# Patient Record
Sex: FEMALE | Race: WHITE | NOT HISPANIC OR LATINO | URBAN - METROPOLITAN AREA
[De-identification: names, ages, dates, MRNs, and addresses within clinical notes are randomized per-mention and may not be internally consistent; named-entity substitution may affect disease eponyms.]

---

## 2017-07-08 ENCOUNTER — INPATIENT (INPATIENT)
Facility: HOSPITAL | Age: 77
LOS: 3 days | Discharge: NURSING, REHAB OR LONG-TERM CARE | End: 2017-07-12
Attending: INTERNAL MEDICINE
Payer: MEDICARE

## 2017-07-08 VITALS
DIASTOLIC BLOOD PRESSURE: 92 MMHG | SYSTOLIC BLOOD PRESSURE: 158 MMHG | WEIGHT: 128.97 LBS | RESPIRATION RATE: 18 BRPM | HEART RATE: 75 BPM | HEIGHT: 66 IN | OXYGEN SATURATION: 100 % | TEMPERATURE: 98 F

## 2017-07-08 DIAGNOSIS — Z96.653 PRESENCE OF ARTIFICIAL KNEE JOINT, BILATERAL: Chronic | ICD-10-CM

## 2017-07-08 DIAGNOSIS — Z98.1 ARTHRODESIS STATUS: Chronic | ICD-10-CM

## 2017-07-08 DIAGNOSIS — Z98.89 OTHER SPECIFIED POSTPROCEDURAL STATES: Chronic | ICD-10-CM

## 2017-07-08 LAB
ABO RH CONFIRMATION: SIGNIFICANT CHANGE UP
ALBUMIN SERPL ELPH-MCNC: 3.5 G/DL — SIGNIFICANT CHANGE UP (ref 3.3–5)
ALP SERPL-CCNC: 51 U/L — SIGNIFICANT CHANGE UP (ref 40–120)
ALT FLD-CCNC: 31 U/L — SIGNIFICANT CHANGE UP (ref 12–78)
ANION GAP SERPL CALC-SCNC: 8 MMOL/L — SIGNIFICANT CHANGE UP (ref 5–17)
APPEARANCE UR: CLEAR — SIGNIFICANT CHANGE UP
APTT BLD: 29.4 SEC — SIGNIFICANT CHANGE UP (ref 27.5–37.4)
AST SERPL-CCNC: 37 U/L — SIGNIFICANT CHANGE UP (ref 15–37)
BACTERIA # UR AUTO: (no result)
BASOPHILS # BLD AUTO: 0.1 K/UL — SIGNIFICANT CHANGE UP (ref 0–0.2)
BASOPHILS NFR BLD AUTO: 2.1 % — HIGH (ref 0–2)
BILIRUB SERPL-MCNC: 0.4 MG/DL — SIGNIFICANT CHANGE UP (ref 0.2–1.2)
BILIRUB UR-MCNC: NEGATIVE — SIGNIFICANT CHANGE UP
BLD GP AB SCN SERPL QL: SIGNIFICANT CHANGE UP
BUN SERPL-MCNC: 17 MG/DL — SIGNIFICANT CHANGE UP (ref 7–23)
CALCIUM SERPL-MCNC: 8.7 MG/DL — SIGNIFICANT CHANGE UP (ref 8.5–10.1)
CHLORIDE SERPL-SCNC: 99 MMOL/L — SIGNIFICANT CHANGE UP (ref 96–108)
CO2 SERPL-SCNC: 26 MMOL/L — SIGNIFICANT CHANGE UP (ref 22–31)
COLOR SPEC: YELLOW — SIGNIFICANT CHANGE UP
CREAT SERPL-MCNC: 0.92 MG/DL — SIGNIFICANT CHANGE UP (ref 0.5–1.3)
DIFF PNL FLD: NEGATIVE — SIGNIFICANT CHANGE UP
EOSINOPHIL # BLD AUTO: 0.2 K/UL — SIGNIFICANT CHANGE UP (ref 0–0.5)
EOSINOPHIL NFR BLD AUTO: 4.1 % — SIGNIFICANT CHANGE UP (ref 0–6)
EPI CELLS # UR: SIGNIFICANT CHANGE UP
GLUCOSE SERPL-MCNC: 89 MG/DL — SIGNIFICANT CHANGE UP (ref 70–99)
GLUCOSE UR QL: NEGATIVE MG/DL — SIGNIFICANT CHANGE UP
HCT VFR BLD CALC: 39.6 % — SIGNIFICANT CHANGE UP (ref 34.5–45)
HGB BLD-MCNC: 13.1 G/DL — SIGNIFICANT CHANGE UP (ref 11.5–15.5)
INR BLD: 0.95 RATIO — SIGNIFICANT CHANGE UP (ref 0.88–1.16)
KETONES UR-MCNC: NEGATIVE — SIGNIFICANT CHANGE UP
LEUKOCYTE ESTERASE UR-ACNC: (no result)
LYMPHOCYTES # BLD AUTO: 1.6 K/UL — SIGNIFICANT CHANGE UP (ref 1–3.3)
LYMPHOCYTES # BLD AUTO: 27.6 % — SIGNIFICANT CHANGE UP (ref 13–44)
MCHC RBC-ENTMCNC: 32.6 PG — SIGNIFICANT CHANGE UP (ref 27–34)
MCHC RBC-ENTMCNC: 33.2 GM/DL — SIGNIFICANT CHANGE UP (ref 32–36)
MCV RBC AUTO: 98.2 FL — SIGNIFICANT CHANGE UP (ref 80–100)
MONOCYTES # BLD AUTO: 0.7 K/UL — SIGNIFICANT CHANGE UP (ref 0–0.9)
MONOCYTES NFR BLD AUTO: 12 % — SIGNIFICANT CHANGE UP (ref 2–14)
NEUTROPHILS # BLD AUTO: 3.2 K/UL — SIGNIFICANT CHANGE UP (ref 1.8–7.4)
NEUTROPHILS NFR BLD AUTO: 54.2 % — SIGNIFICANT CHANGE UP (ref 43–77)
NITRITE UR-MCNC: NEGATIVE — SIGNIFICANT CHANGE UP
PH UR: 7 — SIGNIFICANT CHANGE UP (ref 5–8)
PLATELET # BLD AUTO: 177 K/UL — SIGNIFICANT CHANGE UP (ref 150–400)
POTASSIUM SERPL-MCNC: 4.3 MMOL/L — SIGNIFICANT CHANGE UP (ref 3.5–5.3)
POTASSIUM SERPL-SCNC: 4.3 MMOL/L — SIGNIFICANT CHANGE UP (ref 3.5–5.3)
PROT SERPL-MCNC: 6.9 GM/DL — SIGNIFICANT CHANGE UP (ref 6–8.3)
PROT UR-MCNC: NEGATIVE MG/DL — SIGNIFICANT CHANGE UP
PROTHROM AB SERPL-ACNC: 10.2 SEC — SIGNIFICANT CHANGE UP (ref 9.8–12.7)
RBC # BLD: 4.03 M/UL — SIGNIFICANT CHANGE UP (ref 3.8–5.2)
RBC # FLD: 11.6 % — SIGNIFICANT CHANGE UP (ref 10.3–14.5)
RBC CASTS # UR COMP ASSIST: SIGNIFICANT CHANGE UP /HPF (ref 0–4)
SODIUM SERPL-SCNC: 133 MMOL/L — LOW (ref 135–145)
SP GR SPEC: 1 — LOW (ref 1.01–1.02)
TROPONIN I SERPL-MCNC: <0.015 NG/ML — SIGNIFICANT CHANGE UP (ref 0.01–0.04)
TYPE + AB SCN PNL BLD: SIGNIFICANT CHANGE UP
UROBILINOGEN FLD QL: NEGATIVE MG/DL — SIGNIFICANT CHANGE UP
WBC # BLD: 5.8 K/UL — SIGNIFICANT CHANGE UP (ref 3.8–10.5)
WBC # FLD AUTO: 5.8 K/UL — SIGNIFICANT CHANGE UP (ref 3.8–10.5)
WBC UR QL: SIGNIFICANT CHANGE UP

## 2017-07-08 PROCEDURE — 70450 CT HEAD/BRAIN W/O DYE: CPT | Mod: 26

## 2017-07-08 PROCEDURE — 73552 X-RAY EXAM OF FEMUR 2/>: CPT | Mod: 26

## 2017-07-08 PROCEDURE — 71010: CPT | Mod: 26

## 2017-07-08 PROCEDURE — 99285 EMERGENCY DEPT VISIT HI MDM: CPT

## 2017-07-08 PROCEDURE — 73560 X-RAY EXAM OF KNEE 1 OR 2: CPT | Mod: 26,LT

## 2017-07-08 PROCEDURE — 73610 X-RAY EXAM OF ANKLE: CPT | Mod: 26,RT

## 2017-07-08 PROCEDURE — 72125 CT NECK SPINE W/O DYE: CPT | Mod: 26

## 2017-07-08 PROCEDURE — 93010 ELECTROCARDIOGRAM REPORT: CPT

## 2017-07-08 PROCEDURE — 73501 X-RAY EXAM HIP UNI 1 VIEW: CPT | Mod: 26

## 2017-07-08 RX ORDER — HYDROMORPHONE HYDROCHLORIDE 2 MG/ML
1 INJECTION INTRAMUSCULAR; INTRAVENOUS; SUBCUTANEOUS ONCE
Qty: 0 | Refills: 0 | Status: DISCONTINUED | OUTPATIENT
Start: 2017-07-08 | End: 2017-07-08

## 2017-07-08 RX ORDER — CARVEDILOL PHOSPHATE 80 MG/1
3.12 CAPSULE, EXTENDED RELEASE ORAL EVERY 12 HOURS
Qty: 0 | Refills: 0 | Status: DISCONTINUED | OUTPATIENT
Start: 2017-07-08 | End: 2017-07-09

## 2017-07-08 RX ORDER — MORPHINE SULFATE 50 MG/1
4 CAPSULE, EXTENDED RELEASE ORAL EVERY 4 HOURS
Qty: 0 | Refills: 0 | Status: DISCONTINUED | OUTPATIENT
Start: 2017-07-08 | End: 2017-07-09

## 2017-07-08 RX ORDER — HEPARIN SODIUM 5000 [USP'U]/ML
5000 INJECTION INTRAVENOUS; SUBCUTANEOUS EVERY 8 HOURS
Qty: 0 | Refills: 0 | Status: DISCONTINUED | OUTPATIENT
Start: 2017-07-08 | End: 2017-07-08

## 2017-07-08 RX ORDER — HEPARIN SODIUM 5000 [USP'U]/ML
5000 INJECTION INTRAVENOUS; SUBCUTANEOUS EVERY 8 HOURS
Qty: 0 | Refills: 0 | Status: COMPLETED | OUTPATIENT
Start: 2017-07-08 | End: 2017-07-08

## 2017-07-08 RX ORDER — SODIUM CHLORIDE 9 MG/ML
1000 INJECTION INTRAMUSCULAR; INTRAVENOUS; SUBCUTANEOUS
Qty: 0 | Refills: 0 | Status: DISCONTINUED | OUTPATIENT
Start: 2017-07-08 | End: 2017-07-09

## 2017-07-08 RX ORDER — DOCUSATE SODIUM 100 MG
100 CAPSULE ORAL THREE TIMES A DAY
Qty: 0 | Refills: 0 | Status: DISCONTINUED | OUTPATIENT
Start: 2017-07-08 | End: 2017-07-09

## 2017-07-08 RX ORDER — DULOXETINE HYDROCHLORIDE 30 MG/1
30 CAPSULE, DELAYED RELEASE ORAL DAILY
Qty: 0 | Refills: 0 | Status: DISCONTINUED | OUTPATIENT
Start: 2017-07-08 | End: 2017-07-09

## 2017-07-08 RX ORDER — OXYCODONE HYDROCHLORIDE 5 MG/1
5 TABLET ORAL EVERY 4 HOURS
Qty: 0 | Refills: 0 | Status: DISCONTINUED | OUTPATIENT
Start: 2017-07-08 | End: 2017-07-09

## 2017-07-08 RX ORDER — HYDROXYCHLOROQUINE SULFATE 200 MG
200 TABLET ORAL DAILY
Qty: 0 | Refills: 0 | Status: DISCONTINUED | OUTPATIENT
Start: 2017-07-08 | End: 2017-07-09

## 2017-07-08 RX ORDER — SODIUM CHLORIDE 9 MG/ML
500 INJECTION INTRAMUSCULAR; INTRAVENOUS; SUBCUTANEOUS ONCE
Qty: 0 | Refills: 0 | Status: COMPLETED | OUTPATIENT
Start: 2017-07-08 | End: 2017-07-08

## 2017-07-08 RX ORDER — METOCLOPRAMIDE HCL 10 MG
10 TABLET ORAL EVERY 6 HOURS
Qty: 0 | Refills: 0 | Status: DISCONTINUED | OUTPATIENT
Start: 2017-07-08 | End: 2017-07-09

## 2017-07-08 RX ORDER — ONDANSETRON 8 MG/1
4 TABLET, FILM COATED ORAL ONCE
Qty: 0 | Refills: 0 | Status: COMPLETED | OUTPATIENT
Start: 2017-07-08 | End: 2017-07-08

## 2017-07-08 RX ORDER — OXYCODONE HYDROCHLORIDE 5 MG/1
10 TABLET ORAL EVERY 4 HOURS
Qty: 0 | Refills: 0 | Status: DISCONTINUED | OUTPATIENT
Start: 2017-07-08 | End: 2017-07-09

## 2017-07-08 RX ORDER — SODIUM CHLORIDE 9 MG/ML
1000 INJECTION INTRAMUSCULAR; INTRAVENOUS; SUBCUTANEOUS
Qty: 0 | Refills: 0 | Status: DISCONTINUED | OUTPATIENT
Start: 2017-07-08 | End: 2017-07-08

## 2017-07-08 RX ADMIN — SODIUM CHLORIDE 500 MILLILITER(S): 9 INJECTION INTRAMUSCULAR; INTRAVENOUS; SUBCUTANEOUS at 17:16

## 2017-07-08 RX ADMIN — HYDROMORPHONE HYDROCHLORIDE 1 MILLIGRAM(S): 2 INJECTION INTRAMUSCULAR; INTRAVENOUS; SUBCUTANEOUS at 16:10

## 2017-07-08 RX ADMIN — MORPHINE SULFATE 4 MILLIGRAM(S): 50 CAPSULE, EXTENDED RELEASE ORAL at 22:20

## 2017-07-08 RX ADMIN — HEPARIN SODIUM 5000 UNIT(S): 5000 INJECTION INTRAVENOUS; SUBCUTANEOUS at 22:52

## 2017-07-08 RX ADMIN — HYDROMORPHONE HYDROCHLORIDE 1 MILLIGRAM(S): 2 INJECTION INTRAMUSCULAR; INTRAVENOUS; SUBCUTANEOUS at 18:40

## 2017-07-08 RX ADMIN — MORPHINE SULFATE 4 MILLIGRAM(S): 50 CAPSULE, EXTENDED RELEASE ORAL at 22:06

## 2017-07-08 RX ADMIN — ONDANSETRON 4 MILLIGRAM(S): 8 TABLET, FILM COATED ORAL at 16:10

## 2017-07-08 RX ADMIN — HYDROMORPHONE HYDROCHLORIDE 1 MILLIGRAM(S): 2 INJECTION INTRAMUSCULAR; INTRAVENOUS; SUBCUTANEOUS at 16:40

## 2017-07-08 RX ADMIN — SODIUM CHLORIDE 75 MILLILITER(S): 9 INJECTION INTRAMUSCULAR; INTRAVENOUS; SUBCUTANEOUS at 19:14

## 2017-07-08 RX ADMIN — HYDROMORPHONE HYDROCHLORIDE 1 MILLIGRAM(S): 2 INJECTION INTRAMUSCULAR; INTRAVENOUS; SUBCUTANEOUS at 18:34

## 2017-07-08 RX ADMIN — HYDROMORPHONE HYDROCHLORIDE 1 MILLIGRAM(S): 2 INJECTION INTRAMUSCULAR; INTRAVENOUS; SUBCUTANEOUS at 18:48

## 2017-07-08 NOTE — ED PROVIDER NOTE - OBJECTIVE STATEMENT
70 y/o F w/ mitral valve replacement, OA, RA and bilat knee surgeries 2010 and 2012 presents to ED s/p fall c/o left hip pain. Pt states she was walking on carpet with floppy shoes and fell down hitting her head on a basket.  Pt has no other complaints and denies SOB, CP, fever/chills, n/v/d and HA. Pt takes plavix. 70 y/o F w/ mitral valve replacement, OA, RA and bilat knee surgeries 2010 and 2012 presents to ED s/p fall c/o left hip pain. Pt states she was walking on carpet with floppy shoes and fell down hitting her head on a basket.  Pt has no other complaints and denies SOB, CP, fever/chills, n/v/d and HA. Pt takes Plavix.

## 2017-07-08 NOTE — ED ADULT NURSE REASSESSMENT NOTE - NS ED NURSE REASSESS COMMENT FT1
Pt care received at 1900 from ELIA Abel. Pt resting comfortably in bed. No c/o pain. No acute distress noted at this time. VSS.  at bedside. Awaiting admitted orders.

## 2017-07-08 NOTE — ED ADULT NURSE NOTE - CHPI ED SYMPTOMS NEG
Walk in no loss of consciousness/no fever/no tingling/no numbness/no bleeding/no weakness/no abrasion/no vomiting/no confusion

## 2017-07-08 NOTE — CONSULT NOTE ADULT - SUBJECTIVE AND OBJECTIVE BOX
77y Female community ambulatory presents c/o L hip pain and inability to ambulate sp mechanical fall. Admits to hitting her head but no LOC. Denies numbness/tingling. Denies fever/chills. Denies pain/injury elsewhere.     HEALTH ISSUES - PROBLEM Dx:  Breast Ca (surgery in 1990)  HLD  MV replacement  RA  PSF (2005)  B/l TKA       MEDICATIONS  (STANDING):  sodium chloride 0.9%. 1000 milliLiter(s) IV Continuous <Continuous>  heparin  Injectable 5000 Unit(s) SubCutaneous every 8 hours    Allergies:     No Known Allergies                          13.1   5.8   )-----------( 177      ( 08 Jul 2017 17:14 )             39.6     08 Jul 2017 17:14    133    |  99     |  17     ----------------------------<  89     4.3     |  26     |  0.92     Ca    8.7        08 Jul 2017 17:14    TPro  6.9    /  Alb  3.5    /  TBili  0.4    /  DBili  x      /  AST  37     /  ALT  31     /  AlkPhos  51     08 Jul 2017 17:14    PT/INR - ( 08 Jul 2017 17:14 )   PT: 10.2 sec;   INR: 0.95 ratio         PTT - ( 08 Jul 2017 17:14 )  PTT:29.4 sec  Vital Signs Last 24 Hrs  T(C): 36.8 (07-08-17 @ 15:42), Max: 36.8 (07-08-17 @ 15:42)  T(F): 98.2 (07-08-17 @ 15:42), Max: 98.2 (07-08-17 @ 15:42)  HR: 75 (07-08-17 @ 15:42) (75 - 75)  BP: 158/92 (07-08-17 @ 15:42) (158/92 - 158/92)  BP(mean): --  RR: 18 (07-08-17 @ 15:42) (18 - 18)  SpO2: 100% (07-08-17 @ 15:42) (100% - 100%)    Imaging: XR demonstates displaced L femoral neck fracture  Physical Exam  Gen: NAD, A&Ox3  HEENT: NC/AT, No csp ttp  LLE: skin intact, short/ER leg, unable to SLR R/L LE, + log roll R/L LE, +ttp hip/groin, no ttp elsewhere, +ehl/fhl/ta/gs function, SILT L3-S1, no calf ttp, dp/pt pulse intact, compartments soft  Secondary survey: benign, nv intact, able to SLR contralateral leg, negative log roll contralateral leg, no bony ttp elsewhere    A/P: 77y Female with L femoral neck fx  Pain control  NWB LLE, bedrest  FU labs/preop testing  NPO and hold sqh after midnight  Check vitamin D levels  Ca/Vit D supplementation  Outpt osteoporosis workup  Admit to medical team  Medical clearance/optimization for OR  Plan for OR tomorrow  Discussed with Dr Mcleod, agrees with above plan

## 2017-07-08 NOTE — H&P ADULT - NSHPREVIEWOFSYSTEMS_GEN_ALL_CORE
REVIEW OF SYSTEMS:    CONSTITUTIONAL: No weakness, fevers or chills  EYES/ENT: No visual changes;  No vertigo or throat pain   NECK: No pain or stiffness  RESPIRATORY: No cough, wheezing, hemoptysis; No shortness of breath  CARDIOVASCULAR: No chest pain or palpitations  GASTROINTESTINAL: No abdominal or epigastric pain. No nausea, vomiting, or hematemesis; No diarrhea or constipation. No melena or hematochezia.  GENITOURINARY: No dysuria, frequency or hematuria  NEUROLOGICAL: No numbness  MUSCULOSKELETAL: left hip pain  SKIN: No itching, burning, rashes, or lesions   All other review of systems is negative unless indicated above

## 2017-07-08 NOTE — H&P ADULT - ASSESSMENT
77F with left hip fx s/p [facility] fall.      *RA:  -c/w Plaquenil  -must r/o atlantoaxial disease BEFORE pt can be medically cleared.   -f/u Xrays of cervical A/P, lateral, open mouth and Flex/Ex. If xrays (-) for subluxations pt can be optimized for repair of left hip fx with the following recommendations stated below.    *Fall with Left hip fx  -admit to medicine  -ortho cx  -prn pain meds  -anti-emetics and stool softners prn  -bed rest until post op per Ortho  -npo after midnight  -IVF hydration while NPO  -CT C-spine/head CT (-) for acute findings      *MVR:  -non-metallic valve  -cardiology cx (pt does not require cardiac clearance at this time as pt is low risk for yessenia-operative cardiac complications)  -EKG: NSR      *HTN:  -c/w home meds, coreg and enalapril      *DVT Px:  -heparin sq until midnight, IPCs

## 2017-07-08 NOTE — ED PROVIDER NOTE - MUSCULOSKELETAL, MLM
Spine appears normal, range of motion is not limited, no muscle or joint tenderness +TTP left hip w/ shortness and external rotation, +2DP/PT, +EHL

## 2017-07-08 NOTE — ED PROVIDER NOTE - CARDIAC, MLM
Normal rate, regular rhythm.  Heart sounds S1, S2.  No murmurs, rubs or gallops. +click from valve. Normal rate, regular rhythm.  Heart sounds S1, S2.  No murmurs, rubs or gallops.

## 2017-07-08 NOTE — ED PROVIDER NOTE - PSH
H/O spinal fusion  lumbar 2005  History of bilateral knee replacement  right 2009, left 2011  History of lumpectomy of left breast  with sentinal node biopsy 1990  History of tonsillectomy    S/P bunionectomy  left x 2 2007

## 2017-07-08 NOTE — H&P ADULT - NSHPLABSRESULTS_GEN_ALL_CORE
T(C): 36.8 (17 @ 15:42), Max: 36.8 (17 @ 15:42)  HR: 79 (17 @ 19:15) (75 - 79)  BP: 150/79 (17 @ 19:15) (150/79 - 158/92)  RR: 16 (17 @ 19:15) (16 - 18)  SpO2: 98% (17 @ 19:15) (98% - 100%)  Wt(kg): --    CARDIAC MARKERS ( 2017 17:14 )  <0.015 ng/mL / x     / x     / x     / x                                13.1   5.8   )-----------( 177      ( 2017 17:14 )             39.6     2017 17:14    133    |  99     |  17     ----------------------------<  89     4.3     |  26     |  0.92     Ca    8.7        2017 17:14    TPro  6.9    /  Alb  3.5    /  TBili  0.4    /  DBili  x      /  AST  37     /  ALT  31     /  AlkPhos  51     2017 17:14    PT/INR - ( 2017 17:14 )   PT: 10.2 sec;   INR: 0.95 ratio         PTT - ( 2017 17:14 )  PTT:29.4 sec  CAPILLARY BLOOD GLUCOSE        LIVER FUNCTIONS - ( 2017 17:14 )  Alb: 3.5 g/dL / Pro: 6.9 gm/dL / ALK PHOS: 51 U/L / ALT: 31 U/L / AST: 37 U/L / GGT: x           Urinalysis Basic - ( 2017 18:36 )    Color: Yellow / Appearance: Clear / S.005 / pH: x  Gluc: x / Ketone: Negative  / Bili: Negative / Urobili: Negative mg/dL   Blood: x / Protein: Negative mg/dL / Nitrite: Negative   Leuk Esterase: Small / RBC: 0-2 /HPF / WBC 0-2   Sq Epi: x / Non Sq Epi: Few / Bacteria: x

## 2017-07-08 NOTE — CONSULT NOTE ADULT - ATTENDING COMMENTS
Read agree with above.   No interval change.  Medical clearance appreciated.    A: Left Femoral neck Fx  P:Left Hip Hemiarthroplasty  Bedrest  NPO

## 2017-07-08 NOTE — ED PROVIDER NOTE - PROGRESS NOTE DETAILS
Julieta NICHOLS: SORAYA Garsia attest that this document has been prepared under the direction and in the presence of Dr. Gregory Julieta NICHOLS: Orthopedics paged for intertrochanteric fx on XR, will see pt in ED. spoke to orthopedics pt needs surgery but ate at 2pm today,  will keep npo and iv hydrate. called hospitalist Dr. King.

## 2017-07-08 NOTE — H&P ADULT - HISTORY OF PRESENT ILLNESS
77 F with PMHx of RA, Osteoarthritis, MVR (non-metallic), HTN, Left Br CA s/p lumpectomy and RTX presents after fall and subsequent left hip fx. pt was walking and tripped voer a laundry basket. pt hit her head on the wall and floor, landing on her left side. Denies LOC. Pt was unable to ambulate afterwards,  called EMS. Pt found to have a left hip fx.

## 2017-07-08 NOTE — ED PROVIDER NOTE - PMH
Breast cancer  left 1990 treated with RT and surgery  Chronic pain  secondary to OA and RA  HLD (hyperlipidemia)    Migraine    Mitral valve disease    OA (osteoarthritis)    RA (rheumatoid arthritis)

## 2017-07-09 ENCOUNTER — TRANSCRIPTION ENCOUNTER (OUTPATIENT)
Age: 77
End: 2017-07-09

## 2017-07-09 ENCOUNTER — RESULT REVIEW (OUTPATIENT)
Age: 77
End: 2017-07-09

## 2017-07-09 DIAGNOSIS — M15.9 POLYOSTEOARTHRITIS, UNSPECIFIED: ICD-10-CM

## 2017-07-09 DIAGNOSIS — G43.909 MIGRAINE, UNSPECIFIED, NOT INTRACTABLE, WITHOUT STATUS MIGRAINOSUS: ICD-10-CM

## 2017-07-09 DIAGNOSIS — E78.00 PURE HYPERCHOLESTEROLEMIA, UNSPECIFIED: ICD-10-CM

## 2017-07-09 DIAGNOSIS — M06.9 RHEUMATOID ARTHRITIS, UNSPECIFIED: ICD-10-CM

## 2017-07-09 DIAGNOSIS — Z95.2 PRESENCE OF PROSTHETIC HEART VALVE: ICD-10-CM

## 2017-07-09 DIAGNOSIS — I05.9 RHEUMATIC MITRAL VALVE DISEASE, UNSPECIFIED: ICD-10-CM

## 2017-07-09 DIAGNOSIS — S72.002A FRACTURE OF UNSPECIFIED PART OF NECK OF LEFT FEMUR, INITIAL ENCOUNTER FOR CLOSED FRACTURE: ICD-10-CM

## 2017-07-09 LAB
ANION GAP SERPL CALC-SCNC: 7 MMOL/L — SIGNIFICANT CHANGE UP (ref 5–17)
BUN SERPL-MCNC: 12 MG/DL — SIGNIFICANT CHANGE UP (ref 7–23)
CALCIUM SERPL-MCNC: 8.1 MG/DL — LOW (ref 8.5–10.1)
CHLORIDE SERPL-SCNC: 102 MMOL/L — SIGNIFICANT CHANGE UP (ref 96–108)
CO2 SERPL-SCNC: 27 MMOL/L — SIGNIFICANT CHANGE UP (ref 22–31)
CREAT SERPL-MCNC: 0.55 MG/DL — SIGNIFICANT CHANGE UP (ref 0.5–1.3)
GLUCOSE SERPL-MCNC: 93 MG/DL — SIGNIFICANT CHANGE UP (ref 70–99)
HCT VFR BLD CALC: 37.5 % — SIGNIFICANT CHANGE UP (ref 34.5–45)
HGB BLD-MCNC: 12.5 G/DL — SIGNIFICANT CHANGE UP (ref 11.5–15.5)
INR BLD: 1.03 RATIO — SIGNIFICANT CHANGE UP (ref 0.88–1.16)
MCHC RBC-ENTMCNC: 33.1 PG — SIGNIFICANT CHANGE UP (ref 27–34)
MCHC RBC-ENTMCNC: 33.4 GM/DL — SIGNIFICANT CHANGE UP (ref 32–36)
MCV RBC AUTO: 99.2 FL — SIGNIFICANT CHANGE UP (ref 80–100)
PLATELET # BLD AUTO: 160 K/UL — SIGNIFICANT CHANGE UP (ref 150–400)
POTASSIUM SERPL-MCNC: 4.1 MMOL/L — SIGNIFICANT CHANGE UP (ref 3.5–5.3)
POTASSIUM SERPL-SCNC: 4.1 MMOL/L — SIGNIFICANT CHANGE UP (ref 3.5–5.3)
PROTHROM AB SERPL-ACNC: 11.1 SEC — SIGNIFICANT CHANGE UP (ref 9.8–12.7)
RBC # BLD: 3.78 M/UL — LOW (ref 3.8–5.2)
RBC # FLD: 11.6 % — SIGNIFICANT CHANGE UP (ref 10.3–14.5)
SODIUM SERPL-SCNC: 136 MMOL/L — SIGNIFICANT CHANGE UP (ref 135–145)
WBC # BLD: 6.6 K/UL — SIGNIFICANT CHANGE UP (ref 3.8–10.5)
WBC # FLD AUTO: 6.6 K/UL — SIGNIFICANT CHANGE UP (ref 3.8–10.5)

## 2017-07-09 PROCEDURE — 73522 X-RAY EXAM HIPS BI 3-4 VIEWS: CPT | Mod: 26

## 2017-07-09 PROCEDURE — 88311 DECALCIFY TISSUE: CPT | Mod: 26

## 2017-07-09 PROCEDURE — 88305 TISSUE EXAM BY PATHOLOGIST: CPT | Mod: 26

## 2017-07-09 RX ORDER — CARVEDILOL PHOSPHATE 80 MG/1
3.12 CAPSULE, EXTENDED RELEASE ORAL EVERY 12 HOURS
Qty: 0 | Refills: 0 | Status: DISCONTINUED | OUTPATIENT
Start: 2017-07-09 | End: 2017-07-12

## 2017-07-09 RX ORDER — OXYCODONE HYDROCHLORIDE 5 MG/1
10 TABLET ORAL EVERY 4 HOURS
Qty: 0 | Refills: 0 | Status: DISCONTINUED | OUTPATIENT
Start: 2017-07-09 | End: 2017-07-12

## 2017-07-09 RX ORDER — OXYCODONE HYDROCHLORIDE 5 MG/1
5 TABLET ORAL EVERY 4 HOURS
Qty: 0 | Refills: 0 | Status: DISCONTINUED | OUTPATIENT
Start: 2017-07-09 | End: 2017-07-12

## 2017-07-09 RX ORDER — ONDANSETRON 8 MG/1
4 TABLET, FILM COATED ORAL ONCE
Qty: 0 | Refills: 0 | Status: DISCONTINUED | OUTPATIENT
Start: 2017-07-09 | End: 2017-07-09

## 2017-07-09 RX ORDER — MAGNESIUM HYDROXIDE 400 MG/1
30 TABLET, CHEWABLE ORAL DAILY
Qty: 0 | Refills: 0 | Status: DISCONTINUED | OUTPATIENT
Start: 2017-07-09 | End: 2017-07-12

## 2017-07-09 RX ORDER — HYDROXYCHLOROQUINE SULFATE 200 MG
200 TABLET ORAL DAILY
Qty: 0 | Refills: 0 | Status: DISCONTINUED | OUTPATIENT
Start: 2017-07-09 | End: 2017-07-12

## 2017-07-09 RX ORDER — SODIUM CHLORIDE 9 MG/ML
1000 INJECTION, SOLUTION INTRAVENOUS
Qty: 0 | Refills: 0 | Status: DISCONTINUED | OUTPATIENT
Start: 2017-07-09 | End: 2017-07-12

## 2017-07-09 RX ORDER — MORPHINE SULFATE 50 MG/1
4 CAPSULE, EXTENDED RELEASE ORAL EVERY 4 HOURS
Qty: 0 | Refills: 0 | Status: DISCONTINUED | OUTPATIENT
Start: 2017-07-09 | End: 2017-07-12

## 2017-07-09 RX ORDER — ACETAMINOPHEN 500 MG
650 TABLET ORAL EVERY 6 HOURS
Qty: 0 | Refills: 0 | Status: DISCONTINUED | OUTPATIENT
Start: 2017-07-09 | End: 2017-07-11

## 2017-07-09 RX ORDER — SUMATRIPTAN SUCCINATE 4 MG/.5ML
50 INJECTION, SOLUTION SUBCUTANEOUS EVERY 6 HOURS
Qty: 0 | Refills: 0 | Status: DISCONTINUED | OUTPATIENT
Start: 2017-07-09 | End: 2017-07-12

## 2017-07-09 RX ORDER — DULOXETINE HYDROCHLORIDE 30 MG/1
30 CAPSULE, DELAYED RELEASE ORAL DAILY
Qty: 0 | Refills: 0 | Status: DISCONTINUED | OUTPATIENT
Start: 2017-07-09 | End: 2017-07-12

## 2017-07-09 RX ORDER — HEPARIN SODIUM 5000 [USP'U]/ML
5000 INJECTION INTRAVENOUS; SUBCUTANEOUS EVERY 8 HOURS
Qty: 0 | Refills: 0 | Status: DISCONTINUED | OUTPATIENT
Start: 2017-07-10 | End: 2017-07-10

## 2017-07-09 RX ORDER — OXYCODONE HYDROCHLORIDE 5 MG/1
10 TABLET ORAL EVERY 6 HOURS
Qty: 0 | Refills: 0 | Status: DISCONTINUED | OUTPATIENT
Start: 2017-07-09 | End: 2017-07-09

## 2017-07-09 RX ORDER — SUMATRIPTAN SUCCINATE 4 MG/.5ML
50 INJECTION, SOLUTION SUBCUTANEOUS EVERY 6 HOURS
Qty: 0 | Refills: 0 | Status: DISCONTINUED | OUTPATIENT
Start: 2017-07-09 | End: 2017-07-09

## 2017-07-09 RX ORDER — CEFAZOLIN SODIUM 1 G
2000 VIAL (EA) INJECTION EVERY 8 HOURS
Qty: 0 | Refills: 0 | Status: COMPLETED | OUTPATIENT
Start: 2017-07-09 | End: 2017-07-10

## 2017-07-09 RX ORDER — FENTANYL CITRATE 50 UG/ML
50 INJECTION INTRAVENOUS
Qty: 0 | Refills: 0 | Status: DISCONTINUED | OUTPATIENT
Start: 2017-07-09 | End: 2017-07-09

## 2017-07-09 RX ADMIN — CARVEDILOL PHOSPHATE 3.12 MILLIGRAM(S): 80 CAPSULE, EXTENDED RELEASE ORAL at 17:24

## 2017-07-09 RX ADMIN — Medication 2.5 MILLIGRAM(S): at 05:19

## 2017-07-09 RX ADMIN — SUMATRIPTAN SUCCINATE 50 MILLIGRAM(S): 4 INJECTION, SOLUTION SUBCUTANEOUS at 06:14

## 2017-07-09 RX ADMIN — DULOXETINE HYDROCHLORIDE 30 MILLIGRAM(S): 30 CAPSULE, DELAYED RELEASE ORAL at 17:26

## 2017-07-09 RX ADMIN — MORPHINE SULFATE 4 MILLIGRAM(S): 50 CAPSULE, EXTENDED RELEASE ORAL at 05:30

## 2017-07-09 RX ADMIN — Medication 2.5 MILLIGRAM(S): at 17:25

## 2017-07-09 RX ADMIN — CARVEDILOL PHOSPHATE 3.12 MILLIGRAM(S): 80 CAPSULE, EXTENDED RELEASE ORAL at 05:18

## 2017-07-09 RX ADMIN — SODIUM CHLORIDE 75 MILLILITER(S): 9 INJECTION, SOLUTION INTRAVENOUS at 14:11

## 2017-07-09 RX ADMIN — Medication 200 MILLIGRAM(S): at 17:27

## 2017-07-09 RX ADMIN — SUMATRIPTAN SUCCINATE 50 MILLIGRAM(S): 4 INJECTION, SOLUTION SUBCUTANEOUS at 05:18

## 2017-07-09 RX ADMIN — MORPHINE SULFATE 4 MILLIGRAM(S): 50 CAPSULE, EXTENDED RELEASE ORAL at 05:17

## 2017-07-09 RX ADMIN — Medication 100 MILLIGRAM(S): at 22:07

## 2017-07-09 NOTE — PROGRESS NOTE ADULT - SUBJECTIVE AND OBJECTIVE BOX
Pt S&E. Pain controlled. No acute events overnight    AVSS    Gen: NAD    LLE:  skin intact, no erythema  SILT L2-S1  +EHL/FHL/TA/Gastroc  DP+  Soft compartments, - calf ttp

## 2017-07-09 NOTE — DISCHARGE NOTE ADULT - CARE PROVIDER_API CALL
Rizwan Mcleod (DO), Orthopaedic Surgery  [address] [place] [address]  Phone: [phone]  Fax: [phone]

## 2017-07-09 NOTE — PROGRESS NOTE ADULT - SUBJECTIVE AND OBJECTIVE BOX
Post Op Check    Patient seen and examined. Pain controlled.     Vital Signs Last 24 Hrs  T(C): 36.4 (07-09-17 @ 15:24), Max: 37.1 (07-09-17 @ 10:45)  T(F): 97.6 (07-09-17 @ 15:24), Max: 98.7 (07-09-17 @ 10:45)  HR: 82 (07-09-17 @ 15:24) (72 - 93)  BP: 108/52 (07-09-17 @ 15:24) (108/52 - 160/82)  BP(mean): --  RR: 16 (07-09-17 @ 15:24) (16 - 22)  SpO2: 100% (07-09-17 @ 15:24) (95% - 100%)    Physical Exam  Gen: NAD  LLE:   Dressing c/d/i  +EHL/FHL/Gsc/TA  SILT L3-S1  DP +  Compartments soft  No calf ttp    A/P: 77y Female sp Left Hip Hemiarthroplasty POD 0  Pain control  DVT ppx  PT/OT, WBAT  Posterior hip precautions, use abduction pillow while in bed  FU labs  Ortho stable for DC  D/w attending Post Op Check    Patient seen and examined. Pain controlled.     Vital Signs Last 24 Hrs  T(C): 36.4 (07-09-17 @ 15:24), Max: 37.1 (07-09-17 @ 10:45)  T(F): 97.6 (07-09-17 @ 15:24), Max: 98.7 (07-09-17 @ 10:45)  HR: 82 (07-09-17 @ 15:24) (72 - 93)  BP: 108/52 (07-09-17 @ 15:24) (108/52 - 160/82)  BP(mean): --  RR: 16 (07-09-17 @ 15:24) (16 - 22)  SpO2: 100% (07-09-17 @ 15:24) (95% - 100%)    Physical Exam  Gen: NAD  LLE:   Dressing c/d/i  +EHL/FHL/Gsc/TA  SILT L3-S1  DP +  Compartments soft  No calf ttp    A/P: 77y Female sp Left Hip Hemiarthroplasty POD 0  Pain control  DVT ppx  PT/OT, WBAT  Posterior hip precautions, use abduction pillow while in bed  FU labs  D/w attending

## 2017-07-09 NOTE — DISCHARGE NOTE ADULT - CARE PLAN
Principal Discharge DX:	Closed fracture of left hip, initial encounter  Goal:	Return to ADLs  Instructions for follow-up, activity and diet:	1.	Pain Control  2.	Weight Bearing as Tolerated Left Lower Extremity, with assistive devices (walker/Cane as Needed)  3.	DVT Prophylaxis  4.	PT as needed  5.	Follow up with Dr. Mcleod as Outpatient in 10-14 Days after Discharge from the Hospital or Rehab. Call Office For Appointment.  6.	Staples/Sutures to be removed  Post-Op Day 14, and repeat x-rays  7.	Ice/Elevate affected area as Needed  8.	Keep Dressing Clean and dry.  9. Posterior Hip Precautions - Abduction pillow while in bed Principal Discharge DX:	Closed fracture of left hip, initial encounter  Goal:	Return to ADLs  Instructions for follow-up, activity and diet:	Discharge Instructions  Hip Hemiarthroplasty    1. Diet: Resume previous diet    2. Activity: WBAT. Rolling walker. Posterior Hip Dislocation Precautions. Abduction Pillow while in bed and Chair. Daily Physical Therapy.    3. Call with: fever over 101, wound redness, drainage or open area, calf pain/calf swelling.    4. Wound Care: Remove old and Place new Aquacel bandage to hip wound every 7days. Remove Sutures/Staples Post Op Day #14 (7/23/17) so long as wound is healed, no drainage or open area. OK to Shower with Aquacel.  Avoid direct water beating on bandage.     5. DVT PE Prophylaxis:   Lovenox for 4 weeks or otherwise specified -See Med Rec.    6. Labs: Check H&H weekly while on Anticoagulation    7. Follow Up: Orthopedics, 10-14 days in office. Call to schedule.

## 2017-07-09 NOTE — DISCHARGE NOTE ADULT - PLAN OF CARE
Return to ADLs 1.	Pain Control  2.	Weight Bearing as Tolerated Left Lower Extremity, with assistive devices (walker/Cane as Needed)  3.	DVT Prophylaxis  4.	PT as needed  5.	Follow up with Dr. Mcleod as Outpatient in 10-14 Days after Discharge from the Hospital or Rehab. Call Office For Appointment.  6.	Staples/Sutures to be removed  Post-Op Day 14, and repeat x-rays  7.	Ice/Elevate affected area as Needed  8.	Keep Dressing Clean and dry.  9. Posterior Hip Precautions - Abduction pillow while in bed Discharge Instructions  Hip Hemiarthroplasty    1. Diet: Resume previous diet    2. Activity: WBAT. Rolling walker. Posterior Hip Dislocation Precautions. Abduction Pillow while in bed and Chair. Daily Physical Therapy.    3. Call with: fever over 101, wound redness, drainage or open area, calf pain/calf swelling.    4. Wound Care: Remove old and Place new Aquacel bandage to hip wound every 7days. Remove Sutures/Staples Post Op Day #14 (7/23/17) so long as wound is healed, no drainage or open area. OK to Shower with Aquacel.  Avoid direct water beating on bandage.     5. DVT PE Prophylaxis:   Lovenox for 4 weeks or otherwise specified -See Med Rec.    6. Labs: Check H&H weekly while on Anticoagulation    7. Follow Up: Orthopedics, 10-14 days in office. Call to schedule.

## 2017-07-09 NOTE — DISCHARGE NOTE ADULT - HOSPITAL COURSE
77 F with PMHx of RA, Osteoarthritis, MVR (non-metallic), HTN, Left Br CA s/p lumpectomy and RTX presented to the ER after fall and subsequent left hip fx. Pt was walking and tripped over a laundry basket. She hit her head on the wall and floor, landing on her left side. Denied LOC. Pt was unable to ambulate afterwards,  called EMS. Pt found to have a left hip fx. She was admitted and underwent Left ORIF on 7/9/17. she tolerated the procedure well. remained hemodynamically stable while here. She will be discharged to rehab today.     For physical exam please see progress note from 7/12/17    LABS:                        9.1    6.6   )-----------( 136      ( 12 Jul 2017 05:56 )             27.4     07-12    137  |  102  |  11  ----------------------------<  96  4.0   |  29  |  0.54    Ca    8.1<L>      12 Jul 2017 05:56    CT Head No Cont (07.08.17 @ 16:43) >  IMPRESSION:     No evidence of acute intracranial abnormality.  No evidence of   hemorrhage.    Age-related involutional changes as above.       CT Head No Cont (07.08.17 @ 16:43) >  IMPRESSION:   No vertebral fracture is recognized.   Multilevel   degenerative changes of the cervical spine are present.     Xray Hip w/ Pelvis 1 View, Left (07.09.17 @ 13:38) >  IMPRESSION:    Status post hip replacements in adequate alignment.          Final diagnosis:    1.Mechanical fall with LT hip fracture s/p ORIF  -POD#3  2. Acute blood loss anemia  3. HTN  4. H/o RA    time taken in preparation for dc 65 min  plan d/w patient in detail.   PCP-Dr. Campbell-->Dr. Goncalves following while inpatient.

## 2017-07-09 NOTE — DISCHARGE NOTE ADULT - MEDICATION SUMMARY - MEDICATIONS TO TAKE
I will START or STAY ON the medications listed below when I get home from the hospital:    oxyCODONE 5 mg oral tablet  -- 1 tab(s) by mouth every 4 hours, As needed, moderate pain  -- Indication: For for mild to mod pain    oxyCODONE 10 mg oral tablet  -- 1 tab(s) by mouth every 4 hours, As needed, severe Pain  -- Indication: For for mod to severe pain    SUMAtriptan 50 mg oral tablet  -- 1 tab(s) by mouth every 6 hours, As needed, migraine  -- Indication: For for migraine    CeleBREX 200 mg oral capsule  -- 1 cap(s) by mouth once a day, As Needed for arthritis pain  -- Indication: For Home med    Ecotrin Adult Low Strength 81 mg oral delayed release tablet  -- 1 tab(s) by mouth once a day  -- Indication: For Home med    enalapril 2.5 mg oral tablet  -- 1 tab(s) by mouth 2 times a day  -- Indication: For Home med    enoxaparin  -- 40 milligram(s) subcutaneous once a day  -- Indication: For dvt px for 4 week    Cymbalta 30 mg oral delayed release capsule  -- 1 cap(s) by mouth once a day  -- Indication: For Home med    Plaquenil 200 mg oral tablet  -- 1 tab(s) by mouth once a day  -- Indication: For Home med    Coreg 3.125 mg oral tablet  -- 1 tab(s) by mouth 2 times a day  -- Indication: For Home med    bisacodyl 10 mg rectal suppository  -- 1 suppository(ies) rectally once a day, As needed, Constipation  -- Indication: For Constipation    docusate sodium 100 mg oral capsule  -- 1 cap(s) by mouth 3 times a day  -- Indication: For Constipation    senna 8.6 mg oral tablet  -- 2 tab(s) by mouth once a day (at bedtime)  -- Indication: For Constipation

## 2017-07-09 NOTE — CONSULT NOTE ADULT - SUBJECTIVE AND OBJECTIVE BOX
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  She was walking and tripped over a laundry basket.  She hit her head on the wall and floor, landing on her left side. Denies LOC. Pt was unable to ambulate afterwards,  called EMS.  She was found to have a left hip fx. Prior cardiac cath on 6/10/15 showed normal LV function, severe (4+) MR and normal coronary arteries.        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  S/P bunionectomy: left x 2   History of tonsillectomy  H/O spinal fusion: lumbar   History of bilateral knee replacement: right , left   History of lumpectomy of left breast: with sentinal node biopsy       FAMILY HISTORY:   FAMILY HISTORY:  No pertinent family history in first degree relatives      ALLERGIES:  Allergies  No Known Allergies      REVIEW OF SYSTEMS:    CONSTITUTIONAL: No weakness, fevers or chills  EYES/ENT: No visual changes;  No vertigo or throat pain   NECK: No pain or stiffness  RESPIRATORY: No cough, wheezing, hemoptysis; No shortness of breath  CARDIOVASCULAR: No chest pain or palpitations  GASTROINTESTINAL: No abdominal or epigastric pain. No nausea, vomiting, or hematemesis; No diarrhea or constipation. No melena or hematochezia.  GENITOURINARY: No dysuria, frequency or hematuria  NEUROLOGICAL: No numbness or weakness  SKIN: No itching, burning, rashes, or lesions   All other review of systems is negative unless indicated above    Vital Signs Last 24 Hrs  T(C): 36.8 (2017 05:15), Max: 36.9 (2017 22:02)  T(F): 98.2 (2017 05:15), Max: 98.4 (2017 22:02)  HR: 93 (2017 05:15) (75 - 93)  BP: 155/78 (2017 05:15) (133/77 - 160/82)  BP(mean): --  RR: 18 (2017 05:15) (16 - 18)  SpO2: 95% (2017 05:15) (95% - 100%)    I&O's Summary    2017 07:01  -  2017 07:00  --------------------------------------------------------  IN: 486 mL / OUT: 0 mL / NET: 486 mL          PHYSICAL EXAM:   Constitutional: NAD, awake and alert, well-developed  HEENT: PERR, EOMI, Normal Hearing, MMM  Neck: Soft and supple, No LAD, No JVD  Respiratory: Breath sounds are clear bilaterally, No wheezing, rales or rhonchi  Cardiovascular: S1 and S2, regular rate and rhythm, Crisp bioprosthetic MVR sounds, no gallops or rubs  Gastrointestinal: Bowel Sounds present, soft, nontender, nondistended, no guarding, no rebound  Extremities: No peripheral edema  Vascular: 2+ peripheral pulses  Neurological: A/O x 3, no focal deficits  Skin: No rashes      MEDICATIONS  (STANDING):  sodium chloride 0.9%. 1000 milliLiter(s) (75 mL/Hr) IV Continuous <Continuous>  docusate sodium 100 milliGRAM(s) Oral three times a day  enalapril 2.5 milliGRAM(s) Oral two times a day  DULoxetine 30 milliGRAM(s) Oral daily  hydroxychloroquine 200 milliGRAM(s) Oral daily  carvedilol 3.125 milliGRAM(s) Oral every 12 hours      LABS: All Labs Reviewed:                        12.5   6.6   )-----------( 160      ( 2017 06:35 )             37.5     07-09    136  |  102  |  12  ----------------------------<  93  4.1   |  27  |  0.55    Ca    8.1<L>      2017 06:35    TPro  6.9  /  Alb  3.5  /  TBili  0.4  /  DBili  x   /  AST  37  /  ALT  31  /  AlkPhos  51  07-08    PT/INR - ( 2017 17:14 )   PT: 10.2 sec;   INR: 0.95 ratio         PTT - ( 2017 17:14 )  PTT:29.4 sec  CARDIAC MARKERS ( 2017 17:14 )  <0.015 ng/mL / x     / x     / x     / x          RADIOLOGY:    CT of Cervical Spine: 17:   No vertebral fracture is recognized.   Multilevel degenerative changes of the cervical spine are present.    CT of Head:  17:  No evidence of acute intracranial abnormality.  No evidence of hemorrhage.    Age-related involutional changes as above.      EK17:  NSR, WNL

## 2017-07-09 NOTE — DISCHARGE NOTE ADULT - PATIENT PORTAL LINK FT
“You can access the [facility] Patient Portal, offered by [facility], by registering with the following website: [email or web address]”

## 2017-07-09 NOTE — PROGRESS NOTE ADULT - SUBJECTIVE AND OBJECTIVE BOX
Patient is a 77y old  Female who presents with a chief complaint of fall (2017 20:33)    HPI:  77 F with PMHx of RA, Osteoarthritis, MVR (non-metallic), HTN, Left Br CA s/p lumpectomy and RTX presents after fall and subsequent left hip fx. pt was walking and tripped voer a laundry basket. pt hit her head on the wall and floor, landing on her left side. Denies LOC. Pt was unable to ambulate afterwards,  called EMS. Pt found to have a left hip fx. (2017 20:33)  17- s/p LT hip ORIF    Review of system- All 10 systems reviewed and is as per HPI otherwise negative.     T(C): 36.7 (17 @ 15:11), Max: 37.1 (17 @ 10:45)  HR: 72 (17 @ 15:11) (72 - 93)  BP: 134/61 (17 @ 15:11) (123/63 - 160/82)  RR: 16 (17 @ 15:11) (16 - 22)  SpO2: 100% (17 @ 15:11) (95% - 100%)  Wt(kg): --    LABS:                        12.5   6.6   )-----------( 160      ( 2017 06:35 )             37.5         136  |  102  |  12  ----------------------------<  93  4.1   |  27  |  0.55    Ca    8.1<L>      2017 06:35  TPro  6.9  /  Alb  3.5  /  TBili  0.4  /  DBili  x   /  AST  37  /  ALT  31  /  AlkPhos  51  -08  PT/INR - ( 2017 06:35 )   PT: 11.1 sec;   INR: 1.03 ratio    PTT - ( 2017 17:14 )  PTT:29.4 sec  Urinalysis Basic - ( 2017 18:36 )  Color: Yellow / Appearance: Clear / S.005 / pH: x  Gluc: x / Ketone: Negative  / Bili: Negative / Urobili: Negative mg/dL   Blood: x / Protein: Negative mg/dL / Nitrite: Negative   Leuk Esterase: Small / RBC: 0-2 /HPF / WBC 0-2   Sq Epi: x / Non Sq Epi: Few / Bacteria: x    CARDIAC MARKERS ( 2017 17:14 )  <0.015 ng/mL / x     / x     / x     / x        RADIOLOGY & ADDITIONAL TESTS:    PHYSICAL EXAM:  GENERAL: NAD, well-groomed, well-developed  HEAD:  Atraumatic, Normocephalic  EYES: EOMI, PERRLA, conjunctiva and sclera clear  HEENT: Moist mucous membranes  NECK: Supple, No JVD  NERVOUS SYSTEM:  Alert & Oriented X3, Motor Strength 5/5 B/L upper and lower extremities; DTRs 2+ intact and symmetric  CHEST/LUNG: Clear to auscultation bilaterally; No rales, rhonchi, wheezing, or rubs  HEART: Regular rate and rhythm; No murmurs, rubs, or gallops  ABDOMEN: Soft, Nontender, Nondistended; Bowel sounds present  GENITOURINARY- Voiding, no palpable bladder  EXTREMITIES:  2+ Peripheral Pulses, No clubbing, cyanosis, or edema  MUSCULOSKELTAL- LT hip dressing dry  SKIN-no rash, no lesion  CNS- alert, oriented X3, non focal      morphine  - Injectable 4 milliGRAM(s) IV Push every 4 hours PRN  oxyCODONE    IR 10 milliGRAM(s) Oral every 4 hours PRN  oxyCODONE    IR 5 milliGRAM(s) Oral every 4 hours PRN  enalapril 2.5 milliGRAM(s) Oral two times a day  DULoxetine 30 milliGRAM(s) Oral daily  hydroxychloroquine 200 milliGRAM(s) Oral daily  carvedilol 3.125 milliGRAM(s) Oral every 12 hours  SUMAtriptan 50 milliGRAM(s) Oral every 6 hours PRN  lactated ringers. 1000 milliLiter(s) IV Continuous <Continuous>  acetaminophen   Tablet 650 milliGRAM(s) Oral every 6 hours PRN  ceFAZolin   IVPB 2000 milliGRAM(s) IV Intermittent every 8 hours  aluminum hydroxide/magnesium hydroxide/simethicone Suspension 30 milliLiter(s) Oral four times a day PRN  magnesium hydroxide Suspension 30 milliLiter(s) Oral daily PRN    Assessment/Plan  #LT hip fracture s/p ORIF  Ortho f/u appreciated  PT as tolerated  Pain meds prn  Monitor HH  Bowel regimen  Incentive spirometry  AC consult    #HTN, stable    #Dispo- PT eval

## 2017-07-10 LAB
ANION GAP SERPL CALC-SCNC: 7 MMOL/L — SIGNIFICANT CHANGE UP (ref 5–17)
BASOPHILS # BLD AUTO: 0 K/UL — SIGNIFICANT CHANGE UP (ref 0–0.2)
BASOPHILS NFR BLD AUTO: 0.5 % — SIGNIFICANT CHANGE UP (ref 0–2)
BUN SERPL-MCNC: 18 MG/DL — SIGNIFICANT CHANGE UP (ref 7–23)
CALCIUM SERPL-MCNC: 8.3 MG/DL — LOW (ref 8.5–10.1)
CHLORIDE SERPL-SCNC: 102 MMOL/L — SIGNIFICANT CHANGE UP (ref 96–108)
CO2 SERPL-SCNC: 28 MMOL/L — SIGNIFICANT CHANGE UP (ref 22–31)
CREAT SERPL-MCNC: 0.71 MG/DL — SIGNIFICANT CHANGE UP (ref 0.5–1.3)
EOSINOPHIL # BLD AUTO: 0 K/UL — SIGNIFICANT CHANGE UP (ref 0–0.5)
EOSINOPHIL NFR BLD AUTO: 0.1 % — SIGNIFICANT CHANGE UP (ref 0–6)
GLUCOSE SERPL-MCNC: 127 MG/DL — HIGH (ref 70–99)
HCT VFR BLD CALC: 31.2 % — LOW (ref 34.5–45)
HGB BLD-MCNC: 10.6 G/DL — LOW (ref 11.5–15.5)
LYMPHOCYTES # BLD AUTO: 0.8 K/UL — LOW (ref 1–3.3)
LYMPHOCYTES # BLD AUTO: 7.6 % — LOW (ref 13–44)
MCHC RBC-ENTMCNC: 33.2 PG — SIGNIFICANT CHANGE UP (ref 27–34)
MCHC RBC-ENTMCNC: 34 GM/DL — SIGNIFICANT CHANGE UP (ref 32–36)
MCV RBC AUTO: 97.6 FL — SIGNIFICANT CHANGE UP (ref 80–100)
MONOCYTES # BLD AUTO: 0.8 K/UL — SIGNIFICANT CHANGE UP (ref 0–0.9)
MONOCYTES NFR BLD AUTO: 8.3 % — SIGNIFICANT CHANGE UP (ref 2–14)
NEUTROPHILS # BLD AUTO: 8.3 K/UL — HIGH (ref 1.8–7.4)
NEUTROPHILS NFR BLD AUTO: 83.5 % — HIGH (ref 43–77)
PLATELET # BLD AUTO: 135 K/UL — LOW (ref 150–400)
POTASSIUM SERPL-MCNC: 4.7 MMOL/L — SIGNIFICANT CHANGE UP (ref 3.5–5.3)
POTASSIUM SERPL-SCNC: 4.7 MMOL/L — SIGNIFICANT CHANGE UP (ref 3.5–5.3)
RBC # BLD: 3.19 M/UL — LOW (ref 3.8–5.2)
RBC # FLD: 11.7 % — SIGNIFICANT CHANGE UP (ref 10.3–14.5)
SODIUM SERPL-SCNC: 137 MMOL/L — SIGNIFICANT CHANGE UP (ref 135–145)
WBC # BLD: 9.9 K/UL — SIGNIFICANT CHANGE UP (ref 3.8–10.5)
WBC # FLD AUTO: 9.9 K/UL — SIGNIFICANT CHANGE UP (ref 3.8–10.5)

## 2017-07-10 PROCEDURE — 99223 1ST HOSP IP/OBS HIGH 75: CPT

## 2017-07-10 PROCEDURE — 72052 X-RAY EXAM NECK SPINE 6/>VWS: CPT | Mod: 26

## 2017-07-10 RX ORDER — DOCUSATE SODIUM 100 MG
100 CAPSULE ORAL THREE TIMES A DAY
Qty: 0 | Refills: 0 | Status: DISCONTINUED | OUTPATIENT
Start: 2017-07-10 | End: 2017-07-12

## 2017-07-10 RX ORDER — ONDANSETRON 8 MG/1
4 TABLET, FILM COATED ORAL EVERY 6 HOURS
Qty: 0 | Refills: 0 | Status: DISCONTINUED | OUTPATIENT
Start: 2017-07-10 | End: 2017-07-12

## 2017-07-10 RX ORDER — ENOXAPARIN SODIUM 100 MG/ML
40 INJECTION SUBCUTANEOUS DAILY
Qty: 0 | Refills: 0 | Status: DISCONTINUED | OUTPATIENT
Start: 2017-07-10 | End: 2017-07-12

## 2017-07-10 RX ADMIN — Medication 2.5 MILLIGRAM(S): at 05:49

## 2017-07-10 RX ADMIN — HEPARIN SODIUM 5000 UNIT(S): 5000 INJECTION INTRAVENOUS; SUBCUTANEOUS at 12:37

## 2017-07-10 RX ADMIN — Medication 100 MILLIGRAM(S): at 05:48

## 2017-07-10 RX ADMIN — ENOXAPARIN SODIUM 40 MILLIGRAM(S): 100 INJECTION SUBCUTANEOUS at 21:10

## 2017-07-10 RX ADMIN — HEPARIN SODIUM 5000 UNIT(S): 5000 INJECTION INTRAVENOUS; SUBCUTANEOUS at 05:48

## 2017-07-10 RX ADMIN — OXYCODONE HYDROCHLORIDE 5 MILLIGRAM(S): 5 TABLET ORAL at 18:22

## 2017-07-10 RX ADMIN — CARVEDILOL PHOSPHATE 3.12 MILLIGRAM(S): 80 CAPSULE, EXTENDED RELEASE ORAL at 17:40

## 2017-07-10 RX ADMIN — OXYCODONE HYDROCHLORIDE 10 MILLIGRAM(S): 5 TABLET ORAL at 07:20

## 2017-07-10 RX ADMIN — Medication 2.5 MILLIGRAM(S): at 17:40

## 2017-07-10 RX ADMIN — Medication 200 MILLIGRAM(S): at 12:37

## 2017-07-10 RX ADMIN — ONDANSETRON 4 MILLIGRAM(S): 8 TABLET, FILM COATED ORAL at 18:22

## 2017-07-10 RX ADMIN — Medication 100 MILLIGRAM(S): at 21:10

## 2017-07-10 RX ADMIN — DULOXETINE HYDROCHLORIDE 30 MILLIGRAM(S): 30 CAPSULE, DELAYED RELEASE ORAL at 12:37

## 2017-07-10 RX ADMIN — CARVEDILOL PHOSPHATE 3.12 MILLIGRAM(S): 80 CAPSULE, EXTENDED RELEASE ORAL at 05:50

## 2017-07-10 RX ADMIN — OXYCODONE HYDROCHLORIDE 10 MILLIGRAM(S): 5 TABLET ORAL at 14:21

## 2017-07-10 RX ADMIN — OXYCODONE HYDROCHLORIDE 10 MILLIGRAM(S): 5 TABLET ORAL at 06:35

## 2017-07-10 RX ADMIN — MAGNESIUM HYDROXIDE 30 MILLILITER(S): 400 TABLET, CHEWABLE ORAL at 17:42

## 2017-07-10 NOTE — PROGRESS NOTE ADULT - ATTENDING COMMENTS
Patient doing well  Read and agree with above    A: S/P Left Hip Hemiarthroplasty  P: DC planning  PT - WBAT  Posterior hip precaution  F/U as outpatient in 2 weeks

## 2017-07-10 NOTE — CONSULT NOTE ADULT - ASSESSMENT
7/9/17:  Pt with above history and s/p porcine MVR on 8/31/15 with mechanical fall and resultant left hip fx.  No new cardiac symptoms and otherwise hemodynamically stable and optimized for needed urgent surgery as planned.  Continue as outlined by ortho and medicine etal.  Dr Campbell will follow post-op as needed and as an outpt.
This is a 76 y/o female with PMHx of RA, osteoarthritis, MVR (porcine biosynthetic valve), HTN, Left breast CA s/p lumpectomy and radiation, migraine presents after trip/fall and subsequent left hip fx. Pt is s/p left hip hemiarthroplasty POD #1 yesterday 7/9/17 with abduction pillow in place, pt tolerating post operative pain, left dressing c/d/i.     PLAN:  :: d/c heparin and start lovenox 40 mg DVT ppx tonight x 4 weeks  :: maintain SCDs on right leg  :: encourage ambulation with pain control  :: obtain CBC/BMP for tomorrow    Thank you for the consult. Will continue to follow.

## 2017-07-10 NOTE — PROGRESS NOTE ADULT - SUBJECTIVE AND OBJECTIVE BOX
c/c: Fall    HPI:  77 F with PMHx of RA, Osteoarthritis, MVR (non-metallic), HTN, Left Br CA s/p lumpectomy and RTX presented to the ER after fall and subsequent left hip fx. pt was walking and tripped over a laundry basket. pt hit her head on the wall and floor, landing on her left side. Denied LOC. Pt was unable to ambulate afterwards,  called EMS. Pt found to have a left hip fx. She was admitted and underwent Left ORIF on 17.    7/10: pt seen and examined earlier today. Was feeling well. Was awaiting working with physical therapy. No sob/chest pain/dizziness/lightheadedness. Left hip pain controlled.     Review of system- All 10 systems reviewed and is as per HPI otherwise negative.      Vital Signs Last 24 Hrs  T(C): 36.6 (10 Jul 2017 05:47), Max: 36.7 (2017 14:00)  T(F): 97.9 (10 Jul 2017 05:47), Max: 98 (2017 14:00)  HR: 87 (10 Jul 2017 05:47) (72 - 87)  BP: 142/70 (10 Jul 2017 05:47) (108/52 - 142/70)-  RR: 16 (10 Jul 2017 05:47) (16 - 22)  SpO2: 97% (10 Jul 2017 05:47) (97% - 100%)      PHYSICAL EXAM:  GENERAL: NAD, well-groomed, well-developed  HEAD:  Atraumatic, Normocephalic  EYES: EOMI, PERRLA, conjunctiva and sclera clear  HEENT: Moist mucous membranes  NECK: Supple, No JVD  NERVOUS SYSTEM:  Alert & Oriented X3, non focal.   CHEST/LUNG: Clear to auscultation bilaterally;   HEART: Regular rate and rhythm;  ABDOMEN: Soft, Nontender, Nondistended; Bowel sounds present  GENITOURINARY- Voiding, no palpable bladder  EXTREMITIES:  2+ Peripheral Pulses, No clubbing, cyanosis, or edema  MUSCULOSKELTAL- LT hip dressing dry, appropriate post op left hip tenderness  SKIN-no rash, no lesion    LABS:                        10.6   9.9   )-----------( 135      ( 10 Jul 2017 06:23 )             31.2     07-10    137  |  102  |  18  ----------------------------<  127<H>  4.7   |  28  |  0.71    Ca    8.3<L>      10 Jul 2017 06:23    TPro  6.9  /  Alb  3.5  /  TBili  0.4  /  DBili  x   /  AST  37  /  ALT  31  /  AlkPhos  51  07-08    PT/INR - ( 2017 06:35 )   PT: 11.1 sec;   INR: 1.03 ratio         PTT - ( 2017 17:14 )  PTT:29.4 sec  Urinalysis Basic - ( 2017 18:36 )    Color: Yellow / Appearance: Clear / S.005 / pH: x  Gluc: x / Ketone: Negative  / Bili: Negative / Urobili: Negative mg/dL   Blood: x / Protein: Negative mg/dL / Nitrite: Negative   Leuk Esterase: Small / RBC: 0-2 /HPF / WBC 0-2   Sq Epi: x / Non Sq Epi: Few / Bacteria: x    CARDIAC MARKERS ( 2017 17:14 )  <0.015 ng/mL / x     / x     / x     / x        LABS:                        10.6   9.9   )-----------( 135      ( 10 Jul 2017 06:23 )             31.2     07-10    137  |  102  |  18  ----------------------------<  127<H>  4.7   |  28  |  0.71    Ca    8.3<L>      10 Jul 2017 06:23    TPro  6.9  /  Alb  3.5  /  TBili  0.4  /  DBili  x   /  AST  37  /  ALT  31  /  AlkPhos  51  07-08    PT/INR - ( 2017 06:35 )   PT: 11.1 sec;   INR: 1.03 ratio         PTT - ( 2017 17:14 )  PTT:29.4 sec  Urinalysis Basic - ( 2017 18:36 )    Color: Yellow / Appearance: Clear / S.005 / pH: x  Gluc: x / Ketone: Negative  / Bili: Negative / Urobili: Negative mg/dL   Blood: x / Protein: Negative mg/dL / Nitrite: Negative   Leuk Esterase: Small / RBC: 0-2 /HPF / WBC 0-2   Sq Epi: x / Non Sq Epi: Few / Bacteria: x      CARDIAC MARKERS ( 2017 17:14 )  <0.015 ng/mL / x     / x     / x     / x        MEDICATIONS  (STANDING):  enalapril 2.5 milliGRAM(s) Oral two times a day  DULoxetine 30 milliGRAM(s) Oral daily  hydroxychloroquine 200 milliGRAM(s) Oral daily  carvedilol 3.125 milliGRAM(s) Oral every 12 hours  heparin  Injectable 5000 Unit(s) SubCutaneous every 8 hours  lactated ringers. 1000 milliLiter(s) (75 mL/Hr) IV Continuous <Continuous>    MEDICATIONS  (PRN):  morphine  - Injectable 4 milliGRAM(s) IV Push every 4 hours PRN Severe Pain  oxyCODONE    IR 10 milliGRAM(s) Oral every 4 hours PRN Moderate Pain  oxyCODONE    IR 5 milliGRAM(s) Oral every 4 hours PRN Mild Pain  SUMAtriptan 50 milliGRAM(s) Oral every 6 hours PRN migraine  acetaminophen   Tablet 650 milliGRAM(s) Oral every 6 hours PRN For Temp greater than 38 C (100.4 F)  aluminum hydroxide/magnesium hydroxide/simethicone Suspension 30 milliLiter(s) Oral four times a day PRN Indigestion  magnesium hydroxide Suspension 30 milliLiter(s) Oral daily PRN Constipation        Assessment/Plan    77M, pmh as above a/w:    #Mechanical fall with LT hip fracture s/p ORIF  -pain control  -physical therapy  -incentive spirometry    #HTN  -stable  -continue bb/ace-i    #Rheumatoid arthritis:  -continue plaquenil    #Dispo  - PT eval  -dc planning c/c: Fall    HPI:  77 F with PMHx of RA, Osteoarthritis, MVR (non-metallic), HTN, Left Br CA s/p lumpectomy and RTX presented to the ER after fall and subsequent left hip fx. pt was walking and tripped over a laundry basket. pt hit her head on the wall and floor, landing on her left side. Denied LOC. Pt was unable to ambulate afterwards,  called EMS. Pt found to have a left hip fx. She was admitted and underwent Left ORIF on 17.    7/10: pt seen and examined earlier today. Was feeling well. Was awaiting working with physical therapy. No sob/chest pain/dizziness/lightheadedness. Left hip pain controlled.     Review of system- All 10 systems reviewed and is as per HPI otherwise negative.      Vital Signs Last 24 Hrs  T(C): 36.6 (10 Jul 2017 05:47), Max: 36.7 (2017 14:00)  T(F): 97.9 (10 Jul 2017 05:47), Max: 98 (2017 14:00)  HR: 87 (10 Jul 2017 05:47) (72 - 87)  BP: 142/70 (10 Jul 2017 05:47) (108/52 - 142/70)-  RR: 16 (10 Jul 2017 05:47) (16 - 22)  SpO2: 97% (10 Jul 2017 05:47) (97% - 100%)      PHYSICAL EXAM:  GENERAL: NAD, well-groomed, well-developed  HEAD:  Atraumatic, Normocephalic  EYES: EOMI, PERRLA, conjunctiva and sclera clear  HEENT: Moist mucous membranes  NECK: Supple, No JVD  NERVOUS SYSTEM:  Alert & Oriented X3, non focal.   CHEST/LUNG: Clear to auscultation bilaterally;   HEART: Regular rate and rhythm;  ABDOMEN: Soft, Nontender, Nondistended; Bowel sounds present  GENITOURINARY- Voiding, no palpable bladder  EXTREMITIES:  2+ Peripheral Pulses, No clubbing, cyanosis, or edema  MUSCULOSKELTAL- LT hip dressing dry, appropriate post op left hip tenderness  SKIN-no rash, no lesion    LABS:                        10.6   9.9   )-----------( 135      ( 10 Jul 2017 06:23 )             31.2     07-10    137  |  102  |  18  ----------------------------<  127<H>  4.7   |  28  |  0.71    Ca    8.3<L>      10 Jul 2017 06:23    TPro  6.9  /  Alb  3.5  /  TBili  0.4  /  DBili  x   /  AST  37  /  ALT  31  /  AlkPhos  51  07-08    PT/INR - ( 2017 06:35 )   PT: 11.1 sec;   INR: 1.03 ratio         PTT - ( 2017 17:14 )  PTT:29.4 sec  Urinalysis Basic - ( 2017 18:36 )    Color: Yellow / Appearance: Clear / S.005 / pH: x  Gluc: x / Ketone: Negative  / Bili: Negative / Urobili: Negative mg/dL   Blood: x / Protein: Negative mg/dL / Nitrite: Negative   Leuk Esterase: Small / RBC: 0-2 /HPF / WBC 0-2   Sq Epi: x / Non Sq Epi: Few / Bacteria: x    CARDIAC MARKERS ( 2017 17:14 )  <0.015 ng/mL / x     / x     / x     / x        LABS:                        10.6   9.9   )-----------( 135      ( 10 Jul 2017 06:23 )             31.2     07-10    137  |  102  |  18  ----------------------------<  127<H>  4.7   |  28  |  0.71    Ca    8.3<L>      10 Jul 2017 06:23    TPro  6.9  /  Alb  3.5  /  TBili  0.4  /  DBili  x   /  AST  37  /  ALT  31  /  AlkPhos  51  07-08    PT/INR - ( 2017 06:35 )   PT: 11.1 sec;   INR: 1.03 ratio         PTT - ( 2017 17:14 )  PTT:29.4 sec  Urinalysis Basic - ( 2017 18:36 )    Color: Yellow / Appearance: Clear / S.005 / pH: x  Gluc: x / Ketone: Negative  / Bili: Negative / Urobili: Negative mg/dL   Blood: x / Protein: Negative mg/dL / Nitrite: Negative   Leuk Esterase: Small / RBC: 0-2 /HPF / WBC 0-2   Sq Epi: x / Non Sq Epi: Few / Bacteria: x      CARDIAC MARKERS ( 2017 17:14 )  <0.015 ng/mL / x     / x     / x     / x        MEDICATIONS  (STANDING):  enalapril 2.5 milliGRAM(s) Oral two times a day  DULoxetine 30 milliGRAM(s) Oral daily  hydroxychloroquine 200 milliGRAM(s) Oral daily  carvedilol 3.125 milliGRAM(s) Oral every 12 hours  heparin  Injectable 5000 Unit(s) SubCutaneous every 8 hours  lactated ringers. 1000 milliLiter(s) (75 mL/Hr) IV Continuous <Continuous>    MEDICATIONS  (PRN):  morphine  - Injectable 4 milliGRAM(s) IV Push every 4 hours PRN Severe Pain  oxyCODONE    IR 10 milliGRAM(s) Oral every 4 hours PRN Moderate Pain  oxyCODONE    IR 5 milliGRAM(s) Oral every 4 hours PRN Mild Pain  SUMAtriptan 50 milliGRAM(s) Oral every 6 hours PRN migraine  acetaminophen   Tablet 650 milliGRAM(s) Oral every 6 hours PRN For Temp greater than 38 C (100.4 F)  aluminum hydroxide/magnesium hydroxide/simethicone Suspension 30 milliLiter(s) Oral four times a day PRN Indigestion  magnesium hydroxide Suspension 30 milliLiter(s) Oral daily PRN Constipation        Assessment/Plan    77M, pmh as above a/w:    #Mechanical fall with LT hip fracture s/p ORIF  -pain control  -physical therapy  -incentive spirometry    #HTN  -stable  -continue bb/ace-i    #Rheumatoid arthritis:  -continue plaquenil    #Acute blood loss anemia:  -due to OR  -no need for transfusion at this time.       #Dispo  - PT eval  -dc planning

## 2017-07-10 NOTE — CONSULT NOTE ADULT - CONSULT REASON
DVT/PE prophylaxis, risk stratification, and anticoagulation managment DVT/PE prophylaxis, risk stratification, and anticoagulation management

## 2017-07-10 NOTE — PHYSICAL THERAPY INITIAL EVALUATION ADULT - LIVES WITH, PROFILE
spouse/Pt lives in a 2 story home, 2 [address] no rail, 15 steps to second story with rail going up on left.

## 2017-07-10 NOTE — PHYSICAL THERAPY INITIAL EVALUATION ADULT - GENERAL OBSERVATIONS, REHAB EVAL
Pt rec'd supine in bed with adduction pillow secured, pt pleasant and cooperative, eager to participate with PT. Pt reports moderate left hip pain with movement,

## 2017-07-10 NOTE — PROGRESS NOTE ADULT - SUBJECTIVE AND OBJECTIVE BOX
Patient seen and examined. Pain controlled. No acute events overnight    Vital Signs Last 24 Hrs  T(C): 36.6 (07-10-17 @ 05:47), Max: 37.1 (07-09-17 @ 10:45)  T(F): 97.9 (07-10-17 @ 05:47), Max: 98.7 (07-09-17 @ 10:45)  HR: 87 (07-10-17 @ 05:47) (72 - 91)  BP: 142/70 (07-10-17 @ 05:47) (108/52 - 152/75)  BP(mean): --  RR: 16 (07-10-17 @ 05:47) (16 - 22)  SpO2: 97% (07-10-17 @ 05:47) (97% - 100%)    Physical Exam  Gen: NAD  LLE:   Dressing c/d/i  +EHL/FHL/Gsc/TA  SILT L3-S1  DP +  Compartments soft  No calf ttp    A/P: 77y Female sp L hemiarthroplasty POD 1  Pain control  DVT ppx  PT/OT, WBAT LLE   FU labs  Dispo planning  D/w Dr Mcleod

## 2017-07-10 NOTE — CONSULT NOTE ADULT - SUBJECTIVE AND OBJECTIVE BOX
HPI  HPI:  77 F with PMHx of RA, Osteoarthritis, MVR (non-metallic), HTN, Left Br CA s/p lumpectomy and RTX presents after fall and subsequent left hip fx. pt was walking and tripped voer a laundry basket. pt hit her head on the wall and floor, landing on her left side. Denies LOC. Pt was unable to ambulate afterwards,  called EMS. Pt found to have a left hip fx. (08 Jul 2017 20:33)      Patient is a 77y old  Female who presents with a chief complaint of fall (09 Jul 2017 20:40)      Consulted by            for VTE prophylaxis, risk stratification, and anticoagulation management.    PAST MEDICAL & SURGICAL HISTORY:  Chronic pain: secondary to OA and RA  Breast cancer: left 1990 treated with RT and surgery  HLD (hyperlipidemia)  Migraine  OA (osteoarthritis)  RA (rheumatoid arthritis)  Mitral valve disease  S/P bunionectomy: left x 2 2007  History of tonsillectomy  H/O spinal fusion: lumbar 2005  History of bilateral knee replacement: right 2009, left 2011  History of lumpectomy of left breast: with sentinal node biopsy 1990          BMI:    CrCL:    Caprini VTE Risk Score:    IMPROVE VTE Risk Score:    [id number] Score:     IMPROVE Bleeding Risk Score    Falls Risk:   High (  )  Mod (  )  Low (  )    EBL:   ml      FAMILY HISTORY:  No pertinent family history in first degree relatives    Denies any personal or familial history of clotting or bleeding disorders.    Allergies    No Known Allergies    Intolerances        REVIEW OF SYSTEMS    (  )Fever	     (  )Constipation	(  )SOB			(  )Headache	(  )Dysuria  (  )Chills	     (  )Melena	(  )Dyspnea present on exertion    (  )Dizziness                    (  )Polyuria  (  )Nausea	     (  )Hematochezia	(  )Cough		                    (  )Syncope   	(  )Hematuria  (  )Vomiting    (  )Chest Pain	(  )Wheezing		(  )Weakness  (  )Diarrhea     (  )Palpitations	(  )Anorexia		(  )Myalgia    All other review of systems negative: Yes    Unable to obtain review of systems due to:      PHYSICAL EXAM:    Constitutional: Appears Well    Neurological: A& O x 3    Skin: Warm    Respiratory and Thorax: normal effort; Breath sounds: normal; No rales/wheezing/rhonchi  	  Cardiovascular: S1, S2, regular, NMBR	    Gastrointestinal: BS + x 4Q, nontender	    Genitourinary:  Bladder nondistended, nontender    Musculoskeletal:   General Right:   no muscle/joint tenderness,   normal tone, no joint swelling,   ROM: limited/full	    General Left:   no muscle/joint tenderness,   normal tone, no joint swelling,   ROM: limited/full    Hip:  Right: Dressing CDI; Drain: hemovac ; Type of drng.: serosang.; Amt. of drng: small            Left: Dressing CDI; Drain: hemovac ; Type of drng.: serosang.; Amt. of drng: small      Knee:  Right: Incision: ; Dressing CDI; Drain: hemovac ; Type of drng.: serosang.; Amt. of drng: small               Left: Incision: ; Dressing CDI; Drain: hemovac ; Type of drng.: serosang.; Amt. of drng: small    Shoulder:  Rt: Drsing CDI; Sling/immob. noted; Cap refill good; Radial pulse +; Sensation intact                     Lt: Drsing CDI; Sling/immob. noted; Cap refill good; Radial pulse +; Sensation intact    Lower extrems:   Right: no calf tenderness              negative antony's sign               + pedal pulses    Left:   no calf tenderness              negative antony's sign               + pedal pulses                          10.6   9.9   )-----------( 135      ( 10 Jul 2017 06:23 )             31.2       07-10    137  |  102  |  18  ----------------------------<  127<H>  4.7   |  28  |  0.71    Ca    8.3<L>      10 Jul 2017 06:23    TPro  6.9  /  Alb  3.5  /  TBili  0.4  /  DBili  x   /  AST  37  /  ALT  31  /  AlkPhos  51  07-08      PT/INR - ( 09 Jul 2017 06:35 )   PT: 11.1 sec;   INR: 1.03 ratio         PTT - ( 08 Jul 2017 17:14 )  PTT:29.4 sec				    MEDICATIONS  (STANDING):  enalapril 2.5 milliGRAM(s) Oral two times a day  DULoxetine 30 milliGRAM(s) Oral daily  hydroxychloroquine 200 milliGRAM(s) Oral daily  carvedilol 3.125 milliGRAM(s) Oral every 12 hours  heparin  Injectable 5000 Unit(s) SubCutaneous every 8 hours  lactated ringers. 1000 milliLiter(s) IV Continuous <Continuous>      Vital Signs Last 24 Hrs  T(C): 36.6 (10 Jul 2017 05:47), Max: 36.7 (09 Jul 2017 15:11)  T(F): 97.9 (10 Jul 2017 05:47), Max: 98 (09 Jul 2017 15:11)  HR: 87 (10 Jul 2017 05:47) (72 - 87)  BP: 142/70 (10 Jul 2017 05:47) (108/52 - 142/70)  BP(mean): --  RR: 16 (10 Jul 2017 05:47) (16 - 16)  SpO2: 97% (10 Jul 2017 05:47) (97% - 100%)    DVT Prophylaxis:  LMWH                   (  )  Heparin SQ           (  )  Coumadin             (  )  Xarelto                  (  )  Eliquis                   (  )  Venodynes           (  )  Ambulation          (  )  UFH                       (  )  Contraindicated  (  ) HPI:  77 F with PMHx of RA, Osteoarthritis, MVR (non-metallic), HTN, Left Br CA s/p lumpectomy and RTX presents after fall and subsequent left hip fx. pt was walking and tripped voer a laundry basket. pt hit her head on the wall and floor, landing on her left side. Denies LOC. Pt was unable to ambulate afterwards,  called EMS. Pt found to have a left hip fx. (08 Jul 2017 20:33)    Consulted by Dr. Mcleod for VTE prophylaxis, risk stratification, and anticoagulation management.    PAST MEDICAL & SURGICAL HISTORY:  Chronic pain: secondary to OA and RA  Breast cancer: left 1990 treated with RT and surgery  HLD (hyperlipidemia)  Migraine  OA (osteoarthritis)  RA (rheumatoid arthritis)  Mitral valve disease  S/P bunionectomy: left x 2 2007  History of tonsillectomy  H/O spinal fusion: lumbar 2005  History of bilateral knee replacement: right 2009, left 2011  History of lumpectomy of left breast: with sentinal node biopsy 1990      BMI: 20.8    CrCL: 60.31    Caprini VTE Risk Score: 14  IMPROVE Bleeding Risk Score: 5.5    Falls Risk:   High ( X )  Mod (  )  Low (  )    7/10/17: Pt seen at bedside out of bed in chair with abduction pillow, in no distress. Discussed her of starting lovenox tonight and continuing for 4 weeks. Also informed her that blood work will be drawn once after being discharged to ensure her H/H stable. Pt comments that she hasn't had a bowel movement and feels constipated.     EBL: 100 ml      FAMILY HISTORY:  No pertinent family history in first degree relatives  Denies any personal or familial history of clotting or bleeding disorders.    Allergies  No Known Allergies  Intolerances      REVIEW OF SYSTEMS    (  )Fever	     ( X )Constipation	(  )SOB			(  )Headache	(  )Dysuria  (  )Chills	     (  )Melena	(  )Dyspnea present on exertion    (  )Dizziness                    (  )Polyuria  (  )Nausea	     (  )Hematochezia	(  )Cough		                    (  )Syncope   	(  )Hematuria  (  )Vomiting    (  )Chest Pain	(  )Wheezing		(  )Weakness  (  )Diarrhea     (  )Palpitations	(  )Anorexia		(  )Myalgia    All other review of systems negative: Yes      PHYSICAL EXAM:  Constitutional: Appears Well, no distress  Neurological: A&O x 3  Skin: Warm  Respiratory and Thorax: normal effort; Breath sounds: normal; No rales/wheezing/rhonchi  Cardiovascular: S1, S2, regular, no gallops or rubs (s/p bioprosthetic MVR)	  Gastrointestinal: firm, mildly distended, hypoactive bowel sounds  Genitourinary:  Bladder nondistended, nontender  Musculoskeletal:   General Right:   no muscle/joint tenderness,   normal tone, no joint swelling,   ROM: full	    General Left:   + muscle/joint tenderness,   normal tone, no joint swelling,   ROM: limited    Hip: Left: Dressing CDI    Lower extremities:   Right: no calf tenderness           negative antony's sign            + pedal pulses    Left:   no calf tenderness           negative antony's sign            + pedal pulses                          10.6   9.9   )-----------( 135      ( 10 Jul 2017 06:23 )             31.2       07-10    137  |  102  |  18  ----------------------------<  127<H>  4.7   |  28  |  0.71    Ca    8.3<L>      10 Jul 2017 06:23    TPro  6.9  /  Alb  3.5  /  TBili  0.4  /  DBili  x   /  AST  37  /  ALT  31  /  AlkPhos  51  07-08    PT/INR - ( 09 Jul 2017 06:35 )   PT: 11.1 sec;   INR: 1.03 ratio    PTT - ( 08 Jul 2017 17:14 )  PTT:29.4 sec				    MEDICATIONS  (STANDING):  enalapril 2.5 milliGRAM(s) Oral two times a day  DULoxetine 30 milliGRAM(s) Oral daily  hydroxychloroquine 200 milliGRAM(s) Oral daily  carvedilol 3.125 milliGRAM(s) Oral every 12 hours  lactated ringers. 1000 milliLiter(s) (75 mL/Hr) IV Continuous <Continuous>  enoxaparin Injectable 40 milliGRAM(s) SubCutaneous daily    Vital Signs Last 24 Hrs  T(C): 36.6 (07-10-17 @ 05:47), Max: 36.6 (07-10-17 @ 05:47)  T(F): 97.9 (07-10-17 @ 05:47), Max: 97.9 (07-10-17 @ 05:47)  HR: 87 (07-10-17 @ 05:47) (80 - 87)  BP: 142/70 (07-10-17 @ 05:47) (115/56 - 142/70)  BP(mean): --  RR: 16 (07-10-17 @ 05:47) (16 - 16)  SpO2: 97% (07-10-17 @ 05:47) (97% - 97%)    DVT Prophylaxis:  LMWH                   ( X )  Heparin SQ           (  )  Coumadin             (  )  Xarelto                  (  )  Eliquis                   (  )  Venodynes           ( X )  Ambulation          ( X )  UFH                       (  )  Contraindicated  (  )

## 2017-07-11 LAB
ANION GAP SERPL CALC-SCNC: 5 MMOL/L — SIGNIFICANT CHANGE UP (ref 5–17)
BUN SERPL-MCNC: 16 MG/DL — SIGNIFICANT CHANGE UP (ref 7–23)
CALCIUM SERPL-MCNC: 7.8 MG/DL — LOW (ref 8.5–10.1)
CHLORIDE SERPL-SCNC: 101 MMOL/L — SIGNIFICANT CHANGE UP (ref 96–108)
CO2 SERPL-SCNC: 31 MMOL/L — SIGNIFICANT CHANGE UP (ref 22–31)
CREAT SERPL-MCNC: 0.65 MG/DL — SIGNIFICANT CHANGE UP (ref 0.5–1.3)
GLUCOSE SERPL-MCNC: 99 MG/DL — SIGNIFICANT CHANGE UP (ref 70–99)
HCT VFR BLD CALC: 28.7 % — LOW (ref 34.5–45)
HGB BLD-MCNC: 9.7 G/DL — LOW (ref 11.5–15.5)
MCHC RBC-ENTMCNC: 33.4 PG — SIGNIFICANT CHANGE UP (ref 27–34)
MCHC RBC-ENTMCNC: 33.8 GM/DL — SIGNIFICANT CHANGE UP (ref 32–36)
MCV RBC AUTO: 98.7 FL — SIGNIFICANT CHANGE UP (ref 80–100)
PLATELET # BLD AUTO: 128 K/UL — LOW (ref 150–400)
POTASSIUM SERPL-MCNC: 4.4 MMOL/L — SIGNIFICANT CHANGE UP (ref 3.5–5.3)
POTASSIUM SERPL-SCNC: 4.4 MMOL/L — SIGNIFICANT CHANGE UP (ref 3.5–5.3)
RBC # BLD: 2.91 M/UL — LOW (ref 3.8–5.2)
RBC # FLD: 12 % — SIGNIFICANT CHANGE UP (ref 10.3–14.5)
SODIUM SERPL-SCNC: 137 MMOL/L — SIGNIFICANT CHANGE UP (ref 135–145)
WBC # BLD: 8.1 K/UL — SIGNIFICANT CHANGE UP (ref 3.8–10.5)
WBC # FLD AUTO: 8.1 K/UL — SIGNIFICANT CHANGE UP (ref 3.8–10.5)

## 2017-07-11 PROCEDURE — 99233 SBSQ HOSP IP/OBS HIGH 50: CPT

## 2017-07-11 RX ORDER — ACETAMINOPHEN 500 MG
1000 TABLET ORAL ONCE
Qty: 0 | Refills: 0 | Status: COMPLETED | OUTPATIENT
Start: 2017-07-11 | End: 2017-07-11

## 2017-07-11 RX ADMIN — OXYCODONE HYDROCHLORIDE 10 MILLIGRAM(S): 5 TABLET ORAL at 06:43

## 2017-07-11 RX ADMIN — Medication 100 MILLIGRAM(S): at 05:54

## 2017-07-11 RX ADMIN — Medication 200 MILLIGRAM(S): at 11:04

## 2017-07-11 RX ADMIN — Medication 400 MILLIGRAM(S): at 10:57

## 2017-07-11 RX ADMIN — OXYCODONE HYDROCHLORIDE 10 MILLIGRAM(S): 5 TABLET ORAL at 13:03

## 2017-07-11 RX ADMIN — OXYCODONE HYDROCHLORIDE 10 MILLIGRAM(S): 5 TABLET ORAL at 05:58

## 2017-07-11 RX ADMIN — Medication 2.5 MILLIGRAM(S): at 05:54

## 2017-07-11 RX ADMIN — DULOXETINE HYDROCHLORIDE 30 MILLIGRAM(S): 30 CAPSULE, DELAYED RELEASE ORAL at 11:03

## 2017-07-11 RX ADMIN — ENOXAPARIN SODIUM 40 MILLIGRAM(S): 100 INJECTION SUBCUTANEOUS at 11:03

## 2017-07-11 RX ADMIN — CARVEDILOL PHOSPHATE 3.12 MILLIGRAM(S): 80 CAPSULE, EXTENDED RELEASE ORAL at 05:54

## 2017-07-11 RX ADMIN — Medication 100 MILLIGRAM(S): at 13:02

## 2017-07-11 NOTE — PROGRESS NOTE ADULT - SUBJECTIVE AND OBJECTIVE BOX
HPI:  77 F with PMHx of RA, Osteoarthritis, MVR (non-metallic), HTN, Left Br CA s/p lumpectomy and RTX presents after fall and subsequent left hip fx. pt was walking and tripped voer a laundry basket. pt hit her head on the wall and floor, landing on her left side. Denies LOC. Pt was unable to ambulate afterwards,  called EMS. Pt found to have a left hip fx. (08 Jul 2017 20:33)    Consulted by Dr. Mcleod for VTE prophylaxis, risk stratification, and anticoagulation management.    PAST MEDICAL & SURGICAL HISTORY:  Chronic pain: secondary to OA and RA  Breast cancer: left 1990 treated with RT and surgery  HLD (hyperlipidemia)  Migraine  OA (osteoarthritis)  RA (rheumatoid arthritis)  Mitral valve disease  S/P bunionectomy: left x 2 2007  History of tonsillectomy  H/O spinal fusion: lumbar 2005  History of bilateral knee replacement: right 2009, left 2011  History of lumpectomy of left breast: with sentinal node biopsy 1990      BMI: 20.8    CrCL: 60.31    Caprini VTE Risk Score: 14  IMPROVE Bleeding Risk Score: 5.5    Falls Risk:   High ( X )  Mod (  )  Low (  )    7/10/17: Pt seen at bedside out of bed in chair with abduction pillow, in no distress. Discussed her of starting lovenox tonight and continuing for 4 weeks. Also informed her that blood work will be drawn once after being discharged to ensure her H/H stable. Pt comments that she hasn't had a bowel movement and feels constipated.   7-11-17 pt seen at bedside oob in chair.  no concerns about her anticoagulation with Lovenox States she may need to go to rehab states her  is ill "his heart valve is failing".     EBL: 100 ml      FAMILY HISTORY:  No pertinent family history in first degree relatives  Denies any personal or familial history of clotting or bleeding disorders.    Allergies  No Known Allergies  Intolerances      REVIEW OF SYSTEMS    (  )Fever	     ( X )Constipation	(  )SOB			(  )Headache	(  )Dysuria  (  )Chills	     (  )Melena	(  )Dyspnea present on exertion    (  )Dizziness                    (  )Polyuria  (  )Nausea	     (  )Hematochezia	(  )Cough		                    (  )Syncope   	(  )Hematuria  (  )Vomiting    (  )Chest Pain	(  )Wheezing		(  )Weakness  (  )Diarrhea     (  )Palpitations	(  )Anorexia		(  )Myalgia    All other review of systems negative: Yes      PHYSICAL EXAM:  Constitutional: Appears Well, no distress  Neurological: A&O x 3  Skin: Warm  Respiratory and Thorax: normal effort; Breath sounds: normal; No rales/wheezing/rhonchi  Cardiovascular: S1, S2, regular, no gallops or rubs (s/p bioprosthetic MVR)	  Gastrointestinal: firm, mildly distended, hypoactive bowel sounds  Genitourinary:  Bladder nondistended, nontender  Musculoskeletal:   General Right:   no muscle/joint tenderness,   normal tone, no joint swelling,   ROM: full	    General Left:   + muscle/joint tenderness,   normal tone, no joint swelling,   ROM: limited    Hip: Left: Dressing CDI    Lower extremities:   Right: no calf tenderness           negative antony's sign            + pedal pulses    Left:   no calf tenderness           negative antony's sign            + pedal pulses                        9.7    8.1   )-----------( 128      ( 11 Jul 2017 06:11 )             28.7       07-11    137  |  101  |  16  ----------------------------<  99  4.4   |  31  |  0.65    Ca    7.8<L>      11 Jul 2017 06:11                            10.6   9.9   )-----------( 135      ( 10 Jul 2017 06:23 )             31.2       07-10    137  |  102  |  18  ----------------------------<  127<H>  4.7   |  28  |  0.71    Ca    8.3<L>      10 Jul 2017 06:23    TPro  6.9  /  Alb  3.5  /  TBili  0.4  /  DBili  x   /  AST  37  /  ALT  31  /  AlkPhos  51  07-08    PT/INR - ( 09 Jul 2017 06:35 )   PT: 11.1 sec;   INR: 1.03 ratio    PTT - ( 08 Jul 2017 17:14 )  PTT:29.4 sec				    MEDICATIONS  (STANDING):  enalapril 2.5 milliGRAM(s) Oral two times a day  DULoxetine 30 milliGRAM(s) Oral daily  hydroxychloroquine 200 milliGRAM(s) Oral daily  carvedilol 3.125 milliGRAM(s) Oral every 12 hours  lactated ringers. 1000 milliLiter(s) (75 mL/Hr) IV Continuous <Continuous>  enoxaparin Injectable 40 milliGRAM(s) SubCutaneous daily    Vital Signs Last 24 Hrs  T(C): 37.1 (07-11-17 @ 05:51), Max: 37.1 (07-11-17 @ 05:51)  T(F): 98.7 (07-11-17 @ 05:51), Max: 98.7 (07-11-17 @ 05:51)  HR: 78 (07-11-17 @ 05:51) (78 - 86)  BP: 135/67 (07-11-17 @ 05:51) (108/54 - 135/67)  BP(mean): --  RR: 16 (07-11-17 @ 05:51) (16 - 16)  SpO2: 96% (07-11-17 @ 05:51) (96% - 98%)    DVT Prophylaxis:  LMWH                   ( X )  Heparin SQ           (  )  Coumadin             (  )  Xarelto                  (  )  Eliquis                   (  )  Venodynes           ( X )  Ambulation          ( X )  UFH                       (  )  Contraindicated  (  )

## 2017-07-11 NOTE — PROGRESS NOTE ADULT - SUBJECTIVE AND OBJECTIVE BOX
c/c: Fall    HPI:  77 F with PMHx of RA, Osteoarthritis, MVR (non-metallic), HTN, Left Br CA s/p lumpectomy and RTX presented to the ER after fall and subsequent left hip fx. pt was walking and tripped over a laundry basket. pt hit her head on the wall and floor, landing on her left side. Denied LOC. Pt was unable to ambulate afterwards,  called EMS. Pt found to have a left hip fx. She was admitted and underwent Left ORIF on 7/9/17.    7/11: pt seen and examined this am. Was feeling ok, other than pain in her shoulder from arthritis. No sob/chest pain. Was waiting to work with physical therapy.     Review of system- All 10 systems reviewed and is as per HPI otherwise negative.   Vital Signs Last 24 Hrs  T(C): 37.1 (11 Jul 2017 05:51), Max: 37.1 (11 Jul 2017 05:51)  T(F): 98.7 (11 Jul 2017 05:51), Max: 98.7 (11 Jul 2017 05:51)  HR: 78 (11 Jul 2017 05:51) (78 - 86)  BP: 135/67 (11 Jul 2017 05:51) (108/54 - 135/67)  BP(mean): --  RR: 16 (11 Jul 2017 05:51) (16 - 16)  SpO2: 96% (11 Jul 2017 05:51) (96% - 98%)    PHYSICAL EXAM:  GENERAL: NAD, well-groomed, well-developed  HEAD:  Atraumatic, Normocephalic  EYES: EOMI, PERRLA, conjunctiva and sclera clear  HEENT: Moist mucous membranes  NECK: Supple, No JVD  NERVOUS SYSTEM:  Alert & Oriented X3, non focal.   CHEST/LUNG: Clear to auscultation bilaterally;   HEART: Regular rate and rhythm;  ABDOMEN: Soft, Nontender, Nondistended; Bowel sounds present  GENITOURINARY- Voiding, no palpable bladder  EXTREMITIES:  2+ Peripheral Pulses, No clubbing, cyanosis, or edema  MUSCULOSKELTAL- LT hip dressing dry, appropriate post op left hip tenderness  SKIN-no rash, no lesion    LABS:                        9.7    8.1   )-----------( 128      ( 11 Jul 2017 06:11 )             28.7     07-11    137  |  101  |  16  ----------------------------<  99  4.4   |  31  |  0.65    Ca    7.8<L>      11 Jul 2017 06:11        MEDICATIONS  (STANDING):  enalapril 2.5 milliGRAM(s) Oral two times a day  DULoxetine 30 milliGRAM(s) Oral daily  hydroxychloroquine 200 milliGRAM(s) Oral daily  carvedilol 3.125 milliGRAM(s) Oral every 12 hours  heparin  Injectable 5000 Unit(s) SubCutaneous every 8 hours  lactated ringers. 1000 milliLiter(s) (75 mL/Hr) IV Continuous <Continuous>    MEDICATIONS  (PRN):  morphine  - Injectable 4 milliGRAM(s) IV Push every 4 hours PRN Severe Pain  oxyCODONE    IR 10 milliGRAM(s) Oral every 4 hours PRN Moderate Pain  oxyCODONE    IR 5 milliGRAM(s) Oral every 4 hours PRN Mild Pain  SUMAtriptan 50 milliGRAM(s) Oral every 6 hours PRN migraine  acetaminophen   Tablet 650 milliGRAM(s) Oral every 6 hours PRN For Temp greater than 38 C (100.4 F)  aluminum hydroxide/magnesium hydroxide/simethicone Suspension 30 milliLiter(s) Oral four times a day PRN Indigestion  magnesium hydroxide Suspension 30 milliLiter(s) Oral daily PRN Constipation        Assessment/Plan    77M, pmh as above a/w:    #Mechanical fall with LT hip fracture s/p ORIF  -POD#2  -pain control  -physical therapy  -incentive spirometry    #HTN  -stable  -continue bb/ace-i    #Rheumatoid arthritis:  -continue plaquenil    #Acute blood loss anemia:  -due to OR  -no need for transfusion at this time.       #Dispo  - PT eval  -dc planning

## 2017-07-11 NOTE — PROGRESS NOTE ADULT - SUBJECTIVE AND OBJECTIVE BOX
Orthopedics    POD 2 Hip Rik Arthroplasty  Pain is controlled. Pt feeling well. No nausea or vomiting. Has been OOB with PT. Up in chair.    Vital Signs Last 24 Hrs  T(C): 37.1 (07-11-17 @ 05:51), Max: 37.1 (07-11-17 @ 05:51)  T(F): 98.7 (07-11-17 @ 05:51), Max: 98.7 (07-11-17 @ 05:51)  HR: 78 (07-11-17 @ 05:51) (78 - 86)  BP: 135/67 (07-11-17 @ 05:51) (108/54 - 135/67)  BP(mean): --  RR: 16 (07-11-17 @ 05:51) (16 - 16)  SpO2: 96% (07-11-17 @ 05:51) (96% - 98%)                        9.7    8.1   )-----------( 128      ( 11 Jul 2017 06:11 )             28.7     11 Jul 2017 06:11    137    |  101    |  16     ----------------------------<  99     4.4     |  31     |  0.65     Ca    7.8        11 Jul 2017 06:11          Exam:  NAD AAOx3  Dressing clean and dry  +EHL FHL TA GS  SILT toes 1-5  +DP  Calf Soft NT    A/P:  Stable POD 2 Left Hip Rik Arthroplasty  -Analgesia  -DVT PE ppx  -OOB PT posterior dislocation precautions

## 2017-07-11 NOTE — PROGRESS NOTE ADULT - SUBJECTIVE AND OBJECTIVE BOX
Patient seen and examined. Pain controlled. No acute events overnight    Vital Signs Last 24 Hrs  T(C): 37.1 (07-11-17 @ 05:51), Max: 37.1 (07-11-17 @ 05:51)  T(F): 98.7 (07-11-17 @ 05:51), Max: 98.7 (07-11-17 @ 05:51)  HR: 78 (07-11-17 @ 05:51) (78 - 86)  BP: 135/67 (07-11-17 @ 05:51) (108/54 - 135/67)  BP(mean): --  RR: 16 (07-11-17 @ 05:51) (16 - 16)  SpO2: 96% (07-11-17 @ 05:51) (96% - 98%)    Physical Exam  Gen: NAD  LLE:   Dressing c/d/i  +EHL/FHL/Gsc/TA  SILT L3-S1  DP +  Compartments soft  No calf ttp    A/P: 77y Female sp Left Hip Hemiarthroplasty POD 2  Pain control  DVT ppx  PT/OT, WBAT LLE   FU labs  BELA vs HPT; DC planning   Posterior hip precautions   D/w attending  F/U as outpatient in 2 weeks

## 2017-07-12 VITALS
RESPIRATION RATE: 16 BRPM | OXYGEN SATURATION: 98 % | TEMPERATURE: 98 F | DIASTOLIC BLOOD PRESSURE: 60 MMHG | HEART RATE: 92 BPM | SYSTOLIC BLOOD PRESSURE: 104 MMHG

## 2017-07-12 LAB
ANION GAP SERPL CALC-SCNC: 6 MMOL/L — SIGNIFICANT CHANGE UP (ref 5–17)
BUN SERPL-MCNC: 11 MG/DL — SIGNIFICANT CHANGE UP (ref 7–23)
CALCIUM SERPL-MCNC: 8.1 MG/DL — LOW (ref 8.5–10.1)
CHLORIDE SERPL-SCNC: 102 MMOL/L — SIGNIFICANT CHANGE UP (ref 96–108)
CO2 SERPL-SCNC: 29 MMOL/L — SIGNIFICANT CHANGE UP (ref 22–31)
CREAT SERPL-MCNC: 0.54 MG/DL — SIGNIFICANT CHANGE UP (ref 0.5–1.3)
GLUCOSE SERPL-MCNC: 96 MG/DL — SIGNIFICANT CHANGE UP (ref 70–99)
HCT VFR BLD CALC: 27.4 % — LOW (ref 34.5–45)
HGB BLD-MCNC: 9.1 G/DL — LOW (ref 11.5–15.5)
MCHC RBC-ENTMCNC: 32.8 PG — SIGNIFICANT CHANGE UP (ref 27–34)
MCHC RBC-ENTMCNC: 33 GM/DL — SIGNIFICANT CHANGE UP (ref 32–36)
MCV RBC AUTO: 99.4 FL — SIGNIFICANT CHANGE UP (ref 80–100)
PLATELET # BLD AUTO: 136 K/UL — LOW (ref 150–400)
POTASSIUM SERPL-MCNC: 4 MMOL/L — SIGNIFICANT CHANGE UP (ref 3.5–5.3)
POTASSIUM SERPL-SCNC: 4 MMOL/L — SIGNIFICANT CHANGE UP (ref 3.5–5.3)
RBC # BLD: 2.76 M/UL — LOW (ref 3.8–5.2)
RBC # FLD: 12 % — SIGNIFICANT CHANGE UP (ref 10.3–14.5)
SODIUM SERPL-SCNC: 137 MMOL/L — SIGNIFICANT CHANGE UP (ref 135–145)
SURGICAL PATHOLOGY FINAL REPORT - CH: SIGNIFICANT CHANGE UP
WBC # BLD: 6.6 K/UL — SIGNIFICANT CHANGE UP (ref 3.8–10.5)
WBC # FLD AUTO: 6.6 K/UL — SIGNIFICANT CHANGE UP (ref 3.8–10.5)

## 2017-07-12 PROCEDURE — 99233 SBSQ HOSP IP/OBS HIGH 50: CPT

## 2017-07-12 RX ORDER — OXYCODONE HYDROCHLORIDE 5 MG/1
1 TABLET ORAL
Qty: 0 | Refills: 0 | COMMUNITY
Start: 2017-07-12

## 2017-07-12 RX ORDER — SUMATRIPTAN SUCCINATE 4 MG/.5ML
1 INJECTION, SOLUTION SUBCUTANEOUS
Qty: 0 | Refills: 0 | DISCHARGE
Start: 2017-07-12

## 2017-07-12 RX ORDER — ENOXAPARIN SODIUM 100 MG/ML
40 INJECTION SUBCUTANEOUS
Qty: 0 | Refills: 0 | COMMUNITY
Start: 2017-07-12

## 2017-07-12 RX ORDER — CELECOXIB 200 MG/1
1 CAPSULE ORAL
Qty: 0 | Refills: 0 | COMMUNITY

## 2017-07-12 RX ORDER — DOCUSATE SODIUM 100 MG
1 CAPSULE ORAL
Qty: 0 | Refills: 0 | COMMUNITY
Start: 2017-07-12

## 2017-07-12 RX ADMIN — OXYCODONE HYDROCHLORIDE 10 MILLIGRAM(S): 5 TABLET ORAL at 09:55

## 2017-07-12 RX ADMIN — DULOXETINE HYDROCHLORIDE 30 MILLIGRAM(S): 30 CAPSULE, DELAYED RELEASE ORAL at 12:37

## 2017-07-12 RX ADMIN — Medication 200 MILLIGRAM(S): at 12:37

## 2017-07-12 RX ADMIN — ENOXAPARIN SODIUM 40 MILLIGRAM(S): 100 INJECTION SUBCUTANEOUS at 12:40

## 2017-07-12 RX ADMIN — OXYCODONE HYDROCHLORIDE 10 MILLIGRAM(S): 5 TABLET ORAL at 14:03

## 2017-07-12 RX ADMIN — Medication 2.5 MILLIGRAM(S): at 05:55

## 2017-07-12 RX ADMIN — Medication 100 MILLIGRAM(S): at 05:55

## 2017-07-12 RX ADMIN — CARVEDILOL PHOSPHATE 3.12 MILLIGRAM(S): 80 CAPSULE, EXTENDED RELEASE ORAL at 05:55

## 2017-07-12 RX ADMIN — Medication 100 MILLIGRAM(S): at 12:40

## 2017-07-12 NOTE — PROGRESS NOTE ADULT - SUBJECTIVE AND OBJECTIVE BOX
Orthopedics     POD 3 Hip Hemiarthroplasty  Pain is controlled. Pt feeling well. No nausea or vomiting. Was up OOB with PT.    Vital Signs Last 24 Hrs  T(C): 36.9 (07-12-17 @ 05:59), Max: 36.9 (07-12-17 @ 05:59)  T(F): 98.5 (07-12-17 @ 05:59), Max: 98.5 (07-12-17 @ 05:59)  HR: 84 (07-12-17 @ 05:59) (77 - 87)  BP: 133/65 (07-12-17 @ 05:59) (96/52 - 133/65)  BP(mean): --  RR: 16 (07-12-17 @ 05:59) (16 - 16)  SpO2: 97% (07-12-17 @ 05:59) (93% - 97%)                        9.1    6.6   )-----------( 136      ( 12 Jul 2017 05:56 )             27.4     12 Jul 2017 05:56    137    |  102    |  11     ----------------------------<  96     4.0     |  29     |  0.54     Ca    8.1        12 Jul 2017 05:56        Exam:  NAD AAOx3  Dressing clean and dry  +EHL FHL TA GS  SILT toes 1-5  +DP  Calf Soft NT    A/P:  Stable POD 3 Left Hip Hemiarthroplasty  -Analgesia  -Ppx ABX  -DVT PE ppx  -OOB PT posterior dislocation precautions  -DC Planning, ok from ortho perspective  -Ortho Attending agrees w/above plan

## 2017-07-12 NOTE — PROGRESS NOTE ADULT - PROVIDER SPECIALTY LIST ADULT
Anticoag Management
Anticoag Management
Hospitalist
Orthopedics

## 2017-07-12 NOTE — PROGRESS NOTE ADULT - ASSESSMENT
77F with left hip fx s/p [facility] fall.      *RA:  -c/w Plaquenil  -must r/o atlantoaxial disease BEFORE pt can be medically cleared.   -f/u Xrays of cervical A/P, lateral, open mouth and Flex/Ex. If xrays (-) for subluxations pt can be optimized for repair of left hip fx with the following recommendations stated below.    *Fall with Left hip fx  -admit to medicine  -ortho cx  -prn pain meds  -anti-emetics and stool softners prn  -bed rest until post op per Ortho  -npo after midnight  -IVF hydration while NPO  -CT C-spine/head CT (-) for acute findings      *MVR:  -non-metallic valve  -cardiology cx (pt does not require cardiac clearance at this time as pt is low risk for yessenia-operative cardiac complications)  -EKG: NSR      *HTN:  -c/w home meds, coreg and enalapril      *DVT Px:  -heparin sq until midnight, IPCs
77F with L femoral neck fx  plan for OR today  NPO and hold [facility] for procedure  nwb  f/u labs  medically stable for OR  will follow
This is a 76 y/o female with PMHx of RA, osteoarthritis, MVR (porcine biosynthetic valve), HTN, Left breast CA s/p lumpectomy and radiation, migraine presents after trip/fall and subsequent left hip fx. Pt is s/p left hip hemiarthroplasty POD #1 yesterday 7/9/17 with abduction pillow in place, pt tolerating post operative pain, left dressing c/d/i.     PLAN:  ::cont  lovenox 40 mg DVT ppx x 4 weeks  :: maintain SCDs on right leg  :: encourage ambulation with pain control  :: daily CBC/BMP   . Will continue to follow.
This is a 78 y/o female with PMHx of RA, osteoarthritis, MVR (porcine biosynthetic valve), HTN, Left breast CA s/p lumpectomy and radiation, migraine presents after trip/fall and subsequent left hip fx. Pt is s/p left hip hemiarthroplasty POD #1 yesterday 7/9/17 with abduction pillow in place, pt tolerating post operative pain, left dressing c/d/i.     PLAN:  :: lovenox 40 mg DVT ppx x 4 weeks  :: maintain SCDs on right leg  :: encourage ambulation with pain control  :: daily CBC/BMP   . Will continue to follow.

## 2017-07-12 NOTE — PROGRESS NOTE ADULT - SUBJECTIVE AND OBJECTIVE BOX
Patient seen and examined. Pain controlled. No acute events overnight    Vital Signs Last 24 Hrs  T(C): 36.9 (07-12-17 @ 05:59), Max: 36.9 (07-12-17 @ 05:59)  T(F): 98.5 (07-12-17 @ 05:59), Max: 98.5 (07-12-17 @ 05:59)  HR: 84 (07-12-17 @ 05:59) (77 - 87)  BP: 133/65 (07-12-17 @ 05:59) (96/52 - 133/65)  BP(mean): --  RR: 16 (07-12-17 @ 05:59) (16 - 16)  SpO2: 97% (07-12-17 @ 05:59) (93% - 97%)    Physical Exam  Gen: NAD  LLE:   Dressing c/d/i  +EHL/FHL/Gsc/TA  SILT L3-S1  DP +  Compartments soft  No calf ttp    A/P: 77y Female sp L hip hemoarthroplasty POD 3  Pain control  Dressing changed today  DVT ppx  PT/OT, WBAT LLE   FU labs  Ortho stable for discharge  D/w attending

## 2017-07-12 NOTE — PROGRESS NOTE ADULT - SUBJECTIVE AND OBJECTIVE BOX
HPI:  77 F with PMHx of RA, Osteoarthritis, MVR (non-metallic), HTN, Left Br CA s/p lumpectomy and RTX presents after fall and subsequent left hip fx. pt was walking and tripped voer a laundry basket. pt hit her head on the wall and floor, landing on her left side. Denies LOC. Pt was unable to ambulate afterwards,  called EMS. Pt found to have a left hip fx. (08 Jul 2017 20:33)    Consulted by Dr. Mcleod for VTE prophylaxis, risk stratification, and anticoagulation management.    PAST MEDICAL & SURGICAL HISTORY:  Chronic pain: secondary to OA and RA  Breast cancer: left 1990 treated with RT and surgery  HLD (hyperlipidemia)  Migraine  OA (osteoarthritis)  RA (rheumatoid arthritis)  Mitral valve disease  S/P bunionectomy: left x 2 2007  History of tonsillectomy  H/O spinal fusion: lumbar 2005  History of bilateral knee replacement: right 2009, left 2011  History of lumpectomy of left breast: with sentinal node biopsy 1990      BMI: 20.8    CrCL: 60.31    Caprini VTE Risk Score: 14  IMPROVE Bleeding Risk Score: 5.5    Falls Risk:   High ( X )  Mod (  )  Low (  )    7/10/17: Pt seen at bedside out of bed in chair with abduction pillow, in no distress. Discussed her of starting lovenox tonight and continuing for 4 weeks. Also informed her that blood work will be drawn once after being discharged to ensure her H/H stable. Pt comments that she hasn't had a bowel movement and feels constipated.   7-11-17 pt seen at bedside oob in chair.  no concerns about her anticoagulation with Lovenox States she may need to go to rehab states her  is ill "his heart valve is failing".   7/12/17:  seen at bedside , without c/o     EBL: 100 ml      FAMILY HISTORY:  No pertinent family history in first degree relatives  Denies any personal or familial history of clotting or bleeding disorders.    Allergies  No Known Allergies  Intolerances      REVIEW OF SYSTEMS    (  )Fever	     ( X )Constipation	(  )SOB			(  )Headache	(  )Dysuria  (  )Chills	     (  )Melena	(  )Dyspnea present on exertion    (  )Dizziness                    (  )Polyuria  (  )Nausea	     (  )Hematochezia	(  )Cough		                    (  )Syncope   	(  )Hematuria  (  )Vomiting    (  )Chest Pain	(  )Wheezing		(  )Weakness  (  )Diarrhea     (  )Palpitations	(  )Anorexia		(  )Myalgia    All other review of systems negative: Yes      PHYSICAL EXAM:  Constitutional: Appears Well, no distress  Neurological: A&O x 3  Skin: Warm  Respiratory and Thorax: normal effort; Breath sounds: normal; No rales/wheezing/rhonchi  Cardiovascular: S1, S2, regular, no gallops or rubs (s/p bioprosthetic MVR)	  Gastrointestinal: non tender, BS+    Musculoskeletal:   General Right:   no muscle/joint tenderness,   normal tone, no joint swelling,   ROM: full	    General Left:   + muscle/joint tenderness,   normal tone, no joint swelling,   ROM: limited    Hip: Left: Dressing CDI    Lower extremities:   Right: no calf tenderness           negative antony's sign            + pedal pulses    Left:   no calf tenderness           negative antony's sign            + pedal pulses                             9.1    6.6   )-----------( 136      ( 12 Jul 2017 05:56 )             27.4       07-12    137  |  102  |  11  ----------------------------<  96  4.0   |  29  |  0.54    Ca    8.1<L>      12 Jul 2017 05:56           	    MEDICATIONS  (STANDING):  enalapril 2.5 milliGRAM(s) Oral two times a day  DULoxetine 30 milliGRAM(s) Oral daily  hydroxychloroquine 200 milliGRAM(s) Oral daily  carvedilol 3.125 milliGRAM(s) Oral every 12 hours  lactated ringers. 1000 milliLiter(s) (75 mL/Hr) IV Continuous <Continuous>  enoxaparin Injectable 40 milliGRAM(s) SubCutaneous daily    Vital Signs Last 24 Hrs  T(C): 37.1 (07-11-17 @ 05:51), Max: 37.1 (07-11-17 @ 05:51)  T(F): 98.7 (07-11-17 @ 05:51), Max: 98.7 (07-11-17 @ 05:51)  HR: 78 (07-11-17 @ 05:51) (78 - 86)  BP: 135/67 (07-11-17 @ 05:51) (108/54 - 135/67)  BP(mean): --  RR: 16 (07-11-17 @ 05:51) (16 - 16)  SpO2: 96% (07-11-17 @ 05:51) (96% - 98%)    DVT Prophylaxis:  LMWH                   ( X )  Heparin SQ           (  )  Coumadin             (  )  Xarelto                  (  )  Eliquis                   (  )  Venodynes           ( X )  Ambulation          ( X )  UFH                       (  )  Contraindicated  (  )

## 2017-07-12 NOTE — PROGRESS NOTE ADULT - SUBJECTIVE AND OBJECTIVE BOX
c/c: Fall    HPI:  77 F with PMHx of RA, Osteoarthritis, MVR (non-metallic), HTN, Left Br CA s/p lumpectomy and RTX presented to the ER after fall and subsequent left hip fx. pt was walking and tripped over a laundry basket. pt hit her head on the wall and floor, landing on her left side. Denied LOC. Pt was unable to ambulate afterwards,  called EMS. Pt found to have a left hip fx. She was admitted and underwent Left ORIF on 7/9/17.    7/12: pt seen and examined this am. Feeling better. Shoulder pain improved. No sob/chest pain. No f/c/r. No cough/sputum/dysuria.    Review of system- All 10 systems reviewed and is as per HPI otherwise negative.     Vital Signs Last 24 Hrs  T(C): 36.9 (12 Jul 2017 05:59), Max: 36.9 (12 Jul 2017 05:59)  T(F): 98.5 (12 Jul 2017 05:59), Max: 98.5 (12 Jul 2017 05:59)  HR: 84 (12 Jul 2017 05:59) (77 - 87)  BP: 133/65 (12 Jul 2017 05:59) (96/52 - 133/65)  BP(mean): --  RR: 16 (12 Jul 2017 05:59) (16 - 16)  SpO2: 97% (12 Jul 2017 05:59) (93% - 97%)    PHYSICAL EXAM:  GENERAL: NAD, well-groomed, well-developed  HEAD:  Atraumatic, Normocephalic  EYES: EOMI, PERRLA, conjunctiva and sclera clear  HEENT: Moist mucous membranes  NECK: Supple, No JVD  NERVOUS SYSTEM:  Alert & Oriented X3, non focal.   CHEST/LUNG: Clear to auscultation bilaterally;   HEART: Regular rate and rhythm;  ABDOMEN: Soft, Nontender, Nondistended; Bowel sounds present  GENITOURINARY- Voiding, no palpable bladder  EXTREMITIES:  2+ Peripheral Pulses, No clubbing, cyanosis, or edema  MUSCULOSKELTAL- LT hip dressing dry, appropriate post op left hip tenderness  SKIN-no rash, no lesion    LABS:                        9.1    6.6   )-----------( 136      ( 12 Jul 2017 05:56 )             27.4     07-12    137  |  102  |  11  ----------------------------<  96  4.0   |  29  |  0.54    Ca    8.1<L>      12 Jul 2017 05:56    MEDICATIONS  (STANDING):  enalapril 2.5 milliGRAM(s) Oral two times a day  DULoxetine 30 milliGRAM(s) Oral daily  hydroxychloroquine 200 milliGRAM(s) Oral daily  carvedilol 3.125 milliGRAM(s) Oral every 12 hours  heparin  Injectable 5000 Unit(s) SubCutaneous every 8 hours  lactated ringers. 1000 milliLiter(s) (75 mL/Hr) IV Continuous <Continuous>    MEDICATIONS  (PRN):  morphine  - Injectable 4 milliGRAM(s) IV Push every 4 hours PRN Severe Pain  oxyCODONE    IR 10 milliGRAM(s) Oral every 4 hours PRN Moderate Pain  oxyCODONE    IR 5 milliGRAM(s) Oral every 4 hours PRN Mild Pain  SUMAtriptan 50 milliGRAM(s) Oral every 6 hours PRN migraine  acetaminophen   Tablet 650 milliGRAM(s) Oral every 6 hours PRN For Temp greater than 38 C (100.4 F)  aluminum hydroxide/magnesium hydroxide/simethicone Suspension 30 milliLiter(s) Oral four times a day PRN Indigestion  magnesium hydroxide Suspension 30 milliLiter(s) Oral daily PRN Constipation        Assessment/Plan    77M, pmh as above a/w:    #Mechanical fall with LT hip fracture s/p ORIF  -POD#3  -pain control  -physical therapy  -incentive spirometry    #HTN  -stable  -continue bb/ace-i    #Rheumatoid arthritis:  -continue plaquenil    #Acute blood loss anemia:  -due to OR  -no need for transfusion at this time.       #Dispo  -dc planning to rehab today

## 2017-07-14 DIAGNOSIS — Z95.3 PRESENCE OF XENOGENIC HEART VALVE: ICD-10-CM

## 2017-07-14 DIAGNOSIS — M06.89 OTHER SPECIFIED RHEUMATOID ARTHRITIS, MULTIPLE SITES: ICD-10-CM

## 2017-07-14 DIAGNOSIS — D62 ACUTE POSTHEMORRHAGIC ANEMIA: ICD-10-CM

## 2017-07-14 DIAGNOSIS — M19.90 UNSPECIFIED OSTEOARTHRITIS, UNSPECIFIED SITE: ICD-10-CM

## 2017-07-14 DIAGNOSIS — E78.00 PURE HYPERCHOLESTEROLEMIA, UNSPECIFIED: ICD-10-CM

## 2017-07-14 DIAGNOSIS — Z96.653 PRESENCE OF ARTIFICIAL KNEE JOINT, BILATERAL: ICD-10-CM

## 2017-07-14 DIAGNOSIS — G43.909 MIGRAINE, UNSPECIFIED, NOT INTRACTABLE, WITHOUT STATUS MIGRAINOSUS: ICD-10-CM

## 2017-07-14 DIAGNOSIS — Z85.3 PERSONAL HISTORY OF MALIGNANT NEOPLASM OF BREAST: ICD-10-CM

## 2017-07-14 DIAGNOSIS — Z92.3 PERSONAL HISTORY OF IRRADIATION: ICD-10-CM

## 2017-07-14 DIAGNOSIS — S72.002A FRACTURE OF UNSPECIFIED PART OF NECK OF LEFT FEMUR, INITIAL ENCOUNTER FOR CLOSED FRACTURE: ICD-10-CM

## 2017-07-14 DIAGNOSIS — S72.012A UNSPECIFIED INTRACAPSULAR FRACTURE OF LEFT FEMUR, INITIAL ENCOUNTER FOR CLOSED FRACTURE: ICD-10-CM
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
## 2017-07-14 DIAGNOSIS — I10 ESSENTIAL (PRIMARY) HYPERTENSION: ICD-10-CM

## 2017-07-14 DIAGNOSIS — K59.00 CONSTIPATION, UNSPECIFIED: ICD-10-CM

## 2017-07-14 DIAGNOSIS — I34.0 NONRHEUMATIC MITRAL (VALVE) INSUFFICIENCY: ICD-10-CM
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
## 2017-07-14 DIAGNOSIS — G89.29 OTHER CHRONIC PAIN: ICD-10-CM

## 2017-07-28 ENCOUNTER — TRANSCRIPTION ENCOUNTER (OUTPATIENT)
Age: 77
End: 2017-07-28

## 2017-09-14 ENCOUNTER — APPOINTMENT (OUTPATIENT)
Dept: INFECTIOUS DISEASE | Facility: CLINIC | Age: 77
End: 2017-09-14
Payer: SELF-PAY

## 2017-09-14 DIAGNOSIS — Z71.89 OTHER SPECIFIED COUNSELING: ICD-10-CM

## 2017-09-14 PROCEDURE — 99401 PREV MED CNSL INDIV APPRX 15: CPT | Mod: 25

## 2017-09-14 PROCEDURE — 90473 IMMUNE ADMIN ORAL/NASAL: CPT | Mod: NC

## 2017-09-14 PROCEDURE — 90690 TYPHOID VACCINE ORAL: CPT

## 2018-01-16 RX ORDER — CYCLOPENTOLATE HYDROCHLORIDE 10 MG/ML
1 SOLUTION/ DROPS OPHTHALMIC
Qty: 0 | Refills: 0 | Status: COMPLETED | OUTPATIENT
Start: 2018-01-22 | End: 2018-01-22

## 2018-01-16 RX ORDER — TROPICAMIDE 1 %
1 DROPS OPHTHALMIC (EYE)
Qty: 0 | Refills: 0 | Status: COMPLETED | OUTPATIENT
Start: 2018-01-22 | End: 2018-01-22

## 2018-01-16 RX ORDER — OFLOXACIN 0.3 %
1 DROPS OPHTHALMIC (EYE)
Qty: 0 | Refills: 0 | Status: COMPLETED | OUTPATIENT
Start: 2018-01-22 | End: 2018-01-22

## 2018-01-16 RX ORDER — PHENYLEPHRINE HCL 2.5 %
1 DROPS OPHTHALMIC (EYE)
Qty: 0 | Refills: 0 | Status: COMPLETED | OUTPATIENT
Start: 2018-01-22 | End: 2018-01-22

## 2018-01-19 NOTE — ASU PATIENT PROFILE, ADULT - PSH
H/O spinal fusion  lumbar 2005  History of bilateral knee replacement  right 2009, left 2011  History of lumpectomy of left breast  with sentinal node biopsy 1990  History of tonsillectomy    S/P bunionectomy  left x 2 2007 H/O spinal fusion  lumbar 2005  History of bilateral knee replacement  right 2009, left 2011  History of left hip replacement  2017  History of lumpectomy of left breast  with sentinal node biopsy 1990  History of tonsillectomy    S/P bunionectomy  left x 2 2007

## 2018-01-22 ENCOUNTER — OUTPATIENT (OUTPATIENT)
Dept: OUTPATIENT SERVICES | Facility: HOSPITAL | Age: 78
LOS: 1 days | Discharge: HOME | End: 2018-01-22

## 2018-01-22 VITALS
HEIGHT: 66 IN | HEART RATE: 90 BPM | RESPIRATION RATE: 18 BRPM | TEMPERATURE: 98 F | SYSTOLIC BLOOD PRESSURE: 158 MMHG | WEIGHT: 125 LBS | DIASTOLIC BLOOD PRESSURE: 92 MMHG | OXYGEN SATURATION: 98 %

## 2018-01-22 VITALS
OXYGEN SATURATION: 99 % | TEMPERATURE: 98 F | RESPIRATION RATE: 16 BRPM | DIASTOLIC BLOOD PRESSURE: 61 MMHG | SYSTOLIC BLOOD PRESSURE: 124 MMHG | HEART RATE: 81 BPM

## 2018-01-22 DIAGNOSIS — Z96.642 PRESENCE OF LEFT ARTIFICIAL HIP JOINT: Chronic | ICD-10-CM

## 2018-01-22 DIAGNOSIS — Z98.89 OTHER SPECIFIED POSTPROCEDURAL STATES: Chronic | ICD-10-CM

## 2018-01-22 DIAGNOSIS — Z96.653 PRESENCE OF ARTIFICIAL KNEE JOINT, BILATERAL: Chronic | ICD-10-CM

## 2018-01-22 DIAGNOSIS — Z98.1 ARTHRODESIS STATUS: Chronic | ICD-10-CM

## 2018-01-22 RX ORDER — SENNA PLUS 8.6 MG/1
2 TABLET ORAL
Qty: 0 | Refills: 0 | COMMUNITY

## 2018-01-22 RX ORDER — ACETAMINOPHEN 500 MG
650 TABLET ORAL EVERY 6 HOURS
Qty: 0 | Refills: 0 | Status: DISCONTINUED | OUTPATIENT
Start: 2018-01-22 | End: 2018-02-06

## 2018-01-22 RX ORDER — ACETAMINOPHEN 500 MG
2 TABLET ORAL
Qty: 0 | Refills: 0 | DISCHARGE
Start: 2018-01-22

## 2018-01-22 RX ADMIN — Medication 1 DROP(S): at 15:43

## 2018-01-22 RX ADMIN — CYCLOPENTOLATE HYDROCHLORIDE 1 DROP(S): 10 SOLUTION/ DROPS OPHTHALMIC at 15:44

## 2018-01-22 RX ADMIN — Medication 1 DROP(S): at 15:38

## 2018-01-22 RX ADMIN — CYCLOPENTOLATE HYDROCHLORIDE 1 DROP(S): 10 SOLUTION/ DROPS OPHTHALMIC at 15:38

## 2018-01-22 RX ADMIN — Medication 1 DROP(S): at 15:47

## 2018-01-22 RX ADMIN — CYCLOPENTOLATE HYDROCHLORIDE 1 DROP(S): 10 SOLUTION/ DROPS OPHTHALMIC at 15:47

## 2018-01-22 RX ADMIN — Medication 1 DROP(S): at 15:39

## 2018-01-22 RX ADMIN — Medication 1 DROP(S): at 15:44

## 2018-01-22 NOTE — ASU DISCHARGE PLAN (ADULT/PEDIATRIC). - NURSING INSTRUCTIONS
Review the multicolored Written Discharge Instruction sheet.  Call MD if pain medication not working.  Call MD if unable to urinate in 8 hours.  Apply ice to affected area 20min on and 20min off for the  first 24-48 hours. Do not apply directly to skin, place barrier between ice and skin.  Sleep with the head of the bed elevated on an extra pillow

## 2018-01-22 NOTE — ASU DISCHARGE PLAN (ADULT/PEDIATRIC). - MEDICATION SUMMARY - MEDICATIONS TO TAKE
I will START or STAY ON the medications listed below when I get home from the hospital:    SUMAtriptan 50 mg oral tablet  -- 1 tab(s) by mouth every 6 hours, As needed, migraine  -- Indication: For md    CeleBREX 200 mg oral capsule  -- 1 cap(s) by mouth once a day, As Needed for arthritis pain  -- Indication: For md    Aspirin Low Dose 81 mg oral delayed release tablet  -- 1 tab(s) by mouth once a day  -- Indication: For md    traMADol 100 mg/24 hours oral capsule, extended release  -- orally once a day, As Needed  -- Indication: For md    acetaminophen 325 mg oral tablet  -- 2 tab(s) by mouth every 6 hours, As needed, Moderate Pain (4 - 6)  -- Indication: For md    enalapril 2.5 mg oral tablet  -- 1 tab(s) by mouth 2 times a day  -- Indication: For md    Cymbalta 30 mg oral delayed release capsule  -- 1 cap(s) by mouth once a day  -- Indication: For mdmd    Plaquenil 200 mg oral tablet  -- 1 tab(s) by mouth once a day  -- Indication: For md    Coreg 3.125 mg oral tablet  -- 1 tab(s) by mouth 2 times a day  -- Indication: For md    Fish Oil 1000 mg oral capsule  -- 1  by mouth once a day  -- Indication: For md    B Complex 50  -- 1  by mouth once a day  -- Indication: For md    Vitamin D3 1000 intl units oral tablet  -- 1 tab(s) by mouth once a day  -- Indication: For md

## 2018-01-22 NOTE — BRIEF OPERATIVE NOTE - PROCEDURE
<<-----Click on this checkbox to enter Procedure Cataract extract, extracaps, phacoemuls, w/ prosth lens insertn, 1 stage  01/22/2018    Active  RADHA

## 2018-01-26 DIAGNOSIS — M19.90 UNSPECIFIED OSTEOARTHRITIS, UNSPECIFIED SITE: ICD-10-CM

## 2018-01-26 DIAGNOSIS — E78.5 HYPERLIPIDEMIA, UNSPECIFIED: ICD-10-CM

## 2018-01-26 DIAGNOSIS — H26.9 UNSPECIFIED CATARACT: ICD-10-CM

## 2018-01-26 DIAGNOSIS — Z96.653 PRESENCE OF ARTIFICIAL KNEE JOINT, BILATERAL: ICD-10-CM

## 2018-01-26 DIAGNOSIS — Z98.1 ARTHRODESIS STATUS: ICD-10-CM

## 2018-01-26 DIAGNOSIS — Z85.3 PERSONAL HISTORY OF MALIGNANT NEOPLASM OF BREAST: ICD-10-CM

## 2018-01-26 DIAGNOSIS — M06.9 RHEUMATOID ARTHRITIS, UNSPECIFIED: ICD-10-CM

## 2018-01-26 DIAGNOSIS — I34.1 NONRHEUMATIC MITRAL (VALVE) PROLAPSE: ICD-10-CM

## 2018-02-10 ENCOUNTER — EMERGENCY (EMERGENCY)
Facility: HOSPITAL | Age: 78
LOS: 0 days | Discharge: HOME | End: 2018-02-10
Attending: EMERGENCY MEDICINE | Admitting: EMERGENCY MEDICINE
Payer: MEDICARE

## 2018-02-10 VITALS
TEMPERATURE: 98 F | HEART RATE: 71 BPM | WEIGHT: 123.9 LBS | HEIGHT: 66 IN | DIASTOLIC BLOOD PRESSURE: 87 MMHG | SYSTOLIC BLOOD PRESSURE: 153 MMHG | OXYGEN SATURATION: 100 % | RESPIRATION RATE: 16 BRPM

## 2018-02-10 VITALS
HEART RATE: 75 BPM | SYSTOLIC BLOOD PRESSURE: 148 MMHG | RESPIRATION RATE: 19 BRPM | DIASTOLIC BLOOD PRESSURE: 78 MMHG | OXYGEN SATURATION: 100 % | TEMPERATURE: 98 F
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
## 2018-02-10 DIAGNOSIS — M79.642 PAIN IN LEFT HAND: ICD-10-CM

## 2018-02-10 DIAGNOSIS — Z96.642 PRESENCE OF LEFT ARTIFICIAL HIP JOINT: Chronic | ICD-10-CM
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
## 2018-02-10 DIAGNOSIS — Z98.89 OTHER SPECIFIED POSTPROCEDURAL STATES: Chronic | ICD-10-CM

## 2018-02-10 DIAGNOSIS — Z96.653 PRESENCE OF ARTIFICIAL KNEE JOINT, BILATERAL: Chronic | ICD-10-CM

## 2018-02-10 DIAGNOSIS — E78.5 HYPERLIPIDEMIA, UNSPECIFIED: ICD-10-CM

## 2018-02-10 DIAGNOSIS — S09.90XA UNSPECIFIED INJURY OF HEAD, INITIAL ENCOUNTER: ICD-10-CM

## 2018-02-10 DIAGNOSIS — Z98.1 ARTHRODESIS STATUS: Chronic | ICD-10-CM

## 2018-02-10 DIAGNOSIS — M06.9 RHEUMATOID ARTHRITIS, UNSPECIFIED: ICD-10-CM

## 2018-02-10 DIAGNOSIS — S01.112A LACERATION WITHOUT FOREIGN BODY OF LEFT EYELID AND PERIOCULAR AREA, INITIAL ENCOUNTER: ICD-10-CM
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
## 2018-02-10 DIAGNOSIS — Z85.3 PERSONAL HISTORY OF MALIGNANT NEOPLASM OF BREAST: ICD-10-CM

## 2018-02-10 DIAGNOSIS — I10 ESSENTIAL (PRIMARY) HYPERTENSION: ICD-10-CM

## 2018-02-10 DIAGNOSIS — S00.83XA CONTUSION OF OTHER PART OF HEAD, INITIAL ENCOUNTER: ICD-10-CM

## 2018-02-10 LAB
ALBUMIN SERPL ELPH-MCNC: 3.6 G/DL — SIGNIFICANT CHANGE UP (ref 3.3–5)
ALP SERPL-CCNC: 56 U/L — SIGNIFICANT CHANGE UP (ref 40–120)
ALT FLD-CCNC: 25 U/L — SIGNIFICANT CHANGE UP (ref 12–78)
ANION GAP SERPL CALC-SCNC: 5 MMOL/L — SIGNIFICANT CHANGE UP (ref 5–17)
APPEARANCE UR: CLEAR — SIGNIFICANT CHANGE UP
APTT BLD: 29.2 SEC — SIGNIFICANT CHANGE UP (ref 27.5–37.4)
AST SERPL-CCNC: 29 U/L — SIGNIFICANT CHANGE UP (ref 15–37)
BACTERIA # UR AUTO: (no result)
BASOPHILS # BLD AUTO: 0.2 K/UL — SIGNIFICANT CHANGE UP (ref 0–0.2)
BASOPHILS NFR BLD AUTO: 2.7 % — HIGH (ref 0–2)
BILIRUB SERPL-MCNC: 0.3 MG/DL — SIGNIFICANT CHANGE UP (ref 0.2–1.2)
BILIRUB UR-MCNC: NEGATIVE — SIGNIFICANT CHANGE UP
BLD GP AB SCN SERPL QL: SIGNIFICANT CHANGE UP
BUN SERPL-MCNC: 19 MG/DL — SIGNIFICANT CHANGE UP (ref 7–23)
CALCIUM SERPL-MCNC: 8.7 MG/DL — SIGNIFICANT CHANGE UP (ref 8.5–10.1)
CHLORIDE SERPL-SCNC: 102 MMOL/L — SIGNIFICANT CHANGE UP (ref 96–108)
CO2 SERPL-SCNC: 31 MMOL/L — SIGNIFICANT CHANGE UP (ref 22–31)
COLOR SPEC: YELLOW — SIGNIFICANT CHANGE UP
CREAT SERPL-MCNC: 0.98 MG/DL — SIGNIFICANT CHANGE UP (ref 0.5–1.3)
DIFF PNL FLD: NEGATIVE — SIGNIFICANT CHANGE UP
EOSINOPHIL # BLD AUTO: 0.2 K/UL — SIGNIFICANT CHANGE UP (ref 0–0.5)
EOSINOPHIL NFR BLD AUTO: 3.7 % — SIGNIFICANT CHANGE UP (ref 0–6)
EPI CELLS # UR: SIGNIFICANT CHANGE UP
ETHANOL SERPL-MCNC: <10 MG/DL — SIGNIFICANT CHANGE UP (ref 0–10)
GLUCOSE SERPL-MCNC: 91 MG/DL — SIGNIFICANT CHANGE UP (ref 70–99)
GLUCOSE UR QL: NEGATIVE MG/DL — SIGNIFICANT CHANGE UP
HCT VFR BLD CALC: 40 % — SIGNIFICANT CHANGE UP (ref 34.5–45)
HGB BLD-MCNC: 13.1 G/DL — SIGNIFICANT CHANGE UP (ref 11.5–15.5)
INR BLD: 1 RATIO — SIGNIFICANT CHANGE UP (ref 0.88–1.16)
KETONES UR-MCNC: NEGATIVE — SIGNIFICANT CHANGE UP
LEUKOCYTE ESTERASE UR-ACNC: (no result)
LYMPHOCYTES # BLD AUTO: 1.4 K/UL — SIGNIFICANT CHANGE UP (ref 1–3.3)
LYMPHOCYTES # BLD AUTO: 24.3 % — SIGNIFICANT CHANGE UP (ref 13–44)
MCHC RBC-ENTMCNC: 32.4 PG — SIGNIFICANT CHANGE UP (ref 27–34)
MCHC RBC-ENTMCNC: 32.7 GM/DL — SIGNIFICANT CHANGE UP (ref 32–36)
MCV RBC AUTO: 99.1 FL — SIGNIFICANT CHANGE UP (ref 80–100)
MONOCYTES # BLD AUTO: 0.6 K/UL — SIGNIFICANT CHANGE UP (ref 0–0.9)
MONOCYTES NFR BLD AUTO: 11.3 % — SIGNIFICANT CHANGE UP (ref 2–14)
NEUTROPHILS # BLD AUTO: 3.3 K/UL — SIGNIFICANT CHANGE UP (ref 1.8–7.4)
NEUTROPHILS NFR BLD AUTO: 58 % — SIGNIFICANT CHANGE UP (ref 43–77)
NITRITE UR-MCNC: NEGATIVE — SIGNIFICANT CHANGE UP
PH UR: 7 — SIGNIFICANT CHANGE UP (ref 5–8)
PLATELET # BLD AUTO: 162 K/UL — SIGNIFICANT CHANGE UP (ref 150–400)
POTASSIUM SERPL-MCNC: 4.4 MMOL/L — SIGNIFICANT CHANGE UP (ref 3.5–5.3)
POTASSIUM SERPL-SCNC: 4.4 MMOL/L — SIGNIFICANT CHANGE UP (ref 3.5–5.3)
PROT SERPL-MCNC: 7 GM/DL — SIGNIFICANT CHANGE UP (ref 6–8.3)
PROT UR-MCNC: NEGATIVE MG/DL — SIGNIFICANT CHANGE UP
PROTHROM AB SERPL-ACNC: 10.8 SEC — SIGNIFICANT CHANGE UP (ref 9.8–12.7)
RBC # BLD: 4.03 M/UL — SIGNIFICANT CHANGE UP (ref 3.8–5.2)
RBC # FLD: 12 % — SIGNIFICANT CHANGE UP (ref 10.3–14.5)
RBC CASTS # UR COMP ASSIST: (no result) /HPF (ref 0–4)
SODIUM SERPL-SCNC: 138 MMOL/L — SIGNIFICANT CHANGE UP (ref 135–145)
SP GR SPEC: 1.01 — SIGNIFICANT CHANGE UP (ref 1.01–1.02)
TYPE + AB SCN PNL BLD: SIGNIFICANT CHANGE UP
UROBILINOGEN FLD QL: NEGATIVE MG/DL — SIGNIFICANT CHANGE UP
WBC # BLD: 5.7 K/UL — SIGNIFICANT CHANGE UP (ref 3.8–10.5)
WBC # FLD AUTO: 5.7 K/UL — SIGNIFICANT CHANGE UP (ref 3.8–10.5)
WBC UR QL: (no result)

## 2018-02-10 PROCEDURE — 73130 X-RAY EXAM OF HAND: CPT | Mod: 26,LT

## 2018-02-10 PROCEDURE — 72125 CT NECK SPINE W/O DYE: CPT | Mod: 26

## 2018-02-10 PROCEDURE — 72190 X-RAY EXAM OF PELVIS: CPT | Mod: 26

## 2018-02-10 PROCEDURE — 70450 CT HEAD/BRAIN W/O DYE: CPT | Mod: 26

## 2018-02-10 PROCEDURE — 76377 3D RENDER W/INTRP POSTPROCES: CPT | Mod: 26

## 2018-02-10 PROCEDURE — 99285 EMERGENCY DEPT VISIT HI MDM: CPT

## 2018-02-10 PROCEDURE — 70486 CT MAXILLOFACIAL W/O DYE: CPT | Mod: 26

## 2018-02-10 PROCEDURE — 93010 ELECTROCARDIOGRAM REPORT: CPT

## 2018-02-10 PROCEDURE — 71045 X-RAY EXAM CHEST 1 VIEW: CPT | Mod: 26

## 2018-02-10 RX ORDER — NITROFURANTOIN MACROCRYSTAL 50 MG
100 CAPSULE ORAL ONCE
Qty: 0 | Refills: 0 | Status: DISCONTINUED | OUTPATIENT
Start: 2018-02-10 | End: 2018-02-10

## 2018-02-10 RX ORDER — SODIUM CHLORIDE 9 MG/ML
500 INJECTION INTRAMUSCULAR; INTRAVENOUS; SUBCUTANEOUS ONCE
Qty: 0 | Refills: 0 | Status: COMPLETED | OUTPATIENT
Start: 2018-02-10 | End: 2018-02-10

## 2018-02-10 RX ORDER — NITROFURANTOIN MACROCRYSTAL 50 MG
1 CAPSULE ORAL
Qty: 14 | Refills: 0 | OUTPATIENT
Start: 2018-02-10 | End: 2018-02-16

## 2018-02-10 RX ORDER — TETANUS TOXOID, REDUCED DIPHTHERIA TOXOID AND ACELLULAR PERTUSSIS VACCINE, ADSORBED 5; 2.5; 8; 8; 2.5 [IU]/.5ML; [IU]/.5ML; UG/.5ML; UG/.5ML; UG/.5ML
0.5 SUSPENSION INTRAMUSCULAR ONCE
Qty: 0 | Refills: 0 | Status: COMPLETED | OUTPATIENT
Start: 2018-02-10 | End: 2018-02-10

## 2018-02-10 RX ADMIN — TETANUS TOXOID, REDUCED DIPHTHERIA TOXOID AND ACELLULAR PERTUSSIS VACCINE, ADSORBED 0.5 MILLILITER(S): 5; 2.5; 8; 8; 2.5 SUSPENSION INTRAMUSCULAR at 19:30

## 2018-02-10 NOTE — ED PROVIDER NOTE - MUSCULOSKELETAL, MLM
Spine appears normal, range of motion is not limited, no muscle or joint tenderness. pelvis stable, nontender. bilateral SLR 30 degrees without pain. MAEx4. Spine appears normal, range of motion is not limited, no muscle or joint tenderness. pelvis stable, nontender. bilateral SLR 30 degrees without pain. MAEx4.  L hand dorsum + ecchymotic mild sts, NT.  L wrist: NT, no swelling/discoloration incl. ulnar styloid, AFROM w/o pain.

## 2018-02-10 NOTE — ED PROVIDER NOTE - SKIN, MLM
Skin normal color for race, warm, dry and intact. No evidence of rash. Skin normal color for race, warm, dry. No evidence of rash.  + 2.8 cm linear lac L periorbital area lateral aspect L eyebrow & extending laterally & superiorly, no active bleeding.

## 2018-02-10 NOTE — ED PROVIDER NOTE - DIAGNOSIS COUNSELING, MDM
conducted a detailed discussion... I had a detailed discussion with the patient and daughter regarding the historical points, exam findings, and any diagnostic results supporting the discharge diagnosis.

## 2018-02-10 NOTE — ED ADULT NURSE NOTE - OBJECTIVE STATEMENT
patient tripped over a box falling and striking left side of face on wood floor.  No LOC speaking coherently right after fall.  C/O headache.  Laceration 5-6cm left face near left eye.  Left facial ecchymosis noted and left hand hematoma.  Patient denies any other pain or injury at this time

## 2018-02-10 NOTE — ED PROVIDER NOTE - CONSTITUTIONAL, MLM
normal... elderly white female, well nourished, awake, alert, oriented to person, place, time/situation and in no apparent distress. nontoxic.

## 2018-02-10 NOTE — ED PROVIDER NOTE - ENMT, MLM
Scalp AT/NC. Airway patent, Nasal mucosa clear. Mouth with mildly dry mucosa. Throat has no vesicles, no oropharyngeal exudates and uvula is midline. Bilateral TMJ nontender. AFROM without pain. Neck nontender, supple.

## 2018-02-10 NOTE — ED PROVIDER NOTE - OBJECTIVE STATEMENT
78 y/o female with PMHx of HLD, RA, OP, breast CA, s/p left eye cataract surgery presents to the ED c/o unwitnessed fall 45 minutes ago. +bruising/lac to left side of face. +left hand hematoma. Pt states that she was in the kitchen and tripped over a box, falling forward onto left side. Denies LOC. On baby ASA. Denies nausea, blurry vision, neck pain, blood pain, abd pain. Due for tetanus.

## 2018-02-10 NOTE — ED PROVIDER NOTE - MEDICAL DECISION MAKING DETAILS
elderly woman on ASA presents s/p fall with head/face injury. neuro intact. plan for TA, CT head, face, C spine, labs, XR pelvis, left hand, lac repair, likely will acquire plastics.

## 2018-02-10 NOTE — ED ADULT TRIAGE NOTE - CHIEF COMPLAINT QUOTE
unwitness fall at home, unknown loc, on asa, + laceration to head.  trauma alert called from registration

## 2018-02-10 NOTE — ED PROVIDER NOTE - PROGRESS NOTE DETAILS
Dr. Garzon:  Plastics consult appreciated.  Reevaluated patient at bedside.  Patient feeling much improved.  Discussed the results of all diagnostic testing in ED and copies of all reports given.   An opportunity to ask questions was given.  Discussed the importance of prompt, close medical follow-up.  Patient will return with any changes, concerns or persistent / worsening symptoms.  Understanding of all instructions verbalized. Contacted patient reported revised X-Ray reading as per Dr. Marks age inderterminate  scaphoid fracture. Patient has splint on affected hand and instructed to F/U with Orthopedic. Jeffry FIELDS

## 2018-02-10 NOTE — ED PROVIDER NOTE - PSH
H/O spinal fusion  lumbar 2005  History of bilateral knee replacement  right 2009, left 2011  History of left hip replacement  2017  History of lumpectomy of left breast  with sentinal node biopsy 1990  History of tonsillectomy    S/P bunionectomy  left x 2 2007

## 2018-02-10 NOTE — ED ADULT NURSE NOTE - CHPI ED SYMPTOMS NEG
no vomiting/no confusion/no deformity/no loss of consciousness/no fever/no tingling/no weakness/no numbness

## 2018-02-10 NOTE — ED ADULT NURSE NOTE - NEURO WDL
Alert and oriented to person, place and time, memory intact, behavior appropriate to situation, PERRL. hx of recent left cataract surgery and wears prism glasses for  double vision

## 2018-06-01 RX ORDER — CYCLOPENTOLATE HYDROCHLORIDE 10 MG/ML
1 SOLUTION/ DROPS OPHTHALMIC
Qty: 0 | Refills: 0 | Status: COMPLETED | OUTPATIENT
Start: 2018-06-04 | End: 2018-06-04

## 2018-06-01 RX ORDER — TROPICAMIDE 1 %
1 DROPS OPHTHALMIC (EYE)
Qty: 0 | Refills: 0 | Status: COMPLETED | OUTPATIENT
Start: 2018-06-04 | End: 2018-06-04

## 2018-06-01 RX ORDER — OFLOXACIN 0.3 %
1 DROPS OPHTHALMIC (EYE)
Qty: 0 | Refills: 0 | Status: COMPLETED | OUTPATIENT
Start: 2018-06-04 | End: 2018-06-04

## 2018-06-01 RX ORDER — PHENYLEPHRINE HCL 2.5 %
1 DROPS OPHTHALMIC (EYE)
Qty: 0 | Refills: 0 | Status: COMPLETED | OUTPATIENT
Start: 2018-06-04 | End: 2018-06-04

## 2018-06-04 ENCOUNTER — OUTPATIENT (OUTPATIENT)
Dept: OUTPATIENT SERVICES | Facility: HOSPITAL | Age: 78
LOS: 1 days | Discharge: HOME | End: 2018-06-04

## 2018-06-04 VITALS
HEART RATE: 90 BPM | RESPIRATION RATE: 18 BRPM | HEIGHT: 66 IN | DIASTOLIC BLOOD PRESSURE: 92 MMHG | WEIGHT: 125 LBS | OXYGEN SATURATION: 100 % | TEMPERATURE: 98 F | SYSTOLIC BLOOD PRESSURE: 170 MMHG

## 2018-06-04 VITALS
TEMPERATURE: 98 F | HEART RATE: 86 BPM | SYSTOLIC BLOOD PRESSURE: 145 MMHG | OXYGEN SATURATION: 98 % | RESPIRATION RATE: 15 BRPM | DIASTOLIC BLOOD PRESSURE: 87 MMHG

## 2018-06-04 DIAGNOSIS — Z98.89 OTHER SPECIFIED POSTPROCEDURAL STATES: Chronic | ICD-10-CM

## 2018-06-04 DIAGNOSIS — Z96.642 PRESENCE OF LEFT ARTIFICIAL HIP JOINT: Chronic | ICD-10-CM

## 2018-06-04 DIAGNOSIS — Z96.653 PRESENCE OF ARTIFICIAL KNEE JOINT, BILATERAL: Chronic | ICD-10-CM

## 2018-06-04 DIAGNOSIS — Z98.1 ARTHRODESIS STATUS: Chronic | ICD-10-CM

## 2018-06-04 DIAGNOSIS — Z95.2 PRESENCE OF PROSTHETIC HEART VALVE: Chronic | ICD-10-CM

## 2018-06-04 DIAGNOSIS — Z98.49 CATARACT EXTRACTION STATUS, UNSPECIFIED EYE: Chronic | ICD-10-CM

## 2018-06-04 RX ORDER — ACETAMINOPHEN 500 MG
650 TABLET ORAL EVERY 6 HOURS
Qty: 0 | Refills: 0 | Status: DISCONTINUED | OUTPATIENT
Start: 2018-06-04 | End: 2018-06-19

## 2018-06-04 RX ADMIN — Medication 1 DROP(S): at 13:42

## 2018-06-04 RX ADMIN — CYCLOPENTOLATE HYDROCHLORIDE 1 DROP(S): 10 SOLUTION/ DROPS OPHTHALMIC at 13:35

## 2018-06-04 RX ADMIN — Medication 1 DROP(S): at 13:38

## 2018-06-04 RX ADMIN — Medication 1 DROP(S): at 13:39

## 2018-06-04 RX ADMIN — Medication 1 DROP(S): at 13:34

## 2018-06-04 RX ADMIN — Medication 1 DROP(S): at 13:35

## 2018-06-04 RX ADMIN — CYCLOPENTOLATE HYDROCHLORIDE 1 DROP(S): 10 SOLUTION/ DROPS OPHTHALMIC at 13:38

## 2018-06-04 RX ADMIN — CYCLOPENTOLATE HYDROCHLORIDE 1 DROP(S): 10 SOLUTION/ DROPS OPHTHALMIC at 13:42

## 2018-06-04 NOTE — ASU DISCHARGE PLAN (ADULT/PEDIATRIC). - MEDICATION SUMMARY - MEDICATIONS TO TAKE
I will START or STAY ON the medications listed below when I get home from the hospital:    SUMAtriptan 50 mg oral tablet  -- 1 tab(s) by mouth every 6 hours, As needed, migraine  -- Indication: For md    CeleBREX 200 mg oral capsule  -- 1 cap(s) by mouth once a day, As Needed for arthritis pain  -- Indication: For md    Aspirin Low Dose 81 mg oral delayed release tablet  -- 1 tab(s) by mouth once a day  -- Indication: For md    traMADol 100 mg/24 hours oral capsule, extended release  -- orally once a day, As Needed  -- Indication: For md    acetaminophen 325 mg oral tablet  -- 2 tab(s) by mouth every 6 hours, As needed, Moderate Pain (4 - 6)  -- Indication: For md    Imitrex 100 mg oral tablet  -- 1 tab(s) by mouth once, As Needed migraines  -- Indication: For md    enalapril 2.5 mg oral tablet  -- 1 tab(s) by mouth 2 times a day  -- Indication: For md    Cymbalta 30 mg oral delayed release capsule  -- 1 cap(s) by mouth once a day  -- Indication: For md    Plaquenil 200 mg oral tablet  -- 1 tab(s) by mouth once a day  -- Indication: For md    Coreg 3.125 mg oral tablet  -- 1 tab(s) by mouth 2 times a day  -- Indication: For md    Fish Oil 1000 mg oral capsule  -- 1  by mouth once a day  -- Indication: For md    B Complex 50  -- 1  by mouth once a day  -- Indication: For md    Vitamin D3 1000 intl units oral tablet  -- 1 tab(s) by mouth once a day  -- Indication: For md

## 2018-06-04 NOTE — ASU PATIENT PROFILE, ADULT - PSH
H/O spinal fusion  lumbar 2005  History of bilateral knee replacement  right 2009, left 2011  History of left hip replacement  2017  History of lumpectomy of left breast  with sentinal node biopsy 1990  History of tonsillectomy    S/P bunionectomy  left x 2 2007 H/O mitral valve replacement    H/O spinal fusion  lumbar 2005  History of bilateral knee replacement  right 2009, left 2011  History of cataract surgery  left cataract sx  History of left hip replacement  2017  History of lumpectomy of left breast  with sentinal node biopsy 1990  History of tonsillectomy    S/P bunionectomy  left x 2 2007

## 2018-06-04 NOTE — BRIEF OPERATIVE NOTE - PROCEDURE
<<-----Click on this checkbox to enter Procedure Cataract extract, extracaps, phacoemuls, w/ prosth lens insertn, 1 stage  06/04/2018    Active  RADHA

## 2018-06-04 NOTE — ASU DISCHARGE PLAN (ADULT/PEDIATRIC). - NURSING INSTRUCTIONS
For any problems or concerns, contact your doctor.  If you cannot reach the doctor, call [facility] Emergency Department at [phone] or go to your local Emergency Department.    A responsible adult should be with you for the rest of the day and night for your safety and to help you if you needed. Resume your medications as listed on the attached Medication Record.    Use over the counter stool softener to relieve any constipation you may experience from the pain medication. Increase clear fluids for the next few days. Ambulation is a key part of recovery, so keep moving!

## 2018-06-07 DIAGNOSIS — Z79.82 LONG TERM (CURRENT) USE OF ASPIRIN: ICD-10-CM

## 2018-06-07 DIAGNOSIS — H26.9 UNSPECIFIED CATARACT: ICD-10-CM

## 2018-06-07 DIAGNOSIS — M06.9 RHEUMATOID ARTHRITIS, UNSPECIFIED: ICD-10-CM

## 2018-06-07 DIAGNOSIS — H25.11 AGE-RELATED NUCLEAR CATARACT, RIGHT EYE: ICD-10-CM

## 2018-06-07 DIAGNOSIS — Z96.652 PRESENCE OF LEFT ARTIFICIAL KNEE JOINT: ICD-10-CM

## 2018-06-07 DIAGNOSIS — Z85.3 PERSONAL HISTORY OF MALIGNANT NEOPLASM OF BREAST: ICD-10-CM

## 2018-06-07 DIAGNOSIS — M19.90 UNSPECIFIED OSTEOARTHRITIS, UNSPECIFIED SITE: ICD-10-CM

## 2018-06-07 DIAGNOSIS — E78.5 HYPERLIPIDEMIA, UNSPECIFIED: ICD-10-CM

## 2019-01-16 ENCOUNTER — EMERGENCY (EMERGENCY)
Facility: HOSPITAL | Age: 79
LOS: 0 days | Discharge: HOME | End: 2019-01-16
Attending: EMERGENCY MEDICINE | Admitting: EMERGENCY MEDICINE
Payer: MEDICARE

## 2019-01-16 VITALS
SYSTOLIC BLOOD PRESSURE: 128 MMHG | RESPIRATION RATE: 17 BRPM | OXYGEN SATURATION: 97 % | HEART RATE: 76 BPM | TEMPERATURE: 98 F | DIASTOLIC BLOOD PRESSURE: 76 MMHG

## 2019-01-16 VITALS
HEART RATE: 78 BPM | RESPIRATION RATE: 18 BRPM | OXYGEN SATURATION: 100 % | DIASTOLIC BLOOD PRESSURE: 74 MMHG | SYSTOLIC BLOOD PRESSURE: 158 MMHG | WEIGHT: 132.94 LBS | TEMPERATURE: 96 F | HEIGHT: 65 IN

## 2019-01-16 DIAGNOSIS — Z98.89 OTHER SPECIFIED POSTPROCEDURAL STATES: Chronic | ICD-10-CM

## 2019-01-16 DIAGNOSIS — Z98.49 CATARACT EXTRACTION STATUS, UNSPECIFIED EYE: Chronic | ICD-10-CM
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
## 2019-01-16 DIAGNOSIS — Z96.653 PRESENCE OF ARTIFICIAL KNEE JOINT, BILATERAL: Chronic | ICD-10-CM
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
## 2019-01-16 DIAGNOSIS — E78.5 HYPERLIPIDEMIA, UNSPECIFIED: ICD-10-CM

## 2019-01-16 DIAGNOSIS — M25.552 PAIN IN LEFT HIP: ICD-10-CM

## 2019-01-16 DIAGNOSIS — Z85.3 PERSONAL HISTORY OF MALIGNANT NEOPLASM OF BREAST: ICD-10-CM

## 2019-01-16 DIAGNOSIS — M06.9 RHEUMATOID ARTHRITIS, UNSPECIFIED: ICD-10-CM

## 2019-01-16 DIAGNOSIS — Z96.653 PRESENCE OF ARTIFICIAL KNEE JOINT, BILATERAL: ICD-10-CM

## 2019-01-16 DIAGNOSIS — Z98.1 ARTHRODESIS STATUS: Chronic | ICD-10-CM

## 2019-01-16 DIAGNOSIS — T84.021A DISLOCATION OF INTERNAL LEFT HIP PROSTHESIS, INITIAL ENCOUNTER: ICD-10-CM

## 2019-01-16 DIAGNOSIS — Z95.2 PRESENCE OF PROSTHETIC HEART VALVE: Chronic | ICD-10-CM

## 2019-01-16 DIAGNOSIS — Z95.2 PRESENCE OF PROSTHETIC HEART VALVE: ICD-10-CM

## 2019-01-16 DIAGNOSIS — I10 ESSENTIAL (PRIMARY) HYPERTENSION: ICD-10-CM

## 2019-01-16 DIAGNOSIS — Z96.642 PRESENCE OF LEFT ARTIFICIAL HIP JOINT: Chronic | ICD-10-CM

## 2019-01-16 LAB
ALBUMIN SERPL ELPH-MCNC: 3.3 G/DL — SIGNIFICANT CHANGE UP (ref 3.3–5)
ALP SERPL-CCNC: 70 U/L — SIGNIFICANT CHANGE UP (ref 40–120)
ALT FLD-CCNC: 22 U/L — SIGNIFICANT CHANGE UP (ref 12–78)
ANION GAP SERPL CALC-SCNC: 9 MMOL/L — SIGNIFICANT CHANGE UP (ref 5–17)
APTT BLD: 24 SEC — LOW (ref 27.5–36.3)
AST SERPL-CCNC: 28 U/L — SIGNIFICANT CHANGE UP (ref 15–37)
BASOPHILS # BLD AUTO: 0.16 K/UL — SIGNIFICANT CHANGE UP (ref 0–0.2)
BASOPHILS NFR BLD AUTO: 2 % — SIGNIFICANT CHANGE UP (ref 0–2)
BILIRUB SERPL-MCNC: 0.5 MG/DL — SIGNIFICANT CHANGE UP (ref 0.2–1.2)
BUN SERPL-MCNC: 15 MG/DL — SIGNIFICANT CHANGE UP (ref 7–23)
CALCIUM SERPL-MCNC: 8.7 MG/DL — SIGNIFICANT CHANGE UP (ref 8.5–10.1)
CHLORIDE SERPL-SCNC: 100 MMOL/L — SIGNIFICANT CHANGE UP (ref 96–108)
CO2 SERPL-SCNC: 26 MMOL/L — SIGNIFICANT CHANGE UP (ref 22–31)
CREAT SERPL-MCNC: 0.89 MG/DL — SIGNIFICANT CHANGE UP (ref 0.5–1.3)
EOSINOPHIL # BLD AUTO: 0.38 K/UL — SIGNIFICANT CHANGE UP (ref 0–0.5)
EOSINOPHIL NFR BLD AUTO: 4.7 % — SIGNIFICANT CHANGE UP (ref 0–6)
GLUCOSE SERPL-MCNC: 112 MG/DL — HIGH (ref 70–99)
HCT VFR BLD CALC: 37.6 % — SIGNIFICANT CHANGE UP (ref 34.5–45)
HGB BLD-MCNC: 12.5 G/DL — SIGNIFICANT CHANGE UP (ref 11.5–15.5)
IMM GRANULOCYTES NFR BLD AUTO: 0.5 % — SIGNIFICANT CHANGE UP (ref 0–1.5)
INR BLD: 1.03 RATIO — SIGNIFICANT CHANGE UP (ref 0.88–1.16)
LYMPHOCYTES # BLD AUTO: 1.97 K/UL — SIGNIFICANT CHANGE UP (ref 1–3.3)
LYMPHOCYTES # BLD AUTO: 24.6 % — SIGNIFICANT CHANGE UP (ref 13–44)
MCHC RBC-ENTMCNC: 32.8 PG — SIGNIFICANT CHANGE UP (ref 27–34)
MCHC RBC-ENTMCNC: 33.2 GM/DL — SIGNIFICANT CHANGE UP (ref 32–36)
MCV RBC AUTO: 98.7 FL — SIGNIFICANT CHANGE UP (ref 80–100)
MONOCYTES # BLD AUTO: 1.01 K/UL — HIGH (ref 0–0.9)
MONOCYTES NFR BLD AUTO: 12.6 % — SIGNIFICANT CHANGE UP (ref 2–14)
NEUTROPHILS # BLD AUTO: 4.45 K/UL — SIGNIFICANT CHANGE UP (ref 1.8–7.4)
NEUTROPHILS NFR BLD AUTO: 55.6 % — SIGNIFICANT CHANGE UP (ref 43–77)
NRBC # BLD: 0 /100 WBCS — SIGNIFICANT CHANGE UP (ref 0–0)
PLATELET # BLD AUTO: 290 K/UL — SIGNIFICANT CHANGE UP (ref 150–400)
POTASSIUM SERPL-MCNC: 4.7 MMOL/L — SIGNIFICANT CHANGE UP (ref 3.5–5.3)
POTASSIUM SERPL-SCNC: 4.7 MMOL/L — SIGNIFICANT CHANGE UP (ref 3.5–5.3)
PROT SERPL-MCNC: 7.3 GM/DL — SIGNIFICANT CHANGE UP (ref 6–8.3)
PROTHROM AB SERPL-ACNC: 11.4 SEC — SIGNIFICANT CHANGE UP (ref 10–12.9)
RBC # BLD: 3.81 M/UL — SIGNIFICANT CHANGE UP (ref 3.8–5.2)
RBC # FLD: 12.2 % — SIGNIFICANT CHANGE UP (ref 10.3–14.5)
SODIUM SERPL-SCNC: 135 MMOL/L — SIGNIFICANT CHANGE UP (ref 135–145)
WBC # BLD: 8.01 K/UL — SIGNIFICANT CHANGE UP (ref 3.8–10.5)
WBC # FLD AUTO: 8.01 K/UL — SIGNIFICANT CHANGE UP (ref 3.8–10.5)

## 2019-01-16 PROCEDURE — 71045 X-RAY EXAM CHEST 1 VIEW: CPT | Mod: 26

## 2019-01-16 PROCEDURE — 73502 X-RAY EXAM HIP UNI 2-3 VIEWS: CPT | Mod: 26,LT

## 2019-01-16 PROCEDURE — 99285 EMERGENCY DEPT VISIT HI MDM: CPT | Mod: 25

## 2019-01-16 PROCEDURE — 99152 MOD SED SAME PHYS/QHP 5/>YRS: CPT

## 2019-01-16 PROCEDURE — 73552 X-RAY EXAM OF FEMUR 2/>: CPT | Mod: 26,LT

## 2019-01-16 PROCEDURE — 93010 ELECTROCARDIOGRAM REPORT: CPT

## 2019-01-16 PROCEDURE — 73502 X-RAY EXAM HIP UNI 2-3 VIEWS: CPT | Mod: 26,LT,77

## 2019-01-16 RX ORDER — MORPHINE SULFATE 50 MG/1
4 CAPSULE, EXTENDED RELEASE ORAL ONCE
Qty: 0 | Refills: 0 | Status: DISCONTINUED | OUTPATIENT
Start: 2019-01-16 | End: 2019-01-16

## 2019-01-16 RX ORDER — ONDANSETRON 8 MG/1
4 TABLET, FILM COATED ORAL ONCE
Qty: 0 | Refills: 0 | Status: COMPLETED | OUTPATIENT
Start: 2019-01-16 | End: 2019-01-16

## 2019-01-16 RX ORDER — SODIUM CHLORIDE 9 MG/ML
3 INJECTION INTRAMUSCULAR; INTRAVENOUS; SUBCUTANEOUS ONCE
Qty: 0 | Refills: 0 | Status: COMPLETED | OUTPATIENT
Start: 2019-01-16 | End: 2019-01-16

## 2019-01-16 RX ORDER — SODIUM CHLORIDE 9 MG/ML
500 INJECTION INTRAMUSCULAR; INTRAVENOUS; SUBCUTANEOUS ONCE
Qty: 0 | Refills: 0 | Status: COMPLETED | OUTPATIENT
Start: 2019-01-16 | End: 2019-01-16

## 2019-01-16 RX ADMIN — SODIUM CHLORIDE 500 MILLILITER(S): 9 INJECTION INTRAMUSCULAR; INTRAVENOUS; SUBCUTANEOUS at 10:08

## 2019-01-16 RX ADMIN — SODIUM CHLORIDE 3 MILLILITER(S): 9 INJECTION INTRAMUSCULAR; INTRAVENOUS; SUBCUTANEOUS at 09:06

## 2019-01-16 RX ADMIN — ONDANSETRON 4 MILLIGRAM(S): 8 TABLET, FILM COATED ORAL at 09:05

## 2019-01-16 RX ADMIN — MORPHINE SULFATE 4 MILLIGRAM(S): 50 CAPSULE, EXTENDED RELEASE ORAL at 09:20

## 2019-01-16 RX ADMIN — MORPHINE SULFATE 4 MILLIGRAM(S): 50 CAPSULE, EXTENDED RELEASE ORAL at 10:09

## 2019-01-16 RX ADMIN — MORPHINE SULFATE 4 MILLIGRAM(S): 50 CAPSULE, EXTENDED RELEASE ORAL at 11:03

## 2019-01-16 RX ADMIN — MORPHINE SULFATE 4 MILLIGRAM(S): 50 CAPSULE, EXTENDED RELEASE ORAL at 09:05

## 2019-01-16 RX ADMIN — SODIUM CHLORIDE 500 MILLILITER(S): 9 INJECTION INTRAMUSCULAR; INTRAVENOUS; SUBCUTANEOUS at 09:05

## 2019-01-16 NOTE — ED PROVIDER NOTE - PSH
H/O mitral valve replacement    H/O spinal fusion  lumbar 2005  History of bilateral knee replacement  right 2009, left 2011  History of cataract surgery  left cataract sx  History of left hip replacement  2017  History of lumpectomy of left breast  with sentinal node biopsy 1990  History of tonsillectomy    S/P bunionectomy  left x 2 2007

## 2019-01-16 NOTE — CONSULT NOTE ADULT - ASSESSMENT
A/P:  77 yo F s/p dislocation of L hip, hx of L CAMERON:  -Will require sedation for reduction of L hip prosthesis  -ED attending aware  -pain control  -dvt ppx  -NWB LUE, bed rest  -plan for reduction of L hip  -will discuss with attending and update plan if any changes A/P:  79 yo F s/p dislocation of L total prosthetic hip:  -s/p Reduction of L hip prosthetic dislocation under sedation  -pain control  -dvt ppx  -WBAT in L knee immobilizer  -PT  -In abduction pillow when in bed at all times  -follow up with you Orthopedic surgeon as soon as possible, call office for an appt  -will discuss with Dr. Hartman and update plan if any changes

## 2019-01-16 NOTE — ED ADULT NURSE NOTE - CHIEF COMPLAINT QUOTE
fall in bathroom, unwitnessed, on asa, probable left hip dislocation. hx of left hip repair in november. trauma alert initiated.

## 2019-01-16 NOTE — ED PROVIDER NOTE - OBJECTIVE STATEMENT
ED chart completed 1/18/2019.    79 yo WF, PMH L THR 11/29/18, OA/RA, HTN, breast CA tx'ed by lumpectomy & LN resection, cor. valve replacement, HTN, BIBA from home c/o'ing L hip injury s/p mechanical fall.  Incident occurred ~ 1 hour PTA.  While in bathroom pt bent over to apply cream to her legs --> her feet slid out on her & the L hip "popped out" as pt slid onto tile floor.  Pt has been unable to move L hip nor get up ever since due to the severe pain.  No head injury/LOC.  Pt denies any other injury, no pain elsewhere, nor any weak/numb/paresths. distal LLE.  NKDA.   PCP: Shannan.   Ortho: Isabella

## 2019-01-16 NOTE — ED PROVIDER NOTE - PROGRESS NOTE DETAILS
Julieta Garzon: [facility] resident. Julieta conde Dr. Continra: Ortho residents aware. Dr. Garzon:  Pt alert, feeling much better, no active c/o's at present.  Pt cleared by Ortho for D/c for outpt f/u own ortho MD, pt to keep knee immobilizer on, use adducter pillow when in bed. Dr. Garzon:  Pt alert, feeling much better, no active c/o's at present.  Pt cleared by Ortho for D/c for outpt f/u own ortho MD, pt to keep knee immobilizer on, use adductor pillow when in bed.

## 2019-01-16 NOTE — ED ADULT NURSE REASSESSMENT NOTE - NS ED NURSE REASSESS COMMENT FT1
Pt tolerated conscious sedation, de-stat to 70's and ambu bag  used for 2 minutes and went back up to 98% and on 4lnc. Pt is know axox4 and no pain. VSS. See Conscious Sedation paperwork. Will continue to monitor.

## 2019-01-16 NOTE — ED ADULT NURSE REASSESSMENT NOTE - NS ED NURSE REASSESS COMMENT FT1
Pt destat off o2 90% on RA, placed back on 2lnc and Dr. Garzon made aware. pt not to be d/c home until o2 stat above 96% on RA. Pt was given some water and explain d/c process. Will continue to monitor.

## 2019-01-16 NOTE — CONSULT NOTE ADULT - SUBJECTIVE AND OBJECTIVE BOX
HPI:  78F w/ PMHx of HLD, Breast Ca s/p RT and lumpectomy in remission, RA, L Rik (Harsha, '17) w/ Revision to CAMERON ([facility], 11/2018), B/L TKA (Anam Thorpe, ~10 years ago), PSF ('07) who presents to the ED w/ severe L hip pain. Pt states she lifted her leg up on the toilet to put on some cream, when she heard a pop and the leg gave out and she fell straight down. She denies head trauma, LOC, or any other injuries. Denies numbness or tingling in BLLE. Pt states she is in severe pain and unable to ambulate. States she is an independent ambulator at baseline. Takes a full dose aspirin daily. Last ate yesterday evening. No recent fevers, chills, cp, sob, n/v.     PAST MEDICAL & SURGICAL HISTORY:  Chronic pain: secondary to OA and RA  Breast cancer: left 1990 treated with RT and surgery  HLD (hyperlipidemia)  Migraine  OA (osteoarthritis)  RA (rheumatoid arthritis)  Mitral valve disease  History of cataract surgery: left cataract sx  H/O mitral valve replacement  History of left hip replacement: 2017  S/P bunionectomy: left x 2 2007  History of tonsillectomy  H/O spinal fusion: lumbar 2005  History of bilateral knee replacement: right 2009, left 2011  History of lumpectomy of left breast: with sentinal node biopsy 1990    Home Medications:  acetaminophen 325 mg oral tablet: 2 tab(s) orally every 6 hours, As needed, Moderate Pain (4 - 6) (04 Jun 2018 13:24)  Aspirin Low Dose 81 mg oral delayed release tablet: 1 tab(s) orally once a day (04 Jun 2018 13:24)  B Complex 50: 1  orally once a day (04 Jun 2018 13:24)  CeleBREX 200 mg oral capsule: 1 cap(s) orally once a day, As Needed for arthritis pain (04 Jun 2018 13:24)  Coreg 3.125 mg oral tablet: 1 tab(s) orally 2 times a day (04 Jun 2018 13:24)  Cymbalta 30 mg oral delayed release capsule: 1 cap(s) orally once a day (04 Jun 2018 13:24)  enalapril 2.5 mg oral tablet: 1 tab(s) orally 2 times a day (04 Jun 2018 13:24)  Fish Oil 1000 mg oral capsule: 1  orally once a day (04 Jun 2018 13:24)  Imitrex 100 mg oral tablet: 1 tab(s) orally once, As Needed migraines (04 Jun 2018 13:24)  Plaquenil 200 mg oral tablet: 1 tab(s) orally once a day (04 Jun 2018 13:24)  SUMAtriptan 50 mg oral tablet: 1 tab(s) orally every 6 hours, As needed, migraine (04 Jun 2018 13:24)  traMADol 100 mg/24 hours oral capsule, extended release: orally once a day, As Needed (04 Jun 2018 13:24)  Vitamin D3 1000 intl units oral tablet: 1 tab(s) orally once a day (04 Jun 2018 13:24)    Allergies    No Known Allergies    Intolerances    Vital Signs Last 24 Hrs  T(C): 35.8 (16 Jan 2019 08:35), Max: 35.8 (16 Jan 2019 08:35)  T(F): 96.4 (16 Jan 2019 08:35), Max: 96.4 (16 Jan 2019 08:35)  HR: 85 (16 Jan 2019 10:08) (78 - 85)  BP: 163/93 (16 Jan 2019 10:08) (158/74 - 163/93)  BP(mean): --  RR: 17 (16 Jan 2019 10:08) (17 - 18)  SpO2: 99% (16 Jan 2019 10:08) (99% - 100%)    PE:  Gen: In visible distress,  at bedside    LLE:   Skin intact, no erythema  Shortened, internally rotated  Compartments soft and compressible  Unable to move leg due to severe pain  Unable to SLR  No calf ttp  +EHL/FHL/GSc/TA  SILT L2-S1  2+ RP    Secondary survey:  No head trauma  No ttp along c/t/l/s spine  No ttp along bony prominences diffusely  +A/PROM intact in joints diffusely, other than mentioned above  SILT diffusely  NVI diffusely  Compartments soft and compressible diffusely    XRay L hip:  Demonstrating a L total hip prosthesis, dislocated superiorly and posteriorly    Procedure Note: HPI:  78F w/ PMHx of HLD, Breast Ca s/p RT and lumpectomy in remission, RA, L Rik (Harsha, '17) w/ Revision to CAMERON ([facility] [place], 11/2018), B/L TKA (Anam Thorpe, ~10 years ago), PSF ('07) who presents to the ED w/ severe L hip pain. Pt states she lifted her leg up on the toilet to put on some cream, when she heard a pop and the leg gave out and she fell straight down. She denies head trauma, LOC, or any other injuries. Denies numbness or tingling in BLLE. Pt states she is in severe pain and unable to ambulate. States she is an independent ambulator at baseline. Takes a full dose aspirin daily. Last ate yesterday evening. No recent fevers, chills, cp, sob, n/v.     PAST MEDICAL & SURGICAL HISTORY:  Chronic pain: secondary to OA and RA  Breast cancer: left 1990 treated with RT and surgery  HLD (hyperlipidemia)  Migraine  OA (osteoarthritis)  RA (rheumatoid arthritis)  Mitral valve disease  History of cataract surgery: left cataract sx  H/O mitral valve replacement  History of left hip replacement: 2017  S/P bunionectomy: left x 2 2007  History of tonsillectomy  H/O spinal fusion: lumbar 2005  History of bilateral knee replacement: right 2009, left 2011  History of lumpectomy of left breast: with sentinal node biopsy 1990    Home Medications:  acetaminophen 325 mg oral tablet: 2 tab(s) orally every 6 hours, As needed, Moderate Pain (4 - 6) (04 Jun 2018 13:24)  Aspirin Low Dose 81 mg oral delayed release tablet: 1 tab(s) orally once a day (04 Jun 2018 13:24)  B Complex 50: 1  orally once a day (04 Jun 2018 13:24)  CeleBREX 200 mg oral capsule: 1 cap(s) orally once a day, As Needed for arthritis pain (04 Jun 2018 13:24)  Coreg 3.125 mg oral tablet: 1 tab(s) orally 2 times a day (04 Jun 2018 13:24)  Cymbalta 30 mg oral delayed release capsule: 1 cap(s) orally once a day (04 Jun 2018 13:24)  enalapril 2.5 mg oral tablet: 1 tab(s) orally 2 times a day (04 Jun 2018 13:24)  Fish Oil 1000 mg oral capsule: 1  orally once a day (04 Jun 2018 13:24)  Imitrex 100 mg oral tablet: 1 tab(s) orally once, As Needed migraines (04 Jun 2018 13:24)  Plaquenil 200 mg oral tablet: 1 tab(s) orally once a day (04 Jun 2018 13:24)  SUMAtriptan 50 mg oral tablet: 1 tab(s) orally every 6 hours, As needed, migraine (04 Jun 2018 13:24)  traMADol 100 mg/24 hours oral capsule, extended release: orally once a day, As Needed (04 Jun 2018 13:24)  Vitamin D3 1000 intl units oral tablet: 1 tab(s) orally once a day (04 Jun 2018 13:24)    Allergies    No Known Allergies    Intolerances    Vital Signs Last 24 Hrs  T(C): 35.8 (16 Jan 2019 08:35), Max: 35.8 (16 Jan 2019 08:35)  T(F): 96.4 (16 Jan 2019 08:35), Max: 96.4 (16 Jan 2019 08:35)  HR: 85 (16 Jan 2019 10:08) (78 - 85)  BP: 163/93 (16 Jan 2019 10:08) (158/74 - 163/93)  BP(mean): --  RR: 17 (16 Jan 2019 10:08) (17 - 18)  SpO2: 99% (16 Jan 2019 10:08) (99% - 100%)    PE:  Gen: In visible distress,  at bedside    LLE:   Skin intact, no erythema  Shortened, internally rotated  Compartments soft and compressible  Unable to move leg due to severe pain  Unable to SLR  No calf ttp  +EHL/FHL/GSc/TA  SILT L2-S1  2+ RP    Secondary survey:  No head trauma  No ttp along c/t/l/s spine  No ttp along bony prominences diffusely  +A/PROM intact in joints diffusely, other than mentioned above  SILT diffusely  NVI diffusely  Compartments soft and compressible diffusely    XRay L hip:  Demonstrating a L total hip prosthesis, dislocated superiorly and posteriorly    Procedure Note:  Reduction of L total hip prosthesis dislocation  See chart for details.

## 2019-01-16 NOTE — ED PROVIDER NOTE - ENMT, MLM
Airway patent, Nasal mucosa clear. Mouth with mildly dry mucosa. Throat has no vesicles, no oropharyngeal exudates and uvula is midline.  Willson's negative.

## 2019-01-16 NOTE — ED PROVIDER NOTE - TEMPLATE
[facility]    Name:  Deidre Bobby   Age:  89 y.o.  Sex:  female  :  1926  MRN:  [id number]   Visit Number:  [id number]  Admission Date:  1/15/2019  Date Of Service:  19  Primary Care Physician:  Rob Pantoja DO     LOS: 1 day :  Patient Care Team:  Rob Pantoja DO as PCP - General (Family Medicine):      Subjective / Interval History:     89-year-old patient was been admitted there from the nursing home and details reviewed as per the initial history and physical.  Was initially transferred  because of her abnormal x-ray with right-sided the calcification.  The workup done in the hospital till now shows a she has a diffuse atherosclerosis with peripheral vascular disease and ischemic toes and multiple wounds.  Patient does complain of significant pain in her legs.  She is very hard of hearing so details on the history could not be obtained in the family not year.    Vital Signs:    Temp:  [98.3 °F (36.8 °C)-99.8 °F (37.7 °C)] 98.9 °F (37.2 °C)  Heart Rate:  [61-89] 78  Resp:  [16] 16  BP: ()/(56-95) 125/67    Intake and output:    I/O last 3 completed shifts:  In: 900 [I.V.:500; IV Piggyback:400]  Out: -   I/O this shift:  In: 200 [P.O.:200]  Out: -     Physical Examination:    General Appearance:    Alert and cooperative, not in any acute distress.   Head:    Atraumatic and normocephalic, decreased hearing    Eyes:            PERRLA,  No pallor. Extraocular movements are within normal limits.   Neck:   Supple,  No lymphglands, no bruit   Lungs:     Chest shape is normal. Breath sounds heard bilaterally equally.  No crackles or wheezing.     Heart:    Normal S1 and S2, no murmur,  No JVD   Abdomen:     Normal bowel sounds, no masses, no organomegaly. Soft        non-tender, no guarding, no rebound                tenderness   Extremities:   She has ischemic tip of the right big toe and the heel ulcers bilateral with the no peripheral  distal pulses palpable, no edema, no cyanosis,    Skin:   No  bruising or rash.   Neurologic:   Is very hard of hearing and does have dementia and the high center evaluation very difficult.  Patient is bedbound and able to move extremities..    Laboratory results:  Results from last 7 days   Lab Units 01/16/19  0624 01/15/19  1535   SODIUM mmol/L 134* 132*   POTASSIUM mmol/L 4.1 5.1   CHLORIDE mmol/L 105 103   CO2 mmol/L 20.0* 18.0*   BUN mg/dL 25* 30*   CREATININE mg/dL 1.10 1.50*   CALCIUM mg/dL 8.2* 9.1   BILIRUBIN mg/dL  --  0.3   ALK PHOS U/L  --  77   ALT (SGPT) U/L  --  23   AST (SGOT) U/L  --  22   GLUCOSE mg/dL 88 106*     Results from last 7 days   Lab Units 01/16/19  0624 01/15/19  1535   WBC 10*3/mm3 17.74* 20.52*   HEMOGLOBIN g/dL 7.8* 9.7*   HEMATOCRIT % 24.8* 30.2*   PLATELETS 10*3/mm3 484* 569*     Results from last 7 days   Lab Units 01/15/19  1535   INR  1.41*         Results from last 7 days   Lab Units 01/15/19  1734 01/15/19  1732   BLOODCX  No growth at less than 24 hours No growth at less than 24 hours       Radiology results:    Imaging Results (last 24 hours)     Procedure Component Value Units Date/Time    CT Chest Without Contrast [[id number]] Collected:  01/16/19 1241     Updated:  01/16/19 1241    Narrative:       CT CHEST     INDICATION: Immunocompromised patient with acute respiratory illness.     TECHNIQUE: Axial imaging through the chest without intravenous contrast  or previous chest CT. This study was performed with techniques to keep  radiation doses as low as reasonably achievable, (ALARA).     FINDINGS: Evaluation is degraded by the absence of intravenous contrast.  Heart size is normal. Small hiatal hernia. There are some densities  within this region which may be intraluminal and could represent some  retained ingested densities or contrast. There is also some minimal  thickening of the distal esophagus. No pericardial effusion. Moderate  vascular calcifications. No significant  pleural effusion. Visualized  portions of the upper abdomen demonstrate some tiny nonobstructing renal  stones and/or vascular calcifications.     No pneumothorax. Multiple opacities in the posterior aspect of both lung  bases may represent areas of atelectasis and/or scarring. Developing  infiltrates from pneumonia are difficult to exclude. Moderate  degenerative changes within the spine and shoulders.       Impression:          1. Opacities in the lung bases which may represent atelectasis and/or  scarring but developing infiltrates for pneumonia are not excluded.  Follow-up may be useful.     2. Small hiatal hernia with some thickening of the distal esophagus  which may be related to mild esophagitis. Mass difficult to definitively  exclude and could be further assessed with endoscopy if indicated.    US Renal Limited [[id number]] Resulted:  01/16/19 1241     Updated:  01/16/19 1009    CT Lower Extremity Right Without Contrast [[id number]] Updated:  01/16/19 0959    CT Lower Extremity Left Without Contrast [[id number]] Updated:  01/16/19 0959    XR Chest 1 View [[id number]] Collected:  01/15/19 1824     Updated:  01/15/19 1825    Narrative:       FINAL REPORT    TECHNIQUE:  An AP portable view of the chest was obtained.    CLINICAL HISTORY:  SOA.    FINDINGS:  There is incidental calcification of costal cartilage projecting  over the right lung.  The lungs are clear. The heart and  vasculature are unremarkable. There is no pleural disease,  adenopathy, or significant osseous abnormality.      Impression:       Unremarkable.    Authenticated by Nikolai Pereyra M.D. on 01/15/2019 06:24:42 PM    XR Foot 3+ View Bilateral [[id number]] Collected:  01/15/19 1524     Updated:  01/15/19 1537    Narrative:       BILATERAL FEET     INDICATION: Heel wound.     FINDINGS: Three views of the left foot demonstrate diffuse osteopenia.  Old appearing fracture deformity of the midshaft of the 5th metatarsal.  There are also fracture  deformities of the distal 3rd and 4th  metatarsals, favored to be old as well. Small Achilles calcaneal spur.  Vascular calcifications. Bipartite appearance of the medial sesamoid.  Moderate multifocal degenerative changes. No obvious acute fracture or  areas of bony destruction.     Three views of the right foot also demonstrate diffuse osteopenia and  vascular calcifications. Small plantar and Achilles calcaneal spurs.  Subacute to old fracture of the distal 5th metatarsal. There is also a  probable nondisplaced fracture of the proximal 3rd metatarsal which is  also favored to likely be subacute or old but should be correlated  clinically. Moderate multifocal degenerative changes. Deformity of the  distal aspect of the proximal 4th phalanx is nonspecific. The appearance  may be related to a previous fracture or osteomyelitis. Other etiologies  include psoriatic or Jose Manuel's disease thought less likely.       Impression:       1. Prominent chronic appearing findings.  2. Fracture deformities are favored to be predominantly chronic. Some in  the right foot may be subacute. Acute process thought less likely but  should be correlated clinically.  3. If symptoms persist, or if there is concern for osteomyelitis,  consider MRI.     This report was finalized on 1/15/2019 3:35 PM by Perfecto Diaz MD.          I have reviewed the patient's radiology reports.    Medication Review:     I have reviewed the patients active and prn medications.     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  Cellulitis of the heels and the possible underlying osteomyelitis which still needs to be ruled out-continue with the current IV antibiotic and white count is improving    2.  Possible early pneumonia she had abnormal x-ray and a CT scan done shows there is bilateral probably mild early pneumonia and antibiotic to be continued as she does have a white count and she may not mount the febrile response due to her age    3.  Severe peripheral vascular disease with the signs of necrotic toes.  She does have ischemic pain and would benefit from intervention and at  present would do medical management.  Dr. Setvens consult has already been done and will get his opinion and proceed accordingly    4.  Acute to renal injury she was probably clinically dry and the prerenal azotemia and with hydration has improved and will continue fluids  See rest as per orders      Wilver Morales MD  01/16/19  12:41 PM      Please note that portions of this note may have been completed with a voice recognition program. Efforts were made to edit the dictations, but occasionally words are mistranscribed.     Fall

## 2019-01-16 NOTE — ED PROVIDER NOTE - CROS ED MUSC ALL NEG
Problem: Nutrition  Goal: Tolerates feedings  Outcome: Outcome Met, Continue evaluating goal progress toward completion  Baby continues to tolerate feedings of Sim Sensitive - taking 90-115ml every 3-4 hours.  Baby is using home bottle - Benny with size 1 nipple.  Does gulp and may want to try a slow flow nipple if mother has that available.  However, baby does do better with home bottle system than hospital nipples as it slows her down a little with feedings.  Will continue to monitor and perhaps try a slow flow nipple if available       - - -

## 2019-01-16 NOTE — ED PROVIDER NOTE - MUSCULOSKELETAL, MLM
Neck: NT, supple w/o pain.  Back, pelvis: NT, stable.  L Hip: + TTP, no AROM due to pain, + internally rotated.  LLE distal: NT, no focal swelling/ deformity; L foot DP normal, normal motor/sensory exam.

## 2019-01-16 NOTE — ED PROVIDER NOTE - MEDICAL DECISION MAKING DETAILS
Elderly WF, recent L THR, BIBA from home c/oing L hip injury s/p L foot slid on fllor as pt had controlled fall onto floor.  L hip internally rotated, pt in much pain.  Plan: XRs L hip/femur, labs, IV pain meds.  Ortho consult.  Monitor, observe, reassess. Elderly WF, recent L THR, BIBA from home c/o'ing L hip injury s/p L foot slid on fllor as pt had controlled fall onto floor.  L hip internally rotated, pt in much pain.  Plan: XRs L hip/femur, labs, IV pain meds.  Ortho consult.  Monitor, observe, reassess.

## 2019-01-16 NOTE — ED ADULT NURSE NOTE - OBJECTIVE STATEMENT
Pt sleep and fell, neg loc and c/o of left hip pain. Hip appears to be mis-aligned and recently had hip replacement in Nov.

## 2019-01-16 NOTE — ED PROCEDURE NOTE - NS_POSTPROCCAREGUIDE_ED_ALL_ED
Patient is now fully awake, with vital signs and temperature stable, hydration is adequate, patients Tristan’s  score is at baseline (or greater than 8), patient and escort has received  discharge education.

## 2019-01-16 NOTE — ED ADULT NURSE NOTE - NSIMPLEMENTINTERV_GEN_ALL_ED
Implemented All Fall Risk Interventions:  [place] to call system. Call bell, personal items and telephone within reach. Instruct patient to call for assistance. Room bathroom lighting operational. Non-slip footwear when patient is off stretcher. Physically safe environment: no spills, clutter or unnecessary equipment. Stretcher in lowest position, wheels locked, appropriate side rails in place. Provide visual cue, wrist band, yellow gown, etc. Monitor gait and stability. Monitor for mental status changes and reorient to person, place, and time. Review medications for side effects contributing to fall risk. Reinforce activity limits and safety measures with patient and family.

## 2019-03-05 ENCOUNTER — EMERGENCY (EMERGENCY)
Facility: HOSPITAL | Age: 79
LOS: 0 days | Discharge: HOME | End: 2019-03-05
Attending: EMERGENCY MEDICINE | Admitting: EMERGENCY MEDICINE
Payer: MEDICARE

## 2019-03-05 VITALS
WEIGHT: 130.95 LBS | DIASTOLIC BLOOD PRESSURE: 81 MMHG | SYSTOLIC BLOOD PRESSURE: 153 MMHG | HEART RATE: 75 BPM | OXYGEN SATURATION: 100 % | TEMPERATURE: 98 F | HEIGHT: 64 IN | RESPIRATION RATE: 17 BRPM

## 2019-03-05 DIAGNOSIS — T84.021A DISLOCATION OF INTERNAL LEFT HIP PROSTHESIS, INITIAL ENCOUNTER: ICD-10-CM

## 2019-03-05 DIAGNOSIS — Z98.89 OTHER SPECIFIED POSTPROCEDURAL STATES: Chronic | ICD-10-CM

## 2019-03-05 DIAGNOSIS — Z79.82 LONG TERM (CURRENT) USE OF ASPIRIN: ICD-10-CM

## 2019-03-05 DIAGNOSIS — Z95.2 PRESENCE OF PROSTHETIC HEART VALVE: Chronic | ICD-10-CM

## 2019-03-05 DIAGNOSIS — Z85.3 PERSONAL HISTORY OF MALIGNANT NEOPLASM OF BREAST: ICD-10-CM

## 2019-03-05 DIAGNOSIS — M25.552 PAIN IN LEFT HIP: ICD-10-CM

## 2019-03-05 DIAGNOSIS — Z96.653 PRESENCE OF ARTIFICIAL KNEE JOINT, BILATERAL: Chronic | ICD-10-CM

## 2019-03-05 DIAGNOSIS — I10 ESSENTIAL (PRIMARY) HYPERTENSION: ICD-10-CM

## 2019-03-05 DIAGNOSIS — Z98.49 CATARACT EXTRACTION STATUS, UNSPECIFIED EYE: Chronic | ICD-10-CM

## 2019-03-05 DIAGNOSIS — Z96.653 PRESENCE OF ARTIFICIAL KNEE JOINT, BILATERAL: ICD-10-CM

## 2019-03-05 DIAGNOSIS — M06.9 RHEUMATOID ARTHRITIS, UNSPECIFIED: ICD-10-CM
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
## 2019-03-05 DIAGNOSIS — Z79.01 LONG TERM (CURRENT) USE OF ANTICOAGULANTS: ICD-10-CM

## 2019-03-05 DIAGNOSIS — Z96.642 PRESENCE OF LEFT ARTIFICIAL HIP JOINT: Chronic | ICD-10-CM

## 2019-03-05 DIAGNOSIS — Z98.1 ARTHRODESIS STATUS: Chronic | ICD-10-CM

## 2019-03-05 DIAGNOSIS — Z96.642 PRESENCE OF LEFT ARTIFICIAL HIP JOINT: ICD-10-CM

## 2019-03-05 LAB
ANION GAP SERPL CALC-SCNC: 9 MMOL/L — SIGNIFICANT CHANGE UP (ref 5–17)
BASOPHILS # BLD AUTO: 0.11 K/UL — SIGNIFICANT CHANGE UP (ref 0–0.2)
BASOPHILS NFR BLD AUTO: 1.2 % — SIGNIFICANT CHANGE UP (ref 0–2)
BUN SERPL-MCNC: 20 MG/DL — SIGNIFICANT CHANGE UP (ref 7–23)
CALCIUM SERPL-MCNC: 8.5 MG/DL — SIGNIFICANT CHANGE UP (ref 8.5–10.1)
CHLORIDE SERPL-SCNC: 104 MMOL/L — SIGNIFICANT CHANGE UP (ref 96–108)
CO2 SERPL-SCNC: 25 MMOL/L — SIGNIFICANT CHANGE UP (ref 22–31)
CREAT SERPL-MCNC: 0.97 MG/DL — SIGNIFICANT CHANGE UP (ref 0.5–1.3)
EOSINOPHIL # BLD AUTO: 0.34 K/UL — SIGNIFICANT CHANGE UP (ref 0–0.5)
EOSINOPHIL NFR BLD AUTO: 3.8 % — SIGNIFICANT CHANGE UP (ref 0–6)
GLUCOSE SERPL-MCNC: 106 MG/DL — HIGH (ref 70–99)
HCT VFR BLD CALC: 38.1 % — SIGNIFICANT CHANGE UP (ref 34.5–45)
HGB BLD-MCNC: 12.8 G/DL — SIGNIFICANT CHANGE UP (ref 11.5–15.5)
IMM GRANULOCYTES NFR BLD AUTO: 0.3 % — SIGNIFICANT CHANGE UP (ref 0–1.5)
LYMPHOCYTES # BLD AUTO: 2 K/UL — SIGNIFICANT CHANGE UP (ref 1–3.3)
LYMPHOCYTES # BLD AUTO: 22.4 % — SIGNIFICANT CHANGE UP (ref 13–44)
MCHC RBC-ENTMCNC: 33.2 PG — SIGNIFICANT CHANGE UP (ref 27–34)
MCHC RBC-ENTMCNC: 33.6 GM/DL — SIGNIFICANT CHANGE UP (ref 32–36)
MCV RBC AUTO: 98.7 FL — SIGNIFICANT CHANGE UP (ref 80–100)
MONOCYTES # BLD AUTO: 0.77 K/UL — SIGNIFICANT CHANGE UP (ref 0–0.9)
MONOCYTES NFR BLD AUTO: 8.6 % — SIGNIFICANT CHANGE UP (ref 2–14)
NEUTROPHILS # BLD AUTO: 5.69 K/UL — SIGNIFICANT CHANGE UP (ref 1.8–7.4)
NEUTROPHILS NFR BLD AUTO: 63.7 % — SIGNIFICANT CHANGE UP (ref 43–77)
NRBC # BLD: 0 /100 WBCS — SIGNIFICANT CHANGE UP (ref 0–0)
PLATELET # BLD AUTO: 208 K/UL — SIGNIFICANT CHANGE UP (ref 150–400)
POTASSIUM SERPL-MCNC: 5 MMOL/L — SIGNIFICANT CHANGE UP (ref 3.5–5.3)
POTASSIUM SERPL-SCNC: 5 MMOL/L — SIGNIFICANT CHANGE UP (ref 3.5–5.3)
RBC # BLD: 3.86 M/UL — SIGNIFICANT CHANGE UP (ref 3.8–5.2)
RBC # FLD: 12.4 % — SIGNIFICANT CHANGE UP (ref 10.3–14.5)
SODIUM SERPL-SCNC: 138 MMOL/L — SIGNIFICANT CHANGE UP (ref 135–145)
WBC # BLD: 8.94 K/UL — SIGNIFICANT CHANGE UP (ref 3.8–10.5)
WBC # FLD AUTO: 8.94 K/UL — SIGNIFICANT CHANGE UP (ref 3.8–10.5)

## 2019-03-05 PROCEDURE — 99285 EMERGENCY DEPT VISIT HI MDM: CPT | Mod: 25

## 2019-03-05 PROCEDURE — 73503 X-RAY EXAM HIP UNI 4/> VIEWS: CPT | Mod: 26,LT,77

## 2019-03-05 PROCEDURE — 73503 X-RAY EXAM HIP UNI 4/> VIEWS: CPT | Mod: 26,LT

## 2019-03-05 RX ORDER — SODIUM CHLORIDE 9 MG/ML
1000 INJECTION INTRAMUSCULAR; INTRAVENOUS; SUBCUTANEOUS ONCE
Qty: 0 | Refills: 0 | Status: COMPLETED | OUTPATIENT
Start: 2019-03-05 | End: 2019-03-05

## 2019-03-05 RX ORDER — PROPOFOL 10 MG/ML
80 INJECTION, EMULSION INTRAVENOUS ONCE
Qty: 0 | Refills: 0 | Status: COMPLETED | OUTPATIENT
Start: 2019-03-05 | End: 2019-03-05

## 2019-03-05 RX ORDER — HYDROMORPHONE HYDROCHLORIDE 2 MG/ML
1 INJECTION INTRAMUSCULAR; INTRAVENOUS; SUBCUTANEOUS ONCE
Qty: 0 | Refills: 0 | Status: DISCONTINUED | OUTPATIENT
Start: 2019-03-05 | End: 2019-03-05

## 2019-03-05 RX ORDER — MORPHINE SULFATE 50 MG/1
4 CAPSULE, EXTENDED RELEASE ORAL ONCE
Qty: 0 | Refills: 0 | Status: DISCONTINUED | OUTPATIENT
Start: 2019-03-05 | End: 2019-03-05

## 2019-03-05 RX ADMIN — MORPHINE SULFATE 4 MILLIGRAM(S): 50 CAPSULE, EXTENDED RELEASE ORAL at 09:57

## 2019-03-05 RX ADMIN — SODIUM CHLORIDE 1000 MILLILITER(S): 9 INJECTION INTRAMUSCULAR; INTRAVENOUS; SUBCUTANEOUS at 09:33

## 2019-03-05 RX ADMIN — HYDROMORPHONE HYDROCHLORIDE 1 MILLIGRAM(S): 2 INJECTION INTRAMUSCULAR; INTRAVENOUS; SUBCUTANEOUS at 09:57

## 2019-03-05 RX ADMIN — PROPOFOL 80 MILLIGRAM(S): 10 INJECTION, EMULSION INTRAVENOUS at 10:57

## 2019-03-05 RX ADMIN — HYDROMORPHONE HYDROCHLORIDE 1 MILLIGRAM(S): 2 INJECTION INTRAMUSCULAR; INTRAVENOUS; SUBCUTANEOUS at 10:25

## 2019-03-05 RX ADMIN — SODIUM CHLORIDE 1000 MILLILITER(S): 9 INJECTION INTRAMUSCULAR; INTRAVENOUS; SUBCUTANEOUS at 11:09

## 2019-03-05 RX ADMIN — MORPHINE SULFATE 4 MILLIGRAM(S): 50 CAPSULE, EXTENDED RELEASE ORAL at 09:33

## 2019-03-05 NOTE — CONSULT NOTE ADULT - ASSESSMENT
77 yo F s/p dislocation of L total prosthetic hip:  -s/p Reduction of L hip prosthetic dislocation under sedation  -pain control  -dvt ppx  -WBAT in L knee immobilizer  -PT  -In abduction pillow when in bed at all times  - no further orthopaedic surgical intervention at this time,   -follow up with you Orthopedic surgeon as soon as possible, call office for an appt  -will discuss with Dr. Hartman and update plan if any changes  Ortho stable for DC 78y Female with  recurrent Left hip prosthesis dislocation s/p Total hip Arthroplasty  -pain control  -dvt ppx if admitted  -WBAT in LLE knee immobilizer  -PT  -In abduction pillow when in bed at all times  - no further orthopaedic surgical intervention at this time,   -follow up with you Orthopedic surgeon as soon as possible, call office for an appt  -will discuss with Dr. Hartman and update plan if any changes  Ortho stable for DC

## 2019-03-05 NOTE — ED ADULT TRIAGE NOTE - CHIEF COMPLAINT QUOTE
Pt awake and alert, pt brought in by daughter and EMS for L hip pain 10/10. Pt screaming in pain upon arrival. Pt hx of L hip replacement and dislocation in november. Pt daughter states patient bent over and now having L hip pain. Denies LOC, fall, hitting head.

## 2019-03-05 NOTE — CONSULT NOTE ADULT - SUBJECTIVE AND OBJECTIVE BOX
78F s/p L Rik (Austynos, '17) w/ Revision to CAMERON ([facility], 11/2018), s/p L Hip Dislocation and Reduction 1/16/19, now with severe L hip pain. Pt states she lifted her leg up on the toilet to put on some cream, when she heard a pop and the leg gave out and she fell straight down. She denies head trauma, LOC, or any other injuries. Denies numbness or tingling in BLLE. Pt states she is in severe pain and unable to ambulate. States she is an independent ambulator at baseline. Takes a full dose aspirin daily. Last ate yesterday evening. No recent fevers, chills, cp, sob, n/v.       No pertinent family history in first degree relatives  MEWS Score  Chronic pain  Breast cancer  HLD (hyperlipidemia)  Migraine  OA (osteoarthritis)  RA (rheumatoid arthritis)  Mitral valve disease  History of cataract surgery  H/O mitral valve replacement  History of left hip replacement  S/P bunionectomy  History of tonsillectomy  H/O spinal fusion  History of bilateral knee replacement  History of lumpectomy of left breast  L HIP PAIN  47    PAST MEDICAL & SURGICAL HISTORY:  Chronic pain: secondary to OA and RA  Breast cancer: left 1990 treated with RT and surgery  HLD (hyperlipidemia)  Migraine  OA (osteoarthritis)  RA (rheumatoid arthritis)  Mitral valve disease  History of cataract surgery: left cataract sx  H/O mitral valve replacement  History of left hip replacement: 2017  S/P bunionectomy: left x 2 2007  History of tonsillectomy  H/O spinal fusion: lumbar 2005  History of bilateral knee replacement: right 2009, left 2011  History of lumpectomy of left breast: with sentinal node biopsy 1990    MEDICATIONS  (STANDING):  propofol Injectable 80 milliGRAM(s) IV Push once    Allergies    No Known Allergies    Intolerances                            12.8   8.94  )-----------( 208      ( 05 Mar 2019 09:29 )             38.1     05 Mar 2019 09:29    138    |  104    |  20     ----------------------------<  106    5.0     |  25     |  0.97     Ca    8.5        05 Mar 2019 09:29        Vital Signs Last 24 Hrs  T(C): 36.6 (03-05-19 @ 09:17), Max: 36.6 (03-05-19 @ 09:17)  T(F): 97.8 (03-05-19 @ 09:17), Max: 97.8 (03-05-19 @ 09:17)  HR: 75 (03-05-19 @ 09:17) (75 - 75)  BP: 153/81 (03-05-19 @ 09:17) (153/81 - 153/81)  BP(mean): --  RR: 17 (03-05-19 @ 09:17) (17 - 17)  SpO2: 100% (03-05-19 @ 09:17) (100% - 100%)    Imaging: XR demonstates Right** Left** hip prosthesis  anterior** Posterior** dislocation, no obvious fracture.    Physical Exam  General: NAD, Alert, Awake and oriented  Neurologic: No gross deficits, moving all 4s.  Head: NCAT without abrasions, lacerations, or ecchymosis to head, face, or scalp  Eyes: PERRL  Neck/C-Spine: FAROM. No bony TTP.    HIPS and PELVIS: Unable to SLR **Hip.     RIGHT LE: Healed scar over hip.  No open skin. **Externally/ Internally Rotated and shortened. No deformities or other signs of trauma at  knee, lower leg, ankle or foot. Full baseline painless ROM at ankle and toes with. **Unable/Able to actively SLR. SILT toes 1-5. + DP/PT pulses palpable with brisk cap refill distally. Compartments soft and compressible.     LEFT LE: Healed Scar over hip. No open skin. **Externally/ Internally Rotated and shortened. No deformities or other signs of trauma at  knee, lower leg, ankle or foot. Full baseline painless ROM at ankle and toes with. **Unable/Able to actively SLR. SILT toes 1-5. + DP/PT pulses palpable with brisk cap refill distally. Compartments soft and compressible.     RIGHT UE: No open skin. No obvious deformities or  signs of trauma. Full baseline painless ROM at shoulder, elbow, wrist, and .     LEFT UE: No open skin. No obvious deformities or  signs of trauma. Full baseline painless ROM at shoulder, elbow, wrist, and .     A/P: 78y Female with Right** Left** hip prosthesis dislocation s/p Total hip Arthroplasty** Hip Hemiarthroplasty    Procedure: Closed Reduction under conscious sedation in ED    -Post reduction films show hip is located  -Post reduction exam unchanged, +EHL FHL TA GS  -Hip Abduction pillow placed  -WBAT, posterior hip precautions  -Follow up with orthopedist in 1-2 weeks  -Discussed with Attending  ** and relayed plan to ED Physician ** 78F s/p L Rik (Austynos, '17) w/ Revision to CAMERON ([facility], 11/2018), s/p L Hip Dislocation and Reduction 1/16/19, now with severe L hip pain. Pt states she was sitting on the toilet when she bent down to put on some cream, when she heard a pop. She denies head trauma, LOC, or any other injuries. Denies numbness or tingling in BLLE. Pt states she is in 10/10 umnable to weight bear. At baseline walks without assistive devices, Last ate yesterday evening. No recent fevers, chills, cp, sob, n/v.       No pertinent family history in first degree relatives  MEWS Score  Chronic pain  Breast cancer  HLD (hyperlipidemia)  Migraine  OA (osteoarthritis)  RA (rheumatoid arthritis)  Mitral valve disease  History of cataract surgery  H/O mitral valve replacement  History of left hip replacement  S/P bunionectomy  History of tonsillectomy  H/O spinal fusion  History of bilateral knee replacement  History of lumpectomy of left breast  L HIP PAIN  47    PAST MEDICAL & SURGICAL HISTORY:  Chronic pain: secondary to OA and RA  Breast cancer: left 1990 treated with RT and surgery  HLD (hyperlipidemia)  Migraine  OA (osteoarthritis)  RA (rheumatoid arthritis)  Mitral valve disease  History of cataract surgery: left cataract sx  H/O mitral valve replacement  History of left hip replacement: 2017  S/P bunionectomy: left x 2 2007  History of tonsillectomy  H/O spinal fusion: lumbar 2005  History of bilateral knee replacement: right 2009, left 2011  History of lumpectomy of left breast: with sentinal node biopsy 1990    MEDICATIONS  (STANDING):  propofol Injectable 80 milliGRAM(s) IV Push once    Allergies    No Known Allergies    Intolerances                            12.8   8.94  )-----------( 208      ( 05 Mar 2019 09:29 )             38.1     05 Mar 2019 09:29    138    |  104    |  20     ----------------------------<  106    5.0     |  25     |  0.97     Ca    8.5        05 Mar 2019 09:29        Vital Signs Last 24 Hrs  T(C): 36.6 (03-05-19 @ 09:17), Max: 36.6 (03-05-19 @ 09:17)  T(F): 97.8 (03-05-19 @ 09:17), Max: 97.8 (03-05-19 @ 09:17)  HR: 75 (03-05-19 @ 09:17) (75 - 75)  BP: 153/81 (03-05-19 @ 09:17) (153/81 - 153/81)  BP(mean): --  RR: 17 (03-05-19 @ 09:17) (17 - 17)  SpO2: 100% (03-05-19 @ 09:17) (100% - 100%)    Imaging: XR demonstates Right** Left** hip prosthesis  anterior** Posterior** dislocation, no obvious fracture.    Physical Exam  General: NAD, Alert, Awake and oriented  Neurologic: No gross deficits, moving all 4s.  Head: NCAT without abrasions, lacerations, or ecchymosis to head, face, or scalp  Eyes: PERRL  Neck/C-Spine: FAROM. No bony TTP.    HIPS and PELVIS: Unable to SLR **Hip.     RIGHT LE: Healed scar over hip.  No open skin. **Externally/ Internally Rotated and shortened. No deformities or other signs of trauma at  knee, lower leg, ankle or foot. Full baseline painless ROM at ankle and toes with. **Unable/Able to actively SLR. SILT toes 1-5. + DP/PT pulses palpable with brisk cap refill distally. Compartments soft and compressible.     LEFT LE: Healed Scar over hip. No open skin. **Externally/ Internally Rotated and shortened. No deformities or other signs of trauma at  knee, lower leg, ankle or foot. Full baseline painless ROM at ankle and toes with. **Unable/Able to actively SLR. SILT toes 1-5. + DP/PT pulses palpable with brisk cap refill distally. Compartments soft and compressible.     RIGHT UE: No open skin. No obvious deformities or  signs of trauma. Full baseline painless ROM at shoulder, elbow, wrist, and .     LEFT UE: No open skin. No obvious deformities or  signs of trauma. Full baseline painless ROM at shoulder, elbow, wrist, and .     A/P: 78y Female with Right** Left** hip prosthesis dislocation s/p Total hip Arthroplasty** Hip Hemiarthroplasty    Procedure: Closed Reduction under conscious sedation in ED 78F s/p L Rik (Austynos, '17) w/ Revision to CAMERON ([facility], 11/2018), s/p L Hip Dislocation and Reduction 1/16/19, now with severe L hip pain. Pt states she was sitting on the toilet when she bent down to put on some cream, when she heard a pop. She denies head trauma, LOC, or any other injuries. Denies numbness or tingling in BLLE. Pt states she is in 10/10 umnable to weight bear. At baseline walks without assistive devices, Last ate yesterday evening. No recent fevers, chills, cp, sob, n/v.       No pertinent family history in first degree relatives  MEWS Score  Chronic pain  Breast cancer  HLD (hyperlipidemia)  Migraine  OA (osteoarthritis)  RA (rheumatoid arthritis)  Mitral valve disease  History of cataract surgery  H/O mitral valve replacement  History of left hip replacement  S/P bunionectomy  History of tonsillectomy  H/O spinal fusion  History of bilateral knee replacement  History of lumpectomy of left breast  L HIP PAIN  47    PAST MEDICAL & SURGICAL HISTORY:  Chronic pain: secondary to OA and RA  Breast cancer: left 1990 treated with RT and surgery  HLD (hyperlipidemia)  Migraine  OA (osteoarthritis)  RA (rheumatoid arthritis)  Mitral valve disease  History of cataract surgery: left cataract sx  H/O mitral valve replacement  History of left hip replacement: 2017  S/P bunionectomy: left x 2 2007  History of tonsillectomy  H/O spinal fusion: lumbar 2005  History of bilateral knee replacement: right 2009, left 2011  History of lumpectomy of left breast: with sentinal node biopsy 1990    MEDICATIONS  (STANDING):  propofol Injectable 80 milliGRAM(s) IV Push once    Allergies    No Known Allergies    Intolerances                            12.8   8.94  )-----------( 208      ( 05 Mar 2019 09:29 )             38.1     05 Mar 2019 09:29    138    |  104    |  20     ----------------------------<  106    5.0     |  25     |  0.97     Ca    8.5        05 Mar 2019 09:29        Vital Signs Last 24 Hrs  T(C): 36.6 (03-05-19 @ 09:17), Max: 36.6 (03-05-19 @ 09:17)  T(F): 97.8 (03-05-19 @ 09:17), Max: 97.8 (03-05-19 @ 09:17)  HR: 75 (03-05-19 @ 09:17) (75 - 75)  BP: 153/81 (03-05-19 @ 09:17) (153/81 - 153/81)  BP(mean): --  RR: 17 (03-05-19 @ 09:17) (17 - 17)  SpO2: 100% (03-05-19 @ 09:17) (100% - 100%)    Imaging: XR demonstates Left hip prosthesis Posterior dislocation, no obvious fracture.    Physical Exam  General: Mild distress, Alert, Awake and oriented  Head: NCAT without abrasions, lacerations, or ecchymosis to head, face, or scalp  Eyes: PERRL  LEFT LE: Healed Scar over posterolateral aspect of hip. No open skin. Internally Rotated and shortened. No deformities or other signs of trauma at  knee, lower leg, ankle or foot. Full baseline painless ROM at ankle and toes with. Unable/Able to actively SLR. SILT toes 1-5. + DP/PT pulses palpable with brisk cap refill distally. Compartments soft and compressible.     BLUE: No open skin. No obvious deformities or  signs of trauma. Full baseline painless ROM at shoulder, elbow, wrist, and .     Procedure: Closed Reduction under conscious sedation in ED

## 2019-03-05 NOTE — ED ADULT NURSE NOTE - NSIMPLEMENTINTERV_GEN_ALL_ED
Implemented All Fall with Harm Risk Interventions:  [place] to call system. Call bell, personal items and telephone within reach. Instruct patient to call for assistance. Room bathroom lighting operational. Non-slip footwear when patient is off stretcher. Physically safe environment: no spills, clutter or unnecessary equipment. Stretcher in lowest position, wheels locked, appropriate side rails in place. Provide visual cue, wrist band, yellow gown, etc. Monitor gait and stability. Monitor for mental status changes and reorient to person, place, and time. Review medications for side effects contributing to fall risk. Reinforce activity limits and safety measures with patient and family. Provide visual clues: red socks.

## 2019-03-05 NOTE — ED PROVIDER NOTE - OBJECTIVE STATEMENT
79 y/o female with a PMHx of s/p knee replacement, breast CA (left), back fusion of L3 and L4, heart valve replacement, HTN, HLD, on Warafin and ASA, frequent UTIs presents to the ED c/o left hip injury. Pt recently had second hip replacement in September, done by Dr. Jose Gonsalez in [facility] surgery. Pt then dislocated the hip 7 weeks after the surgery. This morning, pt was bending over to put moisturizer on her leg and dislocated the left hip, now coming to the ED with hip pain. Denies numbness or tingling. 77 y/o female with a PMHx of s/p knee replacement, breast CA (left), back fusion of L3 and L4, heart valve replacement, HTN, HLD, on Warafin and ASA, frequent UTIs presents to the ED c/o left hip pain and dislocation. Pt recently had second hip replacement in November 2018, done by Dr. Jose Gonsalez in [facility] surgery. Pt then dislocated the hip 7 weeks after the surgery. This morning, pt was bending over to put moisturizer on her leg and dislocated the left hip, now coming to the ED with hip pain. Denies numbness or tingling. 79 y/o female with a PMHx of s/p knee replacement, breast CA (left), back fusion of L3 and L4, heart valve replacement, HTN, HLD, on Warfarin and ASA, frequent UTIs presents to the ED c/o left hip pain and dislocation. Pt recently had second hip replacement in November 2018, done by Dr. Jose Mcallister at [facility] surgery. Pt then dislocated the hip 7 weeks after the surgery. This morning, pt was bending over to put moisturizer on her leg and dislocated the left hip, now coming to the ED with hip pain. Denies numbness or tingling.

## 2019-03-05 NOTE — ED PROVIDER NOTE - PHYSICAL EXAMINATION
NVI distally cap less than 2 second, no numbess or tinging  basics, radiologic studies and consult ortho.

## 2019-03-05 NOTE — ED PROVIDER NOTE - NS_ ATTENDINGSCRIBEDETAILS _ED_A_ED_FT
I, Juanito Garcia MD,  performed the initial face to face bedside interview with this patient regarding history of present illness, review of symptoms and relevant past medical, social and family history.  I completed an independent physical examination.    The history, relevant review of systems, past medical and surgical history, medical decision making, and physical examination was documented by the scribe in my presence and I attest to the accuracy of the documentation.

## 2019-03-05 NOTE — ED PROVIDER NOTE - PROGRESS NOTE DETAILS
Radha GONZALES:  Patient requires positioning of the body in ways not feasable with an ordinary bed in order to alleviate pain.

## 2019-03-05 NOTE — ED PROVIDER NOTE - MUSCULOSKELETAL, MLM
+left hip shortened and pointed inward. NVI. Cap refill less than 2 seconds +left hip shortened and pointed inward. NVI. Cap refill less than 2 seconds. No numbness or tingling.

## 2019-06-14 ENCOUNTER — EMERGENCY (EMERGENCY)
Facility: HOSPITAL | Age: 79
LOS: 0 days | Discharge: HOME | End: 2019-06-14
Attending: EMERGENCY MEDICINE | Admitting: EMERGENCY MEDICINE
Payer: MEDICARE

## 2019-06-14 VITALS
WEIGHT: 130.07 LBS | HEIGHT: 66 IN | RESPIRATION RATE: 16 BRPM | OXYGEN SATURATION: 91 % | TEMPERATURE: 98 F | DIASTOLIC BLOOD PRESSURE: 91 MMHG | HEART RATE: 88 BPM | SYSTOLIC BLOOD PRESSURE: 180 MMHG

## 2019-06-14 VITALS
SYSTOLIC BLOOD PRESSURE: 129 MMHG | TEMPERATURE: 98 F | DIASTOLIC BLOOD PRESSURE: 57 MMHG | OXYGEN SATURATION: 96 % | HEART RATE: 85 BPM | RESPIRATION RATE: 17 BRPM

## 2019-06-14 DIAGNOSIS — T84.021A DISLOCATION OF INTERNAL LEFT HIP PROSTHESIS, INITIAL ENCOUNTER: ICD-10-CM

## 2019-06-14 DIAGNOSIS — Z96.642 PRESENCE OF LEFT ARTIFICIAL HIP JOINT: Chronic | ICD-10-CM

## 2019-06-14 DIAGNOSIS — Z85.3 PERSONAL HISTORY OF MALIGNANT NEOPLASM OF BREAST: ICD-10-CM

## 2019-06-14 DIAGNOSIS — Z95.2 PRESENCE OF PROSTHETIC HEART VALVE: Chronic | ICD-10-CM

## 2019-06-14 DIAGNOSIS — Z98.89 OTHER SPECIFIED POSTPROCEDURAL STATES: Chronic | ICD-10-CM

## 2019-06-14 DIAGNOSIS — M06.9 RHEUMATOID ARTHRITIS, UNSPECIFIED: ICD-10-CM

## 2019-06-14 DIAGNOSIS — Z98.49 CATARACT EXTRACTION STATUS, UNSPECIFIED EYE: Chronic | ICD-10-CM

## 2019-06-14 DIAGNOSIS — M19.90 UNSPECIFIED OSTEOARTHRITIS, UNSPECIFIED SITE: ICD-10-CM

## 2019-06-14 DIAGNOSIS — Z98.1 ARTHRODESIS STATUS: Chronic | ICD-10-CM

## 2019-06-14 DIAGNOSIS — Z95.2 PRESENCE OF PROSTHETIC HEART VALVE: ICD-10-CM

## 2019-06-14 DIAGNOSIS — Z96.653 PRESENCE OF ARTIFICIAL KNEE JOINT, BILATERAL: Chronic | ICD-10-CM
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
## 2019-06-14 DIAGNOSIS — E78.5 HYPERLIPIDEMIA, UNSPECIFIED: ICD-10-CM

## 2019-06-14 PROCEDURE — 73502 X-RAY EXAM HIP UNI 2-3 VIEWS: CPT | Mod: 26,LT

## 2019-06-14 PROCEDURE — 99284 EMERGENCY DEPT VISIT MOD MDM: CPT | Mod: 25

## 2019-06-14 PROCEDURE — 99152 MOD SED SAME PHYS/QHP 5/>YRS: CPT

## 2019-06-14 PROCEDURE — 73552 X-RAY EXAM OF FEMUR 2/>: CPT | Mod: 26,LT

## 2019-06-14 RX ORDER — HYDROMORPHONE HYDROCHLORIDE 2 MG/ML
1 INJECTION INTRAMUSCULAR; INTRAVENOUS; SUBCUTANEOUS ONCE
Refills: 0 | Status: DISCONTINUED | OUTPATIENT
Start: 2019-06-14 | End: 2019-06-14

## 2019-06-14 RX ORDER — SODIUM CHLORIDE 9 MG/ML
1000 INJECTION INTRAMUSCULAR; INTRAVENOUS; SUBCUTANEOUS ONCE
Refills: 0 | Status: COMPLETED | OUTPATIENT
Start: 2019-06-14 | End: 2019-06-14

## 2019-06-14 RX ORDER — PROPOFOL 10 MG/ML
90 INJECTION, EMULSION INTRAVENOUS ONCE
Refills: 0 | Status: COMPLETED | OUTPATIENT
Start: 2019-06-14 | End: 2019-06-14

## 2019-06-14 RX ADMIN — HYDROMORPHONE HYDROCHLORIDE 1 MILLIGRAM(S): 2 INJECTION INTRAMUSCULAR; INTRAVENOUS; SUBCUTANEOUS at 22:15

## 2019-06-14 RX ADMIN — SODIUM CHLORIDE 1000 MILLILITER(S): 9 INJECTION INTRAMUSCULAR; INTRAVENOUS; SUBCUTANEOUS at 22:19

## 2019-06-14 RX ADMIN — HYDROMORPHONE HYDROCHLORIDE 1 MILLIGRAM(S): 2 INJECTION INTRAMUSCULAR; INTRAVENOUS; SUBCUTANEOUS at 21:15

## 2019-06-14 RX ADMIN — HYDROMORPHONE HYDROCHLORIDE 1 MILLIGRAM(S): 2 INJECTION INTRAMUSCULAR; INTRAVENOUS; SUBCUTANEOUS at 21:49

## 2019-06-14 RX ADMIN — PROPOFOL 90 MILLIGRAM(S): 10 INJECTION, EMULSION INTRAVENOUS at 22:14

## 2019-06-14 RX ADMIN — SODIUM CHLORIDE 1000 MILLILITER(S): 9 INJECTION INTRAMUSCULAR; INTRAVENOUS; SUBCUTANEOUS at 21:16

## 2019-06-14 NOTE — ED PROVIDER NOTE - SHIFT CHANGE DETAILS
pt signed out to Dr. Rader pending reassess after recovered from sedation. Larry Reyes M.D., Attending Physician

## 2019-06-14 NOTE — ED PROVIDER NOTE - CLINICAL SUMMARY MEDICAL DECISION MAKING FREE TEXT BOX
78 female presents to the ED with hip dislocation. Pt was bending down when prosthetic hip popped out. Now with severe pain. received 100 fentanyl and 10 of morphine. Exam with obvious dislocation but neurovascularly intact. Pt last ate at lunchtime. Will obtain X-Ray, ortho for reduction, and reassess.

## 2019-06-14 NOTE — CONSULT NOTE ADULT - ASSESSMENT
A/P: 78y Female with Left hip prosthesis dislocation s/p Total hip Arthroplasty L Hip         -Post reduction films show hip is located  -Post reduction exam unchanged, +EHL FHL TA GS  -Hip Abduction pillow placed  -WBAT, posterior hip precautions  -Pt is to keep LLE in Knee Immobilizer at all times, abduction pillow while in bed  -Follow up with Own Orthoped[facility] Dr. Durham at [facility] within one week. Discussed with patient may require possible revision surgery since has had recurrent dislocations.   -Will d/w attending  no further orthopedic surgical intervention at this time  ortho stable for DC

## 2019-06-14 NOTE — PROCEDURAL SAFETY CHECKLIST WITH OR WITHOUT SEDATION - NSPOSTCOMMENTFT_GEN_ALL_CORE
pt tolerated procedure well and is arousable to voice, VSS, RN will ctm, XRAY at bedside for post reduction imaging

## 2019-06-14 NOTE — ED PROVIDER NOTE - NS ED ROS FT
Constitutional: No fever or chills  Eyes: No visual changes  HEENT: No throat pain  CV: No chest pain  Resp: No SOB no cough  GI: No abd pain, nausea or vomiting  : No dysuria  MSK: +left hip pain  Skin: No rash  Neuro: No headache

## 2019-06-14 NOTE — ED ADULT NURSE NOTE - OBJECTIVE STATEMENT
pt presents to ED s/p fall while working in garden c/o L hip pain. pt with obvious L hip deformity and shortening of LLE and internal rotation. pt c/o 10/10 pain.  at bedside. states this is the third hip dislocation. given 1mg dilaudid. 100 fentanyl and 10 morphine IM given by EMS. pelvic binder with sheet in place PTA. RN will ctm

## 2019-06-14 NOTE — ED PROVIDER NOTE - PHYSICAL EXAMINATION
Constitutional: no apparent distress AAOx3,   Eyes: PERRLA EOMI,   Head: Normocephalic atraumatic,   Mouth: MMM,   Cardiac: regular rate and rhythm,   Resp: Lungs CTAB,   GI: Abd s/nt/nd,   Neuro: CN2-12 intact,   Skin: No rashes  Musc: normal peripheral pulses, +obvious dislocation to left hip- shortened and internally rotated, normal sensation. Constitutional: severe distress AAOx3,   Eyes: PERRLA EOMI,   Head: Normocephalic atraumatic,   Mouth: MMM,   Cardiac: regular rate and rhythm,   Resp: Lungs CTAB,   GI: Abd s/nt/nd,   Neuro: CN2-12 intact,   Skin: No rashes  Musc: normal peripheral pulses, +obvious dislocation to left hip- shortened and internally rotated, normal sensation.

## 2019-06-14 NOTE — ED PROVIDER NOTE - OBJECTIVE STATEMENT
77 y/o female with a PMHx of left hip replacement done by Dr. Elizalde in 2017, breast CA, RA, OA, HLD, mitral valve disease h/o mitral valve replacement, h/o spinal fusion presents to the ED c/o left hip dislocation s/p bending down to pick something up while gardening PTA. Left leg shortened and internally rotated. Pt has h/o 3 hip dislocations. Pt last ate at lunchtime. Pt was given 10 of Morphine and 100 of fentanyl en route to ED.

## 2019-06-14 NOTE — ED PROVIDER NOTE - NS_ ATTENDINGSCRIBEDETAILS _ED_A_ED_FT
I, Larry Reyes MD,  performed the initial face to face bedside interview with this patient regarding history of present illness, review of symptoms and relevant past medical, social and family history.  I completed an independent physical examination.  I was the initial provider who evaluated this patient.  The history, relevant review of systems, past medical and surgical history, medical decision making, and physical examination was documented by the scribe in my presence and I attest to the accuracy of the documentation.

## 2019-06-14 NOTE — CONSULT NOTE ADULT - SUBJECTIVE AND OBJECTIVE BOX
78y Female community ambulatory presents c/o L hip dislocation. Pt reports she was outside in her garden when she bent over to agressively while gardening and felt her left hip give out. Pt has a hx of 2 dislocations since her most recent Rev L CAMERON with Dr. hsu from [place]. Pt originally had a L Hip fracture in 2017, where she recieved a Left nadira arthroplasty with Dr. Mcleod. Pt had some leg length discrepancy and went to see Dr. Durham for conversion of her nadira arthroplasty to a total hip arthroplasty. Since the surgery this is the patients 3rd dislocation. Pt has been following up with Dr. Durham regularly, and he is aware of her recurrent dislocations. Pt reports Dr. Durham has mentioned to her may require a revision surgery if continues to have dislocations. Denies HS/LOC. Denies numbness/tingling. Denies fever/chills. Denies pain/injury elsewhere.     No pertinent family history in first degree relatives  MEWS Score  Chronic pain  Breast cancer  HLD (hyperlipidemia)  Migraine  OA (osteoarthritis)  RA (rheumatoid arthritis)  Mitral valve disease  History of cataract surgery  H/O mitral valve replacement  History of left hip replacement  S/P bunionectomy  History of tonsillectomy  H/O spinal fusion  History of bilateral knee replacement  History of lumpectomy of left breast  BENT OVER, FELL WITH LEFT HIP PAIN  90+    PAST MEDICAL & SURGICAL HISTORY:  Chronic pain: secondary to OA and RA  Breast cancer: left 1990 treated with RT and surgery  HLD (hyperlipidemia)  Migraine  OA (osteoarthritis)  RA (rheumatoid arthritis)  Mitral valve disease  History of cataract surgery: left cataract sx  H/O mitral valve replacement  History of left hip replacement: 2017  S/P bunionectomy: left x 2 2007  History of tonsillectomy  H/O spinal fusion: lumbar 2005  History of bilateral knee replacement: right 2009, left 2011  History of lumpectomy of left breast: with sentinal node biopsy 1990    MEDICATIONS  (STANDING):    Allergies    No Known Allergies    Intolerances                Vital Signs Last 24 Hrs  T(C): 36.4 (06-14-19 @ 20:46), Max: 36.4 (06-14-19 @ 20:46)  T(F): 97.6 (06-14-19 @ 20:46), Max: 97.6 (06-14-19 @ 20:46)  HR: 77 (06-14-19 @ 21:28) (77 - 88)  BP: 177/85 (06-14-19 @ 21:28) (177/85 - 180/91)  BP(mean): --  RR: 16 (06-14-19 @ 21:28) (16 - 16)  SpO2: 98% (06-14-19 @ 21:28) (91% - 98%)    Imaging: XR demonstates Left hip prosthesis Posterior dislocation, no obvious fracture.    Physical Exam  General: NAD, Alert, Awake and oriented    HIPS and PELVIS: Unable to SLR **Hip.       LEFT LE: Healed Scar over hip. No open skin.   Internally Rotated and shortened.   No deformities or other signs of trauma at  knee, lower leg, ankle or foot.   Full baseline painless ROM at ankle and toes with.   Unable/Able to actively SLR.   SILT toes 1-5. 2+ DP/PT pulses with brisk cap refill distally.   Compartments soft and compressible.     Secondary Survey: No TTP over RUE/LUE/RLE bony prominences, SILT, palpable pulses, full/painless range of motion, compartments soft        Procedure: Closed Reduction under conscious sedation in ED. Consent obtained. Time out performed. Procedure was performed, post reduction imaging obtained demonstrated adequate reduction. Pt was n/v intact post procedure.

## 2019-06-14 NOTE — ED ADULT TRIAGE NOTE - CHIEF COMPLAINT QUOTE
Pt BIBEMS for evaluation of left hip dislocation. Pt was in the garden, picking, bent over and fell to ground with left hip pain. Pt has a hx of 3 previous hip dislocations, EMS gave fentanyl 100 mcg IN and Morphine 10 mg IM PTA.

## 2019-06-14 NOTE — ED ADULT NURSE REASSESSMENT NOTE - NS ED NURSE REASSESS COMMENT FT1
L hip reduction successful, see documentation for further info (preprocedure checklist and paper documentation), pt has family at bedside, c/o no pain, hip back in place confirmed by post reductio xray, RN will ctm until pt is ready to be d/c

## 2019-06-14 NOTE — ED PROVIDER NOTE - PROGRESS NOTE DETAILS
Scribe JEANMARIE for attending Dr. Larry Reyes: ortho at bedside hip reduced patient waking up  - will d/c when fully awake and tolerating PO. Larry Reyes M.D., Attending Physician

## 2019-06-14 NOTE — ED PROVIDER NOTE - NSFOLLOWUPINSTRUCTIONS_ED_ALL_ED_FT
1. return for worsening symptoms or anything concerning to you  2. take all home meds as prescribed  3. follow up with your pmd call to make an appointment  4. follow orthopedic instructions to prevent another dislocation see attached    Dislocation    A dislocation is a displacement of the bones that form a joint. This can result from trauma or due to a previous weakening of that joint. Symptoms include pain, swelling, and deformity at the site. Your health care provider has performed maneuvers to place the bones back in place. If a splint or brace was applied, make sure to wear it until you see an orthopedist as instructed.     SEEK IMMEDIATE MEDICAL CARE IF YOU HAVE ANY OF THE FOLLOWING SYMPTOMS: numbness, tingling, pain, weakness, or skin color/temperature change in any part of your body distal to the injury.

## 2019-06-14 NOTE — ED ADULT NURSE NOTE - NSIMPLEMENTINTERV_GEN_ALL_ED
Implemented All Fall with Harm Risk Interventions:  [place] to call system. Call bell, personal items and telephone within reach. Instruct patient to call for assistance. Room bathroom lighting operational. Non-slip footwear when patient is off stretcher. Physically safe environment: no spills, clutter or unnecessary equipment. Stretcher in lowest position, wheels locked, appropriate side rails in place. Provide visual cue, wrist band, yellow gown, etc. Monitor gait and stability. Monitor for mental status changes and reorient to person, place, and time. Review medications for side effects contributing to fall risk. Reinforce activity limits and safety measures with patient and family. Provide visual clues: red socks.

## 2019-07-07 ENCOUNTER — EMERGENCY (EMERGENCY)
Facility: HOSPITAL | Age: 79
LOS: 0 days | Discharge: HOME | End: 2019-07-07
Attending: EMERGENCY MEDICINE | Admitting: EMERGENCY MEDICINE
Payer: MEDICARE

## 2019-07-07 VITALS
HEIGHT: 66 IN | WEIGHT: 130.07 LBS | OXYGEN SATURATION: 100 % | RESPIRATION RATE: 20 BRPM | HEART RATE: 85 BPM | SYSTOLIC BLOOD PRESSURE: 131 MMHG | TEMPERATURE: 98 F | DIASTOLIC BLOOD PRESSURE: 93 MMHG

## 2019-07-07 VITALS
TEMPERATURE: 98 F | DIASTOLIC BLOOD PRESSURE: 76 MMHG | RESPIRATION RATE: 20 BRPM | SYSTOLIC BLOOD PRESSURE: 126 MMHG | OXYGEN SATURATION: 100 % | HEART RATE: 81 BPM

## 2019-07-07 DIAGNOSIS — Z96.653 PRESENCE OF ARTIFICIAL KNEE JOINT, BILATERAL: Chronic | ICD-10-CM

## 2019-07-07 DIAGNOSIS — Z85.3 PERSONAL HISTORY OF MALIGNANT NEOPLASM OF BREAST: ICD-10-CM

## 2019-07-07 DIAGNOSIS — E78.5 HYPERLIPIDEMIA, UNSPECIFIED: ICD-10-CM

## 2019-07-07 DIAGNOSIS — Z98.49 CATARACT EXTRACTION STATUS, UNSPECIFIED EYE: Chronic | ICD-10-CM

## 2019-07-07 DIAGNOSIS — M25.552 PAIN IN LEFT HIP: ICD-10-CM

## 2019-07-07 DIAGNOSIS — Z95.2 PRESENCE OF PROSTHETIC HEART VALVE: Chronic | ICD-10-CM

## 2019-07-07 DIAGNOSIS — M06.9 RHEUMATOID ARTHRITIS, UNSPECIFIED: ICD-10-CM

## 2019-07-07 DIAGNOSIS — Z96.653 PRESENCE OF ARTIFICIAL KNEE JOINT, BILATERAL: ICD-10-CM
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
## 2019-07-07 DIAGNOSIS — Z96.642 PRESENCE OF LEFT ARTIFICIAL HIP JOINT: ICD-10-CM
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
## 2019-07-07 DIAGNOSIS — Z95.2 PRESENCE OF PROSTHETIC HEART VALVE: ICD-10-CM

## 2019-07-07 DIAGNOSIS — Z98.89 OTHER SPECIFIED POSTPROCEDURAL STATES: Chronic | ICD-10-CM
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
## 2019-07-07 DIAGNOSIS — Z96.642 PRESENCE OF LEFT ARTIFICIAL HIP JOINT: Chronic | ICD-10-CM

## 2019-07-07 DIAGNOSIS — Z98.1 ARTHRODESIS STATUS: Chronic | ICD-10-CM

## 2019-07-07 DIAGNOSIS — M19.90 UNSPECIFIED OSTEOARTHRITIS, UNSPECIFIED SITE: ICD-10-CM

## 2019-07-07 DIAGNOSIS — S73.005A UNSPECIFIED DISLOCATION OF LEFT HIP, INITIAL ENCOUNTER: ICD-10-CM

## 2019-07-07 DIAGNOSIS — Z79.82 LONG TERM (CURRENT) USE OF ASPIRIN: ICD-10-CM

## 2019-07-07 LAB
ALBUMIN SERPL ELPH-MCNC: 3.1 G/DL — LOW (ref 3.3–5)
ALP SERPL-CCNC: 57 U/L — SIGNIFICANT CHANGE UP (ref 40–120)
ALT FLD-CCNC: 20 U/L — SIGNIFICANT CHANGE UP (ref 12–78)
ANION GAP SERPL CALC-SCNC: 6 MMOL/L — SIGNIFICANT CHANGE UP (ref 5–17)
AST SERPL-CCNC: 25 U/L — SIGNIFICANT CHANGE UP (ref 15–37)
BASOPHILS # BLD AUTO: 0.1 K/UL — SIGNIFICANT CHANGE UP (ref 0–0.2)
BASOPHILS NFR BLD AUTO: 1.6 % — SIGNIFICANT CHANGE UP (ref 0–2)
BILIRUB SERPL-MCNC: 0.4 MG/DL — SIGNIFICANT CHANGE UP (ref 0.2–1.2)
BUN SERPL-MCNC: 14 MG/DL — SIGNIFICANT CHANGE UP (ref 7–23)
CALCIUM SERPL-MCNC: 8.1 MG/DL — LOW (ref 8.5–10.1)
CHLORIDE SERPL-SCNC: 105 MMOL/L — SIGNIFICANT CHANGE UP (ref 96–108)
CO2 SERPL-SCNC: 28 MMOL/L — SIGNIFICANT CHANGE UP (ref 22–31)
CREAT SERPL-MCNC: 0.76 MG/DL — SIGNIFICANT CHANGE UP (ref 0.5–1.3)
EOSINOPHIL # BLD AUTO: 0.06 K/UL — SIGNIFICANT CHANGE UP (ref 0–0.5)
EOSINOPHIL NFR BLD AUTO: 1 % — SIGNIFICANT CHANGE UP (ref 0–6)
ERYTHROCYTE [SEDIMENTATION RATE] IN BLOOD: 11 MM/HR — SIGNIFICANT CHANGE UP (ref 0–20)
GLUCOSE SERPL-MCNC: 84 MG/DL — SIGNIFICANT CHANGE UP (ref 70–99)
HCT VFR BLD CALC: 37.5 % — SIGNIFICANT CHANGE UP (ref 34.5–45)
HGB BLD-MCNC: 12 G/DL — SIGNIFICANT CHANGE UP (ref 11.5–15.5)
IMM GRANULOCYTES NFR BLD AUTO: 0.3 % — SIGNIFICANT CHANGE UP (ref 0–1.5)
LYMPHOCYTES # BLD AUTO: 1.04 K/UL — SIGNIFICANT CHANGE UP (ref 1–3.3)
LYMPHOCYTES # BLD AUTO: 17 % — SIGNIFICANT CHANGE UP (ref 13–44)
MCHC RBC-ENTMCNC: 32 GM/DL — SIGNIFICANT CHANGE UP (ref 32–36)
MCHC RBC-ENTMCNC: 32.5 PG — SIGNIFICANT CHANGE UP (ref 27–34)
MCV RBC AUTO: 101.6 FL — HIGH (ref 80–100)
MONOCYTES # BLD AUTO: 0.57 K/UL — SIGNIFICANT CHANGE UP (ref 0–0.9)
MONOCYTES NFR BLD AUTO: 9.3 % — SIGNIFICANT CHANGE UP (ref 2–14)
NEUTROPHILS # BLD AUTO: 4.34 K/UL — SIGNIFICANT CHANGE UP (ref 1.8–7.4)
NEUTROPHILS NFR BLD AUTO: 70.8 % — SIGNIFICANT CHANGE UP (ref 43–77)
PLATELET # BLD AUTO: 164 K/UL — SIGNIFICANT CHANGE UP (ref 150–400)
POTASSIUM SERPL-MCNC: 4.6 MMOL/L — SIGNIFICANT CHANGE UP (ref 3.5–5.3)
POTASSIUM SERPL-SCNC: 4.6 MMOL/L — SIGNIFICANT CHANGE UP (ref 3.5–5.3)
PROT SERPL-MCNC: 6.3 GM/DL — SIGNIFICANT CHANGE UP (ref 6–8.3)
RBC # BLD: 3.69 M/UL — LOW (ref 3.8–5.2)
RBC # FLD: 12.8 % — SIGNIFICANT CHANGE UP (ref 10.3–14.5)
SODIUM SERPL-SCNC: 139 MMOL/L — SIGNIFICANT CHANGE UP (ref 135–145)
WBC # BLD: 6.13 K/UL — SIGNIFICANT CHANGE UP (ref 3.8–10.5)
WBC # FLD AUTO: 6.13 K/UL — SIGNIFICANT CHANGE UP (ref 3.8–10.5)

## 2019-07-07 PROCEDURE — 99285 EMERGENCY DEPT VISIT HI MDM: CPT | Mod: 25

## 2019-07-07 PROCEDURE — 73502 X-RAY EXAM HIP UNI 2-3 VIEWS: CPT | Mod: 26,LT,77

## 2019-07-07 PROCEDURE — 73552 X-RAY EXAM OF FEMUR 2/>: CPT | Mod: 26,LT

## 2019-07-07 PROCEDURE — 99152 MOD SED SAME PHYS/QHP 5/>YRS: CPT

## 2019-07-07 PROCEDURE — 73502 X-RAY EXAM HIP UNI 2-3 VIEWS: CPT | Mod: 26,LT

## 2019-07-07 RX ORDER — PROPOFOL 10 MG/ML
10 INJECTION, EMULSION INTRAVENOUS ONCE
Refills: 0 | Status: DISCONTINUED | OUTPATIENT
Start: 2019-07-07 | End: 2019-07-07

## 2019-07-07 RX ORDER — PROPOFOL 10 MG/ML
50 INJECTION, EMULSION INTRAVENOUS ONCE
Refills: 0 | Status: COMPLETED | OUTPATIENT
Start: 2019-07-07 | End: 2019-07-07

## 2019-07-07 RX ORDER — HYDROMORPHONE HYDROCHLORIDE 2 MG/ML
1 INJECTION INTRAMUSCULAR; INTRAVENOUS; SUBCUTANEOUS ONCE
Refills: 0 | Status: DISCONTINUED | OUTPATIENT
Start: 2019-07-07 | End: 2019-07-07

## 2019-07-07 RX ORDER — SODIUM CHLORIDE 9 MG/ML
1000 INJECTION INTRAMUSCULAR; INTRAVENOUS; SUBCUTANEOUS ONCE
Refills: 0 | Status: COMPLETED | OUTPATIENT
Start: 2019-07-07 | End: 2019-07-07

## 2019-07-07 RX ORDER — PROPOFOL 10 MG/ML
30 INJECTION, EMULSION INTRAVENOUS ONCE
Refills: 0 | Status: COMPLETED | OUTPATIENT
Start: 2019-07-07 | End: 2019-07-07

## 2019-07-07 RX ADMIN — HYDROMORPHONE HYDROCHLORIDE 1 MILLIGRAM(S): 2 INJECTION INTRAMUSCULAR; INTRAVENOUS; SUBCUTANEOUS at 09:45

## 2019-07-07 RX ADMIN — HYDROMORPHONE HYDROCHLORIDE 1 MILLIGRAM(S): 2 INJECTION INTRAMUSCULAR; INTRAVENOUS; SUBCUTANEOUS at 10:00

## 2019-07-07 RX ADMIN — PROPOFOL 50 MILLIGRAM(S): 10 INJECTION, EMULSION INTRAVENOUS at 12:44

## 2019-07-07 RX ADMIN — HYDROMORPHONE HYDROCHLORIDE 1 MILLIGRAM(S): 2 INJECTION INTRAMUSCULAR; INTRAVENOUS; SUBCUTANEOUS at 10:30

## 2019-07-07 RX ADMIN — PROPOFOL 30 MILLIGRAM(S): 10 INJECTION, EMULSION INTRAVENOUS at 12:45

## 2019-07-07 RX ADMIN — SODIUM CHLORIDE 1000 MILLILITER(S): 9 INJECTION INTRAMUSCULAR; INTRAVENOUS; SUBCUTANEOUS at 13:00

## 2019-07-07 RX ADMIN — HYDROMORPHONE HYDROCHLORIDE 1 MILLIGRAM(S): 2 INJECTION INTRAMUSCULAR; INTRAVENOUS; SUBCUTANEOUS at 10:45

## 2019-07-07 RX ADMIN — SODIUM CHLORIDE 1000 MILLILITER(S): 9 INJECTION INTRAMUSCULAR; INTRAVENOUS; SUBCUTANEOUS at 12:00

## 2019-07-07 NOTE — ED PROVIDER NOTE - NSFOLLOWUPINSTRUCTIONS_ED_ALL_ED_FT
FOLLOW UP WITH PMD  & ORTHOPEDIST WITHIN 1-2DAYS, CALL TO MAKE APPOINTMENT  COME BACK TO ED IF YOUR CONDITION WORSENS OR IF YOU DEVELOP FEVER GREATER THAN 100.4F, CHEST PAIN,  SHORTNESS OF BREATH OR ANY OTHER SYMPTOMS CONCERNING TO YOU  TAKE TYLENOL (ACETAMINOPHEN) 650 MG EVERY 6 HOURS AS NEEDED FOR PAIN  TAKE IBUPROFEN (MOTRIN)  600 MG EVERY 6 HOURS AS NEEDED FOR PAIN  IF YOU WERE PRESRCIBED ANY MEDICATIONS FROM TODAY'S VISIT, TAKE THEM AS DIRECTED     Use Hip Abduction pillow placed  Walk with knee immobilizer    -Follow up with your Orthopedic Surgeon (Dr. Mcallister) on 7/9/19    Dislocation    A dislocation is a displacement of the bones that form a joint. This can result from trauma or due to a previous weakening of that joint. Symptoms include pain, swelling, and deformity at the site. Your health care provider has performed maneuvers to place the bones back in place. If a splint or brace was applied, make sure to wear it until you see an orthopedist as instructed.     SEEK IMMEDIATE MEDICAL CARE IF YOU HAVE ANY OF THE FOLLOWING SYMPTOMS: numbness, tingling, pain, weakness, or skin color/temperature change in any part of your body distal to the injury.

## 2019-07-07 NOTE — ED PROVIDER NOTE - CLINICAL SUMMARY MEDICAL DECISION MAKING FREE TEXT BOX
Left hip internally rotated and shortened. NVI. Able to move toes, but no ROM at left hip and left knee. Likely hip dislocation, will consult ortho given h/o prostheses.

## 2019-07-07 NOTE — ED PROVIDER NOTE - NS ED ROS FT
Review of Systems:  	•	CONSTITUTIONAL: no fever  	•	SKIN: no rash  	•	RESPIRATORY: no shortness of breath  	•	CARDIAC: no chest pain, no palpitations  	•	GI:  no abd pain, no nausea, no vomiting, no diarrhea  	•	GENITO-URINARY:  no dysuria; no hematuria    	•	MUSCULOSKELETAL:  +Left hip pain.  	•	NEUROLOGIC: no weakness  	•	ALLERGY: no rhinitis  	•	PSYSCHIATRIC: no anxiety

## 2019-07-07 NOTE — ED PROCEDURE NOTE - PROCEDURE ADDITIONAL DETAILS
L hip prosthetic hip dislocation that needs reduction. 100mcg fentanyl given & then 50mg of propofol given & then 30mg. pt had period of apnea during procedure but o2 sat remained above 94% & pt was bagged using BVM & began to breath independently. after procedure pt was AAOX3, NAD, VSS.    Patient able to ambulate at baseline, is tolerating PO intake

## 2019-07-07 NOTE — ED ADULT TRIAGE NOTE - CHIEF COMPLAINT QUOTE
Pt BIBEMS for evaluation of left hip injury, left leg is shortened and rotated medially. Pt received Fentanyl 100 mcg IVP from EMS PTA but still c/o 10/10 left hip/leg pain on arrival. good Pulse, sensation to LLE

## 2019-07-07 NOTE — ED PROVIDER NOTE - PHYSICAL EXAMINATION
*GEN: NAD; well appearing; A+O x3   *HEAD: NC/AT   *EYES/NOSE: PERRL & EOMI b/l  *THROAT: airway patent, moist mucous membranes  *NECK: Neck supple, no masses  *PULMONARY: CTA b/l, symmetric breath sounds.   *CARDIAC: s1s2, regular rhythm, no Murmur  *ABDOMEN:  ND, NT, soft, no guarding, no rebound, no masses   *BACK: no CVA tenderness, Normal  spine   *EXTREMITIES: symmetric pulses, 2+ dp & radial pulses, capillary refill < 2 seconds, no cyanosis, no edema   *MUSCULOSKELETAL:   *SKIN: no rash or bruising   *NEUROLOGIC: alert, CN 2-12 intact, moves all 4 extremities, full active & passive ROM in all extremities, normal baseline gait  *PSYCH: insight and judgment nl, memory nl, affect nl, thought nl *GEN: NAD; well appearing; A+O x3   *HEAD: NC/AT   *EYES/NOSE: PERRL & EOMI b/l  *THROAT: airway patent, moist mucous membranes  *NECK: Neck supple, no masses  *PULMONARY: CTA b/l, symmetric breath sounds.   *CARDIAC: s1s2, regular rhythm, no Murmur  *ABDOMEN:  ND, NT, soft, no guarding, no rebound, no masses   *BACK: no CVA tenderness, Normal  spine   *EXTREMITIES: symmetric pulses, 2+ dp & radial pulses, capillary refill < 2 seconds, no cyanosis, no edema   *MUSCULOSKELETAL: +Left hip internally rotated and shortened. NVI. Able to move toes, but no ROM at left hip and left knee.  *SKIN: no rash or bruising   *NEUROLOGIC: alert, CN 2-12 intact, moves all 4 extremities, full active & passive ROM in all extremities, normal baseline gait  *PSYCH: insight and judgment nl, memory nl, affect nl, thought nl

## 2019-07-07 NOTE — ED PROVIDER NOTE - OBJECTIVE STATEMENT
Pertinent HPI/PMH/PSH/FHx/SHx and Review of Systems contained within  HPI:  80 yo  p/w CC left hip pain. Pt states she "bent the wrong way" while turning to her left side when she had sudden onset of left hip pain. Given 100 mcg Fentanyl by EMS PTA. Denies fall to ground, loss of consciousness, head trauma. Pt states she is s/p left hip replacement in 2017, reports multiple left hip dislocations since replacement. At time of exam pt says she feels like her hip is dislocated. Ortho: Ary ([facility]).  PMH/PSH: HLD, OA, RA, breast CA, s/p left hip replacement, s/p bilateral knee replacement  ROS positive for: +Left hip pain.  ROS negative for: fever, Chest pain, SOB, Nausea, vomiting, diarrhea, abdominal pain, dysuria    FamilyHx and SocialHx not otherwise contributory Pertinent HPI/PMH/PSH/FHx/SHx and Review of Systems contained within  HPI:  80 yo female p/w CC left hip pain. Pt states she "bent the wrong way" while turning to her left side when she had sudden onset of left hip pain. Given 100 mcg Fentanyl by EMS PTA. Denies fall to ground, loss of consciousness, head trauma. Pt states she is s/p left hip replacement in 2017, reports multiple left hip dislocations since replacement. At time of exam pt says she feels like her hip is dislocated. Ortho: Ary ([facility]).  PMH/PSH: HLD, OA, RA, breast CA, s/p left hip replacement, s/p bilateral knee replacement  ROS positive for: +Left hip pain.  ROS negative for: fever, Chest pain, SOB, Nausea, vomiting, diarrhea, abdominal pain, dysuria    FamilyHx and SocialHx not otherwise contributory

## 2019-07-07 NOTE — CONSULT NOTE ADULT - SUBJECTIVE AND OBJECTIVE BOX
79y Female with a PMHx of RA, breast ca, mitral valve replacement, bilateral total knee arthroplasties, spinal fusion, left total hip arthoplasty (Dr. 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 Reports she is scheduled to undergo revision CAMERON with Dr. Mcallister on Tuesday 7/9/19. She denies hitting her head/LOC. Denies back pain. Denies bowel/bladder incontinence. Denies numbness/tingling. Denies fever/chills. Denies pain/injury elsewhere.     No pertinent family history  Chronic pain  Breast cancer  HLD (hyperlipidemia)  Migraine  OA (osteoarthritis)  RA (rheumatoid arthritis)  Mitral valve disease  History of cataract surgery  H/O mitral valve replacement  History of left hip replacement  S/P bunionectomy  History of tonsillectomy  H/O spinal fusion  History of bilateral knee replacement  History of lumpectomy of left breast  LEFT HIP SHORTENED, ROTATED AFTER GETTING OOB    PAST MEDICAL & SURGICAL HISTORY:  Chronic pain: secondary to OA and RA  Breast cancer: left 1990 treated with RT and surgery  HLD (hyperlipidemia)  Migraine  OA (osteoarthritis)  RA (rheumatoid arthritis)  Mitral valve disease  History of cataract surgery: left cataract sx  H/O mitral valve replacement  History of left hip replacement: 2017  S/P bunionectomy: left x 2 2007  History of tonsillectomy  H/O spinal fusion: lumbar 2005  History of bilateral knee replacement: right 2009, left 2011  History of lumpectomy of left breast: with sentinal node biopsy 1990    MEDICATIONS  (STANDING):  propofol Injectable 10 milliGRAM(s) IV Push once  propofol Injectable 10 milliGRAM(s) IV Push once  sodium chloride 0.9% Bolus 1000 milliLiter(s) IV Bolus once    Allergies  No Known Allergies  Intolerances    Vital Signs Last 24 Hrs  T(C): 36.4 (07-07-19 @ 09:39), Max: 36.4 (07-07-19 @ 09:39)  T(F): 97.6 (07-07-19 @ 09:39), Max: 97.6 (07-07-19 @ 09:39)  HR: 85 (07-07-19 @ 09:39) (85 - 85)  BP: 131/93 (07-07-19 @ 09:39) (131/93 - 131/93)  RR: 20 (07-07-19 @ 09:39) (20 - 20)  SpO2: 100% (07-07-19 @ 09:39) (100% - 100%)    PE  Gen: NAD, A/Ox3  LLE: Shortened, flexed and internally rotated  + EHL/FHL/GA/TA  SILT L2-S1  +DP    Secondary Exam:  No TTP on other bony prominences or joints  RLE: No open skin. No deformities or other signs of trauma at  knee, lower leg, ankle or foot. Full baseline painless ROM at ankle and toes. Able to actively SLR. SILT toes L2-S1. 2+ DP pulse with brisk cap refill distally. Compartments soft and compressible.   LLE: Healed Scar over hip. No open skin. Internally rotated and shortened. No deformities or other signs of trauma at  knee, lower leg, ankle or foot. Full baseline painless ROM at ankle and toes with. Unable to actively SLR. SILT L2-S1. 2+ DP pulse with brisk cap refill distally. Compartments soft and compressible.   RUE: No open skin. No obvious deformities or  signs of trauma. Full baseline painless ROM at shoulder, elbow, wrist, and .   LUE: No open skin. No obvious deformities or  signs of trauma. Full baseline painless ROM at shoulder, elbow, wrist, and .       Imaging:   XR Hip/Pelvis/Femur demonstrates  Left hip prosthesis posterior dislocation, no obvious fracture.      Procedure: Closed Reduction under conscious sedation in ED (see ED procedure note for further details. Post reduction films demonstrate reduction of total hip prosthesis. Post reduction NV exam unchanged, +EHL/FHL/TA/GS 79y Female with a PMHx of RA, breast ca, mitral valve replacement, bilateral total knee arthroplasties, spinal fusion, left total hip arthoplasty (Dr. 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 Reports she is scheduled to undergo revision CAMERON with Dr. Mcallister on Tuesday 7/9/19. She denies hitting her head/LOC. Denies back pain. Denies bowel/bladder incontinence. Denies numbness/tingling. Denies fever/chills. Denies pain/injury elsewhere.     No pertinent family history  Chronic pain  Breast cancer  HLD (hyperlipidemia)  Migraine  OA (osteoarthritis)  RA (rheumatoid arthritis)  Mitral valve disease  History of cataract surgery  H/O mitral valve replacement  History of left hip replacement  S/P bunionectomy  History of tonsillectomy  H/O spinal fusion  History of bilateral knee replacement  History of lumpectomy of left breast  LEFT HIP SHORTENED, ROTATED AFTER GETTING OOB    PAST MEDICAL & SURGICAL HISTORY:  Chronic pain: secondary to OA and RA  Breast cancer: left 1990 treated with RT and surgery  HLD (hyperlipidemia)  Migraine  OA (osteoarthritis)  RA (rheumatoid arthritis)  Mitral valve disease  History of cataract surgery: left cataract sx  H/O mitral valve replacement  History of left hip replacement: 2017  S/P bunionectomy: left x 2 2007  History of tonsillectomy  H/O spinal fusion: lumbar 2005  History of bilateral knee replacement: right 2009, left 2011  History of lumpectomy of left breast: with sentinal node biopsy 1990    MEDICATIONS  (STANDING):  propofol Injectable 10 milliGRAM(s) IV Push once  propofol Injectable 10 milliGRAM(s) IV Push once  sodium chloride 0.9% Bolus 1000 milliLiter(s) IV Bolus once    Allergies  No Known Allergies  Intolerances    Vital Signs Last 24 Hrs  T(C): 36.4 (07-07-19 @ 09:39), Max: 36.4 (07-07-19 @ 09:39)  T(F): 97.6 (07-07-19 @ 09:39), Max: 97.6 (07-07-19 @ 09:39)  HR: 85 (07-07-19 @ 09:39) (85 - 85)  BP: 131/93 (07-07-19 @ 09:39) (131/93 - 131/93)  RR: 20 (07-07-19 @ 09:39) (20 - 20)  SpO2: 100% (07-07-19 @ 09:39) (100% - 100%)    PE  Gen: NAD, A/Ox3  LLE:   Skin intact, no erythema, effusions, ecchymoses  Shortened, flexed and internally rotated  Compartments soft and compressible  +ttp about the left hip  + EHL/FHL/GA/TA  SILT L2-S1  +DP    Secondary Exam:  No TTP on other bony prominences or joints  RLE: No open skin. No deformities or other signs of trauma at  knee, lower leg, ankle or foot. Full baseline painless ROM at ankle and toes. Able to actively SLR. SILT toes L2-S1. 2+ DP pulse with brisk cap refill distally. Compartments soft and compressible.   LLE: Healed Scar over hip. No open skin. Internally rotated and shortened. No deformities or other signs of trauma at  knee, lower leg, ankle or foot. Full baseline painless ROM at ankle and toes with. Unable to actively SLR. SILT L2-S1. 2+ DP pulse with brisk cap refill distally. Compartments soft and compressible.   RUE: No open skin. No obvious deformities or  signs of trauma. Full baseline painless ROM at shoulder, elbow, wrist, and .   LUE: No open skin. No obvious deformities or  signs of trauma. Full baseline painless ROM at shoulder, elbow, wrist, and .       Imaging:   XR Hip/Pelvis/Femur demonstrates  Left hip prosthesis posterior dislocation, no obvious fracture.      Procedure: Closed Reduction under conscious sedation in ED (see ED procedure note for further details). Post reduction films demonstrate reduction of total hip prosthesis. Post reduction NV exam unchanged, +EHL/FHL/TA/GS

## 2019-07-07 NOTE — ED PROVIDER NOTE - PROGRESS NOTE DETAILS
asntino: PT seen and reassessed.  Patient symptomatically improved.   AAOX3, NAD, VSS.  Discussed test results w/ patient. Patient verbalized understanding of hospital course and outpatient plans, has decisional making capacity.  Will f/u w/ pmd in the next few days; patient will call for an appointment. Will return to the ED if there is any worsening of symptoms.  Patient able to ambulate at baseline, is tolerating PO intake

## 2019-07-07 NOTE — CONSULT NOTE ADULT - ASSESSMENT
A/P: 79y Female with Hx of recurrent left hip dislocations s/p left total hip arthroplasty presents with left hip prosthesis dislocation.    -Left hip prosthesis dislocation reduction performed in ED  -Post reduction films show hip is located  -Post reduction exam unchanged, +EHL/FHL/TA/GS  -Hip Abduction pillow placed  -WBAT in knee immobilizer, posterior hip precautions  -No acute orthopaedic surgical intervention needed at this time  -Follow up with her Orthopedic Surgeon (Dr. Mcallister) in 2-3 days

## 2019-11-06 ENCOUNTER — EMERGENCY (EMERGENCY)
Facility: HOSPITAL | Age: 79
LOS: 0 days | Discharge: HOME | End: 2019-11-06
Attending: EMERGENCY MEDICINE
Payer: MEDICARE

## 2019-11-06 VITALS — HEIGHT: 66 IN | WEIGHT: 130.07 LBS

## 2019-11-06 VITALS — TEMPERATURE: 98 F | SYSTOLIC BLOOD PRESSURE: 144 MMHG | HEART RATE: 71 BPM | DIASTOLIC BLOOD PRESSURE: 69 MMHG

## 2019-11-06 DIAGNOSIS — Z96.642 PRESENCE OF LEFT ARTIFICIAL HIP JOINT: Chronic | ICD-10-CM

## 2019-11-06 DIAGNOSIS — Z79.82 LONG TERM (CURRENT) USE OF ASPIRIN: ICD-10-CM

## 2019-11-06 DIAGNOSIS — Z98.89 OTHER SPECIFIED POSTPROCEDURAL STATES: Chronic | ICD-10-CM

## 2019-11-06 DIAGNOSIS — S09.90XA UNSPECIFIED INJURY OF HEAD, INITIAL ENCOUNTER: ICD-10-CM
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
## 2019-11-06 DIAGNOSIS — Z96.653 PRESENCE OF ARTIFICIAL KNEE JOINT, BILATERAL: Chronic | ICD-10-CM

## 2019-11-06 DIAGNOSIS — S30.0XXA CONTUSION OF LOWER BACK AND PELVIS, INITIAL ENCOUNTER: ICD-10-CM
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
## 2019-11-06 DIAGNOSIS — Z95.2 PRESENCE OF PROSTHETIC HEART VALVE: Chronic | ICD-10-CM

## 2019-11-06 DIAGNOSIS — E78.5 HYPERLIPIDEMIA, UNSPECIFIED: ICD-10-CM

## 2019-11-06 DIAGNOSIS — M47.9 SPONDYLOSIS, UNSPECIFIED: ICD-10-CM
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
## 2019-11-06 DIAGNOSIS — Z98.49 CATARACT EXTRACTION STATUS, UNSPECIFIED EYE: Chronic | ICD-10-CM

## 2019-11-06 DIAGNOSIS — Z98.1 ARTHRODESIS STATUS: Chronic | ICD-10-CM

## 2019-11-06 DIAGNOSIS — S00.03XA CONTUSION OF SCALP, INITIAL ENCOUNTER: ICD-10-CM

## 2019-11-06 DIAGNOSIS — M50.30 OTHER CERVICAL DISC DEGENERATION, UNSPECIFIED CERVICAL REGION: ICD-10-CM

## 2019-11-06 LAB
ALBUMIN SERPL ELPH-MCNC: 3.6 G/DL — SIGNIFICANT CHANGE UP (ref 3.3–5)
ALP SERPL-CCNC: 75 U/L — SIGNIFICANT CHANGE UP (ref 40–120)
ALT FLD-CCNC: 23 U/L — SIGNIFICANT CHANGE UP (ref 12–78)
ANION GAP SERPL CALC-SCNC: 5 MMOL/L — SIGNIFICANT CHANGE UP (ref 5–17)
APTT BLD: 30.7 SEC — SIGNIFICANT CHANGE UP (ref 27.5–36.3)
AST SERPL-CCNC: 34 U/L — SIGNIFICANT CHANGE UP (ref 15–37)
BASOPHILS # BLD AUTO: 0.1 K/UL — SIGNIFICANT CHANGE UP (ref 0–0.2)
BASOPHILS NFR BLD AUTO: 1.3 % — SIGNIFICANT CHANGE UP (ref 0–2)
BILIRUB SERPL-MCNC: 0.6 MG/DL — SIGNIFICANT CHANGE UP (ref 0.2–1.2)
BUN SERPL-MCNC: 16 MG/DL — SIGNIFICANT CHANGE UP (ref 7–23)
CALCIUM SERPL-MCNC: 9 MG/DL — SIGNIFICANT CHANGE UP (ref 8.5–10.1)
CHLORIDE SERPL-SCNC: 100 MMOL/L — SIGNIFICANT CHANGE UP (ref 96–108)
CO2 SERPL-SCNC: 29 MMOL/L — SIGNIFICANT CHANGE UP (ref 22–31)
CREAT SERPL-MCNC: 0.89 MG/DL — SIGNIFICANT CHANGE UP (ref 0.5–1.3)
EOSINOPHIL # BLD AUTO: 0.22 K/UL — SIGNIFICANT CHANGE UP (ref 0–0.5)
EOSINOPHIL NFR BLD AUTO: 2.9 % — SIGNIFICANT CHANGE UP (ref 0–6)
GLUCOSE SERPL-MCNC: 95 MG/DL — SIGNIFICANT CHANGE UP (ref 70–99)
HCT VFR BLD CALC: 38.3 % — SIGNIFICANT CHANGE UP (ref 34.5–45)
HGB BLD-MCNC: 12.9 G/DL — SIGNIFICANT CHANGE UP (ref 11.5–15.5)
IMM GRANULOCYTES NFR BLD AUTO: 0.5 % — SIGNIFICANT CHANGE UP (ref 0–1.5)
INR BLD: 1.01 RATIO — SIGNIFICANT CHANGE UP (ref 0.88–1.16)
LYMPHOCYTES # BLD AUTO: 1.33 K/UL — SIGNIFICANT CHANGE UP (ref 1–3.3)
LYMPHOCYTES # BLD AUTO: 17.8 % — SIGNIFICANT CHANGE UP (ref 13–44)
MCHC RBC-ENTMCNC: 32.8 PG — SIGNIFICANT CHANGE UP (ref 27–34)
MCHC RBC-ENTMCNC: 33.7 GM/DL — SIGNIFICANT CHANGE UP (ref 32–36)
MCV RBC AUTO: 97.5 FL — SIGNIFICANT CHANGE UP (ref 80–100)
MONOCYTES # BLD AUTO: 1 K/UL — HIGH (ref 0–0.9)
MONOCYTES NFR BLD AUTO: 13.4 % — SIGNIFICANT CHANGE UP (ref 2–14)
NEUTROPHILS # BLD AUTO: 4.8 K/UL — SIGNIFICANT CHANGE UP (ref 1.8–7.4)
NEUTROPHILS NFR BLD AUTO: 64.1 % — SIGNIFICANT CHANGE UP (ref 43–77)
PLATELET # BLD AUTO: 202 K/UL — SIGNIFICANT CHANGE UP (ref 150–400)
POTASSIUM SERPL-MCNC: 4.8 MMOL/L — SIGNIFICANT CHANGE UP (ref 3.5–5.3)
POTASSIUM SERPL-SCNC: 4.8 MMOL/L — SIGNIFICANT CHANGE UP (ref 3.5–5.3)
PROT SERPL-MCNC: 7.7 GM/DL — SIGNIFICANT CHANGE UP (ref 6–8.3)
PROTHROM AB SERPL-ACNC: 11.2 SEC — SIGNIFICANT CHANGE UP (ref 10–12.9)
RBC # BLD: 3.93 M/UL — SIGNIFICANT CHANGE UP (ref 3.8–5.2)
RBC # FLD: 11.9 % — SIGNIFICANT CHANGE UP (ref 10.3–14.5)
SODIUM SERPL-SCNC: 134 MMOL/L — LOW (ref 135–145)
WBC # BLD: 7.49 K/UL — SIGNIFICANT CHANGE UP (ref 3.8–10.5)
WBC # FLD AUTO: 7.49 K/UL — SIGNIFICANT CHANGE UP (ref 3.8–10.5)

## 2019-11-06 PROCEDURE — 70450 CT HEAD/BRAIN W/O DYE: CPT

## 2019-11-06 PROCEDURE — 36415 COLL VENOUS BLD VENIPUNCTURE: CPT

## 2019-11-06 PROCEDURE — 72125 CT NECK SPINE W/O DYE: CPT

## 2019-11-06 PROCEDURE — 85025 COMPLETE CBC W/AUTO DIFF WBC: CPT

## 2019-11-06 PROCEDURE — 72131 CT LUMBAR SPINE W/O DYE: CPT

## 2019-11-06 PROCEDURE — 99284 EMERGENCY DEPT VISIT MOD MDM: CPT

## 2019-11-06 PROCEDURE — 85730 THROMBOPLASTIN TIME PARTIAL: CPT

## 2019-11-06 PROCEDURE — 72131 CT LUMBAR SPINE W/O DYE: CPT | Mod: 26

## 2019-11-06 PROCEDURE — 80053 COMPREHEN METABOLIC PANEL: CPT

## 2019-11-06 PROCEDURE — 72125 CT NECK SPINE W/O DYE: CPT | Mod: 26

## 2019-11-06 PROCEDURE — 85610 PROTHROMBIN TIME: CPT

## 2019-11-06 PROCEDURE — 99284 EMERGENCY DEPT VISIT MOD MDM: CPT | Mod: 25

## 2019-11-06 PROCEDURE — 70450 CT HEAD/BRAIN W/O DYE: CPT | Mod: 26

## 2019-11-06 NOTE — ED STATDOCS - OBJECTIVE STATEMENT
78 y/o male with PMHx of HLD presents to the ED c/o HA after fall 10 days ago. Pt states that she fell backwards and hit head. no LOC. since then, c/o head tightness, neck pain, low back pain. Able to ambulate. Saw Dr. Campbell who sent pt in for further evaluation.

## 2019-11-06 NOTE — ED STATDOCS - NSFOLLOWUPINSTRUCTIONS_ED_ALL_ED_FT
Contusion in Adults    WHAT YOU NEED TO KNOW:    A contusion is a bruise that appears on your skin after an injury. A bruise happens when small blood vessels tear but skin does not. When blood vessels tear, blood leaks into nearby tissue, such as soft tissue or muscle.    DISCHARGE INSTRUCTIONS:    Return to the emergency department if:     You have new trouble moving the injured area.      You have tingling or numbness in or near the injured area.      Your hand or foot below the bruise gets cold or turns pale.     Contact your healthcare provider if:     You find a new lump in the injured area.      Your symptoms do not improve with treatment after 4 to 5 days.      You have questions or concerns about your condition or care.    Medicines: You may need any of the following:     NSAIDs help decrease swelling and pain or fever. This medicine is available with or without a doctor's order. NSAIDs can cause stomach bleeding or kidney problems in certain people. If you take blood thinner medicine, always ask your healthcare provider if NSAIDs are safe for you. Always read the medicine label and follow directions.      Prescription pain medicine may be given. Do not wait until the pain is severe before you take your medicine.      Take your medicine as directed. Contact your healthcare provider if you think your medicine is not helping or if you have side effects. Tell him of her if you are allergic to any medicine. Keep a list of the medicines, vitamins, and herbs you take. Include the amounts, and when and why you take them. Bring the list or the pill bottles to follow-up visits. Carry your medicine list with you in case of an emergency.    Follow up with your healthcare provider as directed: You may need to return within a week to check your injury again. Write down your questions so you remember to ask them during your visits.    Help a contusion heal:     Rest the injured area or use it less than usual. If you bruised your leg or foot, you may need crutches or a cane to help you walk. This will help you keep weight off your injured body part.       Apply ice to decrease swelling and pain. Ice may also help prevent tissue damage. Use an ice pack, or put crushed ice in a plastic bag. Cover it with a towel and place it on your bruise for 15 to 20 minutes every hour or as directed.      Use compression to support the area and decrease swelling. Wrap an elastic bandage around the area over the bruised muscle. Make sure the bandage is not too tight. You should be able to fit 1 finger between the bandage and your skin.      Elevate (raise) your injured body part above the level of your heart to help decrease pain and swelling. Use pillows, blankets, or rolled towels to elevate the area as often as you can.      Do not drink alcohol as directed. Alcohol may slow healing.      Do not stretch injured muscles right after your injury. Ask your healthcare provider when and how you may safely stretch after your injury. Gentle stretches can help increase your flexibility.      Do not massage the area or put heating pads on the bruise right after your injury. Heat and massage may slow healing. Your healthcare provider may tell you to apply heat after several days. At that time, heat will start to help the injury heal.    Prevent another contusion:     Stretch and warm up before you play sports or exercise.      Wear protective gear when you play sports. Examples are shin guards and padding.       If you begin a new physical activity, start slowly to give your body a chance to adjust.      Pt instructed return to ED if you have a stiff neck, fluid or blood leaking from the nose or ears, any change in awareness, a hard time waking up, or have become more sleepy, a headache that is getting worse, lasts a long time, or is not relieved by acetaminophen (Tylenol), fever, vomiting more than 3 times, problems walking or talking, changes in speech (slurred, difficult to understand, does not make sense), problems thinking straight, seizures (jerking arms or legs without control), changes in behavior or unusual behavior, double vision, changes in eating patterns; Concussion mangement specialist [phone]

## 2019-11-06 NOTE — ED STATDOCS - CARE PLAN
Principal Discharge DX:	Contusion of scalp  Secondary Diagnosis:	Contusion of back  Secondary Diagnosis:	Head injury

## 2019-11-06 NOTE — ED STATDOCS - CLINICAL SUMMARY MEDICAL DECISION MAKING FREE TEXT BOX
Labs unremarkable.  CT shows no acute fracture, chronic changes.  Right lower lobe nodule needs outpatient CT chest.  Pt. aware.  Copies of labs/CT provided to patient.  To follow with PMD.  Hailey Larios PA-C Labs unremarkable.  CT shows no acute fracture, chronic changes.  Right lower lobe nodule needs outpatient CT chest.  Patient without respiratory or chest symptoms, incidental finding. Pt. aware.  Copies of labs/CT provided to patient.  To follow with PMD.  Hailey Larios PA-C

## 2019-11-06 NOTE — ED ADULT TRIAGE NOTE - CHIEF COMPLAINT QUOTE
Patient ambulatory to triage. Patient comes in stating she fell 10 days ago and symptoms have not resolved. Patient endorses dizziness, head and neck pain. No neuro deficits noted. Patient well perfused, well appearing. Patient states she takes 81mg of aspirin daily. Patient sent in by PMD for CT.

## 2019-11-06 NOTE — ED ADULT NURSE NOTE - OBJECTIVE STATEMENT
Pt presents to ED c/o 2/10 head and back pain s/p fall 10 days ago. Pt reports she was walking and fell backwards striking her head on the hardwood floor. Pt denies LOC. Pt reports taking 81mg ASA daily. Pt reports "double vision for quite some time, not just since the fall". Pt reports nausea, denies vomiting.

## 2019-11-06 NOTE — ED STATDOCS - PROGRESS NOTE DETAILS
78 y/o male with PMHx of HLD presents to the ED c/o HA after fall 10 days ago. Pt states that she fell backwards and hit head. no LOC. since then, c/o head tightness, neck pain, low back pain. Able to ambulate. Saw Dr. Campbell who sent pt in for further evaluation.   Hailey Larios PA-C Fall 10 days ago wituh small scalp hematoma.  Will CT head, neck. Labs unremarkable.  CT shows no acute fracture, chronic changes.  Right lower lobe nodule needs outpatient CT chest.  Pt. aware.  Copies of labs/CT provided to patient.  To follow with PMD.  Hailey Larios PA-C Pt. will follow up with Dr. Barrios.  Hailey Larios PA-C

## 2019-11-06 NOTE — ED STATDOCS - PATIENT PORTAL LINK FT
You can access the FollowMyHealth Patient Portal offered by [facility] by registering at the following website: [email or web address]. By joining [facility]’s FollowMyHealth portal, you will also be able to view your health information using other applications (apps) compatible with our system.

## 2019-11-07 ENCOUNTER — TRANSCRIPTION ENCOUNTER (OUTPATIENT)
Age: 79
End: 2019-11-07

## 2019-11-07 NOTE — ASU PATIENT PROFILE, ADULT - NS PRO MODE OF ARRIVAL
ambulatory Area M Indication Text: Tumors in this location are included in Area M (cheek, forehead, scalp, neck, jawline and pretibial skin).  Mohs surgery is indicated for tumors in these anatomic locations.

## 2020-01-15 ENCOUNTER — APPOINTMENT (OUTPATIENT)
Dept: INTERNAL MEDICINE | Facility: CLINIC | Age: 80
End: 2020-01-15
Payer: MEDICARE

## 2020-01-15 ENCOUNTER — NON-APPOINTMENT (OUTPATIENT)
Age: 80
End: 2020-01-15

## 2020-01-15 VITALS
HEIGHT: 65 IN | TEMPERATURE: 98.3 F | DIASTOLIC BLOOD PRESSURE: 84 MMHG | WEIGHT: 130 LBS | BODY MASS INDEX: 21.66 KG/M2 | SYSTOLIC BLOOD PRESSURE: 154 MMHG | OXYGEN SATURATION: 95 % | RESPIRATION RATE: 20 BRPM | HEART RATE: 80 BPM

## 2020-01-15 PROCEDURE — 99205 OFFICE O/P NEW HI 60 MIN: CPT | Mod: 25

## 2020-01-15 PROCEDURE — 94060 EVALUATION OF WHEEZING: CPT

## 2020-01-15 RX ORDER — AZITHROMYCIN 250 MG/1
250 TABLET, FILM COATED ORAL
Qty: 8 | Refills: 0 | Status: DISCONTINUED | COMMUNITY
Start: 2017-09-14 | End: 2020-01-15

## 2020-01-15 RX ORDER — ATOVAQUONE AND PROGUANIL HYDROCHLORIDE 250; 100 MG/1; MG/1
250-100 TABLET, FILM COATED ORAL DAILY
Qty: 26 | Refills: 0 | Status: DISCONTINUED | COMMUNITY
Start: 2017-09-14 | End: 2020-01-15

## 2020-01-15 NOTE — PHYSICAL EXAM
[General Appearance - Well Developed] : well developed [Normal Appearance] : normal appearance [Well Groomed] : well groomed [General Appearance - Well Nourished] : well nourished [No Deformities] : no deformities [General Appearance - In No Acute Distress] : no acute distress [Normal Conjunctiva] : the conjunctiva exhibited no abnormalities [Normal Oropharynx] : normal oropharynx [Eyelids - No Xanthelasma] : the eyelids demonstrated no xanthelasmas [Neck Appearance] : the appearance of the neck was normal [Neck Cervical Mass (___cm)] : no neck mass was observed [Jugular Venous Distention Increased] : there was no jugular-venous distention [Thyroid Diffuse Enlargement] : the thyroid was not enlarged [Heart Sounds] : normal S1 and S2 [Heart Rate And Rhythm] : heart rate and rhythm were normal [Thyroid Nodule] : there were no palpable thyroid nodules [Murmurs] : no murmurs present [Exaggerated Use Of Accessory Muscles For Inspiration] : no accessory muscle use [Respiration, Rhythm And Depth] : normal respiratory rhythm and effort [Abdomen Tenderness] : non-tender [Abdomen Soft] : soft [Auscultation Breath Sounds / Voice Sounds] : lungs were clear to auscultation bilaterally [Abdomen Mass (___ Cm)] : no abdominal mass palpated [Gait - Sufficient For Exercise Testing] : the gait was sufficient for exercise testing [Abnormal Walk] : normal gait [Nail Clubbing] : no clubbing of the fingernails [Cyanosis, Localized] : no localized cyanosis [Petechial Hemorrhages (___cm)] : no petechial hemorrhages [Skin Color & Pigmentation] : normal skin color and pigmentation [] : no rash [Skin Turgor] : normal skin turgor [No Focal Deficits] : no focal deficits [Sensation] : the sensory exam was normal to light touch and pinprick [Deep Tendon Reflexes (DTR)] : deep tendon reflexes were 2+ and symmetric [Oriented To Time, Place, And Person] : oriented to person, place, and time [Affect] : the affect was normal [Impaired Insight] : insight and judgment were intact

## 2020-01-27 ENCOUNTER — LABORATORY RESULT (OUTPATIENT)
Age: 80
End: 2020-01-27

## 2020-02-04 ENCOUNTER — TRANSCRIPTION ENCOUNTER (OUTPATIENT)
Age: 80
End: 2020-02-04

## 2020-02-21 ENCOUNTER — APPOINTMENT (OUTPATIENT)
Dept: INTERNAL MEDICINE | Facility: CLINIC | Age: 80
End: 2020-02-21
Payer: MEDICARE

## 2020-02-21 VITALS
WEIGHT: 130 LBS | RESPIRATION RATE: 22 BRPM | HEIGHT: 65 IN | OXYGEN SATURATION: 97 % | BODY MASS INDEX: 21.66 KG/M2 | HEART RATE: 78 BPM | DIASTOLIC BLOOD PRESSURE: 76 MMHG | SYSTOLIC BLOOD PRESSURE: 140 MMHG | TEMPERATURE: 97.4 F

## 2020-02-21 PROCEDURE — ZZZZZ: CPT

## 2020-02-21 PROCEDURE — 94060 EVALUATION OF WHEEZING: CPT

## 2020-02-21 PROCEDURE — 94729 DIFFUSING CAPACITY: CPT

## 2020-02-21 PROCEDURE — 99214 OFFICE O/P EST MOD 30 MIN: CPT | Mod: 25

## 2020-02-21 PROCEDURE — 94727 GAS DIL/WSHOT DETER LNG VOL: CPT

## 2020-02-21 RX ORDER — DULOXETINE HYDROCHLORIDE 30 MG/1
30 CAPSULE, DELAYED RELEASE PELLETS ORAL
Refills: 0 | Status: ACTIVE | COMMUNITY

## 2020-02-21 NOTE — ASSESSMENT
[FreeTextEntry1] : Mrs. Roa presents for initial pulmonary evaluation. She is an 79-year-old female with persistent cough and some shortness of breath. CT scan of chest shows scattered pulmonary nodules which appear more inflammatory in etiology. She also has extreme but had an more prominent in the mid to lower lobes. There is no significant adenopathy. Mrs. Roa has no previous history of asthma or seasonal allergic rhinitis. She is a lifelong nonsmoker. She will be started on Kathia Elipta 100 mcg one puff daily. Patient will also produce a sputum sample at the lab which will be sent for AFB smear and culture. 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 Followup as scheduled.

## 2020-02-21 NOTE — PROCEDURE
[FreeTextEntry1] : Complete pulmonary function test show mild obstructive lung disease. FEV1 is 1.4 L which is 76% predicted. FEV1/FEC ratio is 66%. There is no significant bronchodilator response. Residual line was mildly elevated at 2.12 L which is 101% predicted. Diffusing capacity is 75%.

## 2020-02-21 NOTE — HISTORY OF PRESENT ILLNESS
[FreeTextEntry1] : Mrs. Roa is an 79-year-old female who presents for initial pulmonary evaluation. Patient had a fall in late October and struck the back of her head. Approximately 2 weeks later she decided to seek medical followup and had a CT scan of the head as well as a CT scan of the spine. CT scan of the spine demonstrated some nodular densities in the lung. Mrs. Roa then had a dedicated CT scan of the chest which showed scattered pulmonary nodules. Mrs. Roa also has shortness of breath with exertion. Activities such as going up stairs going up an incline or carrying objects causes shortness of breath. The symptoms do resolve with rest. Mrs. Roa gets occasional nocturnal symptoms of cough and dyspnea. She has no hemoptysis, anorexia or weight loss. Mrs. Peoples is a lifelong nonsmoker. There is no significant occupational exposure she was originally born in the [place] but has lived in [place] for approximately [place] years.

## 2020-02-21 NOTE — PROCEDURE
[FreeTextEntry1] : Spirometry pre-and postbronchodilator therapy shows moderate obstructive lung disease. FEV1 is 1.30 L which is 64% predicted. FEV1 percent is 70%.

## 2020-02-21 NOTE — REASON FOR VISIT
[Follow-Up] : a follow-up visit [Asthma] : asthma [Abnormal CXR/ Chest CT] : an abnormal CXR/ chest CT

## 2020-02-21 NOTE — HISTORY OF PRESENT ILLNESS
[TextBox_4] : Mrs. Roa presents for a follow up evaluation accompanied by her daughter. She discontinued Breo Ellipta therapy secondary to voice hoarseness and blurred vision. Patient does have some shortness of breath with exertion. She denies any fevers, chills or hemoptysis. CT scan of the chest has shown bilateral pulmonary nodules. Sputum Gram stain and culture was negative for AFB.

## 2020-02-21 NOTE — ASSESSMENT
[FreeTextEntry1] : Mrs. Roa presents for a followup evaluation. CT scan of the chest showed scattered pulmonary nodules which may be indicative of Mycobacterium avium complex. Pulmonary function tests do show mild asthma. Ms. Roa discontinued Breo therapy due to adverse side effects. She will be given a trial of Advair 250/50 micrograms one puff b.i.d. Patient will also repeat a CT scan of the chest in 3 months. If her progression of infiltrates she may require fiberoptic bronchoscopy to determine if she has JOHANNA.

## 2020-02-21 NOTE — PHYSICAL EXAM
[No Acute Distress] : no acute distress [Normal Oropharynx] : normal oropharynx [Normal Appearance] : normal appearance [No Neck Mass] : no neck mass [Normal S1, S2] : normal s1, s2 [Normal Rate/Rhythm] : normal rate/rhythm [No Murmurs] : no murmurs [No Resp Distress] : no resp distress [Clear to Auscultation Bilaterally] : clear to auscultation bilaterally [No Abnormalities] : no abnormalities [Benign] : benign [No Clubbing] : no clubbing [Normal Gait] : normal gait [No Cyanosis] : no cyanosis [No Edema] : no edema [FROM] : FROM [Normal Color/ Pigmentation] : normal color/ pigmentation [No Focal Deficits] : no focal deficits [Normal Affect] : normal affect [Oriented x3] : oriented x3

## 2020-03-04 NOTE — ED ADULT NURSE NOTE - SKIN CAPILLARY REFILL
Billing Type: Client Bill Expected Date Of Service: 03/04/2020 2 seconds or less Bill For Surgical Tray: no

## 2020-03-18 LAB — ACID FAST STN SPT: NORMAL

## 2020-05-21 ENCOUNTER — APPOINTMENT (OUTPATIENT)
Dept: INTERNAL MEDICINE | Facility: CLINIC | Age: 80
End: 2020-05-21
Payer: MEDICARE

## 2020-05-21 VITALS
HEART RATE: 86 BPM | DIASTOLIC BLOOD PRESSURE: 68 MMHG | TEMPERATURE: 97.5 F | RESPIRATION RATE: 18 BRPM | BODY MASS INDEX: 21.66 KG/M2 | WEIGHT: 130 LBS | SYSTOLIC BLOOD PRESSURE: 114 MMHG | HEIGHT: 65 IN | OXYGEN SATURATION: 96 %

## 2020-05-21 PROCEDURE — 99214 OFFICE O/P EST MOD 30 MIN: CPT

## 2020-05-21 RX ORDER — FLUTICASONE FUROATE AND VILANTEROL TRIFENATATE 100; 25 UG/1; UG/1
100-25 POWDER RESPIRATORY (INHALATION) DAILY
Qty: 1 | Refills: 5 | Status: DISCONTINUED | COMMUNITY
Start: 2020-01-15 | End: 2020-05-21

## 2020-05-21 RX ORDER — FLUTICASONE PROPIONATE AND SALMETEROL 250; 50 UG/1; UG/1
250-50 POWDER RESPIRATORY (INHALATION)
Qty: 1 | Refills: 5 | Status: DISCONTINUED | COMMUNITY
Start: 2020-02-21 | End: 2020-05-21

## 2020-05-21 NOTE — ASSESSMENT
[FreeTextEntry1] : Mrs. Roa presents for followup evaluation. Repeat CT scan of chest shows no change in size or number of pulmonary nodules. Patient remains asymptomatic. She will followup in 12 months with a repeat CAT scan of chest pain that is no significant change at that time further CT scan surveillance we'll not be warranted.

## 2020-05-21 NOTE — HISTORY OF PRESENT ILLNESS
[TextBox_4] : Mrs. Roa presents for a followup evaluation. She is feeling well. Patient had a CT scan of the chest which showed several subcentimeter pulmonary nodules as well as atelectasis. She remains asymptomatic. Patient had a repeat CAT scan of the chest and presents for review.

## 2020-10-14 NOTE — ED ADULT NURSE NOTE - NSIMPLEMENTINTERV_GEN_ALL_ED
Sudden numbness or weakness of the face, arm, or leg, especially on one side of the body. Confusion, trouble speaking or understanding. Trouble seeing in one or both eyes. Trouble walking, dizziness, loss of balance or coordination. Severe headache.
Implemented All Fall with Harm Risk Interventions:  [place] to call system. Call bell, personal items and telephone within reach. Instruct patient to call for assistance. Room bathroom lighting operational. Non-slip footwear when patient is off stretcher. Physically safe environment: no spills, clutter or unnecessary equipment. Stretcher in lowest position, wheels locked, appropriate side rails in place. Provide visual cue, wrist band, yellow gown, etc. Monitor gait and stability. Monitor for mental status changes and reorient to person, place, and time. Review medications for side effects contributing to fall risk. Reinforce activity limits and safety measures with patient and family. Provide visual clues: red socks.

## 2021-05-01 ENCOUNTER — INPATIENT (INPATIENT)
Facility: HOSPITAL | Age: 81
LOS: 5 days | Discharge: HOME | DRG: 40 | End: 2021-05-07
Attending: HOSPITALIST
Payer: MEDICARE

## 2021-05-01 VITALS
HEIGHT: 66 IN | SYSTOLIC BLOOD PRESSURE: 170 MMHG | WEIGHT: 134.92 LBS | OXYGEN SATURATION: 98 % | RESPIRATION RATE: 18 BRPM | TEMPERATURE: 98 F | HEART RATE: 73 BPM | DIASTOLIC BLOOD PRESSURE: 82 MMHG

## 2021-05-01 DIAGNOSIS — Z98.89 OTHER SPECIFIED POSTPROCEDURAL STATES: Chronic | ICD-10-CM

## 2021-05-01 DIAGNOSIS — I60.9 NONTRAUMATIC SUBARACHNOID HEMORRHAGE, UNSPECIFIED: ICD-10-CM

## 2021-05-01 DIAGNOSIS — Z98.1 ARTHRODESIS STATUS: Chronic | ICD-10-CM

## 2021-05-01 DIAGNOSIS — Z96.653 PRESENCE OF ARTIFICIAL KNEE JOINT, BILATERAL: Chronic | ICD-10-CM

## 2021-05-01 DIAGNOSIS — Z98.49 CATARACT EXTRACTION STATUS, UNSPECIFIED EYE: Chronic | ICD-10-CM

## 2021-05-01 DIAGNOSIS — Z96.642 PRESENCE OF LEFT ARTIFICIAL HIP JOINT: Chronic | ICD-10-CM

## 2021-05-01 DIAGNOSIS — Z95.2 PRESENCE OF PROSTHETIC HEART VALVE: Chronic | ICD-10-CM

## 2021-05-01 LAB
SARS-COV-2 RNA SPEC QL NAA+PROBE: SIGNIFICANT CHANGE UP
TROPONIN I SERPL-MCNC: <0.015 NG/ML — SIGNIFICANT CHANGE UP (ref 0.01–0.04)
TROPONIN I SERPL-MCNC: <0.015 NG/ML — SIGNIFICANT CHANGE UP (ref 0.01–0.04)

## 2021-05-01 PROCEDURE — 86769 SARS-COV-2 COVID-19 ANTIBODY: CPT

## 2021-05-01 PROCEDURE — 99222 1ST HOSP IP/OBS MODERATE 55: CPT

## 2021-05-01 PROCEDURE — 93880 EXTRACRANIAL BILAT STUDY: CPT

## 2021-05-01 PROCEDURE — 99221 1ST HOSP IP/OBS SF/LOW 40: CPT

## 2021-05-01 PROCEDURE — 70486 CT MAXILLOFACIAL W/O DYE: CPT | Mod: 26

## 2021-05-01 PROCEDURE — 71045 X-RAY EXAM CHEST 1 VIEW: CPT | Mod: 26

## 2021-05-01 PROCEDURE — 93010 ELECTROCARDIOGRAM REPORT: CPT

## 2021-05-01 PROCEDURE — 72125 CT NECK SPINE W/O DYE: CPT | Mod: 26

## 2021-05-01 PROCEDURE — 85027 COMPLETE CBC AUTOMATED: CPT

## 2021-05-01 PROCEDURE — 70450 CT HEAD/BRAIN W/O DYE: CPT

## 2021-05-01 PROCEDURE — 70450 CT HEAD/BRAIN W/O DYE: CPT | Mod: 26,77

## 2021-05-01 PROCEDURE — 85610 PROTHROMBIN TIME: CPT

## 2021-05-01 PROCEDURE — 76376 3D RENDER W/INTRP POSTPROCES: CPT | Mod: 26

## 2021-05-01 PROCEDURE — 85652 RBC SED RATE AUTOMATED: CPT

## 2021-05-01 PROCEDURE — C1764: CPT

## 2021-05-01 PROCEDURE — 99285 EMERGENCY DEPT VISIT HI MDM: CPT | Mod: CS,25

## 2021-05-01 PROCEDURE — 80053 COMPREHEN METABOLIC PANEL: CPT

## 2021-05-01 PROCEDURE — 93005 ELECTROCARDIOGRAM TRACING: CPT

## 2021-05-01 PROCEDURE — 86200 CCP ANTIBODY: CPT

## 2021-05-01 PROCEDURE — 72170 X-RAY EXAM OF PELVIS: CPT | Mod: 26

## 2021-05-01 PROCEDURE — 81001 URINALYSIS AUTO W/SCOPE: CPT

## 2021-05-01 PROCEDURE — 12011 RPR F/E/E/N/L/M 2.5 CM/<: CPT

## 2021-05-01 PROCEDURE — 85730 THROMBOPLASTIN TIME PARTIAL: CPT

## 2021-05-01 PROCEDURE — 93306 TTE W/DOPPLER COMPLETE: CPT

## 2021-05-01 PROCEDURE — 84443 ASSAY THYROID STIM HORMONE: CPT

## 2021-05-01 PROCEDURE — 80048 BASIC METABOLIC PNL TOTAL CA: CPT

## 2021-05-01 PROCEDURE — 84484 ASSAY OF TROPONIN QUANT: CPT

## 2021-05-01 PROCEDURE — 97163 PT EVAL HIGH COMPLEX 45 MIN: CPT | Mod: GP

## 2021-05-01 PROCEDURE — 97116 GAIT TRAINING THERAPY: CPT | Mod: GP

## 2021-05-01 PROCEDURE — 87635 SARS-COV-2 COVID-19 AMP PRB: CPT

## 2021-05-01 PROCEDURE — 97110 THERAPEUTIC EXERCISES: CPT | Mod: GP

## 2021-05-01 PROCEDURE — 83735 ASSAY OF MAGNESIUM: CPT

## 2021-05-01 PROCEDURE — 70498 CT ANGIOGRAPHY NECK: CPT

## 2021-05-01 PROCEDURE — 70496 CT ANGIOGRAPHY HEAD: CPT

## 2021-05-01 PROCEDURE — 86431 RHEUMATOID FACTOR QUANT: CPT

## 2021-05-01 PROCEDURE — 70450 CT HEAD/BRAIN W/O DYE: CPT | Mod: 26

## 2021-05-01 PROCEDURE — 86140 C-REACTIVE PROTEIN: CPT

## 2021-05-01 PROCEDURE — 36415 COLL VENOUS BLD VENIPUNCTURE: CPT

## 2021-05-01 RX ORDER — SUMATRIPTAN SUCCINATE 4 MG/.5ML
50 INJECTION, SOLUTION SUBCUTANEOUS DAILY
Refills: 0 | Status: DISCONTINUED | OUTPATIENT
Start: 2021-05-01 | End: 2021-05-07

## 2021-05-01 RX ORDER — HYDROXYCHLOROQUINE SULFATE 200 MG
200 TABLET ORAL
Refills: 0 | Status: DISCONTINUED | OUTPATIENT
Start: 2021-05-01 | End: 2021-05-07

## 2021-05-01 RX ORDER — METOCLOPRAMIDE HCL 10 MG
5 TABLET ORAL EVERY 6 HOURS
Refills: 0 | Status: DISCONTINUED | OUTPATIENT
Start: 2021-05-01 | End: 2021-05-07

## 2021-05-01 RX ORDER — ACETAMINOPHEN 500 MG
1000 TABLET ORAL ONCE
Refills: 0 | Status: COMPLETED | OUTPATIENT
Start: 2021-05-01 | End: 2021-05-01

## 2021-05-01 RX ORDER — HYDRALAZINE HCL 50 MG
10 TABLET ORAL ONCE
Refills: 0 | Status: COMPLETED | OUTPATIENT
Start: 2021-05-01 | End: 2021-05-01

## 2021-05-01 RX ORDER — ACETAMINOPHEN 500 MG
650 TABLET ORAL EVERY 6 HOURS
Refills: 0 | Status: DISCONTINUED | OUTPATIENT
Start: 2021-05-01 | End: 2021-05-07

## 2021-05-01 RX ORDER — METHOCARBAMOL 500 MG/1
500 TABLET, FILM COATED ORAL EVERY 8 HOURS
Refills: 0 | Status: DISCONTINUED | OUTPATIENT
Start: 2021-05-01 | End: 2021-05-03

## 2021-05-01 RX ORDER — CELECOXIB 200 MG/1
200 CAPSULE ORAL DAILY
Refills: 0 | Status: DISCONTINUED | OUTPATIENT
Start: 2021-05-01 | End: 2021-05-07

## 2021-05-01 RX ORDER — ACETAMINOPHEN 500 MG
1000 TABLET ORAL ONCE
Refills: 0 | Status: DISCONTINUED | OUTPATIENT
Start: 2021-05-01 | End: 2021-05-04

## 2021-05-01 RX ORDER — DULOXETINE HYDROCHLORIDE 30 MG/1
30 CAPSULE, DELAYED RELEASE ORAL DAILY
Refills: 0 | Status: DISCONTINUED | OUTPATIENT
Start: 2021-05-01 | End: 2021-05-07

## 2021-05-01 RX ORDER — LEVETIRACETAM 250 MG/1
500 TABLET, FILM COATED ORAL
Refills: 0 | Status: DISCONTINUED | OUTPATIENT
Start: 2021-05-01 | End: 2021-05-07

## 2021-05-01 RX ORDER — HYDRALAZINE HCL 50 MG
10 TABLET ORAL EVERY 6 HOURS
Refills: 0 | Status: DISCONTINUED | OUTPATIENT
Start: 2021-05-01 | End: 2021-05-04

## 2021-05-01 RX ORDER — CARVEDILOL PHOSPHATE 80 MG/1
3.12 CAPSULE, EXTENDED RELEASE ORAL EVERY 12 HOURS
Refills: 0 | Status: DISCONTINUED | OUTPATIENT
Start: 2021-05-01 | End: 2021-05-07

## 2021-05-01 RX ADMIN — Medication 200 MILLIGRAM(S): at 22:47

## 2021-05-01 RX ADMIN — METHOCARBAMOL 500 MILLIGRAM(S): 500 TABLET, FILM COATED ORAL at 19:50

## 2021-05-01 RX ADMIN — Medication 10 MILLIGRAM(S): at 16:02

## 2021-05-01 RX ADMIN — Medication 1000 MILLIGRAM(S): at 23:09

## 2021-05-01 RX ADMIN — Medication 400 MILLIGRAM(S): at 22:47

## 2021-05-01 RX ADMIN — CARVEDILOL PHOSPHATE 3.12 MILLIGRAM(S): 80 CAPSULE, EXTENDED RELEASE ORAL at 22:47

## 2021-05-01 RX ADMIN — Medication 10 MILLIGRAM(S): at 19:49

## 2021-05-01 RX ADMIN — Medication 2.5 MILLIGRAM(S): at 23:09

## 2021-05-01 RX ADMIN — LEVETIRACETAM 500 MILLIGRAM(S): 250 TABLET, FILM COATED ORAL at 22:47

## 2021-05-01 NOTE — ED PROVIDER NOTE - SECONDARY DIAGNOSIS.
Closed head injury with brief loss of consciousness Chin laceration, initial encounter Syncope and collapse Uncontrolled hypertension

## 2021-05-01 NOTE — CHART NOTE - NSCHARTNOTEFT_GEN_A_CORE
CT head reviewed appears stable. Will allow for diet as patient remains neurologically intact.     orthostatic BP /66 when laying down sitting 127/62 standing 60/36 became diaphoretic.     ECHO, carotid us, cardiology consult, Hospitalist to evaluate as well.   Hold BP meds for <120  Gently hydration to avoid syncope.   zofran prn nausea.   RPT CT head in am and add CTA to evaluate for intracranial stenosis.   repeat BP checks frequently.   consider neurology eval.

## 2021-05-01 NOTE — H&P ADULT - ASSESSMENT
Pt is a very pleasant 80 year old woman with a past medical history of HTN, HLD, OA/RA and Breast cancer who presented to the ED after a fall at a restaurant. As per the patient she was leaving the restaurant after lunch when she thinks she tripped up the stairs. CT of the head was done which showed a small subarachnoid hemorrhage in the right central sulcus at the convexity.    PLAN-  Admit to Dr. Min's service  SDU for Q2 hour neuro checks  Start Keppra 500mg BID for 7 days   Maintain SBP < 150  Hospitalist consult for medical management   Repeat CT head in 6 hours  Venodynes for DVT PPX     Discussed with Dr. Min who has reviewed the imaging

## 2021-05-01 NOTE — ED ADULT TRIAGE NOTE - CHIEF COMPLAINT QUOTE
Pt. to the ED [facility] from [facility] C/O Fall - Pt. states she trip and fell but does not remember-- + Head injury and + LOC---+Aspirin- Neuro Alert called and EKG

## 2021-05-01 NOTE — ED PROVIDER NOTE - ENMT, MLM
Airway patent, Nasal mucosa clear. Mouth with normal mucosa. Throat has no vesicles, no oropharyngeal exudates and uvula is midline. Battles negative, positive ecchymosis swelling to L cheek, bilateral TMJ non tender AFROM, oropharynx clear, mucous membranes dry, positive 1cm laceration under surface of chin. Scalp: atraumatic, right forehead ecchymotic soft tissue hematoma

## 2021-05-01 NOTE — ED PROVIDER NOTE - OBJECTIVE STATEMENT
79 y/o female with PMHx of HLD, OA, RA, breast CA, s/p left hip replacement, s/p bilateral knee replacement presents to the ED s/p syncopal episode. Pt reports she doesn't remember what happened but the last thing she remembers is her reaching for her wallet. Currently, pt is now c/o L frontal headache, dizziness, nausea, and bruising to L eye. Denies any other complaints. 79 y/o female with PMHx of HLD, OA, RA, breast CA, s/p left hip replacement, s/p bilateral knee replacement presents to the ED s/p syncopal episode. Pt reports she doesn't remember what happened or remember her falling but the last thing she remembers is her reaching for her wallet. She states the next thing she saw was the paramedics. Currently, pt is now c/o L frontal headache, dizziness, nausea, and bruising to L eye. Pt received both COVID vaccines. Pt is on baby ASA. 81 y/o female with PMHx of HLD, OA, RA, breast CA, s/p left hip replacement, s/p bilateral knee replacement presents to the ED s/p fall and possible syncopal episode. Pt reports LOC. Pt was going up the stairs and next thing she remembers is being woken up by EMS. Pt does not remember the fall, and didn't feel lightheaded prior to the event. Denies CP. Pt now c/o L sided headache, facial pain, nausea, dizziness. Pt states neck is sore when she moves it. Denies back pain, vomiting, SOB, fever, chills, arm and leg pain. Pt is on baby ASA. NKDA.   PMD: Dr. Valentin Campbell

## 2021-05-01 NOTE — H&P ADULT - NSICDXPASTMEDICALHX_GEN_ALL_CORE_FT
PAST MEDICAL HISTORY:  Breast cancer left 1990 treated with RT and surgery    Chronic pain secondary to OA and RA    HLD (hyperlipidemia)     Migraine     Mitral valve disease     OA (osteoarthritis)     RA (rheumatoid arthritis)

## 2021-05-01 NOTE — ED PROVIDER NOTE - NEUROLOGICAL, MLM
Alert and oriented, no focal deficits, no motor or sensory deficits. Alert and oriented x 3, no focal deficits, no motor or sensory deficits, normal speech, CN 2-12 intact, positive retrograde amnesia of event

## 2021-05-01 NOTE — H&P ADULT - NSICDXPASTSURGICALHX_GEN_ALL_CORE_FT
PAST SURGICAL HISTORY:  H/O mitral valve replacement     H/O spinal fusion lumbar 2005    History of bilateral knee replacement right 2009, left 2011    History of cataract surgery left cataract sx    History of left hip replacement 2017    History of lumpectomy of left breast with sentinal node biopsy 1990    History of tonsillectomy     S/P bunionectomy left x 2 2007

## 2021-05-01 NOTE — ED ADULT NURSE REASSESSMENT NOTE - NS ED NURSE REASSESS COMMENT FT1
Pt with right sided subarachnoid hemorrhage,  at bedside and pt/family aware )see code stroke flowsheet for neuro assessment) pt A&OX4, 5/5 strength to upper/lower extremities, +perrla, bleeding with laceration to chin, ecchymosis to left side of face, son at bedside, safety maintained, will continue to monitor.

## 2021-05-01 NOTE — ED PROVIDER NOTE - CARE PLAN
Principal Discharge DX:	SAH (subarachnoid hemorrhage)  Secondary Diagnosis:	Syncope and collapse  Secondary Diagnosis:	Closed head injury with brief loss of consciousness  Secondary Diagnosis:	Chin laceration, initial encounter   Principal Discharge DX:	SAH (subarachnoid hemorrhage)  Secondary Diagnosis:	Syncope and collapse  Secondary Diagnosis:	Closed head injury with brief loss of consciousness  Secondary Diagnosis:	Chin laceration, initial encounter  Secondary Diagnosis:	Uncontrolled hypertension

## 2021-05-01 NOTE — ED PROVIDER NOTE - MUSCULOSKELETAL, MLM
Spine appears normal, range of motion is not limited, no muscle or joint tenderness neck no midline tenderness, no stepoff deformity, positive supple with mild soreness, back, chest wall and pelvis stable non tender, GARCIA x 4 bilateral SLR 50 degrees without pain, normal motor.

## 2021-05-01 NOTE — H&P ADULT - NSHPPHYSICALEXAM_GEN_ALL_CORE
Vital Signs Last 24 Hrs  T(C): 36.4 (01 May 2021 16:07), Max: 36.4 (01 May 2021 13:45)  T(F): 97.5 (01 May 2021 16:07), Max: 97.5 (01 May 2021 13:45)  HR: 79 (01 May 2021 16:07) (73 - 79)  BP: 152/61 (01 May 2021 16:07) (152/61 - 187/82)  BP(mean): 87 (01 May 2021 16:07) (87 - 112)  RR: 12 (01 May 2021 16:07) (11 - 18)  SpO2: 94% (01 May 2021 16:07) (94% - 99%)    Constitutional: awake and alert  HEENT: PERRLA, EOMI  Neck: Supple  Respiratory: Breath sounds are clear bilaterally  Cardiovascular: S1 and S2, regular  rhythm  Gastrointestinal: soft, nontender  Extremities:  no edema  Vascular: Carotid Bruit - no  Musculoskeletal: no joint swelling/tenderness, no abnormal movements  Skin: ecchymosis and hematoma to left periorbital area, small laceration to chin     Neurological exam:  HF: A x O x 3. Appropriately interactive, normal affect. Speech fluent, No Aphasia or paraphasic errors. Naming /repetition intact   CN: PEARRL, EOMI, VFF, facial sensation normal, no NLFD, tongue midline  Motor: No pronator drift, Strength 5/5 in all 4 ext, normal bulk and tone, no tremor, rigidity or bradykinesia.    Sens: Intact to light touch  Reflexes: Symmetric and normal, downgoing toes b/l  Coord:  No FNFA, dysmetria, SAI intact   Gait/Balance: Not tested

## 2021-05-01 NOTE — ED ADULT NURSE NOTE - OBJECTIVE STATEMENT
pt presents to ED with complaints of trip and fall at a local restaurant. pt has ecchymosis to left eye and small laceration to lip. neuro alert initiated by MD Garzon. pt brought straight to CT. EKG performed upon arrival to room. pt placed on tele and VS monitoring.

## 2021-05-01 NOTE — ED PROVIDER NOTE - PROGRESS NOTE DETAILS
Julieta PS for ED attending, Dr. Garzon: CT head verbal report, positive small SAH, R central sulcus, no mass affect, no intraparenchymal bleed, BLAISE PA aware of ED consult. Julieta PS for ED attending, Dr. Garzon: neurosurgery PA at beside MIRTA Garzon MD:  ED PA aware of chin lac repair.  Admission accepted under Dr. Min/BLAISE to SICU. MIRTA Garzon MD:  Initial EKG ? MUMTAZ ant lds.  Pt w/o any cp on arrival nor while in ED.  Repeat EKG+ ST normal V3, pt remains cp-free, 1st troponin negative.  Pt being admitted to monitored setting for syncope with + SAH.  No clinical indication for emergent cardiac cath.  CT face, c-spine no acute trauma.

## 2021-05-01 NOTE — H&P ADULT - HISTORY OF PRESENT ILLNESS
Pt is a very pleasant 80 year old woman with a past medical history of HTN, HLD, OA/RA and Breast cancer who presented to the ED after a fall at a restaurant. as per the patient she was leaving the restaurant after lunch when she thinks she tripped up the stairs. The patient does not remember the fall. She denies any dizziness, lightheadedness or palpitations prior to the fall. Her family brought her to the ED because she had a laceration on her chin and ecchymosis and hematoma around her left eye. CT of the head was done which showed a small subarachnoid hemorrhage in the right central sulcus at the convexity. Pt c/o mild headache, no nausea or vomiting, she denies numbness or weakness, she denies any changes in bowl or bladder function.

## 2021-05-01 NOTE — H&P ADULT - NSHPLABSRESULTS_GEN_ALL_CORE
< from: CT Cervical Spine No Cont (05.01.21 @ 14:17) >    IMPRESSION:    Cervical spine: No vertebral fracture is recognized.  Focal kyphosis at C5-C6 on a degenerative basis. Grade 1 anterior spondylolisthesis is noted at C3-4 and C4-5.   Multilevel degenerative disc disease and spondylosis at C3-4 through C7-T1 with loss of disc height and associated degenerative endplate changes. There is narrowing of the RIGHT C3-4, RIGHT C4-5 and LEFT C6-7 neural foramina due to uncovertebral spurring and facet osteophytic hypertrophy. Degenerative cord impingement is seen at C4-5 through C6-7.    Facial bones:  Facial bones intact without fracture.    Surgical small lenses. Minimal LEFT periorbital soft tissue swelling is noted.   3 cm LEFT buccal hematoma with associated soft tissue swelling is noted.    Brain:   minimal subarachnoid hemorrhage in the RIGHT central sulcus at the convexity.                          13.4   6.49  )-----------( 143      ( 01 May 2021 15:45 )             40.8

## 2021-05-01 NOTE — ED ADULT NURSE REASSESSMENT NOTE - NS ED NURSE REASSESS COMMENT FT1
neurosurgery pa at bedside, admit to [facility] neuro,  aware of pt's bp (see flowsheet) awaiting new orders, will continue to monitor.

## 2021-05-01 NOTE — ED PROVIDER NOTE - CONSTITUTIONAL, MLM
Well appearing, awake, alert, oriented to person, place, time/situation and in no apparent distress. normal... Elderly white female alert, in no respiratory discomfort.

## 2021-05-01 NOTE — ED PROVIDER NOTE - CLINICAL SUMMARY MEDICAL DECISION MAKING FREE TEXT BOX
81 y/o female PMHx of HLD, OA, RA, breast CA, s/p left hip replacement, s/p bilateral knee replacement positive ASA 81 mg BIBA from home s/p fall with associated LOC, ?syncope with L head falcial injury, chin laceration, no memory of incident, positive headache, mild nausea, and mild dizziness. No extremity focal weakness. Neuro alert called in. Plan: CT head, face, c-spine, x-ray chest, and pelvis. Cardiac /syncope w/u, EKG, labs including coags, serial troponin. Monitor, observe, reassess

## 2021-05-01 NOTE — ED ADULT NURSE REASSESSMENT NOTE - NS ED NURSE REASSESS COMMENT FT1
Assumed care of pt from Neli escobar, pt's safety maintained, pt reports she feels tired at this time, nsr on cm, will continue to monitor.

## 2021-05-02 DIAGNOSIS — E78.00 PURE HYPERCHOLESTEROLEMIA, UNSPECIFIED: ICD-10-CM

## 2021-05-02 DIAGNOSIS — S01.81XA LACERATION WITHOUT FOREIGN BODY OF OTHER PART OF HEAD, INITIAL ENCOUNTER: ICD-10-CM

## 2021-05-02 DIAGNOSIS — I60.9 NONTRAUMATIC SUBARACHNOID HEMORRHAGE, UNSPECIFIED: ICD-10-CM

## 2021-05-02 DIAGNOSIS — R55 SYNCOPE AND COLLAPSE: ICD-10-CM

## 2021-05-02 DIAGNOSIS — S06.9X9A UNSPECIFIED INTRACRANIAL INJURY WITH LOSS OF CONSCIOUSNESS OF UNSPECIFIED DURATION, INITIAL ENCOUNTER: ICD-10-CM

## 2021-05-02 DIAGNOSIS — Z95.2 PRESENCE OF PROSTHETIC HEART VALVE: ICD-10-CM

## 2021-05-02 LAB
COVID-19 SPIKE DOMAIN AB INTERP: POSITIVE
COVID-19 SPIKE DOMAIN ANTIBODY RESULT: >250 U/ML — HIGH
SARS-COV-2 IGG+IGM SERPL QL IA: >250 U/ML — HIGH
SARS-COV-2 IGG+IGM SERPL QL IA: POSITIVE

## 2021-05-02 PROCEDURE — 99232 SBSQ HOSP IP/OBS MODERATE 35: CPT

## 2021-05-02 PROCEDURE — 70496 CT ANGIOGRAPHY HEAD: CPT | Mod: 26

## 2021-05-02 PROCEDURE — 70498 CT ANGIOGRAPHY NECK: CPT | Mod: 26

## 2021-05-02 PROCEDURE — 99233 SBSQ HOSP IP/OBS HIGH 50: CPT

## 2021-05-02 RX ADMIN — Medication 200 MILLIGRAM(S): at 22:31

## 2021-05-02 RX ADMIN — LEVETIRACETAM 500 MILLIGRAM(S): 250 TABLET, FILM COATED ORAL at 22:32

## 2021-05-02 RX ADMIN — LEVETIRACETAM 500 MILLIGRAM(S): 250 TABLET, FILM COATED ORAL at 10:45

## 2021-05-02 RX ADMIN — Medication 200 MILLIGRAM(S): at 10:45

## 2021-05-02 RX ADMIN — CELECOXIB 200 MILLIGRAM(S): 200 CAPSULE ORAL at 03:30

## 2021-05-02 RX ADMIN — METHOCARBAMOL 500 MILLIGRAM(S): 500 TABLET, FILM COATED ORAL at 22:33

## 2021-05-02 RX ADMIN — CELECOXIB 200 MILLIGRAM(S): 200 CAPSULE ORAL at 02:47

## 2021-05-02 RX ADMIN — CARVEDILOL PHOSPHATE 3.12 MILLIGRAM(S): 80 CAPSULE, EXTENDED RELEASE ORAL at 22:32

## 2021-05-02 RX ADMIN — Medication 2.5 MILLIGRAM(S): at 22:32

## 2021-05-02 RX ADMIN — Medication 2.5 MILLIGRAM(S): at 10:45

## 2021-05-02 RX ADMIN — DULOXETINE HYDROCHLORIDE 30 MILLIGRAM(S): 30 CAPSULE, DELAYED RELEASE ORAL at 10:45

## 2021-05-02 RX ADMIN — CARVEDILOL PHOSPHATE 3.12 MILLIGRAM(S): 80 CAPSULE, EXTENDED RELEASE ORAL at 10:45

## 2021-05-02 RX ADMIN — Medication 650 MILLIGRAM(S): at 16:39

## 2021-05-02 RX ADMIN — SUMATRIPTAN SUCCINATE 50 MILLIGRAM(S): 4 INJECTION, SOLUTION SUBCUTANEOUS at 04:05

## 2021-05-02 RX ADMIN — Medication 650 MILLIGRAM(S): at 17:00

## 2021-05-02 NOTE — PROGRESS NOTE ADULT - SUBJECTIVE AND OBJECTIVE BOX
Patient is a 80y old  Female who presents with a chief complaint of Subarachnoid hemorrhage (02 May 2021 09:17)      HPI:  Pt is a very pleasant 80 year old woman with a past medical history of HTN, HLD, OA/RA and Breast cancer who presented to the ED after a fall at a restaurant. as per the patient she was leaving the restaurant after lunch when she thinks she tripped up the stairs. The patient does not remember the fall. She denies any dizziness, lightheadedness or palpitations prior to the fall. Her family brought her to the ED because she had a laceration on her chin and ecchymosis and hematoma around her left eye. CT of the head was done which showed a small subarachnoid hemorrhage in the right central sulcus at the convexity. Pt c/o mild headache, no nausea or vomiting, she denies numbness or weakness, she denies any changes in bowl or bladder function.     5/2 - Pt seen and examined earlier today, in NAD. Appears comfortable, denies new complaints, no overnight events       acetaminophen   Tablet .. 650 milliGRAM(s) Oral every 6 hours PRN  acetaminophen  IVPB .. 1000 milliGRAM(s) IV Intermittent once PRN  acetaminophen  IVPB .. 1000 milliGRAM(s) IV Intermittent once PRN  carvedilol 3.125 milliGRAM(s) Oral every 12 hours  celecoxib 200 milliGRAM(s) Oral daily PRN  DULoxetine 30 milliGRAM(s) Oral daily  enalapril 2.5 milliGRAM(s) Oral two times a day  hydrALAZINE Injectable 10 milliGRAM(s) IV Push every 6 hours PRN  hydroxychloroquine 200 milliGRAM(s) Oral two times a day  levETIRAcetam 500 milliGRAM(s) Oral two times a day  methocarbamol 500 milliGRAM(s) Oral every 8 hours PRN  metoclopramide Injectable 5 milliGRAM(s) IV Push every 6 hours PRN  SUMAtriptan 50 milliGRAM(s) Oral daily PRN      Vital Signs Last 24 Hrs  T(C): 36.7 (02 May 2021 08:47), Max: 36.9 (01 May 2021 20:31)  T(F): 98.1 (02 May 2021 08:47), Max: 98.4 (01 May 2021 20:31)  HR: 73 (02 May 2021 08:00) (69 - 106)  BP: 137/65 (02 May 2021 08:00) (118/98 - 187/82)  BP(mean): 84 (02 May 2021 08:00) (71 - 112)  RR: 15 (02 May 2021 08:00) (11 - 30)  SpO2: 96% (02 May 2021 08:00) (92% - 99%)      Constitutional: awake and alert  HEENT: PERRLA, EOMI  Neck: Supple  Respiratory: Breath sounds are clear bilaterally  Cardiovascular: S1 and S2, regular  rhythm  Gastrointestinal: soft, nontender  Extremities:  no edema  Musculoskeletal: no abnormal movements  Skin: ecchymosis and hematoma to left periorbital area, small laceration to chin     Neurological exam:  HF: A x O x 3. Appropriately interactive, normal affect. Speech fluent, No Aphasia or paraphasic errors. Naming /repetition intact   CN: PERRL, EOMI, facial sensation normal, no NLFD, tongue midline  Motor: No pronator drift, Strength 5/5 in all 4 ext, normal bulk and tone, no tremor, rigidity or bradykinesia.    Sens: Intact to light touch  Reflexes: Symmetric and normal, downgoing toes b/l  Coord:  No FNFA, dysmetria, SAI intact   Gait/Balance: Not tested                          13.4   6.49  )-----------( 143      ( 01 May 2021 15:45 )             40.8     136  |  104  |  16  ----------------------------<  81  4.5   |  28  |  0.82    Ca    8.9      01 May 2021 15:45    TPro  7.2  /  Alb  3.5  /  TBili  0.4  /  DBili  x   /  AST  32  /  ALT  20  /  AlkPhos  61  05-01    PT/INR - ( 01 May 2021 17:08 )   PT: 11.5 sec;   INR: 0.98 ratio       PTT - ( 01 May 2021 17:08 )  PTT:32.6 sec      Radiology:  CT Head No Cont (05.01.21 @ 22:09) >  Stable trace amount of subarachnoid hemorrhage in the right central sulcus.

## 2021-05-02 NOTE — PROGRESS NOTE ADULT - ASSESSMENT
79 y/o F w/ PMH of HTN, dyslipidemia, OA/RA, breast cancer s/p lumpectomy, MVR p/w  fall with retrograde amnesia likely syncope with no evidence of concious attempt to protect herself with arms extension resulting in traumatic SAH    *Subarachnoid Hemorrhage - BP control, neuro checks, PT   - f/u repeat CT head results    *Syncope - with new + orthostasis   - ? EP eval   - f/u TTE/CUS    *H/o dyslipidemia / OA / RA / breast cancer / MVR  -C/w home meds and f/u outpatient for further management if conditions remain stable during hospitalization     *DVT ppx  -SCDs

## 2021-05-02 NOTE — PHYSICAL THERAPY INITIAL EVALUATION ADULT - GENERAL OBSERVATIONS, REHAB EVAL
RN reported orthostasis yesterday. Pt received resting in bed. Bandage on chin. Agreeable to PT. SPO2 95% in RA but needs encouragement to use O2 NC. SBP increased by 5 points supine to sit but dropped 18 points to 115 supine to stand with lightheadedness. Able to walk near bed. Pt returned to bed per her request. Hydrated after nursing OK. SPO2 100% with NC.

## 2021-05-02 NOTE — PHYSICAL THERAPY INITIAL EVALUATION ADULT - IMPAIRMENTS FOUND, PT EVAL
aerobic capacity/endurance/gait, locomotion, and balance/gross motor/integumentary integrity/muscle strength/ventilation and respiration/gas exchange

## 2021-05-02 NOTE — PROGRESS NOTE ADULT - SUBJECTIVE AND OBJECTIVE BOX
81 y/o Female with PMH of HTN, dyslipidemia, OA/RA, breast cancer s/p lumpectomy, MVR, p/w possible syncope. Patient states she had brunch at restaurant and was walking out, and next thing she remembers is waking up with people around her. States she doesn't recall falling, and may have passed out. Patient denies any symptoms prior to episode. Denies chest pain, nausea, vomiting, abdominal pain, fever, chills, palpitations, SOB, cough, runny nose, sore throat. Patient states at this time she does have some mild light headedness and some mild pain in back of her head / neck area.     INTERVAL HPI: + HA, no neuro deficits, + orthostatic changes, unable to recall events  Other ROS reviewed and neg     Vital Signs Last 24 Hrs  T(C): 36.7 (02 May 2021 08:47), Max: 36.9 (01 May 2021 20:31)  T(F): 98.1 (02 May 2021 08:47), Max: 98.4 (01 May 2021 20:31)  HR: 79 (02 May 2021 11:00) (69 - 106)  BP: 146/72 (02 May 2021 11:00) (118/98 - 187/82)  BP(mean): 90 (02 May 2021 11:00) (71 - 112)  RR: 21 (02 May 2021 11:00) (11 - 30)  SpO2: 100% (02 May 2021 11:00) (92% - 100%)  I&O's Detail    01 May 2021 07:01  -  02 May 2021 07:00  --------------------------------------------------------  IN:  Total IN: 0 mL    OUT:    Intermittent Catheterization - Urethral (mL): 800 mL  Total OUT: 800 mL    Total NET: -800 mL    CARDIAC MARKERS ( 01 May 2021 20:58 )  <0.015 ng/mL / x     / x     / x     / x      CARDIAC MARKERS ( 01 May 2021 17:08 )  <0.015 ng/mL / x     / x     / x     / x      CARDIAC MARKERS ( 01 May 2021 15:45 )  <0.015 ng/mL / x     / x     / x     / x                                13.4   6.49  )-----------( 143      ( 01 May 2021 15:45 )             40.8     01 May 2021 15:45    136    |  104    |  16     ----------------------------<  81     4.5     |  28     |  0.82     Ca    8.9        01 May 2021 15:45    TPro  7.2    /  Alb  3.5    /  TBili  0.4    /  DBili  x      /  AST  32     /  ALT  20     /  AlkPhos  61     01 May 2021 15:45    PT/INR - ( 01 May 2021 17:08 )   PT: 11.5 sec;   INR: 0.98 ratio       PTT - ( 01 May 2021 17:08 )  PTT:32.6 sec    LIVER FUNCTIONS - ( 01 May 2021 15:45 )  Alb: 3.5 g/dL / Pro: 7.2 gm/dL / ALK PHOS: 61 U/L / ALT: 20 U/L / AST: 32 U/L / GGT: x           MEDICATIONS  (STANDING):  carvedilol 3.125 milliGRAM(s) Oral every 12 hours  DULoxetine 30 milliGRAM(s) Oral daily  enalapril 2.5 milliGRAM(s) Oral two times a day  hydroxychloroquine 200 milliGRAM(s) Oral two times a day  levETIRAcetam 500 milliGRAM(s) Oral two times a day    MEDICATIONS  (PRN):  acetaminophen   Tablet .. 650 milliGRAM(s) Oral every 6 hours PRN Mild Pain (1 - 3)  acetaminophen  IVPB .. 1000 milliGRAM(s) IV Intermittent once PRN Moderate Pain (4 - 6)  acetaminophen  IVPB .. 1000 milliGRAM(s) IV Intermittent once PRN Severe Pain (7 - 10)  celecoxib 200 milliGRAM(s) Oral daily PRN Moderate Pain (4 - 6)  hydrALAZINE Injectable 10 milliGRAM(s) IV Push every 6 hours PRN SBP >150  methocarbamol 500 milliGRAM(s) Oral every 8 hours PRN Muscle Spasm  metoclopramide Injectable 5 milliGRAM(s) IV Push every 6 hours PRN nausea / vomiting  SUMAtriptan 50 milliGRAM(s) Oral daily PRN Migraine    RADIOLOGY: personally visualized    PHYSICAL EXAM:    General: elderly frail female in no acute distress  Eyes: PERRLA, EOMI; conjunctiva and sclera clear  Head: left facial ecchymoses and tenderness on exam  ENMT: No nasal discharge; airway clear  Neck: Supple; non tender; no masses  Respiratory: No wheezes, rales or rhonchi  Cardiovascular: S1, S2 reg  Gastrointestinal: Soft non-tender non-distended; Normal bowel sounds  Genitourinary: No costovertebral angle tenderness  Extremities: No clubbing, cyanosis or edema  Vascular: Peripheral pulses palpable 2+ bilaterally  Neurological: Alert and oriented x4  Skin: Warm and dry.   Musculoskeletal: Normal tone  Psychiatric: Cooperative

## 2021-05-02 NOTE — PROGRESS NOTE ADULT - ASSESSMENT
Pt is a very pleasant 80 year old woman with a past medical history of HTN, HLD, OA/RA and Breast cancer who presented to the ED after a fall at a restaurant. As per the patient she was leaving the restaurant after lunch when she thinks she tripped up the stairs. CT of the head was done which showed a small subarachnoid hemorrhage in the right central sulcus at the convexity, stable on follow up CT    - No acute neurosurgical intervention   - Cardiology appreciated  - CTA neck  - ECHO  - Neuros q 8  - Cont Keppra 500mg BID for 7 days   - Maintain SBP < 150  - Hospitalist consult for medical management   - Venodynes for DVT PPX     Discussed with Dr. Min

## 2021-05-02 NOTE — PROGRESS NOTE ADULT - SUBJECTIVE AND OBJECTIVE BOX
No events overnight  AFVSS  Neuro intact  CT head last PM was stable  CT angiogram head/neck today shows stable right traumatic SAH, no intracranial vascular abnormality, no significant flow-limiting carotid stenosis  -No neurosurgical intervention at this time  -Keppra x 7 days total  -Further syncope work-up per medicine team  -Will hold ASA 5 days total and then can restart

## 2021-05-02 NOTE — PHYSICAL THERAPY INITIAL EVALUATION ADULT - GAIT DEVIATIONS NOTED, PT EVAL
decreased hetal/increased time in double stance/decreased velocity of limb motion/decreased step length/decreased stride length/decreased swing-to-stance ratio/decreased weight-shifting ability

## 2021-05-02 NOTE — PHYSICAL THERAPY INITIAL EVALUATION ADULT - PLANNED THERAPY INTERVENTIONS, PT EVAL
balance training/bed mobility training/gait training/lumbar stabilization/manual therapy techniques/motor coordination training/neuromuscular re-education/ROM/stretching/transfer training

## 2021-05-02 NOTE — PHYSICAL THERAPY INITIAL EVALUATION ADULT - PERTINENT HX OF CURRENT PROBLEM, REHAB EVAL
Pt fell in a restaurant with brief LOC. Chin laceration s/p repair, L eye hematoma, CT R central sulcus SAH. Standing SBP dropped to 60's.

## 2021-05-03 PROCEDURE — 93306 TTE W/DOPPLER COMPLETE: CPT | Mod: 26

## 2021-05-03 PROCEDURE — 99233 SBSQ HOSP IP/OBS HIGH 50: CPT

## 2021-05-03 PROCEDURE — 99232 SBSQ HOSP IP/OBS MODERATE 35: CPT

## 2021-05-03 PROCEDURE — 99223 1ST HOSP IP/OBS HIGH 75: CPT

## 2021-05-03 PROCEDURE — 93880 EXTRACRANIAL BILAT STUDY: CPT | Mod: 26

## 2021-05-03 RX ADMIN — DULOXETINE HYDROCHLORIDE 30 MILLIGRAM(S): 30 CAPSULE, DELAYED RELEASE ORAL at 10:07

## 2021-05-03 RX ADMIN — Medication 200 MILLIGRAM(S): at 21:40

## 2021-05-03 RX ADMIN — LEVETIRACETAM 500 MILLIGRAM(S): 250 TABLET, FILM COATED ORAL at 10:07

## 2021-05-03 RX ADMIN — Medication 200 MILLIGRAM(S): at 10:07

## 2021-05-03 RX ADMIN — CARVEDILOL PHOSPHATE 3.12 MILLIGRAM(S): 80 CAPSULE, EXTENDED RELEASE ORAL at 10:07

## 2021-05-03 RX ADMIN — CARVEDILOL PHOSPHATE 3.12 MILLIGRAM(S): 80 CAPSULE, EXTENDED RELEASE ORAL at 21:40

## 2021-05-03 RX ADMIN — SUMATRIPTAN SUCCINATE 50 MILLIGRAM(S): 4 INJECTION, SOLUTION SUBCUTANEOUS at 00:12

## 2021-05-03 RX ADMIN — SUMATRIPTAN SUCCINATE 50 MILLIGRAM(S): 4 INJECTION, SOLUTION SUBCUTANEOUS at 00:45

## 2021-05-03 RX ADMIN — LEVETIRACETAM 500 MILLIGRAM(S): 250 TABLET, FILM COATED ORAL at 21:40

## 2021-05-03 RX ADMIN — Medication 2.5 MILLIGRAM(S): at 21:40

## 2021-05-03 RX ADMIN — Medication 2.5 MILLIGRAM(S): at 10:07

## 2021-05-03 NOTE — PROGRESS NOTE ADULT - SUBJECTIVE AND OBJECTIVE BOX
HPI:  Pt is a very pleasant 80 year old woman with a past medical history of HTN, HLD, OA/RA and Breast cancer who presented to the ED after a fall at a restaurant. as per the patient she was leaving the restaurant after lunch when she thinks she tripped up the stairs. The patient does not remember the fall. She denies any dizziness, lightheadedness or palpitations prior to the fall. Her family brought her to the ED because she had a laceration on her chin and ecchymosis and hematoma around her left eye. CT of the head was done which showed a small subarachnoid hemorrhage in the right central sulcus at the convexity. Pt c/o mild headache, no nausea or vomiting, she denies numbness or weakness, she denies any changes in bowl or bladder function.     5/2 - Pt seen and examined earlier today, in NAD. Appears comfortable, denies new complaints, no overnight events   5/3- Patient seen and examined. + Orthostatics complains of headache, blurry vision, dizziness.     Vital Signs Last 24 Hrs  T(C): 36.7 (03 May 2021 08:58), Max: 36.9 (02 May 2021 20:21)  T(F): 98.1 (03 May 2021 08:58), Max: 98.4 (02 May 2021 20:21)  HR: 80 (03 May 2021 12:00) (69 - 88)  BP: 129/68 (03 May 2021 12:00) (120/74 - 168/81)  BP(mean): 81 (03 May 2021 12:00) (73 - 104)  RR: 15 (03 May 2021 12:00) (12 - 30)  SpO2: 92% (03 May 2021 12:00) (92% - 100%)    MEDICATIONS  (STANDING):  carvedilol 3.125 milliGRAM(s) Oral every 12 hours  DULoxetine 30 milliGRAM(s) Oral daily  enalapril 2.5 milliGRAM(s) Oral two times a day  hydroxychloroquine 200 milliGRAM(s) Oral two times a day  levETIRAcetam 500 milliGRAM(s) Oral two times a day    MEDICATIONS  (PRN):  acetaminophen   Tablet .. 650 milliGRAM(s) Oral every 6 hours PRN Mild Pain (1 - 3)  acetaminophen  IVPB .. 1000 milliGRAM(s) IV Intermittent once PRN Moderate Pain (4 - 6)  acetaminophen  IVPB .. 1000 milliGRAM(s) IV Intermittent once PRN Severe Pain (7 - 10)  celecoxib 200 milliGRAM(s) Oral daily PRN Moderate Pain (4 - 6)  hydrALAZINE Injectable 10 milliGRAM(s) IV Push every 6 hours PRN SBP >150  metoclopramide Injectable 5 milliGRAM(s) IV Push every 6 hours PRN nausea / vomiting  SUMAtriptan 50 milliGRAM(s) Oral daily PRN Migraine      PHYSICAL EXAM:  Constitutional: awake and alert  HEENT: PERRLA, EOMI  Neck: Supple  Respiratory: Breath sounds are clear bilaterally  Cardiovascular: S1 and S2, regular  rhythm  Gastrointestinal: soft, nontender  Extremities:  no edema  Musculoskeletal: no abnormal movements  Skin: ecchymosis and hematoma to left periorbital area, small laceration to chin     Neurological exam:  HF: A x O x 3. Appropriately interactive, normal affect. Speech fluent, No Aphasia or paraphasic errors. Naming /repetition intact   CN: PERRL, EOMI, facial sensation normal, no NLFD, tongue midline  Motor: No pronator drift, Strength 5/5 in all 4 ext, normal bulk and tone, no tremor, rigidity or bradykinesia.    Sens: Intact to light touch  Reflexes: Symmetric and normal, downgoing toes b/l  Coord:  No FNFA, dysmetria, SAI intact   Gait/Balance: Not tested          LABS:                         13.4   6.49  )-----------( 143      ( 01 May 2021 15:45 )             40.8     05-01    136  |  104  |  16  ----------------------------<  81  4.5   |  28  |  0.82    Ca    8.9      01 May 2021 15:45    TPro  7.2  /  Alb  3.5  /  TBili  0.4  /  DBili  x   /  AST  32  /  ALT  20  /  AlkPhos  61  05-01    LIVER FUNCTIONS - ( 01 May 2021 15:45 )  Alb: 3.5 g/dL / Pro: 7.2 gm/dL / ALK PHOS: 61 U/L / ALT: 20 U/L / AST: 32 U/L / GGT: x

## 2021-05-03 NOTE — PROGRESS NOTE ADULT - SUBJECTIVE AND OBJECTIVE BOX
81 y/o Female with PMH of HTN, dyslipidemia, OA/RA, breast cancer s/p lumpectomy, MVR, p/w possible syncope. Patient states she had brunch at restaurant and was walking out, and next thing she remembers is waking up with people around her. States she doesn't recall falling, and may have passed out. Patient denies any symptoms prior to episode. Denies chest pain, nausea, vomiting, abdominal pain, fever, chills, palpitations, SOB, cough, runny nose, sore throat. Patient states at this time she does have some mild light headedness and some mild pain in back of her head / neck area.     INTERVAL HPI: + HA, no neuro deficits, + orthostatic changes, unable to recall events  Other ROS reviewed and neg     Vital Signs Last 24 Hrs  T(C): 36.8 (03 May 2021 14:18), Max: 36.9 (02 May 2021 20:21)  T(F): 98.2 (03 May 2021 14:18), Max: 98.4 (02 May 2021 20:21)  HR: 77 (03 May 2021 14:00) (69 - 88)  BP: 111/60 (03 May 2021 14:00) (111/60 - 168/81)  BP(mean): 72 (03 May 2021 14:00) (72 - 104)  RR: 56 (03 May 2021 14:00) (12 - 56)  SpO2: 95% (03 May 2021 14:00) (92% - 100%)  I&O's Detail    02 May 2021 07:01  -  03 May 2021 07:00  --------------------------------------------------------  IN:  Total IN: 0 mL    OUT:    Intermittent Catheterization - Urethral (mL): 600 mL    Voided (mL): 400 mL  Total OUT: 1000 mL    Total NET: -1000 mL    CARDIAC MARKERS ( 01 May 2021 20:58 )  <0.015 ng/mL / x     / x     / x     / x      CARDIAC MARKERS ( 01 May 2021 17:08 )  <0.015 ng/mL / x     / x     / x     / x      CARDIAC MARKERS ( 01 May 2021 15:45 )  <0.015 ng/mL / x     / x     / x     / x                                13.4   6.49  )-----------( 143      ( 01 May 2021 15:45 )             40.8     01 May 2021 15:45    136    |  104    |  16     ----------------------------<  81     4.5     |  28     |  0.82     Ca    8.9        01 May 2021 15:45    TPro  7.2    /  Alb  3.5    /  TBili  0.4    /  DBili  x      /  AST  32     /  ALT  20     /  AlkPhos  61     01 May 2021 15:45    PT/INR - ( 01 May 2021 17:08 )   PT: 11.5 sec;   INR: 0.98 ratio         PTT - ( 01 May 2021 17:08 )  PTT:32.6 sec  CAPILLARY BLOOD GLUCOSE        LIVER FUNCTIONS - ( 01 May 2021 15:45 )  Alb: 3.5 g/dL / Pro: 7.2 gm/dL / ALK PHOS: 61 U/L / ALT: 20 U/L / AST: 32 U/L / GGT: x           MEDICATIONS  (STANDING):  carvedilol 3.125 milliGRAM(s) Oral every 12 hours  DULoxetine 30 milliGRAM(s) Oral daily  enalapril 2.5 milliGRAM(s) Oral two times a day  hydroxychloroquine 200 milliGRAM(s) Oral two times a day  levETIRAcetam 500 milliGRAM(s) Oral two times a day    MEDICATIONS  (PRN):  acetaminophen   Tablet .. 650 milliGRAM(s) Oral every 6 hours PRN Mild Pain (1 - 3)  acetaminophen  IVPB .. 1000 milliGRAM(s) IV Intermittent once PRN Moderate Pain (4 - 6)  acetaminophen  IVPB .. 1000 milliGRAM(s) IV Intermittent once PRN Severe Pain (7 - 10)  celecoxib 200 milliGRAM(s) Oral daily PRN Moderate Pain (4 - 6)  hydrALAZINE Injectable 10 milliGRAM(s) IV Push every 6 hours PRN SBP >150  metoclopramide Injectable 5 milliGRAM(s) IV Push every 6 hours PRN nausea / vomiting  SUMAtriptan 50 milliGRAM(s) Oral daily PRN Migraine    RADIOLOGY: personally visualized    PHYSICAL EXAM:    General: elderly frail female in no acute distress  Eyes: PERRLA, EOMI; conjunctiva and sclera clear  Head: left facial ecchymoses and tenderness on exam  ENMT: No nasal discharge; airway clear  Neck: Supple; non tender; no masses  Respiratory: No wheezes, rales or rhonchi  Cardiovascular: S1, S2 reg  Gastrointestinal: Soft non-tender non-distended; Normal bowel sounds  Genitourinary: No costovertebral angle tenderness  Extremities: No clubbing, cyanosis or edema  Vascular: Peripheral pulses palpable 2+ bilaterally  Neurological: Alert and oriented x4  Skin: Warm and dry.   Musculoskeletal: Normal tone  Psychiatric: Cooperative

## 2021-05-03 NOTE — PROGRESS NOTE ADULT - SUBJECTIVE AND OBJECTIVE BOX
CHIEF COMPLAINT:  Patient is a 80y old  Female who presents with a chief complaint of Subarachnoid hemorrhage (01 May 2021 20:41)      HPI: 21:  Pt is a very pleasant 80 year old woman well known to our service and Dr Campbell with a past medical history of HTN, HLD, s/p MVR yrs ago by Dr Faria at [facility], OA/RA and Breast cancer who presented to the ED after a fall at a restaurant. as per the patient she was leaving the restaurant after lunch when she thinks she tripped up the stairs. The patient does not remember the fall and does not recall anything until after the EMS was already there. She denies any dizziness, lightheadedness or palpitations prior to the fall. Her family brought her to the ED because she had a laceration on her chin and ecchymosis and hematoma around her left eye. CT of the head was done which showed a small subarachnoid hemorrhage in the right central sulcus at the convexity. Pt c/o mild headache, no nausea or vomiting, she denies numbness or weakness, she denies any changes in bowl or bladder function.  She was having orthostatic drops in BP in the SICU, chronically taking Coreg and Enalapril for her BP as an outpt.        5/3- has headache, but otherwise feels well     PMHx:  PAST MEDICAL & SURGICAL HISTORY:  Mitral valve disease-s/p mitral valve replacement-Dr Faria at [facility]  RA (rheumatoid arthritis)  OA (osteoarthritis)  Migraine  HLD (hyperlipidemia)  Breast cancer-left  treated with RT and surgery  Chronic pain-secondary to OA and RA  History of lumpectomy of left breast-with sentinal node biopsy   History of bilateral knee replacement-right , left   H/O spinal fusion-lumbar 2005  History of tonsillectomy  S/P bunionectomy-left x 2   History of left hip replacement-2017  History of cataract surgery-left cataract sx      FAMILY HISTORY:   FAMILY HISTORY:  No pertinent family history in first degree relatives      ALLERGIES:  Allergies  No Known Allergies      REVIEW OF SYSTEMS:  10 point ROS was obtained  Pertinent positives and negatives are as above  All other review of systems is negative unless indicated above      Vital Signs Last 24 Hrs  T(C): 36.8 (03 May 2021 06:15), Max: 36.9 (02 May 2021 20:21)  T(F): 98.3 (03 May 2021 06:15), Max: 98.4 (02 May 2021 20:21)  HR: 75 (03 May 2021 05:00) (69 - 84)  BP: 132/67 (03 May 2021 05:00) (119/94 - 146/72)  BP(mean): 83 (03 May 2021 05:00) (73 - 100)  RR: 18 (03 May 2021 05:00) (11 - 26)  SpO2: 94% (03 May 2021 05:00) (92% - 100%)    I&O's Summary    01 May 2021 07:01  -  02 May 2021 07:00  --------------------------------------------------------  IN: 0 mL / OUT: 800 mL / NET: -800 mL      PHYSICAL EXAM:   Constitutional: NAD, awake and alert, well-developed  HEENT: PERR, EOMI, Normal Hearing, MMM, abrasion on chin and ecchymosis on face (healing)  Neck: Soft and supple, No LAD, No JVD  Respiratory: Breath sounds are clear bilaterally, No wheezing, rales or rhonchi, well healed mid-thoracotomy scar  Cardiovascular: S1 and S2, regular rate and rhythm, soft ST[place] at LLSB and base as before, no gallops or rubs  Gastrointestinal: Bowel Sounds present, soft, nontender, nondistended, no guarding, no rebound  Extremities: No peripheral edema  Vascular: 2+ peripheral pulses  Neurological: A/O x 3, no focal deficits  Musculoskeletal: 5/5 strength b/l upper and lower extremities  MEDICATIONS  (STANDING):  carvedilol 3.125 milliGRAM(s) Oral every 12 hours  DULoxetine 30 milliGRAM(s) Oral daily  enalapril 2.5 milliGRAM(s) Oral two times a day  hydroxychloroquine 200 milliGRAM(s) Oral two times a day  levETIRAcetam 500 milliGRAM(s) Oral two times a day    MEDICATIONS  (PRN):  acetaminophen   Tablet .. 650 milliGRAM(s) Oral every 6 hours PRN Mild Pain (1 - 3)  acetaminophen  IVPB .. 1000 milliGRAM(s) IV Intermittent once PRN Moderate Pain (4 - 6)  acetaminophen  IVPB .. 1000 milliGRAM(s) IV Intermittent once PRN Severe Pain (7 - 10)  celecoxib 200 milliGRAM(s) Oral daily PRN Moderate Pain (4 - 6)  hydrALAZINE Injectable 10 milliGRAM(s) IV Push every 6 hours PRN SBP >150  methocarbamol 500 milliGRAM(s) Oral every 8 hours PRN Muscle Spasm  metoclopramide Injectable 5 milliGRAM(s) IV Push every 6 hours PRN nausea / vomiting  SUMAtriptan 50 milliGRAM(s) Oral daily PRN Migraine    LABS: All Labs Reviewed:                        13.4   6.49  )-----------( 143      ( 01 May 2021 15:45 )             40.8   05-    136  |  104  |  16  ----------------------------<  81  4.5   |  28  |  0.82    Ca    8.9      01 May 2021 15:45    TPro  7.2  /  Alb  3.5  /  TBili  0.4  /  DBili  x   /  AST  32  /  ALT  20  /  AlkPhos  61  05-            CARDIAC MARKERS ( 01 May 2021 20:58 )  <0.015 ng/mL / x     / x     / x     / x      CARDIAC MARKERS ( 01 May 2021 17:08 )  <0.015 ng/mL / x     / x     / x     / x      CARDIAC MARKERS ( 01 May 2021 15:45 )  <0.015 ng/mL / x     / x     / x     / x          BLOOD CULTURES:   LIPID PROFILE     RADIOLOGY:    CXR: 21:  INTERPRETATION:  XR CHEST  Single AP view  HISTORY:  syncope, fall; r/o trauma  Comparison: Chest x-ray 2019  Status post sternotomy and CABG.  Mitral annuloplasty.  The cardiac silhouette is within normal limits. The lungs are clear. No pleural abnormality.  IMPRESSION: No acute disease.      X-ray of Pelvis: 21:  INTERPRETATION:  XR PELVIS AP ONLY 1 OR 2 VIEWS  HISTORY:  r/o trauma s/p syncope/fall  Views:  AP pelvis;  The bony pelvis, acetabula, bilateral inferior and superior pubic rami demonstrate intact cortical margins with no evidence of an acute fracture.  Bilateral femoral heads, visualized bilateral femoral necks and proximal shafts demonstrate intact cortical margins with no evidence of an acute fracture.  There are mild osteoarthritic degenerative changes of the right hip.  There has been a left hip arthroplasty.  Osseous and metallic elements are in good alignment. There is no evidence of prosthetic loosening.  Bilateral sacroiliac joints are unremarkable.  IMPRESSION:  No acute fracture or dislocation.    Repeat CT of Head: 21;  FINDINGS:  There is again noted a trace amount of subarachnoid hemorrhage in the right central sulcus. There is no interval increase in volume of blood products or new intracranial hemorrhage. There is no significant mass effect or shift of the midline. The basal cisterns are not effaced. There is mild cerebral volume loss with prominence of the ventricles and sulci. There are minimal chronic ischemic changes in the frontoparietal white matter. There is no CT evidence of an acute vascular territorial infarct. There are atherosclerotic calcifications of the intracranial carotid arteries.  There is left-sided pre-malar soft tissue swelling. The skull base and bony calvarium are intact. The visualized paranasal sinuses and tympanic/mastoid cavities are clear apart from minimal ethmoid mucosal thickening. There is evidence of bilateral lens surgery.  IMPRESSION:  Stable trace amount of subarachnoid hemorrhage in the right central sulcus.    CT of Maxillofacial, Brain and C-Spine: 21:  FINDINGS:   CT dated 2019 available for review.  The brain demonstrates minimal subarachnoid hemorrhage in the RIGHT central sulcus at the convexity.   No acute cerebral cortical infarct is seen. No mass effect is found in the brain.  The ventricles, sulci and basal cisterns appear unremarkable.  The orbits are unremarkable.  The paranasal sinuses are clear.  The nasal cavity appears intact.  The nasopharynx is symmetric.  The central skull base, petrous temporal bones and calvarium remain intact.    CT facial bones without IV contrast  FINDINGS:   No prior similar studies are available for review.  Facial bones are intact without fracture. 3 cm LEFT buccal hematoma is noted with associated soft tissue swelling.  The paranasal sinuses are clear.  No abnormal paranasal sinus fluid collection is found.  No osseous erosion or expansion is present.  The nasal bones are intact without fracture.  The nasal septum shows a RIGHT-sided deviation with moderate osteophyte.  The ostiomeatal complexes appear normally formed.  The orbits are significant for surgically small lenses. Minimal LEFT periorbital soft tissue swelling is noted.    The globes are intact.  Intraconal and extraconal fat is preserved.  The extraocular muscles remain symmetric.  The superior ophthalmic veins are also symmetric.  Orbital rims remain intact.  The deep facial spaces are intact.   No radiopaque foreign body is seen.  The nasopharynx is symmetric.  The central skull base is intact.  The visualized intracranial contents appear unremarkable.    CT cervical spine without IV contrast  FINDINGS:   CT dated 2019 available for review  Cervical vertebral body heights are maintained. No vertebral fracture is seen. No destructive bone lesion is found.  Alignment is significant for focal kyphosis at C5-C6 on a degenerative basis. Grade 1 anterior spondylolisthesis is noted at C3-4 and C4-5. Facet joints appear intact and aligned. There is calcification of the posterior peridontoid ligaments.  Cervical intervertebral disc spaces show degenerative disc disease and spondylosis at C3-4 through C7-T1 with loss of disc height and associated degenerative endplate changes. There is narrowing of the RIGHT C3-4, RIGHT C4-5 and LEFT C6-7 neural foramina due to uncovertebral spurring and facet osteophytic hypertrophy. Degenerative cord impingement is seen at C4-5 through C6-7.  The skull base appears intact.  No neck mass is recognized.  Paraspinal soft tissues appear intact. Visualized lymph nodes appear to be within physiologic size limits.  IMPRESSION:  Cervical spine: No vertebral fracture is recognized.  Focal kyphosis at C5-C6 on a degenerative basis. Grade 1 anterior spondylolisthesis is noted at C3-4 and C4-5.   Multilevel degenerative disc disease and spondylosis at C3-4 through C7-T1 with loss of disc height and associated degenerative endplate changes. There is narrowing of the RIGHT C3-4, RIGHT C4-5 and LEFT C6-7 neural foramina due to uncovertebral spurring and facet osteophytic hypertrophy. Degenerative cord impingement is seen at C4-5 through C6-7.  Facial bones:  Facial bones intact without fracture.    Surgical small lenses. Minimal LEFT periorbital soft tissue swelling is noted.   3 cm LEFT buccal hematoma with associated soft tissue swelling is noted.  Brain:   minimal subarachnoid hemorrhage in the RIGHT central sulcus at the convexity.      EK21:  Normal sinus rhythm  Normal ECG    TELEMETRY:  NSR    ECHO:  pending

## 2021-05-03 NOTE — PROGRESS NOTE ADULT - ASSESSMENT
5/2/21:  Pt with above history with prior HTN controlled on meds with sudden fall and ? syncope with resultant head injury and small but stable subarachnoid hemorrhage.  She did not injure her hand during the fall and this not normal alert reaction to put her hands out to block her fall and thus may have been a hypotensive syncopal event...vaso-vagal (doubt), orthostatic hypotension (???) aggravated by her meds (???)  possible, autonomic dysfunction (???).  Scheduled to have a CTA of the carotids and brain today and ECHO ordered as well.  Dr Campbell will be back in the AM and follow up.    5/3 feeling better today  check orthostatics again  echocardiogram pending  continue tele monitoring- SR so far  recommend Loop recorder to evaluate for arrythmic cause of syncope resulting in trauma  follow up imaging as per NS

## 2021-05-03 NOTE — PROGRESS NOTE ADULT - ASSESSMENT
Pt is a very pleasant 80 year old woman with a past medical history of HTN, HLD, OA/RA and Breast cancer who presented to the ED after a fall at a restaurant. As per the patient she was leaving the restaurant after lunch when she thinks she tripped up the stairs. CT of the head was done which showed a small subarachnoid hemorrhage in the right central sulcus at the convexity, stable on follow up CT    - No acute neurosurgical intervention   - Cardiology consult appreciated  - Transfer to Tele as per Cardiology  - ECHO today  - EP consult  - Neuros q 8  - Cont Keppra 500mg BID for 7 days   - Maintain SBP < 150  - Hospitalist consult appreciated  - Venodynes for DVT PPX     Discussed with Dr. Min

## 2021-05-03 NOTE — PROGRESS NOTE ADULT - ASSESSMENT
81 y/o F w/ PMH of HTN, dyslipidemia, OA/RA, breast cancer s/p lumpectomy, MVR p/w  fall with retrograde amnesia likely syncope with no evidence of concious attempt to protect herself with arms extension resulting in traumatic SAH    *Subarachnoid Hemorrhage - BP control, neuro checks, PT   - f/u repeat CT head results is stable, no extra/intra-cranial vascular dx    *Syncope - with new + orthostasis - stop robaxin as it can cause hypotension, if no improvement will start florinef   - ? EP eval   - f/u TTE -P/CUS is neg    *H/o dyslipidemia / OA / RA / breast cancer / MVR  -C/w home meds and f/u outpatient for further management if conditions remain stable during hospitalization     *DVT ppx  -SCDs

## 2021-05-04 LAB
ADD ON TEST-SPECIMEN IN LAB: SIGNIFICANT CHANGE UP
ANION GAP SERPL CALC-SCNC: 5 MMOL/L — SIGNIFICANT CHANGE UP (ref 5–17)
APPEARANCE UR: CLEAR — SIGNIFICANT CHANGE UP
BACTERIA # UR AUTO: ABNORMAL
BILIRUB UR-MCNC: NEGATIVE — SIGNIFICANT CHANGE UP
BUN SERPL-MCNC: 10 MG/DL — SIGNIFICANT CHANGE UP (ref 7–23)
CALCIUM SERPL-MCNC: 9.2 MG/DL — SIGNIFICANT CHANGE UP (ref 8.5–10.1)
CHLORIDE SERPL-SCNC: 107 MMOL/L — SIGNIFICANT CHANGE UP (ref 96–108)
CO2 SERPL-SCNC: 27 MMOL/L — SIGNIFICANT CHANGE UP (ref 22–31)
COLOR SPEC: YELLOW — SIGNIFICANT CHANGE UP
COMMENT - URINE: SIGNIFICANT CHANGE UP
CREAT SERPL-MCNC: 0.86 MG/DL — SIGNIFICANT CHANGE UP (ref 0.5–1.3)
DIFF PNL FLD: NEGATIVE — SIGNIFICANT CHANGE UP
EPI CELLS # UR: SIGNIFICANT CHANGE UP
GLUCOSE SERPL-MCNC: 121 MG/DL — HIGH (ref 70–99)
GLUCOSE UR QL: NEGATIVE MG/DL — SIGNIFICANT CHANGE UP
KETONES UR-MCNC: NEGATIVE — SIGNIFICANT CHANGE UP
LEUKOCYTE ESTERASE UR-ACNC: ABNORMAL
MAGNESIUM SERPL-MCNC: 2.1 MG/DL — SIGNIFICANT CHANGE UP (ref 1.6–2.6)
NITRITE UR-MCNC: NEGATIVE — SIGNIFICANT CHANGE UP
PH UR: 7 — SIGNIFICANT CHANGE UP (ref 5–8)
POTASSIUM SERPL-MCNC: 4.2 MMOL/L — SIGNIFICANT CHANGE UP (ref 3.5–5.3)
POTASSIUM SERPL-SCNC: 4.2 MMOL/L — SIGNIFICANT CHANGE UP (ref 3.5–5.3)
PROT UR-MCNC: NEGATIVE MG/DL — SIGNIFICANT CHANGE UP
RBC CASTS # UR COMP ASSIST: SIGNIFICANT CHANGE UP /HPF (ref 0–4)
SODIUM SERPL-SCNC: 139 MMOL/L — SIGNIFICANT CHANGE UP (ref 135–145)
SP GR SPEC: 1.01 — SIGNIFICANT CHANGE UP (ref 1.01–1.02)
TSH SERPL-MCNC: 1.7 UU/ML — SIGNIFICANT CHANGE UP (ref 0.34–4.82)
UROBILINOGEN FLD QL: NEGATIVE MG/DL — SIGNIFICANT CHANGE UP
WBC UR QL: SIGNIFICANT CHANGE UP

## 2021-05-04 PROCEDURE — 99232 SBSQ HOSP IP/OBS MODERATE 35: CPT

## 2021-05-04 PROCEDURE — 33285 INSJ SUBQ CAR RHYTHM MNTR: CPT

## 2021-05-04 RX ORDER — BETHANECHOL CHLORIDE 25 MG
50 TABLET ORAL
Refills: 0 | Status: DISCONTINUED | OUTPATIENT
Start: 2021-05-04 | End: 2021-05-07

## 2021-05-04 RX ORDER — FLUDROCORTISONE ACETATE 0.1 MG/1
0.1 TABLET ORAL
Refills: 0 | Status: DISCONTINUED | OUTPATIENT
Start: 2021-05-04 | End: 2021-05-07

## 2021-05-04 RX ADMIN — Medication 200 MILLIGRAM(S): at 22:19

## 2021-05-04 RX ADMIN — FLUDROCORTISONE ACETATE 0.1 MILLIGRAM(S): 0.1 TABLET ORAL at 10:44

## 2021-05-04 RX ADMIN — LEVETIRACETAM 500 MILLIGRAM(S): 250 TABLET, FILM COATED ORAL at 10:43

## 2021-05-04 RX ADMIN — FLUDROCORTISONE ACETATE 0.1 MILLIGRAM(S): 0.1 TABLET ORAL at 22:19

## 2021-05-04 RX ADMIN — Medication 200 MILLIGRAM(S): at 10:42

## 2021-05-04 RX ADMIN — Medication 50 MILLIGRAM(S): at 17:56

## 2021-05-04 RX ADMIN — Medication 2.5 MILLIGRAM(S): at 22:19

## 2021-05-04 RX ADMIN — CARVEDILOL PHOSPHATE 3.12 MILLIGRAM(S): 80 CAPSULE, EXTENDED RELEASE ORAL at 22:19

## 2021-05-04 RX ADMIN — DULOXETINE HYDROCHLORIDE 30 MILLIGRAM(S): 30 CAPSULE, DELAYED RELEASE ORAL at 10:42

## 2021-05-04 RX ADMIN — CARVEDILOL PHOSPHATE 3.12 MILLIGRAM(S): 80 CAPSULE, EXTENDED RELEASE ORAL at 10:42

## 2021-05-04 RX ADMIN — Medication 2.5 MILLIGRAM(S): at 10:43

## 2021-05-04 RX ADMIN — LEVETIRACETAM 500 MILLIGRAM(S): 250 TABLET, FILM COATED ORAL at 22:19

## 2021-05-04 RX ADMIN — Medication 1 TABLET(S): at 14:06

## 2021-05-04 NOTE — PACU DISCHARGE NOTE - COMMENTS
Report given to Marly RODRIGEZ 3E. Pt placed on telemetry monitor with rhythm verified by Marly. Pt transported via stretcher to 3E by transporter. Home monitor was paired with Lux implanted recorder with verbal and written instructions given by José Simeon, [employer] rep. Pt verbalized understanding. Report given to Marly RODRIGEZ 3E. Pt placed on telemetry monitor with rhythm verified by Marly. Pt transported via stretcher to 3E by transporter.

## 2021-05-04 NOTE — PROCEDURAL SAFETY CHECKLIST WITH OR WITHOUT SEDATION - NSBLDPRODCTAVAIL_GEN_ALL_CORE
"Encounter Date: 2017    SCRIBE #1 NOTE: I, Gino Dereck, am scribing for, and in the presence of,  Dr. Beck. I have scribed the entire note.       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  abd pain x 1 hour.      Review of patient's allergies indicates:   Allergen Reactions    Gabapentin Anaphylaxis    Naproxen Anaphylaxis    Methocarb Other (See Comments)     States "makes me go crazy"    Vicodin [hydrocodone-acetaminophen] Nausea And Vomiting and Other (See Comments)     "Feel sick", "Does not help with pain"     Time patient was seen by the provider: 7:45 AM      The patient is a 37 y.o. female with hx of: asthma, nutcracker phenomenon, and scoliosis that presents to the ED with a complaint of intermittent chronic LLQ abdominal pain, which began this morning. She notes an associated nausea. Pt was seen in the ER in May for abdominal discomfort and on CT scan found to have colitis and treated with flagyl and Cipro. Seen on the  with once again LLQ abdominal pain and diarrhea. She did have vascular surgery May 2016 without much success for this nutcracker syndrome. She is attempting to get pain management physician to treat her chronic pain. During this past visit she came out multiple times requesting more dilaudid.       The history is provided by the patient and medical records.     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     Fibroid tumor     Hernia     Nutcracker phenomenon of renal vein     Nutcracker phenomenon of renal vein     Ovarian cyst     Scoliosis      Past Surgical History:   Procedure Laterality Date     SECTION, CLASSIC      x 2    COLONOSCOPY N/A 2016    Procedure: COLONOSCOPY;  Surgeon: Merrill Dueñas MD;  Location: [facility] ([address] [place] [address]);  Service: Endoscopy;  Laterality: N/A;    left renal vein transposition      " ligation of left ovarian vein      nutcracker surgery      TUBAL LIGATION      VASCULAR SURGERY       Family History   Problem Relation Age of Onset    Hypertension Mother     Diabetes Mother     Heart disease Mother     Diabetes Maternal Grandmother     Stroke Maternal Grandmother     Heart disease Maternal Grandmother     Cancer Maternal Grandmother     Cancer Maternal Grandfather      Social History   Substance Use Topics    Smoking status: Current Some Day Smoker     Packs/day: 0.00     Years: 16.00     Types: Cigarettes    Smokeless tobacco: Never Used    Alcohol use No     Review of Systems   Constitutional: Negative for fever.   HENT: Negative for sore throat.    Respiratory: Negative for shortness of breath.    Cardiovascular: Negative for chest pain.   Gastrointestinal: Positive for abdominal pain and nausea.   Genitourinary: Negative for dysuria.   Musculoskeletal: Negative for back pain.   Skin: Negative for rash.   Neurological: Negative for weakness.   Hematological: Does not bruise/bleed easily.       Physical Exam     Initial Vitals [06/16/17 0458]   BP Pulse Resp Temp SpO2   130/80 98 18 97.8 °F (36.6 °C) 100 %     Physical Exam    Constitutional: She appears distressed.   Patient very anxious   HENT:   Head: Normocephalic and atraumatic.   Mouth/Throat: Oropharynx is clear and moist.   Eyes: Conjunctivae are normal. No scleral icterus.   Neck: Normal range of motion. Neck supple.   Cardiovascular: Normal rate and regular rhythm.   Pulmonary/Chest: No accessory muscle usage. No tachypnea. No respiratory distress.   Abdominal: There is tenderness in the left lower quadrant. There is no rigidity, no rebound and no guarding.   Musculoskeletal: Normal range of motion.   Skin: Skin is warm and dry.   Psychiatric: Her mood appears anxious. She exhibits a depressed mood.         ED Course   Procedures  Labs Reviewed - No data to display          Medical Decision Making:   Initial Assessment:    Patient has history of chronic abdominal pain states has history of a nutcracker syndrome and has had surgery for this but without success reviewing the chart the patient has multiple presentations to ERs requesting a pain medication sometimes these interactions can be difficult according to the chart  Differential Diagnosis:   Chronic abdominal pain; acute abdomen  ED Management:  Do not see a reason for imaging or labs will attempt to control the discomfort patient has Divina had a recent workup for same complaints            Scribe Attestation:   Scribe #1: I performed the above scribed service and the documentation accurately describes the services I performed. I attest to the accuracy of the note.    Attending Attestation:           Physician Attestation for Scribe:  Physician Attestation Statement for Scribe #1: I, Dr. Beck, reviewed documentation, as scribed by Gino Harden in my presence, and it is both accurate and complete.         Attending ED Notes:   Pt has had some relief with the IV medication. Pt states her ride is coming and we will give the pt an IM injection and pt will be discharged. Had discussion with the pt that she must think about seeing pain management because she seems reluctant as she is concerned she is going to have further conflict with medications for pain control.          ED Course     Clinical Impression:   The encounter diagnosis was Abdominal pain, chronic, left lower quadrant.    Disposition:   Disposition: Discharged  Condition: Stable       Rajan Beck MD  06/27/17 1449     not applicable

## 2021-05-04 NOTE — PROGRESS NOTE ADULT - ASSESSMENT
5/2/21:  Pt with above history with prior HTN controlled on meds with sudden fall and ? syncope with resultant head injury and small but stable subarachnoid hemorrhage.  She did not injure her hand during the fall and this not normal alert reaction to put her hands out to block her fall and thus may have been a hypotensive syncopal event...vaso-vagal (doubt), orthostatic hypotension (???) aggravated by her meds (???)  possible, autonomic dysfunction (???).  Scheduled to have a CTA of the carotids and brain today and ECHO ordered as well.  Dr Campbell will be back in the AM and follow up.    5/3 feeling better today  check orthostatics again  echocardiogram pending  continue tele monitoring- SR so far  recommend Loop recorder to evaluate for arrythmic cause of syncope resulting in trauma  follow up imaging as per NS    5/4 still orthostatic, continue florinef  check daily chem and orthostatics  echo reveals  low normal EF ( unchanged)  consideration for loop recorder prior to DC, no arrythmias so far  gently hydration

## 2021-05-04 NOTE — PROGRESS NOTE ADULT - SUBJECTIVE AND OBJECTIVE BOX
HPI: Pt is a very pleasant 80 year old woman with a past medical history of HTN, HLD, OA/RA and Breast cancer who presented to the ED after a fall at a restaurant. as per the patient she was leaving the restaurant after lunch when she thinks she tripped up the stairs. The patient does not remember the fall. She denies any dizziness, lightheadedness or palpitations prior to the fall. Her family brought her to the ED because she had a laceration on her chin and ecchymosis and hematoma around her left eye. CT of the head was done which showed a small subarachnoid hemorrhage in the right central sulcus at the convexity. Pt c/o mild headache, no nausea or vomiting, she denies numbness or weakness, she denies any changes in bowl or bladder function.     5/2 - Pt seen and examined earlier today, in NAD. Appears comfortable, denies new complaints, no overnight events     5/3- Patient seen and examined. + Orthostatics complains of headache, blurry vision, dizziness.     5/4- Pt seen and examine, c/o mild headache, has had orthostatic hypotension, started on florinef     Vital Signs Last 24 Hrs  T(C): 36.6 (04 May 2021 07:07), Max: 36.9 (03 May 2021 17:31)  T(F): 97.9 (04 May 2021 07:07), Max: 98.4 (03 May 2021 17:31)  HR: 78 (04 May 2021 07:07) (77 - 98)  BP: 139/67 (04 May 2021 07:07) (96/67 - 140/83)  BP(mean): 97 (03 May 2021 16:00) (72 - 97)  RR: 19 (04 May 2021 07:07) (14 - 56)  SpO2: 97% (04 May 2021 07:07) (92% - 97%)    MEDICATIONS  (STANDING):  bethanechol 50 milliGRAM(s) Oral two times a day  carvedilol 3.125 milliGRAM(s) Oral every 12 hours  DULoxetine 30 milliGRAM(s) Oral daily  enalapril 2.5 milliGRAM(s) Oral two times a day  fludroCORTISONE 0.1 milliGRAM(s) Oral two times a day  hydroxychloroquine 200 milliGRAM(s) Oral two times a day  levETIRAcetam 500 milliGRAM(s) Oral two times a day    MEDICATIONS  (PRN):  acetaminophen   Tablet .. 650 milliGRAM(s) Oral every 6 hours PRN Mild Pain (1 - 3)  celecoxib 200 milliGRAM(s) Oral daily PRN Moderate Pain (4 - 6)  metoclopramide Injectable 5 milliGRAM(s) IV Push every 6 hours PRN nausea / vomiting  SUMAtriptan 50 milliGRAM(s) Oral daily PRN Migraine    ROS: pertinent positives in HPI, all other ROS were reviewed and are negative     PHYSICAL EXAM:  Constitutional: awake and alert  HEENT: PERRLA, EOMI  Neck: Supple  Respiratory: Breath sounds are clear bilaterally  Cardiovascular: S1 and S2, regular rhythm  Gastrointestinal: soft, nontender  Extremities:  no edema  Musculoskeletal: no abnormal movements  Skin: ecchymosis and hematoma to left periorbital area, improving      Neurological exam:  HF: A x O x 3. Appropriately interactive, normal affect. Speech fluent, No Aphasia or paraphasic errors. Naming/repetition intact   CN: PEARRL, EOMI, facial sensation normal, no NLFD, tongue midline  Motor: No pronator drift, Strength 5/5 in all 4 ext, normal bulk and tone, no tremor, rigidity or bradykinesia.    Sens: Intact to light touch  Reflexes: Symmetric and normal, downgoing toes b/l  Coord:  No FNFA, dysmetria, SAI intact   Gait/Balance: Not tested      RADIOLOGY:  < from: CT Angio Head w/ IV Cont (05.02.21 @ 10:02) >  IMPRESSION: Normal normal brain CTfor patient's age. No change since 5/1/2021. Normal CTA of the head and neck. No significant carotid stenosis. CT angiogram of the intracranial circulation    < from: CT Head No Cont (05.01.21 @ 22:09) >  There is again noted a trace amount of subarachnoid hemorrhage in the right central sulcus. There is no interval increase in volume of blood products or new intracranial hemorrhage. There is no significant mass effect or shift of the midline. The basal cisterns are not effaced.There is mild cerebral volume loss with prominence of the ventricles and sulci. There are minimal chronic ischemic changes in the frontoparietal white matter. There is no CT evidence of an acute vascular territorial infarct. There are atheroscleroticcalcifications of the intracranial carotid arteries.    There is left-sided premalar soft tissue swelling. The skull base and bony calvarium are intact. The visualized paranasal sinuses and tympanic/mastoid cavities are clear apart from minimal ethmoid mucosal thickening. There is evidence of bilateral lens surgery.    IMPRESSION:  Stable trace amount of subarachnoid hemorrhage in the right central sulcus.

## 2021-05-04 NOTE — PROGRESS NOTE ADULT - SUBJECTIVE AND OBJECTIVE BOX
80 year old woman who  presented after a traumatic fall and found to have a SAH.    She stated that she  fell at a restaurant after lunch when she thinks she tripped up the stairs  She denies any   OTC supplements Nausea, vomiting or diarrhea, or prior syncope.  She has a  laceration on her chin and ecchymosis and hematoma around her left eye and a headache.   She did say that she recently started a medications for incontinence approximately 1 week ago.  Dr. Campbell has recommended Florinef for positive orthostatic BP    PMH:  HTN, HLD, OA/RA and Breast cancers. .       TELE:  SR at 84 BPM    MEDICATIONS  (STANDING):  carvedilol 3.125 milliGRAM(s) Oral every 12 hours  DULoxetine 30 milliGRAM(s) Oral daily  enalapril 2.5 milliGRAM(s) Oral two times a day  hydroxychloroquine 200 milliGRAM(s) Oral two times a day  levETIRAcetam 500 milliGRAM(s) Oral two times a day    MEDICATIONS  (PRN):  acetaminophen   Tablet .. 650 milliGRAM(s) Oral every 6 hours PRN Mild Pain (1 - 3)  acetaminophen  IVPB .. 1000 milliGRAM(s) IV Intermittent once PRN Moderate Pain (4 - 6)  acetaminophen  IVPB .. 1000 milliGRAM(s) IV Intermittent once PRN Severe Pain (7 - 10)  celecoxib 200 milliGRAM(s) Oral daily PRN Moderate Pain (4 - 6)  hydrALAZINE Injectable 10 milliGRAM(s) IV Push every 6 hours PRN SBP >150  metoclopramide Injectable 5 milliGRAM(s) IV Push every 6 hours PRN nausea / vomiting  SUMAtriptan 50 milliGRAM(s) Oral daily PRN Migraine      Allergies    No Known Allergies    Intolerances        Vital Signs Last 24 Hrs  T(C): 36.6 (04 May 2021 07:07), Max: 36.9 (03 May 2021 17:31)  T(F): 97.9 (04 May 2021 07:07), Max: 98.4 (03 May 2021 17:31)  HR: 78 (04 May 2021 07:07) (77 - 98)  BP: 139/67 (04 May 2021 07:07) (96/67 - 168/81)  BP(mean): 97 (03 May 2021 16:00) (72 - 104)  RR: 19 (04 May 2021 07:07) (14 - 56)  SpO2: 97% (04 May 2021 07:07) (92% - 97%)        LABS:              RADIOLOGY & ADDITIONAL TESTS:

## 2021-05-04 NOTE — PROGRESS NOTE ADULT - SUBJECTIVE AND OBJECTIVE BOX
CHIEF COMPLAINT:  Patient is a 80y old  Female who presents with a chief complaint of Subarachnoid hemorrhage (01 May 2021 20:41)      HPI: 21:  Pt is a very pleasant 80 year old woman well known to our service and Dr Campbell with a past medical history of HTN, HLD, s/p MVR yrs ago by Dr Faria at [facility], OA/RA and Breast cancer who presented to the ED after a fall at a restaurant. as per the patient she was leaving the restaurant after lunch when she thinks she tripped up the stairs. The patient does not remember the fall and does not recall anything until after the EMS was already there. She denies any dizziness, lightheadedness or palpitations prior to the fall. Her family brought her to the ED because she had a laceration on her chin and ecchymosis and hematoma around her left eye. CT of the head was done which showed a small subarachnoid hemorrhage in the right central sulcus at the convexity. Pt c/o mild headache, no nausea or vomiting, she denies numbness or weakness, she denies any changes in bowl or bladder function.  She was having orthostatic drops in BP in the SICU, chronically taking Coreg and Enalapril for her BP as an outpt.        /-  dizzy when standing     PMHx:  PAST MEDICAL & SURGICAL HISTORY:  Mitral valve disease-s/p mitral valve replacement-Dr Faria at [facility]  RA (rheumatoid arthritis)  OA (osteoarthritis)  Migraine  HLD (hyperlipidemia)  Breast cancer-left  treated with RT and surgery  Chronic pain-secondary to OA and RA  History of lumpectomy of left breast-with sentinal node biopsy   History of bilateral knee replacement-right , left   H/O spinal fusion-lumbar   History of tonsillectomy  S/P bunionectomy-left x 2   History of left hip replacement-2017  History of cataract surgery-left cataract sx      FAMILY HISTORY:   FAMILY HISTORY:  No pertinent family history in first degree relatives      ALLERGIES:  Allergies  No Known Allergies      REVIEW OF SYSTEMS:  10 point ROS was obtained  Pertinent positives and negatives are as above  All other review of systems is negative unless indicated above    Vital Signs Last 24 Hrs  T(C): 36.7 (03 May 2021 20:14), Max: 36.9 (03 May 2021 17:31)  T(F): 98.1 (03 May 2021 20:14), Max: 98.4 (03 May 2021 17:31)  HR: 98 (03 May 2021 20:14) (76 - 98)  BP: 138/75 (03 May 2021 20:14) (96/67 - 168/81)  BP(mean): 97 (03 May 2021 16:00) (72 - 104)  RR: 18 (03 May 2021 20:14) (14 - 56)  SpO2: 95% (03 May 2021 20:14) (92% - 96%)    PHYSICAL EXAM:   Constitutional: NAD, awake and alert, well-developed  HEENT: PERR, EOMI, Normal Hearing, MMM, abrasion on chin and ecchymosis on face (healing)  Neck: Soft and supple, No LAD, No JVD  Respiratory: Breath sounds are clear bilaterally, No wheezing, rales or rhonchi, well healed mid-thoracotomy scar  Cardiovascular: S1 and S2, regular rate and rhythm, soft [place] at LLSB and base as before, no gallops or rubs  Gastrointestinal: Bowel Sounds present, soft, nontender, nondistended, no guarding, no rebound  Extremities: No peripheral edema  Vascular: 2+ peripheral pulses  Neurological: A/O x 3, no focal deficits  Musculoskeletal: 5/5 strength b/l upper and lower extremities    MEDICATIONS  (STANDING):  carvedilol 3.125 milliGRAM(s) Oral every 12 hours  DULoxetine 30 milliGRAM(s) Oral daily  enalapril 2.5 milliGRAM(s) Oral two times a day  hydroxychloroquine 200 milliGRAM(s) Oral two times a day  levETIRAcetam 500 milliGRAM(s) Oral two times a day    MEDICATIONS  (PRN):  acetaminophen   Tablet .. 650 milliGRAM(s) Oral every 6 hours PRN Mild Pain (1 - 3)  acetaminophen  IVPB .. 1000 milliGRAM(s) IV Intermittent once PRN Moderate Pain (4 - 6)  acetaminophen  IVPB .. 1000 milliGRAM(s) IV Intermittent once PRN Severe Pain (7 - 10)  celecoxib 200 milliGRAM(s) Oral daily PRN Moderate Pain (4 - 6)  hydrALAZINE Injectable 10 milliGRAM(s) IV Push every 6 hours PRN SBP >150  metoclopramide Injectable 5 milliGRAM(s) IV Push every 6 hours PRN nausea / vomiting  SUMAtriptan 50 milliGRAM(s) Oral daily PRN Migraine        LABS: All Labs Reviewed:                     CARDIAC MARKERS ( 01 May 2021 20:58 )  <0.015 ng/mL / x     / x     / x     / x      CARDIAC MARKERS ( 01 May 2021 17:08 )  <0.015 ng/mL / x     / x     / x     / x      CARDIAC MARKERS ( 01 May 2021 15:45 )  <0.015 ng/mL / x     / x     / x     / x          BLOOD CULTURES:   LIPID PROFILE     RADIOLOGY:    CXR: 21:  INTERPRETATION:  XR CHEST  Single AP view  HISTORY:  syncope, fall; r/o trauma  Comparison: Chest x-ray 2019  Status post sternotomy and CABG.  Mitral annuloplasty.  The cardiac silhouette is within normal limits. The lungs are clear. No pleural abnormality.  IMPRESSION: No acute disease.      X-ray of Pelvis: 21:  INTERPRETATION:  XR PELVIS AP ONLY 1 OR 2 VIEWS  HISTORY:  r/o trauma s/p syncope/fall  Views:  AP pelvis;  The bony pelvis, acetabula, bilateral inferior and superior pubic rami demonstrate intact cortical margins with no evidence of an acute fracture.  Bilateral femoral heads, visualized bilateral femoral necks and proximal shafts demonstrate intact cortical margins with no evidence of an acute fracture.  There are mild osteoarthritic degenerative changes of the right hip.  There has been a left hip arthroplasty.  Osseous and metallic elements are in good alignment. There is no evidence of prosthetic loosening.  Bilateral sacroiliac joints are unremarkable.  IMPRESSION:  No acute fracture or dislocation.    Repeat CT of Head: 21;  FINDINGS:  There is again noted a trace amount of subarachnoid hemorrhage in the right central sulcus. There is no interval increase in volume of blood products or new intracranial hemorrhage. There is no significant mass effect or shift of the midline. The basal cisterns are not effaced. There is mild cerebral volume loss with prominence of the ventricles and sulci. There are minimal chronic ischemic changes in the frontoparietal white matter. There is no CT evidence of an acute vascular territorial infarct. There are atherosclerotic calcifications of the intracranial carotid arteries.  There is left-sided pre-malar soft tissue swelling. The skull base and bony calvarium are intact. The visualized paranasal sinuses and tympanic/mastoid cavities are clear apart from minimal ethmoid mucosal thickening. There is evidence of bilateral lens surgery.  IMPRESSION:  Stable trace amount of subarachnoid hemorrhage in the right central sulcus.    CT of Maxillofacial, Brain and C-Spine: 21:  FINDINGS:   CT dated 2019 available for review.  The brain demonstrates minimal subarachnoid hemorrhage in the RIGHT central sulcus at the convexity.   No acute cerebral cortical infarct is seen. No mass effect is found in the brain.  The ventricles, sulci and basal cisterns appear unremarkable.  The orbits are unremarkable.  The paranasal sinuses are clear.  The nasal cavity appears intact.  The nasopharynx is symmetric.  The central skull base, petrous temporal bones and calvarium remain intact.    CT facial bones without IV contrast  FINDINGS:   No prior similar studies are available for review.  Facial bones are intact without fracture. 3 cm LEFT buccal hematoma is noted with associated soft tissue swelling.  The paranasal sinuses are clear.  No abnormal paranasal sinus fluid collection is found.  No osseous erosion or expansion is present.  The nasal bones are intact without fracture.  The nasal septum shows a RIGHT-sided deviation with moderate osteophyte.  The ostiomeatal complexes appear normally formed.  The orbits are significant for surgically small lenses. Minimal LEFT periorbital soft tissue swelling is noted.    The globes are intact.  Intraconal and extraconal fat is preserved.  The extraocular muscles remain symmetric.  The superior ophthalmic veins are also symmetric.  Orbital rims remain intact.  The deep facial spaces are intact.   No radiopaque foreign body is seen.  The nasopharynx is symmetric.  The central skull base is intact.  The visualized intracranial contents appear unremarkable.    CT cervical spine without IV contrast  FINDINGS:   CT dated 2019 available for review  Cervical vertebral body heights are maintained. No vertebral fracture is seen. No destructive bone lesion is found.  Alignment is significant for focal kyphosis at C5-C6 on a degenerative basis. Grade 1 anterior spondylolisthesis is noted at C3-4 and C4-5. Facet joints appear intact and aligned. There is calcification of the posterior peridontoid ligaments.  Cervical intervertebral disc spaces show degenerative disc disease and spondylosis at C3-4 through C7-T1 with loss of disc height and associated degenerative endplate changes. There is narrowing of the RIGHT C3-4, RIGHT C4-5 and LEFT C6-7 neural foramina due to uncovertebral spurring and facet osteophytic hypertrophy. Degenerative cord impingement is seen at C4-5 through C6-7.  The skull base appears intact.  No neck mass is recognized.  Paraspinal soft tissues appear intact. Visualized lymph nodes appear to be within physiologic size limits.  IMPRESSION:  Cervical spine: No vertebral fracture is recognized.  Focal kyphosis at C5-C6 on a degenerative basis. Grade 1 anterior spondylolisthesis is noted at C3-4 and C4-5.   Multilevel degenerative disc disease and spondylosis at C3-4 through C7-T1 with loss of disc height and associated degenerative endplate changes. There is narrowing of the RIGHT C3-4, RIGHT C4-5 and LEFT C6-7 neural foramina due to uncovertebral spurring and facet osteophytic hypertrophy. Degenerative cord impingement is seen at C4-5 through C6-7.  Facial bones:  Facial bones intact without fracture.    Surgical small lenses. Minimal LEFT periorbital soft tissue swelling is noted.   3 cm LEFT buccal hematoma with associated soft tissue swelling is noted.  Brain:   minimal subarachnoid hemorrhage in the RIGHT central sulcus at the convexity.      EK21:  Normal sinus rhythm  Normal ECG    TELEMETRY:  NSR    ECHO:  pending

## 2021-05-04 NOTE — PROGRESS NOTE ADULT - SUBJECTIVE AND OBJECTIVE BOX
CHIEF COMPLAINT/DIAGNOSIS: Fall at a restaurant    HPI: 80 year old woman with a past medical history of HTN, HLD, OA/RA and Breast cancer who presented to the ED after a fall at a restaurant. as per the patient she was leaving the restaurant after lunch when she thinks she tripped up the stairs. The patient does not remember the fall. She denies any dizziness, lightheadedness or palpitations prior to the fall. Her family brought her to the ED because she had a laceration on her chin and ecchymosis and hematoma around her left eye. CT of the head was done which showed a small subarachnoid hemorrhage in the right central sulcus at the convexity. Pt c/o mild headache, no nausea or vomiting, she denies numbness or weakness, she denies any changes in bowl or bladder function.  (01 May 2021 16:05)    5/4/2021:  REVIEW OF SYSTEMS:  All other review of systems is negative unless indicated above    PHYSICAL EXAM:  Constitutional: NAD, awake and alert, well-developed  HEENT: PERR, EOMI, Normal Hearing, MMM  Neck: Soft and supple, No LAD, No JVD  Respiratory: Breath sounds are clear bilaterally, No wheezing, rales or rhonchi  Cardiovascular: S1 and S2, regular rate and rhythm, no Murmurs, gallops or rubs  Gastrointestinal: Bowel Sounds present, soft, nontender, nondistended, no guarding, no rebound  Extremities: No peripheral edema  Vascular: 2+ peripheral pulses  Neurological: A/O x 3, no focal deficits  Musculoskeletal: 5/5 strength b/l upper and lower extremities  Skin: No rashes    Vital Signs Last 24 Hrs  T(C): 36.6 (04 May 2021 07:07), Max: 36.9 (03 May 2021 17:31)  T(F): 97.9 (04 May 2021 07:07), Max: 98.4 (03 May 2021 17:31)  HR: 78 (04 May 2021 07:07) (77 - 98)  BP: 139/67 (04 May 2021 07:07) (96/67 - 140/83)  BP(mean): 97 (03 May 2021 16:00) (72 - 97)  RR: 19 (04 May 2021 07:07) (14 - 56)  SpO2: 97% (04 May 2021 07:07) (92% - 97%)    LABS: All Labs Reviewed    RADIOLOGY:  < from: US Duplex Carotid Arteries Complete, Bilateral (05.03.21 @ 12:21) >  IMPRESSION: Mild plaque in the carotid bulbs with no evidence of a hemodynamically significant internal carotid artery stenosis bilaterally.  < end of copied text >    < from: CT Angio Head w/ IV Cont (05.02.21 @ 10:02) >  IMPRESSION: Normal normal brain CTfor patient's age. No change since 5/1/2021. Normal CTA of the head and neck. No significant carotid stenosis. CT angiogram of the intracranial circulation  < end of copied text >    < from: CT Head No Cont (05.01.21 @ 22:09) >  IMPRESSION:  Stable trace amount of subarachnoid hemorrhage in the right central sulcus.  < end of copied text >    < from: CT Maxillofacial No Cont (05.01.21 @ 14:28) >  IMPRESSION:  Cervical spine: No vertebral fracture is recognized.  Focal kyphosis at C5-C6 on a degenerative basis. Grade 1 anterior spondylolisthesis is noted at C3-4 and C4-5.   Multilevel degenerative disc disease and spondylosis at C3-4 through C7-T1 with loss of disc height and associated degenerative endplate changes. There is narrowing of the RIGHT C3-4, RIGHT C4-5 and LEFT C6-7 neural foramina due to uncovertebral spurring and facet osteophytic hypertrophy. Degenerative cord impingement is seen at C4-5 through C6-7.  Facial bones:  Facial bones intact without fracture.    Surgical small lenses. Minimal LEFT periorbital soft tissue swelling is noted.   3 cm LEFT buccal hematoma with associated soft tissue swelling is noted.  Brain:   minimal subarachnoid hemorrhage in the RIGHT central sulcus at the convexity.  < end of copied text >    ECHOCARDIOGRAM:  < from: TTE Echo Complete w/o Contrast w/ Doppler (05.03.21 @ 12:37) >   The left ventricle is normal in size and wall thickness.   Mild, diffuse hypokinesis of the left ventricle is present with an overall   estimated EF of 45-50%.   The left atrium is mildly dilated.   Normal appearing right atrium.   Patient has a history of a PFO as noted within the 05/21/2015 [facility] Exam.   A PFO is not noted with color Doppler during this exam.   Fibrocalcific changes noted to the Aortic valve leaflets with preserved   leaflet excursion.   No aortic regurgitation is present.   Well seated bioprosthetic valve in the mitral position. Mean   trans-prosthetic gradient is within normal limitations for this type of   prosthesis.   Trace mitral regurgitation is present.   EA reversal of the mitral inflow consistentwith reduced compliance of the   left ventricle.   Normal appearing tricuspid valve structure.   Mild (1+) tricuspid valve regurgitation is present.  < end of copied text >    MEDICATIONS  (STANDING):  carvedilol 3.125 milliGRAM(s) Oral every 12 hours  DULoxetine 30 milliGRAM(s) Oral daily  enalapril 2.5 milliGRAM(s) Oral two times a day  fludroCORTISONE 0.1 milliGRAM(s) Oral two times a day  hydroxychloroquine 200 milliGRAM(s) Oral two times a day  levETIRAcetam 500 milliGRAM(s) Oral two times a day    MEDICATIONS  (PRN):  acetaminophen   Tablet .. 650 milliGRAM(s) Oral every 6 hours PRN Mild Pain (1 - 3)  acetaminophen  IVPB .. 1000 milliGRAM(s) IV Intermittent once PRN Moderate Pain (4 - 6)  acetaminophen  IVPB .. 1000 milliGRAM(s) IV Intermittent once PRN Severe Pain (7 - 10)  celecoxib 200 milliGRAM(s) Oral daily PRN Moderate Pain (4 - 6)  hydrALAZINE Injectable 10 milliGRAM(s) IV Push every 6 hours PRN SBP >150  metoclopramide Injectable 5 milliGRAM(s) IV Push every 6 hours PRN nausea / vomiting  SUMAtriptan 50 milliGRAM(s) Oral daily PRN Migraine    TELEMETRY REVIEW:  5/4/2021:    ASSESSMENT AND PLAN:    79 y/o F w/ PMH of HTN, dyslipidemia, OA/RA, breast cancer s/p lumpectomy, MVR p/w  fall with retrograde amnesia likely syncope with no evidence of conscious attempt to protect herself with arms extension resulting in traumatic SAH    1. Subarachnoid Hemorrhage   - CT w/ minimal subarachnoid hemorrhage in the RIGHT central sulcus at the convexity  - BP control, neuro checks, PT  - f/u repeat CT head results is stable, no extra/intra-cranial vascular dx    *Syncope - with new + orthostasis - stop robaxin as it can cause hypotension, if no improvement will start florinef   - ? EP eval   - f/u TTE -P/CUS is neg    *H/o dyslipidemia / OA / RA / breast cancer / MVR  -C/w home meds and f/u outpatient for further management if conditions remain stable during hospitalization     *DVT ppx  -SCDs  CHIEF COMPLAINT/DIAGNOSIS: Fall at a restaurant    HPI: 80 year old woman with a past medical history of HTN, HLD, OA/RA and Breast cancer who presented to the ED after a fall at a restaurant. as per the patient she was leaving the restaurant after lunch when she thinks she tripped up the stairs. The patient does not remember the fall. She denies any dizziness, lightheadedness or palpitations prior to the fall. Her family brought her to the ED because she had a laceration on her chin and ecchymosis and hematoma around her left eye. CT of the head was done which showed a small subarachnoid hemorrhage in the right central sulcus at the convexity. Pt c/o mild headache, no nausea or vomiting, she denies numbness or weakness, she denies any changes in bowl or bladder function.  (01 May 2021 16:05)    5/4/2021: c/o bladder fullness, reports not hydrating well.   REVIEW OF SYSTEMS:  All other review of systems is negative unless indicated above    PHYSICAL EXAM:  Constitutional: Awake and alert, cachechtic  HEENT: PERR, EOMI, Normal Hearing, MMM  Neck: Soft and supple, No LAD, No JVD  Respiratory: Breath sounds are clear bilaterally, No wheezing, rales or rhonchi  Cardiovascular: S1 and S2, regular rate and rhythm, no Murmurs, gallops or rubs  Gastrointestinal: Bowel Sounds present, soft, nontender, nondistended, no guarding, no rebound  Extremities: No peripheral edema  Vascular: 2+ peripheral pulses  Neurological: A/O x 3, no focal deficits  Musculoskeletal: 5/5 strength b/l upper and lower extremities  Skin: No rashes    Vital Signs Last 24 Hrs  T(C): 36.6 (04 May 2021 07:07), Max: 36.9 (03 May 2021 17:31)  T(F): 97.9 (04 May 2021 07:07), Max: 98.4 (03 May 2021 17:31)  HR: 78 (04 May 2021 07:07) (77 - 98)  BP: 139/67 (04 May 2021 07:07) (96/67 - 140/83)  BP(mean): 97 (03 May 2021 16:00) (72 - 97)  RR: 19 (04 May 2021 07:07) (14 - 56)  SpO2: 97% (04 May 2021 07:07) (92% - 97%)    LABS: All Labs Reviewed    RADIOLOGY:  < from: US Duplex Carotid Arteries Complete, Bilateral (05.03.21 @ 12:21) >  IMPRESSION: Mild plaque in the carotid bulbs with no evidence of a hemodynamically significant internal carotid artery stenosis bilaterally.  < end of copied text >    < from: CT Angio Head w/ IV Cont (05.02.21 @ 10:02) >  IMPRESSION: Normal brain CT for patient's age. No change since 5/1/2021. Normal CTA of the head and neck. No significant carotid stenosis. CT angiogram of the intracranial circulation  < end of copied text >    < from: CT Head No Cont (05.01.21 @ 22:09) >  IMPRESSION:  Stable trace amount of subarachnoid hemorrhage in the right central sulcus.  < end of copied text >    < from: CT Maxillofacial No Cont (05.01.21 @ 14:28) >  IMPRESSION:  Cervical spine: No vertebral fracture is recognized.  Focal kyphosis at C5-C6 on a degenerative basis. Grade 1 anterior spondylolisthesis is noted at C3-4 and C4-5.   Multilevel degenerative disc disease and spondylosis at C3-4 through C7-T1 with loss of disc height and associated degenerative endplate changes. There is narrowing of the RIGHT C3-4, RIGHT C4-5 and LEFT C6-7 neural foramina due to uncovertebral spurring and facet osteophytic hypertrophy. Degenerative cord impingement is seen at C4-5 through C6-7.  Facial bones:  Facial bones intact without fracture.    Surgical small lenses. Minimal LEFT periorbital soft tissue swelling is noted.   3 cm LEFT buccal hematoma with associated soft tissue swelling is noted.  Brain:   minimal subarachnoid hemorrhage in the RIGHT central sulcus at the convexity.  < end of copied text >    ECHOCARDIOGRAM:  < from: TTE Echo Complete w/o Contrast w/ Doppler (05.03.21 @ 12:37) >   The left ventricle is normal in size and wall thickness.   Mild, diffuse hypokinesis of the left ventricle is present with an overall   estimated EF of 45-50%.   The left atrium is mildly dilated.   Normal appearing right atrium.   Patient has a history of a PFO as noted within the 05/21/2015 [facility] Exam.   A PFO is not noted with color Doppler during this exam.   Fibrocalcific changes noted to the Aortic valve leaflets with preserved   leaflet excursion.   No aortic regurgitation is present.   Well seated bioprosthetic valve in the mitral position. Mean   trans-prosthetic gradient is within normal limitations for this type of   prosthesis.   Trace mitral regurgitation is present.   EA reversal of the mitral inflow consistentwith reduced compliance of the   left ventricle.   Normal appearing tricuspid valve structure.   Mild (1+) tricuspid valve regurgitation is present.  < end of copied text >    MEDICATIONS  (STANDING):  carvedilol 3.125 milliGRAM(s) Oral every 12 hours  DULoxetine 30 milliGRAM(s) Oral daily  enalapril 2.5 milliGRAM(s) Oral two times a day  fludroCORTISONE 0.1 milliGRAM(s) Oral two times a day  hydroxychloroquine 200 milliGRAM(s) Oral two times a day  levETIRAcetam 500 milliGRAM(s) Oral two times a day    MEDICATIONS  (PRN):  acetaminophen   Tablet .. 650 milliGRAM(s) Oral every 6 hours PRN Mild Pain (1 - 3)  acetaminophen  IVPB .. 1000 milliGRAM(s) IV Intermittent once PRN Moderate Pain (4 - 6)  acetaminophen  IVPB .. 1000 milliGRAM(s) IV Intermittent once PRN Severe Pain (7 - 10)  celecoxib 200 milliGRAM(s) Oral daily PRN Moderate Pain (4 - 6)  hydrALAZINE Injectable 10 milliGRAM(s) IV Push every 6 hours PRN SBP >150  metoclopramide Injectable 5 milliGRAM(s) IV Push every 6 hours PRN nausea / vomiting  SUMAtriptan 50 milliGRAM(s) Oral daily PRN Migraine    TELEMETRY REVIEW:  5/4/2021: sinus 80s    ASSESSMENT AND PLAN:    81 y/o F w/ PMH of HTN, dyslipidemia, OA/RA, breast cancer s/p lumpectomy, MVR p/w  fall with retrograde amnesia likely syncope with no evidence of conscious attempt to protect herself with arms extension resulting in traumatic SAH    1. Subarachnoid Hemorrhage   - CT w/ minimal subarachnoid hemorrhage in the RIGHT central sulcus at the convexity. No acute neurosurgical intervention needed  - BP control, neuro checks,   - f/u repeat CT head results is stable, no extra/intra-cranial vascular dx  - Cont Keppra 500mg BID for 7 days   - Maintain SBP < 150  - PT, NSGY    2. Syncope, orthostasis   - Stop Robaxin as it can cause hypotension, started Florinef for persistent orthostasis   - ZION socks  - Carotid US w/o significant internal carotid artery stenosis bilaterally.   - ILR placement for today 5/4.  - Echo w/ mildly reduced EF 45-50%  - EP, Cardio Consult    3. Urinary retention, r/o UTI  - Valladares placed. Patient straight catheterized x3.  - bethanechol started 50mg BID.   - check UA  - Urology consult    4. HTN | HLD  - Maintain SBP < 150. Cont. Coreg, enalapril     5. OA/RA  - Hydroxychloroquine, celebrex    6. Malnutrition  - nutrition eval. start ensure supp. drinks w/ meals.    7. DVT ppx  - SCDs     CHIEF COMPLAINT/DIAGNOSIS: Fall at a restaurant    HPI: 80 year old woman with a past medical history of HTN, HLD, OA/RA and Breast cancer who presented to the ED after a fall at a restaurant. as per the patient she was leaving the restaurant after lunch when she thinks she tripped up the stairs. The patient does not remember the fall. She denies any dizziness, lightheadedness or palpitations prior to the fall. Her family brought her to the ED because she had a laceration on her chin and ecchymosis and hematoma around her left eye. CT of the head was done which showed a small subarachnoid hemorrhage in the right central sulcus at the convexity. Pt c/o mild headache, no nausea or vomiting, she denies numbness or weakness, she denies any changes in bowl or bladder function.  (01 May 2021 16:05)    5/4/2021: c/o bladder fullness, reports not hydrating well.   REVIEW OF SYSTEMS:  All other review of systems is negative unless indicated above    PHYSICAL EXAM:  Constitutional: Awake and alert, cachechtic  HEENT: PERR, EOMI, Normal Hearing, MMM  Neck: Soft and supple  Respiratory: Breath sounds are clear bilaterally  Cardiovascular: S1 and S2, regular rate and rhythm, no Murmurs, gallops or rubs  Gastrointestinal: Bowel Sounds present, soft, nontender, nondistended, no guarding, no rebound  Extremities: No peripheral edema  Vascular: 2+ peripheral pulses  Neurological: A/O x 3, no focal deficits  Musculoskeletal: 5/5 strength b/l upper and lower extremities  Skin: No rashes    Vital Signs Last 24 Hrs  T(C): 36.6 (04 May 2021 07:07), Max: 36.9 (03 May 2021 17:31)  T(F): 97.9 (04 May 2021 07:07), Max: 98.4 (03 May 2021 17:31)  HR: 78 (04 May 2021 07:07) (77 - 98)  BP: 139/67 (04 May 2021 07:07) (96/67 - 140/83)  BP(mean): 97 (03 May 2021 16:00) (72 - 97)  RR: 19 (04 May 2021 07:07) (14 - 56)  SpO2: 97% (04 May 2021 07:07) (92% - 97%)    LABS: All Labs Reviewed    RADIOLOGY:  < from: US Duplex Carotid Arteries Complete, Bilateral (05.03.21 @ 12:21) >  IMPRESSION: Mild plaque in the carotid bulbs with no evidence of a hemodynamically significant internal carotid artery stenosis bilaterally.  < end of copied text >    < from: CT Angio Head w/ IV Cont (05.02.21 @ 10:02) >  IMPRESSION: Normal brain CT for patient's age. No change since 5/1/2021. Normal CTA of the head and neck. No significant carotid stenosis. CT angiogram of the intracranial circulation  < end of copied text >    < from: CT Head No Cont (05.01.21 @ 22:09) >  IMPRESSION:  Stable trace amount of subarachnoid hemorrhage in the right central sulcus.  < end of copied text >    < from: CT Maxillofacial No Cont (05.01.21 @ 14:28) >  IMPRESSION:  Cervical spine: No vertebral fracture is recognized.  Focal kyphosis at C5-C6 on a degenerative basis. Grade 1 anterior spondylolisthesis is noted at C3-4 and C4-5.   Multilevel degenerative disc disease and spondylosis at C3-4 through C7-T1 with loss of disc height and associated degenerative endplate changes. There is narrowing of the RIGHT C3-4, RIGHT C4-5 and LEFT C6-7 neural foramina due to uncovertebral spurring and facet osteophytic hypertrophy. Degenerative cord impingement is seen at C4-5 through C6-7.  Facial bones:  Facial bones intact without fracture.    Surgical small lenses. Minimal LEFT periorbital soft tissue swelling is noted.   3 cm LEFT buccal hematoma with associated soft tissue swelling is noted.  Brain:   minimal subarachnoid hemorrhage in the RIGHT central sulcus at the convexity.  < end of copied text >    ECHOCARDIOGRAM:  < from: TTE Echo Complete w/o Contrast w/ Doppler (05.03.21 @ 12:37) >   The left ventricle is normal in size and wall thickness.   Mild, diffuse hypokinesis of the left ventricle is present with an overall   estimated EF of 45-50%.   The left atrium is mildly dilated.   Normal appearing right atrium.   Patient has a history of a PFO as noted within the 05/21/2015 [facility] Exam.   A PFO is not noted with color Doppler during this exam.   Fibrocalcific changes noted to the Aortic valve leaflets with preserved   leaflet excursion.   No aortic regurgitation is present.   Well seated bioprosthetic valve in the mitral position. Mean   trans-prosthetic gradient is within normal limitations for this type of   prosthesis.   Trace mitral regurgitation is present.   EA reversal of the mitral inflow consistentwith reduced compliance of the   left ventricle.   Normal appearing tricuspid valve structure.   Mild (1+) tricuspid valve regurgitation is present.  < end of copied text >    MEDICATIONS  (STANDING):  carvedilol 3.125 milliGRAM(s) Oral every 12 hours  DULoxetine 30 milliGRAM(s) Oral daily  enalapril 2.5 milliGRAM(s) Oral two times a day  fludroCORTISONE 0.1 milliGRAM(s) Oral two times a day  hydroxychloroquine 200 milliGRAM(s) Oral two times a day  levETIRAcetam 500 milliGRAM(s) Oral two times a day    MEDICATIONS  (PRN):  acetaminophen   Tablet .. 650 milliGRAM(s) Oral every 6 hours PRN Mild Pain (1 - 3)  acetaminophen  IVPB .. 1000 milliGRAM(s) IV Intermittent once PRN Moderate Pain (4 - 6)  acetaminophen  IVPB .. 1000 milliGRAM(s) IV Intermittent once PRN Severe Pain (7 - 10)  celecoxib 200 milliGRAM(s) Oral daily PRN Moderate Pain (4 - 6)  hydrALAZINE Injectable 10 milliGRAM(s) IV Push every 6 hours PRN SBP >150  metoclopramide Injectable 5 milliGRAM(s) IV Push every 6 hours PRN nausea / vomiting  SUMAtriptan 50 milliGRAM(s) Oral daily PRN Migraine    TELEMETRY REVIEW:  5/4/2021: sinus 80s    ASSESSMENT AND PLAN:    81 y/o F w/ PMH of HTN, dyslipidemia, OA/RA, breast cancer s/p lumpectomy, MVR p/w  fall with retrograde amnesia likely syncope with no evidence of conscious attempt to protect herself with arms extension resulting in traumatic SAH    1. Traumatic Subarachnoid Hemorrhage due to fall  - CT w/ minimal subarachnoid hemorrhage in the RIGHT central sulcus at the convexity. No acute neurosurgical intervention needed  - BP control, neuro checks,   - f/u repeat CT head results is stable, no extra/intra-cranial vascular dx  - Cont Keppra 500mg BID for 7 days   - Maintain SBP < 150  - PT, NSGY    2. Syncope, orthostasis   - Stop Robaxin as it can cause hypotension, started Florinef for persistent orthostasis   - ZION socks  - Carotid US w/o significant internal carotid artery stenosis bilaterally.   - ILR placement for today 5/4.  - Echo w/ mildly reduced EF 45-50%  - EP, Cardio Consult    3. Urinary retention, r/o UTI  - Valladares placed. Patient straight catheterized x3.  - bethanechol started 50mg BID.   - check UA  - Urology consult    4. HTN | HLD  - Maintain SBP < 150. Cont. Coreg, enalapril     5. OA/RA  - Hydroxychloroquine, celebrex    6. Malnutrition  - nutrition eval. start ensure supp. drinks w/ meals.    7. DVT ppx  - SCDs     CHIEF COMPLAINT/DIAGNOSIS: Fall at a restaurant    HPI: 80 year old woman with a past medical history of HTN, HLD, OA/RA and Breast cancer who presented to the ED after a fall at a restaurant. as per the patient she was leaving the restaurant after lunch when she thinks she tripped up the stairs. The patient does not remember the fall. She denies any dizziness, lightheadedness or palpitations prior to the fall. Her family brought her to the ED because she had a laceration on her chin and ecchymosis and hematoma around her left eye. CT of the head was done which showed a small subarachnoid hemorrhage in the right central sulcus at the convexity. Pt c/o mild headache, no nausea or vomiting, she denies numbness or weakness, she denies any changes in bowl or bladder function.  (01 May 2021 16:05)    5/4/2021: c/o bladder fullness, reports not hydrating well.   REVIEW OF SYSTEMS:  All other review of systems is negative unless indicated above    PHYSICAL EXAM:  Constitutional: Awake and alert, cachechtic  HEENT: PERR, EOMI, Normal Hearing, MMM  Neck: Soft and supple  Respiratory: Breath sounds are clear bilaterally  Cardiovascular: S1 and S2, regular rate and rhythm, no Murmurs, gallops or rubs  Gastrointestinal: Bowel Sounds present, soft, nontender, nondistended, no guarding, no rebound  Extremities: No peripheral edema  Vascular: 2+ peripheral pulses  Neurological: A/O x 3, no focal deficits  Musculoskeletal: 5/5 strength b/l upper and lower extremities  Skin: No rashes, LT sided facial ecchymosis    Vital Signs Last 24 Hrs  T(C): 36.6 (04 May 2021 07:07), Max: 36.9 (03 May 2021 17:31)  T(F): 97.9 (04 May 2021 07:07), Max: 98.4 (03 May 2021 17:31)  HR: 78 (04 May 2021 07:07) (77 - 98)  BP: 139/67 (04 May 2021 07:07) (96/67 - 140/83)  BP(mean): 97 (03 May 2021 16:00) (72 - 97)  RR: 19 (04 May 2021 07:07) (14 - 56)  SpO2: 97% (04 May 2021 07:07) (92% - 97%)    LABS: All Labs Reviewed    RADIOLOGY:  < from: US Duplex Carotid Arteries Complete, Bilateral (05.03.21 @ 12:21) >  IMPRESSION: Mild plaque in the carotid bulbs with no evidence of a hemodynamically significant internal carotid artery stenosis bilaterally.  < end of copied text >    < from: CT Angio Head w/ IV Cont (05.02.21 @ 10:02) >  IMPRESSION: Normal brain CT for patient's age. No change since 5/1/2021. Normal CTA of the head and neck. No significant carotid stenosis. CT angiogram of the intracranial circulation  < end of copied text >    < from: CT Head No Cont (05.01.21 @ 22:09) >  IMPRESSION:  Stable trace amount of subarachnoid hemorrhage in the right central sulcus.  < end of copied text >    < from: CT Maxillofacial No Cont (05.01.21 @ 14:28) >  IMPRESSION:  Cervical spine: No vertebral fracture is recognized.  Focal kyphosis at C5-C6 on a degenerative basis. Grade 1 anterior spondylolisthesis is noted at C3-4 and C4-5.   Multilevel degenerative disc disease and spondylosis at C3-4 through C7-T1 with loss of disc height and associated degenerative endplate changes. There is narrowing of the RIGHT C3-4, RIGHT C4-5 and LEFT C6-7 neural foramina due to uncovertebral spurring and facet osteophytic hypertrophy. Degenerative cord impingement is seen at C4-5 through C6-7.  Facial bones:  Facial bones intact without fracture.    Surgical small lenses. Minimal LEFT periorbital soft tissue swelling is noted.   3 cm LEFT buccal hematoma with associated soft tissue swelling is noted.  Brain:   minimal subarachnoid hemorrhage in the RIGHT central sulcus at the convexity.  < end of copied text >    ECHOCARDIOGRAM:  < from: TTE Echo Complete w/o Contrast w/ Doppler (05.03.21 @ 12:37) >   The left ventricle is normal in size and wall thickness.   Mild, diffuse hypokinesis of the left ventricle is present with an overall   estimated EF of 45-50%.   The left atrium is mildly dilated.   Normal appearing right atrium.   Patient has a history of a PFO as noted within the 05/21/2015 [facility] Exam.   A PFO is not noted with color Doppler during this exam.   Fibrocalcific changes noted to the Aortic valve leaflets with preserved   leaflet excursion.   No aortic regurgitation is present.   Well seated bioprosthetic valve in the mitral position. Mean   trans-prosthetic gradient is within normal limitations for this type of   prosthesis.   Trace mitral regurgitation is present.   EA reversal of the mitral inflow consistentwith reduced compliance of the   left ventricle.   Normal appearing tricuspid valve structure.   Mild (1+) tricuspid valve regurgitation is present.  < end of copied text >    MEDICATIONS  (STANDING):  carvedilol 3.125 milliGRAM(s) Oral every 12 hours  DULoxetine 30 milliGRAM(s) Oral daily  enalapril 2.5 milliGRAM(s) Oral two times a day  fludroCORTISONE 0.1 milliGRAM(s) Oral two times a day  hydroxychloroquine 200 milliGRAM(s) Oral two times a day  levETIRAcetam 500 milliGRAM(s) Oral two times a day    MEDICATIONS  (PRN):  acetaminophen   Tablet .. 650 milliGRAM(s) Oral every 6 hours PRN Mild Pain (1 - 3)  acetaminophen  IVPB .. 1000 milliGRAM(s) IV Intermittent once PRN Moderate Pain (4 - 6)  acetaminophen  IVPB .. 1000 milliGRAM(s) IV Intermittent once PRN Severe Pain (7 - 10)  celecoxib 200 milliGRAM(s) Oral daily PRN Moderate Pain (4 - 6)  hydrALAZINE Injectable 10 milliGRAM(s) IV Push every 6 hours PRN SBP >150  metoclopramide Injectable 5 milliGRAM(s) IV Push every 6 hours PRN nausea / vomiting  SUMAtriptan 50 milliGRAM(s) Oral daily PRN Migraine    TELEMETRY REVIEW:  5/4/2021: sinus 80s    ASSESSMENT AND PLAN:    81 y/o F w/ PMH of HTN, dyslipidemia, OA/RA, breast cancer s/p lumpectomy, MVR p/w  fall with retrograde amnesia likely syncope with no evidence of conscious attempt to protect herself with arms extension resulting in traumatic SAH    1. Traumatic Subarachnoid Hemorrhage due to fall  - CT w/ minimal subarachnoid hemorrhage in the RIGHT central sulcus at the convexity. No acute neurosurgical intervention needed  - BP control, neuro checks,   - f/u repeat CT head results is stable, no extra/intra-cranial vascular dx  - Cont Keppra 500mg BID for 7 days   - Maintain SBP < 150  - PT, NSGY    2. Syncope, orthostasis   - Stop Robaxin as it can cause hypotension, started Florinef for persistent orthostasis   - ZION socks  - Carotid US w/o significant internal carotid artery stenosis bilaterally.   - ILR placement for today 5/4.  - Echo w/ mildly reduced EF 45-50%  - EP, Cardio Consult    3. Urinary retention, r/o UTI  - Valladares placed. Patient straight catheterized x3.  - bethanechol started 50mg BID.   - check UA  - Urology consult    4. HTN | HLD  - Maintain SBP < 150. Cont. Coreg, enalapril     5. OA/RA  - Hydroxychloroquine, celebrex    6. Malnutrition  - nutrition eval. start ensure supp. drinks w/ meals.    7. DVT ppx  - SCDs

## 2021-05-04 NOTE — PROCEDURE NOTE - ADDITIONAL PROCEDURE DETAILS
ILR Implanted for Syncope.  Pt tolerated procedure well.  All questions answered in regards to remote monitoring.  Pt will f/u with Dr. Campbell

## 2021-05-04 NOTE — PROGRESS NOTE ADULT - ASSESSMENT
Pt is a very pleasant 80 year old woman with a past medical history of HTN, HLD, OA/RA and Breast cancer who presented to the ED after a fall at a restaurant. As per the patient she was leaving the restaurant after lunch when she thinks she tripped up the stairs. CT of the head was done which showed a small subarachnoid hemorrhage in the right central sulcus at the convexity, stable on follow up CT    - No acute neurosurgical intervention   - Cardiology note appreciated, on florinef, echo stable, possible loop recorder prior to d/c   - Neuros q 8  - Cont Keppra 500mg BID for 7 days, (5/8/21)  - Maintain SBP < 150  - Venodynes for DVT PPX     Discussed with Dr. Min

## 2021-05-04 NOTE — CDI QUERY NOTE - NSCDIOTHERTXTBX_GEN_ALL_CORE_HH
This patient was admitted after a fall with + LOC    Assessment and Plan:   · Assessment	  5/2/21:  Pt with above history with prior HTN controlled on meds with sudden fall and ? syncope with resultant head injury and small but stable subarachnoid hemorrhage.    Please specify the type of SAH:    A. traumatic SAH after head injury  B. Non-traumatic SAH  C. Other (please specify)  D. Not clinically significant

## 2021-05-04 NOTE — PROGRESS NOTE ADULT - ASSESSMENT
Continue to monitor this pt  Consider ILR on discharge-which may be helpful accoding to dr. Briseno  Continue to hydrate   Maintain normal lytes and Mg level  Await echo results   Continue with Coreg  Repeat orthostatic Vital sign  Avoid any medications that result in syncope    Plan to be discussed with Dr. Barboza or Dr. Briseno      Attestation Statements:   Attestation Statements:  Attending supervision statement: I have personally seen and examined the patient.  I fully participated in the care of this patient.  I have made amendments to the documentation where necessary, and agree with the history, physical exam, and plan as documented by the ACP.     pt with syncope unclear etiology being worked up by primary team, possible exacerbated by meds, ecg no conduction disease. ILR monitoring may be useful to assess for arrhythmia symptoms correlation in the future.  Continue to monitor this pt   ILR today -which may be helpful according to Dr. Briseno  Continue to hydrate   Maintain normal lytes and Mg level  Await echo results   Continue with Coreg  Repeat orthostatic Vital sign  Avoid any medications that result in syncope  Will start her on Florinef today

## 2021-05-05 ENCOUNTER — TRANSCRIPTION ENCOUNTER (OUTPATIENT)
Age: 81
End: 2021-05-05

## 2021-05-05 PROCEDURE — 99232 SBSQ HOSP IP/OBS MODERATE 35: CPT

## 2021-05-05 RX ORDER — SUMATRIPTAN SUCCINATE 4 MG/.5ML
1 INJECTION, SOLUTION SUBCUTANEOUS
Qty: 0 | Refills: 0 | DISCHARGE

## 2021-05-05 RX ORDER — SODIUM CHLORIDE 9 MG/ML
1000 INJECTION INTRAMUSCULAR; INTRAVENOUS; SUBCUTANEOUS
Refills: 0 | Status: DISCONTINUED | OUTPATIENT
Start: 2021-05-05 | End: 2021-05-07

## 2021-05-05 RX ORDER — LEVETIRACETAM 250 MG/1
1 TABLET, FILM COATED ORAL
Qty: 6 | Refills: 0
Start: 2021-05-05 | End: 2021-05-07

## 2021-05-05 RX ORDER — OMEGA-3 ACID ETHYL ESTERS 1 G
1 CAPSULE ORAL
Qty: 0 | Refills: 0 | DISCHARGE

## 2021-05-05 RX ORDER — ASPIRIN/CALCIUM CARB/MAGNESIUM 324 MG
1 TABLET ORAL
Qty: 0 | Refills: 0 | DISCHARGE

## 2021-05-05 RX ORDER — TRAMADOL HYDROCHLORIDE 50 MG/1
100 TABLET ORAL EVERY 8 HOURS
Refills: 0 | Status: DISCONTINUED | OUTPATIENT
Start: 2021-05-05 | End: 2021-05-07

## 2021-05-05 RX ORDER — TRAMADOL HYDROCHLORIDE 50 MG/1
0 TABLET ORAL
Qty: 0 | Refills: 0 | DISCHARGE

## 2021-05-05 RX ORDER — BETHANECHOL CHLORIDE 25 MG
1 TABLET ORAL
Qty: 60 | Refills: 0
Start: 2021-05-05 | End: 2021-06-03

## 2021-05-05 RX ORDER — FLUDROCORTISONE ACETATE 0.1 MG/1
1 TABLET ORAL
Qty: 60 | Refills: 0
Start: 2021-05-05 | End: 2021-06-03

## 2021-05-05 RX ADMIN — SODIUM CHLORIDE 60 MILLILITER(S): 9 INJECTION INTRAMUSCULAR; INTRAVENOUS; SUBCUTANEOUS at 15:43

## 2021-05-05 RX ADMIN — FLUDROCORTISONE ACETATE 0.1 MILLIGRAM(S): 0.1 TABLET ORAL at 10:35

## 2021-05-05 RX ADMIN — CARVEDILOL PHOSPHATE 3.12 MILLIGRAM(S): 80 CAPSULE, EXTENDED RELEASE ORAL at 21:33

## 2021-05-05 RX ADMIN — LEVETIRACETAM 500 MILLIGRAM(S): 250 TABLET, FILM COATED ORAL at 21:33

## 2021-05-05 RX ADMIN — CARVEDILOL PHOSPHATE 3.12 MILLIGRAM(S): 80 CAPSULE, EXTENDED RELEASE ORAL at 10:35

## 2021-05-05 RX ADMIN — Medication 200 MILLIGRAM(S): at 21:33

## 2021-05-05 RX ADMIN — Medication 2.5 MILLIGRAM(S): at 21:33

## 2021-05-05 RX ADMIN — Medication 50 MILLIGRAM(S): at 21:33

## 2021-05-05 RX ADMIN — Medication 200 MILLIGRAM(S): at 10:35

## 2021-05-05 RX ADMIN — Medication 50 MILLIGRAM(S): at 10:35

## 2021-05-05 RX ADMIN — Medication 1 TABLET(S): at 10:35

## 2021-05-05 RX ADMIN — TRAMADOL HYDROCHLORIDE 100 MILLIGRAM(S): 50 TABLET ORAL at 19:27

## 2021-05-05 RX ADMIN — DULOXETINE HYDROCHLORIDE 30 MILLIGRAM(S): 30 CAPSULE, DELAYED RELEASE ORAL at 10:36

## 2021-05-05 RX ADMIN — LEVETIRACETAM 500 MILLIGRAM(S): 250 TABLET, FILM COATED ORAL at 10:35

## 2021-05-05 RX ADMIN — FLUDROCORTISONE ACETATE 0.1 MILLIGRAM(S): 0.1 TABLET ORAL at 21:33

## 2021-05-05 RX ADMIN — Medication 2.5 MILLIGRAM(S): at 10:36

## 2021-05-05 NOTE — CONSULT NOTE ADULT - CONSULT REASON
traumatic syncope
urinary retention
syncope
syncope, orthostatic hypotension, HTN, subarachnoid hemorrhage

## 2021-05-05 NOTE — PROGRESS NOTE ADULT - ASSESSMENT
5/2/21:  Pt with above history with prior HTN controlled on meds with sudden fall and ? syncope with resultant head injury and small but stable subarachnoid hemorrhage.  She did not injure her hand during the fall and this not normal alert reaction to put her hands out to block her fall and thus may have been a hypotensive syncopal event...vaso-vagal (doubt), orthostatic hypotension (???) aggravated by her meds (???)  possible, autonomic dysfunction (???).  Scheduled to have a CTA of the carotids and brain today and ECHO ordered as well.  Dr Campbell will be back in the AM and follow up.    5/3 feeling better today  check orthostatics again  echocardiogram pending  continue tele monitoring- SR so far  recommend Loop recorder to evaluate for arrythmic cause of syncope resulting in trauma  follow up imaging as per NS    5/4 still orthostatic, continue florinef  check daily chem and orthostatics  echo reveals  low normal EF ( unchanged)  consideration for loop recorder prior to DC, no arrythmias so far  gently hydration    5/5  although still orthostatic,  drop in blood pressure not as great  no arrythmia on tele, s/p  LOOP placement  continue low dose  florinef- monitor chem and electrolyutes  PT, continue orthstatics  Mild CHF systolic ( chronic - continue ACE and BB      5/2/21:  Pt with above history with prior HTN controlled on meds with sudden fall and ? syncope with resultant head injury and small but stable subarachnoid hemorrhage.  She did not injure her hand during the fall and this not normal alert reaction to put her hands out to block her fall and thus may have been a hypotensive syncopal event...vaso-vagal (doubt), orthostatic hypotension (???) aggravated by her meds (???)  possible, autonomic dysfunction (???).  Scheduled to have a CTA of the carotids and brain today and ECHO ordered as well.  Dr Campbell will be back in the AM and follow up.    5/3 feeling better today  check orthostatics again  echocardiogram pending  continue tele monitoring- SR so far  recommend Loop recorder to evaluate for arrythmic cause of syncope resulting in trauma  follow up imaging as per NS    5/4 still orthostatic, continue florinef  check daily chem and orthostatics  echo reveals  low normal EF ( unchanged)  consideration for loop recorder prior to DC, no arrythmias so far  gently hydration    5/5  although still orthostatic,  drop in blood pressure not as great  no arrythmia on tele, s/p  LOOP placement  continue low dose  florinef- monitor chem and electrolyutes  PT, continue orthstatics  Mild CHF systolic ( chronic - continue ACE and BB   urology eval for urine retention-- possibility that distended bladder contributed to orthostatics due to increased vagal tone

## 2021-05-05 NOTE — DISCHARGE NOTE PROVIDER - PROVIDER TOKENS
PROVIDER:[TOKEN:[[id number]]],PROVIDER:[TOKEN:[[id number]]] PROVIDER:[TOKEN:[[id number]]],PROVIDER:[TOKEN:[[id number]]],PROVIDER:[TOKEN:[[id number]],FOLLOWUP:[2 weeks]]

## 2021-05-05 NOTE — PROGRESS NOTE ADULT - SUBJECTIVE AND OBJECTIVE BOX
CHIEF COMPLAINT:  Patient is a 80y old  Female who presents with a chief complaint of Subarachnoid hemorrhage (01 May 2021 20:41)      HPI: 21:  Pt is a very pleasant 80 year old woman well known to our service and Dr Campbell with a past medical history of HTN, HLD, s/p MVR yrs ago by Dr Faria at [facility], OA/RA and Breast cancer who presented to the ED after a fall at a restaurant. as per the patient she was leaving the restaurant after lunch when she thinks she tripped up the stairs. The patient does not remember the fall and does not recall anything until after the EMS was already there. She denies any dizziness, lightheadedness or palpitations prior to the fall. Her family brought her to the ED because she had a laceration on her chin and ecchymosis and hematoma around her left eye. CT of the head was done which showed a small subarachnoid hemorrhage in the right central sulcus at the convexity. Pt c/o mild headache, no nausea or vomiting, she denies numbness or weakness, she denies any changes in bowl or bladder function.  She was having orthostatic drops in BP in the SICU, chronically taking Coreg and Enalapril for her BP as an outpt.        5/4-  dizzy when standing   5/5 less dizziness with standing-- still orthostatic , but less so    PMHx:  PAST MEDICAL & SURGICAL HISTORY:  Mitral valve disease-s/p mitral valve replacement-Dr Faria at [facility]  RA (rheumatoid arthritis)  OA (osteoarthritis)  Migraine  HLD (hyperlipidemia)  Breast cancer-left  treated with RT and surgery  Chronic pain-secondary to OA and RA  History of lumpectomy of left breast-with sentinal node biopsy   History of bilateral knee replacement-right , left   H/O spinal fusion-lumbar 2005  History of tonsillectomy  S/P bunionectomy-left x 2   History of left hip replacement-2017  History of cataract surgery-left cataract sx      FAMILY HISTORY:   FAMILY HISTORY:  No pertinent family history in first degree relatives      ALLERGIES:  Allergies  No Known Allergies      REVIEW OF SYSTEMS:  10 point ROS was obtained  Pertinent positives and negatives are as above  All other review of systems is negative unless indicated above      Vital Signs Last 24 Hrs  T(C): 36.6 (04 May 2021 20:41), Max: 36.6 (04 May 2021 11:21)  T(F): 97.8 (04 May 2021 20:41), Max: 97.8 (04 May 2021 11:21)  HR: 88 (04 May 2021 22:14) (78 - 88)  BP: 122/80 (04 May 2021 22:14) (118/63 - 142/86)  BP(mean): --  RR: 19 (04 May 2021 20:41) (16 - 20)  SpO2: 94% (04 May 2021 20:41) (94% - 98%)    Orthostatic VS    21 @ 11:21  Lying BP: 157/78 HR: 81   Sitting BP: 143/91 HR: 85  Standing BP: 124/88 HR: 106  Site: --   Mode: --    21 @ 10:40  Lying BP: 157/79 HR: 87   Sitting BP: 137/78 HR: 90  Standing BP: 128/80 HR: 105  Site: upper right arm   Mode: electronic    21 @ 21:49  Lying BP: 147/73 HR: 88   Sitting BP: 137/78 HR: 92  Standing BP: 114/81 HR: 108  Site: upper right arm   Mode: electronic      PHYSICAL EXAM:   Constitutional: NAD, awake and alert, well-developed  HEENT: PERR, EOMI, Normal Hearing, MMM, abrasion on chin and ecchymosis on face (healing)  Neck: Soft and supple, No LAD, No JVD  Respiratory: Breath sounds are clear bilaterally, No wheezing, rales or rhonchi, well healed mid-thoracotomy scar  Cardiovascular: S1 and S2, regular rate and rhythm, soft [place] at LLSB and base as before, no gallops or rubs  Gastrointestinal: Bowel Sounds present, soft, nontender, nondistended, no guarding, no rebound  Extremities: No peripheral edema  Vascular: 2+ peripheral pulses  Neurological: A/O x 3, no focal deficits  Musculoskeletal: 5/5 strength b/l upper and lower extremities      MEDICATIONS  (STANDING):  bethanechol 50 milliGRAM(s) Oral two times a day  carvedilol 3.125 milliGRAM(s) Oral every 12 hours  DULoxetine 30 milliGRAM(s) Oral daily  enalapril 2.5 milliGRAM(s) Oral two times a day  fludroCORTISONE 0.1 milliGRAM(s) Oral two times a day  hydroxychloroquine 200 milliGRAM(s) Oral two times a day  levETIRAcetam 500 milliGRAM(s) Oral two times a day  multivitamin 1 Tablet(s) Oral daily    MEDICATIONS  (PRN):  acetaminophen   Tablet .. 650 milliGRAM(s) Oral every 6 hours PRN Mild Pain (1 - 3)  celecoxib 200 milliGRAM(s) Oral daily PRN Moderate Pain (4 - 6)  metoclopramide Injectable 5 milliGRAM(s) IV Push every 6 hours PRN nausea / vomiting  SUMAtriptan 50 milliGRAM(s) Oral daily PRN Migraine        LABS: All Labs Reviewed:                     CARDIAC MARKERS ( 01 May 2021 20:58 )  <0.015 ng/mL / x     / x     / x     / x      CARDIAC MARKERS ( 01 May 2021 17:08 )  <0.015 ng/mL / x     / x     / x     / x      CARDIAC MARKERS ( 01 May 2021 15:45 )  <0.015 ng/mL / x     / x     / x     / x          BLOOD CULTURES:   LIPID PROFILE     RADIOLOGY:    CXR: 21:  INTERPRETATION:  XR CHEST  Single AP view  HISTORY:  syncope, fall; r/o trauma  Comparison: Chest x-ray 2019  Status post sternotomy and CABG.  Mitral annuloplasty.  The cardiac silhouette is within normal limits. The lungs are clear. No pleural abnormality.  IMPRESSION: No acute disease.      X-ray of Pelvis: 21:  INTERPRETATION:  XR PELVIS AP ONLY 1 OR 2 VIEWS  HISTORY:  r/o trauma s/p syncope/fall  Views:  AP pelvis;  The bony pelvis, acetabula, bilateral inferior and superior pubic rami demonstrate intact cortical margins with no evidence of an acute fracture.  Bilateral femoral heads, visualized bilateral femoral necks and proximal shafts demonstrate intact cortical margins with no evidence of an acute fracture.  There are mild osteoarthritic degenerative changes of the right hip.  There has been a left hip arthroplasty.  Osseous and metallic elements are in good alignment. There is no evidence of prosthetic loosening.  Bilateral sacroiliac joints are unremarkable.  IMPRESSION:  No acute fracture or dislocation.    Repeat CT of Head: 21;  FINDINGS:  There is again noted a trace amount of subarachnoid hemorrhage in the right central sulcus. There is no interval increase in volume of blood products or new intracranial hemorrhage. There is no significant mass effect or shift of the midline. The basal cisterns are not effaced. There is mild cerebral volume loss with prominence of the ventricles and sulci. There are minimal chronic ischemic changes in the frontoparietal white matter. There is no CT evidence of an acute vascular territorial infarct. There are atherosclerotic calcifications of the intracranial carotid arteries.  There is left-sided pre-malar soft tissue swelling. The skull base and bony calvarium are intact. The visualized paranasal sinuses and tympanic/mastoid cavities are clear apart from minimal ethmoid mucosal thickening. There is evidence of bilateral lens surgery.  IMPRESSION:  Stable trace amount of subarachnoid hemorrhage in the right central sulcus.    CT of Maxillofacial, Brain and C-Spine: 21:  FINDINGS:   CT dated 2019 available for review.  The brain demonstrates minimal subarachnoid hemorrhage in the RIGHT central sulcus at the convexity.   No acute cerebral cortical infarct is seen. No mass effect is found in the brain.  The ventricles, sulci and basal cisterns appear unremarkable.  The orbits are unremarkable.  The paranasal sinuses are clear.  The nasal cavity appears intact.  The nasopharynx is symmetric.  The central skull base, petrous temporal bones and calvarium remain intact.    CT facial bones without IV contrast  FINDINGS:   No prior similar studies are available for review.  Facial bones are intact without fracture. 3 cm LEFT buccal hematoma is noted with associated soft tissue swelling.  The paranasal sinuses are clear.  No abnormal paranasal sinus fluid collection is found.  No osseous erosion or expansion is present.  The nasal bones are intact without fracture.  The nasal septum shows a RIGHT-sided deviation with moderate osteophyte.  The ostiomeatal complexes appear normally formed.  The orbits are significant for surgically small lenses. Minimal LEFT periorbital soft tissue swelling is noted.    The globes are intact.  Intraconal and extraconal fat is preserved.  The extraocular muscles remain symmetric.  The superior ophthalmic veins are also symmetric.  Orbital rims remain intact.  The deep facial spaces are intact.   No radiopaque foreign body is seen.  The nasopharynx is symmetric.  The central skull base is intact.  The visualized intracranial contents appear unremarkable.    CT cervical spine without IV contrast  FINDINGS:   CT dated 2019 available for review  Cervical vertebral body heights are maintained. No vertebral fracture is seen. No destructive bone lesion is found.  Alignment is significant for focal kyphosis at C5-C6 on a degenerative basis. Grade 1 anterior spondylolisthesis is noted at C3-4 and C4-5. Facet joints appear intact and aligned. There is calcification of the posterior peridontoid ligaments.  Cervical intervertebral disc spaces show degenerative disc disease and spondylosis at C3-4 through C7-T1 with loss of disc height and associated degenerative endplate changes. There is narrowing of the RIGHT C3-4, RIGHT C4-5 and LEFT C6-7 neural foramina due to uncovertebral spurring and facet osteophytic hypertrophy. Degenerative cord impingement is seen at C4-5 through C6-7.  The skull base appears intact.  No neck mass is recognized.  Paraspinal soft tissues appear intact. Visualized lymph nodes appear to be within physiologic size limits.  IMPRESSION:  Cervical spine: No vertebral fracture is recognized.  Focal kyphosis at C5-C6 on a degenerative basis. Grade 1 anterior spondylolisthesis is noted at C3-4 and C4-5.   Multilevel degenerative disc disease and spondylosis at C3-4 through C7-T1 with loss of disc height and associated degenerative endplate changes. There is narrowing of the RIGHT C3-4, RIGHT C4-5 and LEFT C6-7 neural foramina due to uncovertebral spurring and facet osteophytic hypertrophy. Degenerative cord impingement is seen at C4-5 through C6-7.  Facial bones:  Facial bones intact without fracture.    Surgical small lenses. Minimal LEFT periorbital soft tissue swelling is noted.   3 cm LEFT buccal hematoma with associated soft tissue swelling is noted.  Brain:   minimal subarachnoid hemorrhage in the RIGHT central sulcus at the convexity.      EK21:  Normal sinus rhythm  Normal ECG    TELEMETRY:  NSR    ECHO:  pending

## 2021-05-05 NOTE — CONSULT NOTE ADULT - SUBJECTIVE AND OBJECTIVE BOX
HPI:  Pt is a very pleasant 80 year old woman with a past medical history of HTN, HLD, OA/RA and Breast cancer who presented to the ED after a fall at a restaurant. as per the patient she was leaving the restaurant after lunch when she thinks she tripped up the stairs. The patient does not remember the fall. She denies any dizziness, lightheadedness or palpitations prior to the fall. Her family brought her to the ED because she had a laceration on her chin and ecchymosis and hematoma around her left eye. CT of the head was done which showed a small subarachnoid hemorrhage in the right central sulcus at the convexity. Pt c/o mild headache, no nausea or vomiting, she denies numbness or weakness, she denies any changes in bowl or bladder function.  (01 May 2021 16:05)    79 yo female with PMH as above admitted for fall/ Subarachnoid hemorrhage. Urology consulted for pt with urinary retention requiring straight cath x3 with PVRs 800cc, 600cc and 700cc requiring indwelling titus. Patient seen at bedside reports she follows up with Dr. Kwon (urologist) and was found to have urge incontinence and was placed on a medication which made her "dizzy" pt unsure of the medication. Patient reports she is no longer on any medications for her bladder and recently found out she has overflow incontinence. Patient unable to void in the hospital. She denies recurrent UTIs, fevers, chills, abd/flank pain. Pt has a titus now draining yellow urine.    PAST MEDICAL & SURGICAL HISTORY:  Mitral valve disease    RA (rheumatoid arthritis)    OA (osteoarthritis)    Migraine    HLD (hyperlipidemia)    Breast cancer  left  treated with RT and surgery    Chronic pain  secondary to OA and RA    History of lumpectomy of left breast  with sentinal node biopsy     History of bilateral knee replacement  right , left     H/O spinal fusion  lumbar 2005    History of tonsillectomy    S/P bunionectomy  left x 2     History of left hip replacement  2017    H/O mitral valve replacement    History of cataract surgery  left cataract sx        REVIEW OF SYSTEMS    All other review of systems neg, except as noted in HPI    MEDICATIONS  (STANDING):  bethanechol 50 milliGRAM(s) Oral two times a day  carvedilol 3.125 milliGRAM(s) Oral every 12 hours  DULoxetine 30 milliGRAM(s) Oral daily  enalapril 2.5 milliGRAM(s) Oral two times a day  fludroCORTISONE 0.1 milliGRAM(s) Oral two times a day  hydroxychloroquine 200 milliGRAM(s) Oral two times a day  levETIRAcetam 500 milliGRAM(s) Oral two times a day  multivitamin 1 Tablet(s) Oral daily    MEDICATIONS  (PRN):  acetaminophen   Tablet .. 650 milliGRAM(s) Oral every 6 hours PRN Mild Pain (1 - 3)  celecoxib 200 milliGRAM(s) Oral daily PRN Moderate Pain (4 - 6)  metoclopramide Injectable 5 milliGRAM(s) IV Push every 6 hours PRN nausea / vomiting  SUMAtriptan 50 milliGRAM(s) Oral daily PRN Migraine  traMADol 100 milliGRAM(s) Oral every 8 hours PRN Severe Pain (7 - 10)      Allergies    No Known Allergies    Intolerances        SOCIAL HISTORY:    FAMILY HISTORY:  No pertinent family history in first degree relatives        Vital Signs Last 24 Hrs  T(C): 36.4 (05 May 2021 02:45), Max: 36.6 (04 May 2021 20:41)  T(F): 97.6 (05 May 2021 02:45), Max: 97.8 (04 May 2021 20:41)  HR: 77 (05 May 2021 02:45) (77 - 88)  BP: 137/66 (05 May 2021 02:45) (118/63 - 142/86)  BP(mean): --  RR: 19 (05 May 2021 02:45) (19 - 19)  SpO2: 94% (05 May 2021 02:45) (94% - 96%)    PHYSICAL EXAM:    General: No distress, No anxiety  VITALS  T(C): 36.4 (21 @ 02:45), Max: 36.6 (21 @ 20:41)  HR: 77 (21 @ 02:45) (77 - 88)  BP: 137/66 (21 @ 02:45) (118/63 - 142/86)  RR: 19 (21 @ 02:45) (19 - 19)  SpO2: 94% (21 @ 02:45) (94% - 96%)            Skin     : No jaundice, ecchymosis on left face noted  HEENT: No icterus , EOM full , No epistaxis  Lung    : No resp distress  Abdo:   : Soft, Non tender, No guarding, No distension    Back    : No CVAT b/l  Extremity: No calf tenderness _    Genitalia Female: Titus draining yellow urine  Neuro   : A&Ox3      LABS:        139  |  107  |  10  ----------------------------<  121<H>  4.2   |  27  |  0.86    Ca    9.2      04 May 2021 10:29  Mg     2.1             Urinalysis Basic - ( 04 May 2021 13:00 )    Color: Yellow / Appearance: Clear / S.010 / pH: x  Gluc: x / Ketone: Negative  / Bili: Negative / Urobili: Negative mg/dL   Blood: x / Protein: Negative mg/dL / Nitrite: Negative   Leuk Esterase: Trace / RBC: 0-2 /HPF / WBC 0-2   Sq Epi: x / Non Sq Epi: Few / Bacteria: Many        RADIOLOGY & ADDITIONAL STUDIES:
80 year old woman who  presented after a traumatic fall and found to have a SAH.    She stated that she  fell at a restaurant after lunch when she thinks she tripped up the stairs  She denies any   OTC supplements Nausea, vomiting or diarrhea, or prior syncope.  She has a  laceration on her chin and ecchymosis and hematoma around her left eye and a headache.   She did say that she recently started a medications for incontinence approximately 1 week ago    PMH:  HTN, HLD, OA/RA and Breast cancers. .       EKG: SR at 88 BPM  no St-T waves abnomalities  TELE:  SB  60s     MEDICATIONS  (STANDING):  carvedilol 3.125 milliGRAM(s) Oral every 12 hours  DULoxetine 30 milliGRAM(s) Oral daily  enalapril 2.5 milliGRAM(s) Oral two times a day  hydroxychloroquine 200 milliGRAM(s) Oral two times a day  levETIRAcetam 500 milliGRAM(s) Oral two times a day    MEDICATIONS  (PRN):  acetaminophen   Tablet .. 650 milliGRAM(s) Oral every 6 hours PRN Mild Pain (1 - 3)  acetaminophen  IVPB .. 1000 milliGRAM(s) IV Intermittent once PRN Moderate Pain (4 - 6)  acetaminophen  IVPB .. 1000 milliGRAM(s) IV Intermittent once PRN Severe Pain (7 - 10)  celecoxib 200 milliGRAM(s) Oral daily PRN Moderate Pain (4 - 6)  hydrALAZINE Injectable 10 milliGRAM(s) IV Push every 6 hours PRN SBP >150  metoclopramide Injectable 5 milliGRAM(s) IV Push every 6 hours PRN nausea / vomiting  SUMAtriptan 50 milliGRAM(s) Oral daily PRN Migraine      Allergies    No Known Allergies    Intolerances        Vital Signs Last 24 Hrs  T(C): 36.8 (03 May 2021 14:18), Max: 36.9 (02 May 2021 20:21)  T(F): 98.2 (03 May 2021 14:18), Max: 98.4 (02 May 2021 20:21)  HR: 84 (03 May 2021 15:00) (69 - 88)  BP: 139/84 (03 May 2021 15:00) (111/60 - 168/81)  BP(mean): 97 (03 May 2021 15:00) (72 - 104)  RR: 27 (03 May 2021 15:00) (12 - 56)  SpO2: 96% (03 May 2021 15:00) (92% - 100%)    PHYSICAL EXAMINATION:    GENERAL APPEARANCE:  Pt. is not currently dyspneic, in no distress. Pt. is alert, oriented, and pleasant.  HEENT:  Pupils are normal and react normally. No icterus. Mucous membranes well colored,  left ecchymotics on left side of face   NECK:  Supple. No lymphadenopathy. Jugular venous pressure not elevated. Carotids equal.   HEART:   The cardiac impulse has a normal quality. There are no murmurs, rubs or gallops noted  CHEST:  Chest is clear to auscultation. Normal respiratory effort.  ABDOMEN:  Soft and nontender.   EXTREMITIES:  There is no edema.   SKIN:  No rash or significant lesions are noted.    LABS:                        13.4   6.49  )-----------( 143      ( 01 May 2021 15:45 )             40.8     05-01    136  |  104  |  16  ----------------------------<  81  4.5   |  28  |  0.82    Ca    8.9      01 May 2021 15:45    TPro  7.2  /  Alb  3.5  /  TBili  0.4  /  DBili  x   /  AST  32  /  ALT  20  /  AlkPhos  61  05-01    PT/INR - ( 01 May 2021 17:08 )   PT: 11.5 sec;   INR: 0.98 ratio         PTT - ( 01 May 2021 17:08 )  PTT:32.6 sec    CARDIAC MARKERS ( 01 May 2021 20:58 )  <0.015 ng/mL / x     / x     / x     / x      CARDIAC MARKERS ( 01 May 2021 17:08 )  <0.015 ng/mL / x     / x     / x     / x      CARDIAC MARKERS ( 01 May 2021 15:45 )  <0.015 ng/mL / x     / x     / x     / x            RADIOLOGY & ADDITIONAL TESTS:    cho  
  81 y/o F w/ PMH of HTN, dyslipidemia, OA/RA, breast cancer s/p lumpectomy, MVR, p/w possible syncope. Patient states she had brunch at restaurant and was walking out, and next thing she remembers is waking up with people around her. States she doesn't recall falling, and may have passed out. Patient denies any symptoms prior to episode. Denies chest pain, nausea, vomiting, abdominal pain, fever, chills, palpitations, SOB, cough, runny nose, sore throat. Patient states at this time she does have some mild light headedness and some mild pain in back of her head / neck area.       PSH: Spinal fusion, B/L lumpectomy, tonsillectomy, bunionectomy     Social Hx: Denies tobacco, Etoh - half a glass of wine occasionally, drugs - denies     Family Hx: Mother - uterine cancer, sister - metastatic breast cancer 
  CHIEF COMPLAINT:  Patient is a 80y old  Female who presents with a chief complaint of Subarachnoid hemorrhage (01 May 2021 20:41)      HPI: 21:  Pt is a very pleasant 80 year old woman well known to our service and Dr Campbell with a past medical history of HTN, HLD, s/p MVR yrs ago by Dr Faria at [facility], OA/RA and Breast cancer who presented to the ED after a fall at a restaurant. as per the patient she was leaving the restaurant after lunch when she thinks she tripped up the stairs. The patient does not remember the fall and does not recall anything until after the EMS was already there. She denies any dizziness, lightheadedness or palpitations prior to the fall. Her family brought her to the ED because she had a laceration on her chin and ecchymosis and hematoma around her left eye. CT of the head was done which showed a small subarachnoid hemorrhage in the right central sulcus at the convexity. Pt c/o mild headache, no nausea or vomiting, she denies numbness or weakness, she denies any changes in bowl or bladder function.  She was having orthostatic drops in BP in the SICU, chronically taking Coreg and Enalapril for her BP as an outpt.        PMHx:  PAST MEDICAL & SURGICAL HISTORY:  Mitral valve disease-s/p mitral valve replacement-Dr Faria at [facility]  RA (rheumatoid arthritis)  OA (osteoarthritis)  Migraine  HLD (hyperlipidemia)  Breast cancer-left  treated with RT and surgery  Chronic pain-secondary to OA and RA  History of lumpectomy of left breast-with sentinal node biopsy   History of bilateral knee replacement-right , left   H/O spinal fusion-lumbar   History of tonsillectomy  S/P bunionectomy-left x 2   History of left hip replacement-2017  History of cataract surgery-left cataract sx      FAMILY HISTORY:   FAMILY HISTORY:  No pertinent family history in first degree relatives      ALLERGIES:  Allergies  No Known Allergies      REVIEW OF SYSTEMS:  10 point ROS was obtained  Pertinent positives and negatives are as above  All other review of systems is negative unless indicated above      ICU Vital Signs Last 24 Hrs  T(C): 36.7 (02 May 2021 08:47), Max: 36.9 (01 May 2021 20:31)  T(F): 98.1 (02 May 2021 08:47), Max: 98.4 (01 May 2021 20:31)  HR: 73 (02 May 2021 08:00) (69 - 106)  BP: 137/65 (02 May 2021 08:00) (118/98 - 187/82)  BP(mean): 84 (02 May 2021 08:00) (71 - 112)  ABP: --  ABP(mean): --  RR: 15 (02 May 2021 08:00) (11 - 30)  SpO2: 96% (02 May 2021 08:00) (92% - 99%)        I&O's Summary    01 May 2021 07:01  -  02 May 2021 07:00  --------------------------------------------------------  IN: 0 mL / OUT: 800 mL / NET: -800 mL      PHYSICAL EXAM:   Constitutional: NAD, awake and alert, well-developed  HEENT: PERR, EOMI, Normal Hearing, MMM, abrasion on chin and ecchymosis on face (healing)  Neck: Soft and supple, No LAD, No JVD  Respiratory: Breath sounds are clear bilaterally, No wheezing, rales or rhonchi, well healed mid-thoracotomy scar  Cardiovascular: S1 and S2, regular rate and rhythm, soft ST[place]ORD at LLSB and base as before, no gallops or rubs  Gastrointestinal: Bowel Sounds present, soft, nontender, nondistended, no guarding, no rebound  Extremities: No peripheral edema  Vascular: 2+ peripheral pulses  Neurological: A/O x 3, no focal deficits  Musculoskeletal: 5/5 strength b/l upper and lower extremities      MEDICATIONS  (STANDING):  carvedilol 3.125 milliGRAM(s) Oral every 12 hours  DULoxetine 30 milliGRAM(s) Oral daily  enalapril 2.5 milliGRAM(s) Oral two times a day  hydroxychloroquine 200 milliGRAM(s) Oral two times a day  levETIRAcetam 500 milliGRAM(s) Oral two times a day    MEDICATIONS  (PRN):  acetaminophen   Tablet .. 650 milliGRAM(s) Oral every 6 hours PRN Mild Pain (1 - 3)  acetaminophen  IVPB .. 1000 milliGRAM(s) IV Intermittent once PRN Moderate Pain (4 - 6)  acetaminophen  IVPB .. 1000 milliGRAM(s) IV Intermittent once PRN Severe Pain (7 - 10)  celecoxib 200 milliGRAM(s) Oral daily PRN Moderate Pain (4 - 6)  hydrALAZINE Injectable 10 milliGRAM(s) IV Push every 6 hours PRN SBP >150  methocarbamol 500 milliGRAM(s) Oral every 8 hours PRN Muscle Spasm  metoclopramide Injectable 5 milliGRAM(s) IV Push every 6 hours PRN nausea / vomiting  SUMAtriptan 50 milliGRAM(s) Oral daily PRN Migraine        LABS: All Labs Reviewed:                        13.4   6.49  )-----------( 143      ( 01 May 2021 15:45 )             40.8     05    136  |  104  |  16  ----------------------------<  81  4.5   |  28  |  0.82    Ca    8.9      01 May 2021 15:45    TPro  7.2  /  Alb  3.5  /  TBili  0.4  /  DBili  x   /  AST  32  /  ALT  20  /  AlkPhos  61  05-    PT/INR - ( 01 May 2021 17:08 )   PT: 11.5 sec;   INR: 0.98 ratio      PTT - ( 01 May 2021 17:08 )  PTT:32.6 sec    CARDIAC MARKERS ( 01 May 2021 20:58 )  <0.015 ng/mL / x     / x     / x     / x      CARDIAC MARKERS ( 01 May 2021 17:08 )  <0.015 ng/mL / x     / x     / x     / x      CARDIAC MARKERS ( 01 May 2021 15:45 )  <0.015 ng/mL / x     / x     / x     / x          BLOOD CULTURES:   LIPID PROFILE     RADIOLOGY:    CXR: 21:  INTERPRETATION:  XR CHEST  Single AP view  HISTORY:  syncope, fall; r/o trauma  Comparison: Chest x-ray 2019  Status post sternotomy and CABG.  Mitral annuloplasty.  The cardiac silhouette is within normal limits. The lungs are clear. No pleural abnormality.  IMPRESSION: No acute disease.      X-ray of Pelvis: 21:  INTERPRETATION:  XR PELVIS AP ONLY 1 OR 2 VIEWS  HISTORY:  r/o trauma s/p syncope/fall  Views:  AP pelvis;  The bony pelvis, acetabula, bilateral inferior and superior pubic rami demonstrate intact cortical margins with no evidence of an acute fracture.  Bilateral femoral heads, visualized bilateral femoral necks and proximal shafts demonstrate intact cortical margins with no evidence of an acute fracture.  There are mild osteoarthritic degenerative changes of the right hip.  There has been a left hip arthroplasty.  Osseous and metallic elements are in good alignment. There is no evidence of prosthetic loosening.  Bilateral sacroiliac joints are unremarkable.  IMPRESSION:  No acute fracture or dislocation.    Repeat CT of Head: 21;  FINDINGS:  There is again noted a trace amount of subarachnoid hemorrhage in the right central sulcus. There is no interval increase in volume of blood products or new intracranial hemorrhage. There is no significant mass effect or shift of the midline. The basal cisterns are not effaced. There is mild cerebral volume loss with prominence of the ventricles and sulci. There are minimal chronic ischemic changes in the frontoparietal white matter. There is no CT evidence of an acute vascular territorial infarct. There are atherosclerotic calcifications of the intracranial carotid arteries.  There is left-sided pre-malar soft tissue swelling. The skull base and bony calvarium are intact. The visualized paranasal sinuses and tympanic/mastoid cavities are clear apart from minimal ethmoid mucosal thickening. There is evidence of bilateral lens surgery.  IMPRESSION:  Stable trace amount of subarachnoid hemorrhage in the right central sulcus.    CT of Maxillofacial, Brain and C-Spine: 21:  FINDINGS:   CT dated 2019 available for review.  The brain demonstrates minimal subarachnoid hemorrhage in the RIGHT central sulcus at the convexity.   No acute cerebral cortical infarct is seen. No mass effect is found in the brain.  The ventricles, sulci and basal cisterns appear unremarkable.  The orbits are unremarkable.  The paranasal sinuses are clear.  The nasal cavity appears intact.  The nasopharynx is symmetric.  The central skull base, petrous temporal bones and calvarium remain intact.    CT facial bones without IV contrast  FINDINGS:   No prior similar studies are available for review.  Facial bones are intact without fracture. 3 cm LEFT buccal hematoma is noted with associated soft tissue swelling.  The paranasal sinuses are clear.  No abnormal paranasal sinus fluid collection is found.  No osseous erosion or expansion is present.  The nasal bones are intact without fracture.  The nasal septum shows a RIGHT-sided deviation with moderate osteophyte.  The ostiomeatal complexes appear normally formed.  The orbits are significant for surgically small lenses. Minimal LEFT periorbital soft tissue swelling is noted.    The globes are intact.  Intraconal and extraconal fat is preserved.  The extraocular muscles remain symmetric.  The superior ophthalmic veins are also symmetric.  Orbital rims remain intact.  The deep facial spaces are intact.   No radiopaque foreign body is seen.  The nasopharynx is symmetric.  The central skull base is intact.  The visualized intracranial contents appear unremarkable.    CT cervical spine without IV contrast  FINDINGS:   CT dated 2019 available for review  Cervical vertebral body heights are maintained. No vertebral fracture is seen. No destructive bone lesion is found.  Alignment is significant for focal kyphosis at C5-C6 on a degenerative basis. Grade 1 anterior spondylolisthesis is noted at C3-4 and C4-5. Facet joints appear intact and aligned. There is calcification of the posterior peridontoid ligaments.  Cervical intervertebral disc spaces show degenerative disc disease and spondylosis at C3-4 through C7-T1 with loss of disc height and associated degenerative endplate changes. There is narrowing of the RIGHT C3-4, RIGHT C4-5 and LEFT C6-7 neural foramina due to uncovertebral spurring and facet osteophytic hypertrophy. Degenerative cord impingement is seen at C4-5 through C6-7.  The skull base appears intact.  No neck mass is recognized.  Paraspinal soft tissues appear intact. Visualized lymph nodes appear to be within physiologic size limits.  IMPRESSION:  Cervical spine: No vertebral fracture is recognized.  Focal kyphosis at C5-C6 on a degenerative basis. Grade 1 anterior spondylolisthesis is noted at C3-4 and C4-5.   Multilevel degenerative disc disease and spondylosis at C3-4 through C7-T1 with loss of disc height and associated degenerative endplate changes. There is narrowing of the RIGHT C3-4, RIGHT C4-5 and LEFT C6-7 neural foramina due to uncovertebral spurring and facet osteophytic hypertrophy. Degenerative cord impingement is seen at C4-5 through C6-7.  Facial bones:  Facial bones intact without fracture.    Surgical small lenses. Minimal LEFT periorbital soft tissue swelling is noted.   3 cm LEFT buccal hematoma with associated soft tissue swelling is noted.  Brain:   minimal subarachnoid hemorrhage in the RIGHT central sulcus at the convexity.      EK21:  Normal sinus rhythm  Normal ECG    TELEMETRY:  NSR    ECHO:  pending

## 2021-05-05 NOTE — DISCHARGE NOTE PROVIDER - CARE PROVIDER_API CALL
Santos Monet)  Urology  [address] [facility], [address]nd [place] [address]  Phone: [phone]  Fax: [phone]  Follow Up Time:     Valentin Campbell (MD)  Cardiovascular Disease; Internal Medicine; Nuclear Cardiology  [address] [facility], [address], [address]  [place] [address]  Phone: [phone]  Fax: [phone]  Follow Up Time:    Santos Monet)  Urology  [address] [facility], [address]nd [place] [address]  Phone: [phone]  Fax: [phone]  Follow Up Time:     Valentin Campbell (MD)  Cardiovascular Disease; Internal Medicine; Nuclear Cardiology  [address] [facility], [address]  [place] [address]  Phone: [phone]  Fax: [phone]  Follow Up Time:     GLORIA CHRISTIANSEN  [id number]  [address] [place] [address]  Phone: [phone]  Fax: [phone])-  Follow Up Time: 2 weeks

## 2021-05-05 NOTE — DISCHARGE NOTE PROVIDER - HOSPITAL COURSE
SEE FULL PROGRESS NOTE    ASSESSMENT AND PLAN:    79 y/o F w/ PMH of HTN, dyslipidemia, OA/RA, breast cancer s/p lumpectomy, MVR p/w  fall with retrograde amnesia likely syncope with no evidence of conscious attempt to protect herself with arms extension resulting in traumatic SAH    1. Traumatic Subarachnoid Hemorrhage due to fall  - CT w/ minimal subarachnoid hemorrhage in the RIGHT central sulcus at the convexity. No acute neurosurgical intervention needed  - BP control, neuro checks    - f/u repeat CT head results is stable, no extra/intra-cranial vascular dx  - Cont Keppra 500mg BID for 7 days (last dose on 5/8)  - keep ASA held x2 weeks.  - Maintain SBP < 150  - Chin Stitches -- due to be removed later this week  - PT, NSGY consult     2. Syncope, orthostasis   - Stop Robaxin as it can cause hypotension, started Florinef for persistent orthostasis   - ZION socks  - Carotid US w/o significant internal carotid artery stenosis bilaterally.   - s/p ILR placement on 5/4.  - Echo w/ mildly reduced EF 45-50%  - EP, Cardio Consult  5/5: repeat orthostatics td.     3. Urinary retention   - Valladares placed. Patient straight catheterized x3.  - bethanechol started 50mg BID.   - check UA > negative   - Urology consult appreciated -- needs urodynamic studies outpt. w/ Chiki or Skylar (her urologist)    4. HTN | HLD  - Maintain SBP < 150. Cont. Coreg, enalapril     5. OA/RA  - Hydroxychloroquine, celebrex, Tramadol  - Follows w/ MD Barragan    6. Malnutrition  - Nutrition eval. Start ensure supp. drinks w/ meals.    7. DVT ppx  - SCDs    Dispo: discharge to HOME W/ HOME PT in stable condition    Final diagnosis, treatment plan, and follow-up recommendations were discussed and explained to the patient. The patient was given an opportunity to ask questions concerning the diagnosis and treatment plan. The patient acknowledged understanding of the diagnosis, treatment, and follow-up recommendations. The patient was advised to seek urgent care upon discharge if worsening symptoms develop prior to scheduled follow-up. Time spent on discharge included time with the patient, and also coordinating discharge care as outlined below.    Total time spent: 50 min CC: SAH  HPI and Hospital Course: 79 y/o F w/ PMH of HTN, dyslipidemia, OA/RA, breast cancer s/p lumpectomy, MVR p/w  fall with retrograde amnesia likely syncope with no evidence of conscious attempt to protect herself with arms extension resulting in traumatic SAH. Joce'asher by NS, for Keppra thru 5/8, no ASA until 5/16/21. Found to have urinary retention, started on bethanechol. Titus placed, for outpt f/u with Dr. Monet in 2 wks. With RA flare while admitted, discharged on steroid taper, see below for problem based plan.    VITALS:  T(F): 96.8 (05-07-21 @ 07:55), Max: 97.9 (05-06-21 @ 19:38)  HR: 69 (05-07-21 @ 07:55) (69 - 79)  BP: 119/55 (05-07-21 @ 07:55) (119/55 - 125/60)  RR: 18 (05-07-21 @ 07:55) (18 - 18)  SpO2: 97% (05-07-21 @ 07:55) (94% - 97%)    PHYSICAL EXAM:  General: NAD, lying in bed  HEENT:  pupils equal and reactive, EOMI, no oropharyngeal lesions, erythema, exudates, oral thrush  NECK:   supple, no carotid bruits, no palpable lymph nodes, no thyromegaly  CV:  +S1, +S2, regular, no murmurs or rubs  RESP:   lungs clear to auscultation bilaterally, no wheezing, rales, rhonchi, good air entry bilaterally  BREAST:  not examined  GI:  abdomen soft, non-tender, non-distended, normal BS, no bruits, no abdominal masses, no palpable masses  RECTAL:  not examined  :  + titus  MSK:   normal muscle tone, no atrophy, no rigidity, no contractions  EXT:  no clubbing, no cyanosis, no edema, no calf pain, swelling or erythema  VASCULAR:  pulses equal and symmetric in the upper and lower extremities  NEURO:  AAOX3, no focal neurological deficits, follows all commands, able to move extremities spontaneously  SKIN:  no ulcers, lesions or rashes, + L sided facial bruising    Carotid doppler 5/3/21: Mild plaque in the carotid bulbs with no evidence of a hemodynamically significant internal carotid artery stenosis bilaterally. Measurement of carotid stenosis is based on velocity parameters that correlate the residual internal carotid diameter with that of the more distal vessel in accordance with a method such as the [employer] Symptomatic Carotid Endarterectomy Trial (NASCET).    CTA head 5/2/21: Normal normal brain CT for patient's age. No change since 5/1/2021. Normal CTA of the head and neck. No significant carotid stenosis. CT angiogram of the intracranial circulation    CTH 5/1/21: Stable trace amount of subarachnoid hemorrhage in the right central sulcus.      # Traumatic Subarachnoid Hemorrhage due to fall  - CT w/ minimal subarachnoid hemorrhage in the RIGHT central sulcus at the convexity. No acute neurosurgical intervention needed  - f/u repeat CT head results is stable, no extra/intra-cranial vascular dx  - Cont Keppra 500mg BID for 7 days (last dose on 5/8)  - keep ASA held x2 weeks, can resume 5/16/21  - Maintain SBP < 150  - Chin Stitches - removed prior to d/c  - PT, NSGY consult     2. Syncope, orthostasis   - Stop Robaxin as it can cause hypotension, started Florinef for persistent orthostasis   - ZION socks  - Carotid US w/o significant internal carotid artery stenosis bilaterally.   - s/p ILR placement on 5/4.  - Echo w/ mildly reduced EF 45-50%  - EP, Cardio Consult    3. Urinary retention   - Titus placed. Patient straight catheterized x3.  - bethanechol started 50mg BID, x30d  - check UA > negative   - Urology consult appreciated -- needs urodynamic studies outpt. w/ Chiki or Skylar (her urologist), in 2 wks    4. HTN | HLD  - Maintain SBP < 150. Cont. Coreg, enalapril     5. OA/RA with flare (b/l shoulder/neck stiffness)  -s/p iv solumedrol here with improvement, for d/c on prednisone taper 20mg x 5d, 15mg x 5d, 10mg x 5d, 5mg x 5d  - Hydroxychloroquine, celebrex, Tramadol  - Follows w/ MD Barragan    6. Severe protein calorie malnutrition  - Nutrition eval. Start ensure supp. drinks w/ meals.    Dispo: discharge to HOME W/ HOME PT in stable condition    Final diagnosis, treatment plan, and follow-up recommendations were discussed and explained to the patient. The patient was given an opportunity to ask questions concerning the diagnosis and treatment plan. The patient acknowledged understanding of the diagnosis, treatment, and follow-up recommendations. The patient was advised to seek urgent care upon discharge if worsening symptoms develop prior to scheduled follow-up. Time spent on discharge included time with the patient, and also coordinating discharge care as outlined below.    Total time spent: 50 min

## 2021-05-05 NOTE — DISCHARGE NOTE PROVIDER - CARE PROVIDERS DIRECT ADDRESSES
,[email or web address],DirectAddress_Unknown ,[email or web address],DirectAddress_Unknown,DirectAddress_Unknown

## 2021-05-05 NOTE — PROGRESS NOTE ADULT - ASSESSMENT
Pt is a very pleasant 80 year old woman with a past medical history of HTN, HLD, OA/RA and Breast cancer who presented to the ED after a fall at a restaurant. As per the patient she was leaving the restaurant after lunch when she thinks she tripped up the stairs. CT of the head was done which showed a small subarachnoid hemorrhage in the right central sulcus at the convexity, stable on follow up CT    - No acute neurosurgical intervention   - Neuros q 8  - Cont Keppra 500mg BID for 7 days, (5/8/21)  - Maintain SBP < 150  - Venodynes for DVT PPX, can be started on SQ Heparin   - Cardiology note appreciated, still orthostatic, mild CHF  - +urology retention, urology consult pending for today     Will transfer care to the Hospitalist service, discussed with Jocelyn Payne NP  Follow up with Dr. Min in 2 weeks for follow up CT of the head  Keppa can be discontinued on 5/8/21  Ok to start SQ heparin for DVT PPX     Discussed with Dr. Min

## 2021-05-05 NOTE — PROGRESS NOTE ADULT - SUBJECTIVE AND OBJECTIVE BOX
CHIEF COMPLAINT/DIAGNOSIS: Fall at a restaurant    HPI: 80 year old woman with a past medical history of HTN, HLD, OA/RA and Breast cancer who presented to the ED after a fall at a restaurant. as per the patient she was leaving the restaurant after lunch when she thinks she tripped up the stairs. The patient does not remember the fall. She denies any dizziness, lightheadedness or palpitations prior to the fall. Her family brought her to the ED because she had a laceration on her chin and ecchymosis and hematoma around her left eye. CT of the head was done which showed a small subarachnoid hemorrhage in the right central sulcus at the convexity. Pt c/o mild headache, no nausea or vomiting, she denies numbness or weakness, she denies any changes in bowl or bladder function.  (01 May 2021 16:05)    5/4/2021: c/o bladder fullness, reports not hydrating well.   5/5/2021:    REVIEW OF SYSTEMS:  All other review of systems is negative unless indicated above    PHYSICAL EXAM:  Constitutional: Awake and alert, cachechtic  HEENT: PERR, EOMI, Normal Hearing, MMM  Neck: Soft and supple  Respiratory: Breath sounds are clear bilaterally  Cardiovascular: S1 and S2, regular rate and rhythm, no Murmurs, gallops or rubs  Gastrointestinal: Bowel Sounds present, soft, nontender, nondistended, no guarding, no rebound  Extremities: No peripheral edema  Vascular: 2+ peripheral pulses  Neurological: A/O x 3, no focal deficits  Musculoskeletal: 5/5 strength b/l upper and lower extremities  Skin: No rashes, LT sided facial ecchymosis    Vital Signs Last 24 Hrs  T(C): 36.6 (04 May 2021 20:41), Max: 36.6 (04 May 2021 11:21)  T(F): 97.8 (04 May 2021 20:41), Max: 97.8 (04 May 2021 11:21)  HR: 88 (04 May 2021 22:14) (78 - 88)  BP: 122/80 (04 May 2021 22:14) (118/63 - 142/86)  BP(mean): --  RR: 19 (04 May 2021 20:41) (16 - 20)  SpO2: 94% (04 May 2021 20:41) (94% - 98%)    LABS: All Labs Reviewed    RADIOLOGY:  < from: US Duplex Carotid Arteries Complete, Bilateral (05.03.21 @ 12:21) >  IMPRESSION: Mild plaque in the carotid bulbs with no evidence of a hemodynamically significant internal carotid artery stenosis bilaterally.  < end of copied text >    < from: CT Angio Head w/ IV Cont (05.02.21 @ 10:02) >  IMPRESSION: Normal brain CT for patient's age. No change since 5/1/2021. Normal CTA of the head and neck. No significant carotid stenosis. CT angiogram of the intracranial circulation  < end of copied text >    < from: CT Head No Cont (05.01.21 @ 22:09) >  IMPRESSION:  Stable trace amount of subarachnoid hemorrhage in the right central sulcus.  < end of copied text >    < from: CT Maxillofacial No Cont (05.01.21 @ 14:28) >  IMPRESSION:  Cervical spine: No vertebral fracture is recognized.  Focal kyphosis at C5-C6 on a degenerative basis. Grade 1 anterior spondylolisthesis is noted at C3-4 and C4-5.   Multilevel degenerative disc disease and spondylosis at C3-4 through C7-T1 with loss of disc height and associated degenerative endplate changes. There is narrowing of the RIGHT C3-4, RIGHT C4-5 and LEFT C6-7 neural foramina due to uncovertebral spurring and facet osteophytic hypertrophy. Degenerative cord impingement is seen at C4-5 through C6-7.  Facial bones:  Facial bones intact without fracture.    Surgical small lenses. Minimal LEFT periorbital soft tissue swelling is noted.   3 cm LEFT buccal hematoma with associated soft tissue swelling is noted.  Brain:   minimal subarachnoid hemorrhage in the RIGHT central sulcus at the convexity.  < end of copied text >    ECHOCARDIOGRAM:  < from: TTE Echo Complete w/o Contrast w/ Doppler (05.03.21 @ 12:37) >   The left ventricle is normal in size and wall thickness.   Mild, diffuse hypokinesis of the left ventricle is present with an overall   estimated EF of 45-50%.   The left atrium is mildly dilated.   Normal appearing right atrium.   Patient has a history of a PFO as noted within the 05/21/2015 [facility] Exam.   A PFO is not noted with color Doppler during this exam.   Fibrocalcific changes noted to the Aortic valve leaflets with preserved   leaflet excursion.   No aortic regurgitation is present.   Well seated bioprosthetic valve in the mitral position. Mean   trans-prosthetic gradient is within normal limitations for this type of   prosthesis.   Trace mitral regurgitation is present.   EA reversal of the mitral inflow consistentwith reduced compliance of the   left ventricle.   Normal appearing tricuspid valve structure.   Mild (1+) tricuspid valve regurgitation is present.  < end of copied text >    MEDICATIONS  (STANDING):  carvedilol 3.125 milliGRAM(s) Oral every 12 hours  DULoxetine 30 milliGRAM(s) Oral daily  enalapril 2.5 milliGRAM(s) Oral two times a day  fludroCORTISONE 0.1 milliGRAM(s) Oral two times a day  hydroxychloroquine 200 milliGRAM(s) Oral two times a day  levETIRAcetam 500 milliGRAM(s) Oral two times a day    MEDICATIONS  (PRN):  acetaminophen   Tablet .. 650 milliGRAM(s) Oral every 6 hours PRN Mild Pain (1 - 3)  acetaminophen  IVPB .. 1000 milliGRAM(s) IV Intermittent once PRN Moderate Pain (4 - 6)  acetaminophen  IVPB .. 1000 milliGRAM(s) IV Intermittent once PRN Severe Pain (7 - 10)  celecoxib 200 milliGRAM(s) Oral daily PRN Moderate Pain (4 - 6)  hydrALAZINE Injectable 10 milliGRAM(s) IV Push every 6 hours PRN SBP >150  metoclopramide Injectable 5 milliGRAM(s) IV Push every 6 hours PRN nausea / vomiting  SUMAtriptan 50 milliGRAM(s) Oral daily PRN Migraine    TELEMETRY REVIEW:  5/4/2021: sinus 80s  5/5/2021:    ASSESSMENT AND PLAN:    79 y/o F w/ PMH of HTN, dyslipidemia, OA/RA, breast cancer s/p lumpectomy, MVR p/w  fall with retrograde amnesia likely syncope with no evidence of conscious attempt to protect herself with arms extension resulting in traumatic SAH    1. Traumatic Subarachnoid Hemorrhage due to fall  - CT w/ minimal subarachnoid hemorrhage in the RIGHT central sulcus at the convexity. No acute neurosurgical intervention needed  - BP control, neuro checks    - f/u repeat CT head results is stable, no extra/intra-cranial vascular dx  - Cont Keppra 500mg BID for 7 days   - Maintain SBP < 150  - PT, NSGY    2. Syncope, orthostasis   - Stop Robaxin as it can cause hypotension, started Florinef for persistent orthostasis   - ZION socks  - Carotid US w/o significant internal carotid artery stenosis bilaterally.   - s/p ILR placement on 5/4.  - Echo w/ mildly reduced EF 45-50%  - EP, Cardio Consult    3. Urinary retention   - Valladares placed. Patient straight catheterized x3.  - bethanechol started 50mg BID.   - check UA > negative   - Urology consult    4. HTN | HLD  - Maintain SBP < 150. Cont. Coreg, enalapril     5. OA/RA  - Hydroxychloroquine, celebrex    6. Malnutrition  - nutrition eval. start ensure supp. drinks w/ meals.    7. DVT ppx  - SCDs     CHIEF COMPLAINT/DIAGNOSIS: Fall at a restaurant    HPI: 80 year old woman with a past medical history of HTN, HLD, OA/RA and Breast cancer who presented to the ED after a fall at a restaurant. as per the patient she was leaving the restaurant after lunch when she thinks she tripped up the stairs. The patient does not remember the fall. She denies any dizziness, lightheadedness or palpitations prior to the fall. Her family brought her to the ED because she had a laceration on her chin and ecchymosis and hematoma around her left eye. CT of the head was done which showed a small subarachnoid hemorrhage in the right central sulcus at the convexity. Pt c/o mild headache, no nausea or vomiting, she denies numbness or weakness, she denies any changes in bowl or bladder function.  (01 May 2021 16:05)    5/4/2021: c/o bladder fullness, reports not hydrating well.   5/5/2021:    REVIEW OF SYSTEMS:  All other review of systems is negative unless indicated above    PHYSICAL EXAM:  Constitutional: Awake and alert, cachechtic  HEENT: PERR, EOMI, Normal Hearing, MMM  Neck: Soft and supple  Respiratory: Breath sounds are clear bilaterally  Cardiovascular: S1 and S2, regular rate and rhythm, no Murmurs, gallops or rubs  Gastrointestinal: Bowel Sounds present, soft, nontender, nondistended, no guarding, no rebound  Extremities: No peripheral edema  Vascular: 2+ peripheral pulses  Neurological: A/O x 3, no focal deficits  Musculoskeletal: 5/5 strength b/l upper and lower extremities  Skin: No rashes, LT sided facial ecchymosis    Vital Signs Last 24 Hrs  T(C): 36.6 (04 May 2021 20:41), Max: 36.6 (04 May 2021 11:21)  T(F): 97.8 (04 May 2021 20:41), Max: 97.8 (04 May 2021 11:21)  HR: 88 (04 May 2021 22:14) (78 - 88)  BP: 122/80 (04 May 2021 22:14) (118/63 - 142/86)  BP(mean): --  RR: 19 (04 May 2021 20:41) (16 - 20)  SpO2: 94% (04 May 2021 20:41) (94% - 98%)    LABS: All Labs Reviewed    RADIOLOGY:  < from: US Duplex Carotid Arteries Complete, Bilateral (05.03.21 @ 12:21) >  IMPRESSION: Mild plaque in the carotid bulbs with no evidence of a hemodynamically significant internal carotid artery stenosis bilaterally.  < end of copied text >    < from: CT Angio Head w/ IV Cont (05.02.21 @ 10:02) >  IMPRESSION: Normal brain CT for patient's age. No change since 5/1/2021. Normal CTA of the head and neck. No significant carotid stenosis. CT angiogram of the intracranial circulation  < end of copied text >    < from: CT Head No Cont (05.01.21 @ 22:09) >  IMPRESSION:  Stable trace amount of subarachnoid hemorrhage in the right central sulcus.  < end of copied text >    < from: CT Maxillofacial No Cont (05.01.21 @ 14:28) >  IMPRESSION:  Cervical spine: No vertebral fracture is recognized.  Focal kyphosis at C5-C6 on a degenerative basis. Grade 1 anterior spondylolisthesis is noted at C3-4 and C4-5.   Multilevel degenerative disc disease and spondylosis at C3-4 through C7-T1 with loss of disc height and associated degenerative endplate changes. There is narrowing of the RIGHT C3-4, RIGHT C4-5 and LEFT C6-7 neural foramina due to uncovertebral spurring and facet osteophytic hypertrophy. Degenerative cord impingement is seen at C4-5 through C6-7.  Facial bones:  Facial bones intact without fracture.    Surgical small lenses. Minimal LEFT periorbital soft tissue swelling is noted.   3 cm LEFT buccal hematoma with associated soft tissue swelling is noted.  Brain:   minimal subarachnoid hemorrhage in the RIGHT central sulcus at the convexity.  < end of copied text >    ECHOCARDIOGRAM:  < from: TTE Echo Complete w/o Contrast w/ Doppler (05.03.21 @ 12:37) >   The left ventricle is normal in size and wall thickness.   Mild, diffuse hypokinesis of the left ventricle is present with an overall   estimated EF of 45-50%.   The left atrium is mildly dilated.   Normal appearing right atrium.   Patient has a history of a PFO as noted within the 05/21/2015 [facility] Exam.   A PFO is not noted with color Doppler during this exam.   Fibrocalcific changes noted to the Aortic valve leaflets with preserved   leaflet excursion.   No aortic regurgitation is present.   Well seated bioprosthetic valve in the mitral position. Mean   trans-prosthetic gradient is within normal limitations for this type of   prosthesis.   Trace mitral regurgitation is present.   EA reversal of the mitral inflow consistentwith reduced compliance of the   left ventricle.   Normal appearing tricuspid valve structure.   Mild (1+) tricuspid valve regurgitation is present.  < end of copied text >    MEDICATIONS  (STANDING):  carvedilol 3.125 milliGRAM(s) Oral every 12 hours  DULoxetine 30 milliGRAM(s) Oral daily  enalapril 2.5 milliGRAM(s) Oral two times a day  fludroCORTISONE 0.1 milliGRAM(s) Oral two times a day  hydroxychloroquine 200 milliGRAM(s) Oral two times a day  levETIRAcetam 500 milliGRAM(s) Oral two times a day    MEDICATIONS  (PRN):  acetaminophen   Tablet .. 650 milliGRAM(s) Oral every 6 hours PRN Mild Pain (1 - 3)  acetaminophen  IVPB .. 1000 milliGRAM(s) IV Intermittent once PRN Moderate Pain (4 - 6)  acetaminophen  IVPB .. 1000 milliGRAM(s) IV Intermittent once PRN Severe Pain (7 - 10)  celecoxib 200 milliGRAM(s) Oral daily PRN Moderate Pain (4 - 6)  hydrALAZINE Injectable 10 milliGRAM(s) IV Push every 6 hours PRN SBP >150  metoclopramide Injectable 5 milliGRAM(s) IV Push every 6 hours PRN nausea / vomiting  SUMAtriptan 50 milliGRAM(s) Oral daily PRN Migraine    TELEMETRY REVIEW:  5/4/2021: sinus 80s  5/5/2021:    ASSESSMENT AND PLAN:    79 y/o F w/ PMH of HTN, dyslipidemia, OA/RA, breast cancer s/p lumpectomy, MVR p/w  fall with retrograde amnesia likely syncope with no evidence of conscious attempt to protect herself with arms extension resulting in traumatic SAH    1. Traumatic Subarachnoid Hemorrhage due to fall  - CT w/ minimal subarachnoid hemorrhage in the RIGHT central sulcus at the convexity. No acute neurosurgical intervention needed  - BP control, neuro checks    - f/u repeat CT head results is stable, no extra/intra-cranial vascular dx  - Cont Keppra 500mg BID for 7 days (last dose on 5/8)  - keep ASA x 2 weeks.  - Maintain SBP < 150  - PT, NSGY    2. Syncope, orthostasis   - Stop Robaxin as it can cause hypotension, started Florinef for persistent orthostasis   - ZION socks  - Carotid US w/o significant internal carotid artery stenosis bilaterally.   - s/p ILR placement on 5/4.  - Echo w/ mildly reduced EF 45-50%  - EP, Cardio Consult    3. Urinary retention   - Valladares placed. Patient straight catheterized x3.  - bethanechol started 50mg BID.   - check UA > negative   - Urology consult    4. HTN | HLD  - Maintain SBP < 150. Cont. Coreg, enalapril     5. OA/RA  - Hydroxychloroquine, celebrex    6. Malnutrition  - nutrition eval. start ensure supp. drinks w/ meals.    7. DVT ppx  - SCDs     CHIEF COMPLAINT/DIAGNOSIS: Fall at a restaurant    HPI: 80 year old woman with a past medical history of HTN, HLD, OA/RA and Breast cancer who presented to the ED after a fall at a restaurant. as per the patient she was leaving the restaurant after lunch when she thinks she tripped up the stairs. The patient does not remember the fall. She denies any dizziness, lightheadedness or palpitations prior to the fall. Her family brought her to the ED because she had a laceration on her chin and ecchymosis and hematoma around her left eye. CT of the head was done which showed a small subarachnoid hemorrhage in the right central sulcus at the convexity. Pt c/o mild headache, no nausea or vomiting, she denies numbness or weakness, she denies any changes in bowl or bladder function.  (01 May 2021 16:05)    5/4/2021: c/o bladder fullness, reports not hydrating well.   5/5/2021: was able to ambulate in halls had some dizziness after walking down hallway 3 times. intermittent loose stools. low concern for infectious etiology.     REVIEW OF SYSTEMS:  All other review of systems is negative unless indicated above    PHYSICAL EXAM:  Constitutional: Awake and alert, cachechtic  HEENT: PERR, EOMI, Normal Hearing, MMM  Neck: Soft and supple  Respiratory: Breath sounds are clear bilaterally  Cardiovascular: S1 and S2, regular rate and rhythm, no Murmurs, gallops or rubs  Gastrointestinal: Bowel Sounds present, soft, nontender, nondistended, no guarding, no rebound: : BETTENCOURT  Extremities: No peripheral edema  Vascular: 2+ peripheral pulses  Neurological: A/O x 3, no focal deficits  Musculoskeletal: 5/5 strength b/l upper and lower extremities  Skin: No rashes, LT sided facial ecchymosis, stitches     Vital Signs Last 24 Hrs  T(C): 36.4 (05 May 2021 02:45), Max: 36.6 (04 May 2021 11:21)  T(F): 97.6 (05 May 2021 02:45), Max: 97.8 (04 May 2021 11:21)  HR: 77 (05 May 2021 02:45) (77 - 88)  BP: 137/66 (05 May 2021 02:45) (118/63 - 142/86)  BP(mean): --  RR: 19 (05 May 2021 02:45) (16 - 20)  SpO2: 94% (05 May 2021 02:45) (94% - 98%)    LABS: All Labs Reviewed:    05-04    139  |  107  |  10  ----------------------------<  121<H>  4.2   |  27  |  0.86    Ca    9.2      04 May 2021 10:29  Mg     2.1     05-04    RADIOLOGY:  < from: US Duplex Carotid Arteries Complete, Bilateral (05.03.21 @ 12:21) >  IMPRESSION: Mild plaque in the carotid bulbs with no evidence of a hemodynamically significant internal carotid artery stenosis bilaterally.  < end of copied text >    < from: CT Angio Head w/ IV Cont (05.02.21 @ 10:02) >  IMPRESSION: Normal brain CT for patient's age. No change since 5/1/2021. Normal CTA of the head and neck. No significant carotid stenosis. CT angiogram of the intracranial circulation  < end of copied text >    < from: CT Head No Cont (05.01.21 @ 22:09) >  IMPRESSION:  Stable trace amount of subarachnoid hemorrhage in the right central sulcus.  < end of copied text >    < from: CT Maxillofacial No Cont (05.01.21 @ 14:28) >  IMPRESSION:  Cervical spine: No vertebral fracture is recognized.  Focal kyphosis at C5-C6 on a degenerative basis. Grade 1 anterior spondylolisthesis is noted at C3-4 and C4-5.   Multilevel degenerative disc disease and spondylosis at C3-4 through C7-T1 with loss of disc height and associated degenerative endplate changes. There is narrowing of the RIGHT C3-4, RIGHT C4-5 and LEFT C6-7 neural foramina due to uncovertebral spurring and facet osteophytic hypertrophy. Degenerative cord impingement is seen at C4-5 through C6-7.  Facial bones:  Facial bones intact without fracture.    Surgical small lenses. Minimal LEFT periorbital soft tissue swelling is noted.   3 cm LEFT buccal hematoma with associated soft tissue swelling is noted.  Brain:   minimal subarachnoid hemorrhage in the RIGHT central sulcus at the convexity.  < end of copied text >    ECHOCARDIOGRAM:  < from: TTE Echo Complete w/o Contrast w/ Doppler (05.03.21 @ 12:37) >   The left ventricle is normal in size and wall thickness.   Mild, diffuse hypokinesis of the left ventricle is present with an overall   estimated EF of 45-50%.   The left atrium is mildly dilated.   Normal appearing right atrium.   Patient has a history of a PFO as noted within the 05/21/2015 [facility] Exam.   A PFO is not noted with color Doppler during this exam.   Fibrocalcific changes noted to the Aortic valve leaflets with preserved   leaflet excursion.   No aortic regurgitation is present.   Well seated bioprosthetic valve in the mitral position. Mean   trans-prosthetic gradient is within normal limitations for this type of   prosthesis.   Trace mitral regurgitation is present.   EA reversal of the mitral inflow consistentwith reduced compliance of the   left ventricle.   Normal appearing tricuspid valve structure.   Mild (1+) tricuspid valve regurgitation is present.  < end of copied text >    MEDICATIONS  (STANDING):  bethanechol 50 milliGRAM(s) Oral two times a day  carvedilol 3.125 milliGRAM(s) Oral every 12 hours  DULoxetine 30 milliGRAM(s) Oral daily  enalapril 2.5 milliGRAM(s) Oral two times a day  fludroCORTISONE 0.1 milliGRAM(s) Oral two times a day  hydroxychloroquine 200 milliGRAM(s) Oral two times a day  levETIRAcetam 500 milliGRAM(s) Oral two times a day  multivitamin 1 Tablet(s) Oral daily    MEDICATIONS  (PRN):  acetaminophen   Tablet .. 650 milliGRAM(s) Oral every 6 hours PRN Mild Pain (1 - 3)  celecoxib 200 milliGRAM(s) Oral daily PRN Moderate Pain (4 - 6)  metoclopramide Injectable 5 milliGRAM(s) IV Push every 6 hours PRN nausea / vomiting  SUMAtriptan 50 milliGRAM(s) Oral daily PRN Migraine    TELEMETRY REVIEW:  5/5/2021: sinus 80s    ASSESSMENT AND PLAN:    79 y/o F w/ PMH of HTN, dyslipidemia, OA/RA, breast cancer s/p lumpectomy, MVR p/w  fall with retrograde amnesia likely syncope with no evidence of conscious attempt to protect herself with arms extension resulting in traumatic SAH    1. Traumatic Subarachnoid Hemorrhage due to fall  - CT w/ minimal subarachnoid hemorrhage in the RIGHT central sulcus at the convexity. No acute neurosurgical intervention needed  - BP control, neuro checks    - f/u repeat CT head results is stable, no extra/intra-cranial vascular dx  - Cont Keppra 500mg BID for 7 days (last dose on 5/8)  - keep ASA held x2 weeks.  - Maintain SBP < 150  - Chin Stitches -- due to be removed later this week  - PT, NSGY consult     2. Syncope, orthostasis   - Stop Robaxin as it can cause hypotension, started Florinef for persistent orthostasis   - ZION socks  - Carotid US w/o significant internal carotid artery stenosis bilaterally.   - s/p ILR placement on 5/4.  - Echo w/ mildly reduced EF 45-50%  - EP, Cardio Consult  5/5: repeat orthostatics td.     3. Urinary retention   - Bettencourt placed. Patient straight catheterized x3.  - bethanechol started 50mg BID.   - check UA > negative   - Urology consult appreciated -- needs urodynamic studies outpt. w/ Chiki or Skylar (her urologist)    4. HTN | HLD  - Maintain SBP < 150. Cont. Coreg, enalapril     5. OA/RA  - Hydroxychloroquine, celebrex, Tramadol  - Follows w/ MD Barragan    6. Malnutrition  - Nutrition eval. Start ensure supp. drinks w/ meals.    7. DVT ppx  - SCDs     CHIEF COMPLAINT/DIAGNOSIS: Fall at a restaurant    HPI: 80 year old woman with a past medical history of HTN, HLD, OA/RA and Breast cancer who presented to the ED after a fall at a restaurant. as per the patient she was leaving the restaurant after lunch when she thinks she tripped up the stairs. The patient does not remember the fall. She denies any dizziness, lightheadedness or palpitations prior to the fall. Her family brought her to the ED because she had a laceration on her chin and ecchymosis and hematoma around her left eye. CT of the head was done which showed a small subarachnoid hemorrhage in the right central sulcus at the convexity. Pt c/o mild headache, no nausea or vomiting, she denies numbness or weakness, she denies any changes in bowl or bladder function.  (01 May 2021 16:05)    5/4/2021: c/o bladder fullness, reports not hydrating well.   5/5/2021: was able to ambulate in halls had some dizziness after walking down hallway 3 times. intermittent loose stools. low concern for infectious etiology.     REVIEW OF SYSTEMS:  All other review of systems is negative unless indicated above     PHYSICAL EXAM:  Constitutional: Awake and alert, cachechtic  HEENT: PERR, EOMI, Normal Hearing, MMM  Neck: Soft and supple  Respiratory: Breath sounds are clear bilaterally  Cardiovascular: S1 and S2, regular rate and rhythm, no Murmurs, gallops or rubs  Gastrointestinal: Bowel Sounds present, soft, nontender, nondistended, no guarding, no rebound: : BETTENCOURT  Extremities: No peripheral edema  Vascular: 2+ peripheral pulses  Neurological: A/O x 3, no focal deficits  Musculoskeletal: 5/5 strength b/l upper and lower extremities  Skin: No rashes, LT sided facial ecchymosis, stitches     Vital Signs Last 24 Hrs  T(C): 36.4 (05 May 2021 02:45), Max: 36.6 (04 May 2021 11:21)  T(F): 97.6 (05 May 2021 02:45), Max: 97.8 (04 May 2021 11:21)  HR: 77 (05 May 2021 02:45) (77 - 88)  BP: 137/66 (05 May 2021 02:45) (118/63 - 142/86)  BP(mean): --  RR: 19 (05 May 2021 02:45) (16 - 20)  SpO2: 94% (05 May 2021 02:45) (94% - 98%)    LABS: All Labs Reviewed:    05-04    139  |  107  |  10  ----------------------------<  121<H>  4.2   |  27  |  0.86    Ca    9.2      04 May 2021 10:29  Mg     2.1     05-04    RADIOLOGY:  < from: US Duplex Carotid Arteries Complete, Bilateral (05.03.21 @ 12:21) >  IMPRESSION: Mild plaque in the carotid bulbs with no evidence of a hemodynamically significant internal carotid artery stenosis bilaterally.  < end of copied text >    < from: CT Angio Head w/ IV Cont (05.02.21 @ 10:02) >  IMPRESSION: Normal brain CT for patient's age. No change since 5/1/2021. Normal CTA of the head and neck. No significant carotid stenosis. CT angiogram of the intracranial circulation  < end of copied text >    < from: CT Head No Cont (05.01.21 @ 22:09) >  IMPRESSION:  Stable trace amount of subarachnoid hemorrhage in the right central sulcus.  < end of copied text >    < from: CT Maxillofacial No Cont (05.01.21 @ 14:28) >  IMPRESSION:  Cervical spine: No vertebral fracture is recognized.  Focal kyphosis at C5-C6 on a degenerative basis. Grade 1 anterior spondylolisthesis is noted at C3-4 and C4-5.   Multilevel degenerative disc disease and spondylosis at C3-4 through C7-T1 with loss of disc height and associated degenerative endplate changes. There is narrowing of the RIGHT C3-4, RIGHT C4-5 and LEFT C6-7 neural foramina due to uncovertebral spurring and facet osteophytic hypertrophy. Degenerative cord impingement is seen at C4-5 through C6-7.  Facial bones:  Facial bones intact without fracture.    Surgical small lenses. Minimal LEFT periorbital soft tissue swelling is noted.   3 cm LEFT buccal hematoma with associated soft tissue swelling is noted.  Brain:   minimal subarachnoid hemorrhage in the RIGHT central sulcus at the convexity.  < end of copied text >    ECHOCARDIOGRAM:  < from: TTE Echo Complete w/o Contrast w/ Doppler (05.03.21 @ 12:37) >   The left ventricle is normal in size and wall thickness.   Mild, diffuse hypokinesis of the left ventricle is present with an overall   estimated EF of 45-50%.   The left atrium is mildly dilated.   Normal appearing right atrium.   Patient has a history of a PFO as noted within the 05/21/2015 [facility] Exam.   A PFO is not noted with color Doppler during this exam.   Fibrocalcific changes noted to the Aortic valve leaflets with preserved   leaflet excursion.   No aortic regurgitation is present.   Well seated bioprosthetic valve in the mitral position. Mean   trans-prosthetic gradient is within normal limitations for this type of   prosthesis.   Trace mitral regurgitation is present.   EA reversal of the mitral inflow consistentwith reduced compliance of the   left ventricle.   Normal appearing tricuspid valve structure.   Mild (1+) tricuspid valve regurgitation is present.  < end of copied text >    MEDICATIONS  (STANDING):  bethanechol 50 milliGRAM(s) Oral two times a day  carvedilol 3.125 milliGRAM(s) Oral every 12 hours  DULoxetine 30 milliGRAM(s) Oral daily  enalapril 2.5 milliGRAM(s) Oral two times a day  fludroCORTISONE 0.1 milliGRAM(s) Oral two times a day  hydroxychloroquine 200 milliGRAM(s) Oral two times a day  levETIRAcetam 500 milliGRAM(s) Oral two times a day  multivitamin 1 Tablet(s) Oral daily    MEDICATIONS  (PRN):  acetaminophen   Tablet .. 650 milliGRAM(s) Oral every 6 hours PRN Mild Pain (1 - 3)  celecoxib 200 milliGRAM(s) Oral daily PRN Moderate Pain (4 - 6)  metoclopramide Injectable 5 milliGRAM(s) IV Push every 6 hours PRN nausea / vomiting  SUMAtriptan 50 milliGRAM(s) Oral daily PRN Migraine    TELEMETRY REVIEW:  5/5/2021: sinus 80s    ASSESSMENT AND PLAN:    79 y/o F w/ PMH of HTN, dyslipidemia, OA/RA, breast cancer s/p lumpectomy, MVR p/w  fall with retrograde amnesia likely syncope with no evidence of conscious attempt to protect herself with arms extension resulting in traumatic SAH    1. Traumatic Subarachnoid Hemorrhage due to fall  - CT w/ minimal subarachnoid hemorrhage in the RIGHT central sulcus at the convexity. No acute neurosurgical intervention needed  - BP control, neuro checks    - f/u repeat CT head results is stable, no extra/intra-cranial vascular dx  - Cont Keppra 500mg BID for 7 days (last dose on 5/8)  - keep ASA held x2 weeks.  - Maintain SBP < 150  - Chin Stitches -- due to be removed later this week  - PT, NSGY consult     2. Syncope, orthostasis   - Stop Robaxin as it can cause hypotension, started Florinef for persistent orthostasis   - ZION socks  - Carotid US w/o significant internal carotid artery stenosis bilaterally.   - s/p ILR placement on 5/4.  - Echo w/ mildly reduced EF 45-50%  - EP, Cardio Consult  5/5: repeat orthostatics td.     3. Urinary retention   - Bettencourt placed. Patient straight catheterized x3.  - bethanechol started 50mg BID.   - check UA > negative   - Urology consult appreciated -- needs urodynamic studies outpt. w/ Chiki or Skylar (her urologist)    4. HTN | HLD  - Maintain SBP < 150. Cont. Coreg, enalapril     5. OA/RA  - Hydroxychloroquine, celebrex, Tramadol  - Follows w/ MD Barragan    6. Malnutrition  - Nutrition eval. Start ensure supp. drinks w/ meals.    7. DVT ppx  - SCDs

## 2021-05-05 NOTE — DIETITIAN INITIAL EVALUATION ADULT. - OTHER INFO
Pt is a very pleasant 80 year old woman with a past medical history of HTN, HLD, OA/RA and Breast cancer who presented to the ED after a fall at a restaurant. as per the patient she was leaving the restaurant after lunch when she thinks she tripped up the stairs. The patient does not remember the fall. She denies any dizziness, lightheadedness or palpitations prior to the fall. Her family brought her to the ED because she had a laceration on her chin and ecchymosis and hematoma around her left eye. CT of the head was done which showed a small subarachnoid hemorrhage in the right central sulcus at the convexity. Pt c/o mild headache, no nausea or vomiting, she denies numbness or weakness, she denies any changes in bowl or bladder function.     Pt seen for assessment. Pt seen for assessment for malnutrition. Pt does present with very clear signs of muscle and fat wasting, but difficult to get clear information regarding pt's nutritional status. Pt at first stated her appetite isn't good, but then states she doesn't eat a lot. When asked about weight pt states she actually thinks she has been gaining weight. Pt then reports that since her husbands death she has difficulty preparing meals for herself and does not feel like cooking. It appears prior to husbands death pt wasn't eating well and lost weight, uncertain if pt has actually gained weight but likely signs of muscle and fat wasting are related to poor PO intake while caring for . Pt's intake may also be decreased after husbands death. Pt denies c/s difficulty and denies n/v/d/c. Pt's NFPE showed clear signs of muscle and fat wasting (of note varying degrees around the body). Pt hasn't had ensure before and was unsure why she is getting it. RD stressed that pt should try to eat more and focus on good sources of protein, but pt states she is unsure why RD is here as she feels she eats enough. Encourage pt to try and take supplement daily to help meet protein needs. Pt agreeable.

## 2021-05-05 NOTE — DIETITIAN NUTRITION RISK NOTIFICATION - TREATMENT: THE FOLLOWING DIET HAS BEEN RECOMMENDED
Diet, DASH/TLC:   Sodium & Cholesterol Restricted  Supplement Feeding Modality:  Oral  Ensure Enlive Cans or Servings Per Day:  1       Frequency:  Two Times a day (05-04-21 @ 11:57) [Active]

## 2021-05-05 NOTE — PROGRESS NOTE ADULT - SUBJECTIVE AND OBJECTIVE BOX
HPI:  Pt is a very pleasant 80 year old woman with a past medical history of HTN, HLD, OA/RA and Breast cancer who presented to the ED after a fall at a restaurant. as per the patient she was leaving the restaurant after lunch when she thinks she tripped up the stairs. The patient does not remember the fall. She denies any dizziness, lightheadedness or palpitations prior to the fall. Her family brought her to the ED because she had a laceration on her chin and ecchymosis and hematoma around her left eye. CT of the head was done which showed a small subarachnoid hemorrhage in the right central sulcus at the convexity. Pt c/o mild headache, no nausea or vomiting, she denies numbness or weakness, she denies any changes in bowl or bladder function.     5/2 - Pt seen and examined earlier today, in NAD. Appears comfortable, denies new complaints, no overnight events     5/3- Patient seen and examined. + Orthostatics complains of headache, blurry vision, dizziness.     5/4- Pt seen and examine, c/o mild headache, has had orthostatic hypotension, started on florinef    5/5- Pt seen and examined, c/o mild headache, +b/l shoulder pain due to arthritis, has titus cath in place due to urinary retention, had LOOP recorder placed yesterday     Vital Signs Last 24 Hrs  T(C): 36.6 (04 May 2021 20:41), Max: 36.6 (04 May 2021 11:21)  T(F): 97.8 (04 May 2021 20:41), Max: 97.8 (04 May 2021 11:21)  HR: 88 (04 May 2021 22:14) (78 - 88)  BP: 122/80 (04 May 2021 22:14) (118/63 - 142/86)  RR: 19 (04 May 2021 20:41) (16 - 20)  SpO2: 94% (04 May 2021 20:41) (94% - 98%)    MEDICATIONS  (STANDING):  bethanechol 50 milliGRAM(s) Oral two times a day  carvedilol 3.125 milliGRAM(s) Oral every 12 hours  DULoxetine 30 milliGRAM(s) Oral daily  enalapril 2.5 milliGRAM(s) Oral two times a day  fludroCORTISONE 0.1 milliGRAM(s) Oral two times a day  hydroxychloroquine 200 milliGRAM(s) Oral two times a day  levETIRAcetam 500 milliGRAM(s) Oral two times a day  multivitamin 1 Tablet(s) Oral daily    MEDICATIONS  (PRN):  acetaminophen   Tablet .. 650 milliGRAM(s) Oral every 6 hours PRN Mild Pain (1 - 3)  celecoxib 200 milliGRAM(s) Oral daily PRN Moderate Pain (4 - 6)  metoclopramide Injectable 5 milliGRAM(s) IV Push every 6 hours PRN nausea / vomiting  SUMAtriptan 50 milliGRAM(s) Oral daily PRN Migraine    ROS: pertinent positives in HPI, all other ROS were reviewed and are negative     PHYSICAL EXAM:  Constitutional: awake and alert  HEENT: PERRLA, EOMI  Neck: Supple  Respiratory: Breath sounds are clear bilaterally  Cardiovascular: S1 and S2, regular rhythm  Gastrointestinal: soft, nontender  Extremities:  no edema  Musculoskeletal: no abnormal movements  Skin: ecchymosis and hematoma to left periorbital area, improving      Neurological exam:  HF: A x O x 3. Appropriately interactive, normal affect. Speech fluent, No Aphasia or paraphasic errors. Naming/repetition intact   CN: PEARRL, EOMI, facial sensation normal, no NLFD, tongue midline  Motor: No pronator drift, Strength 5/5 in all 4 ext, normal bulk and tone, no tremor, rigidity or bradykinesia.    Sens: Intact to light touch  Reflexes: Symmetric and normal, downgoing toes b/l  Coord:  No FNFA, dysmetria, SAI intact   Gait/Balance: Not tested      RADIOLOGY:  < from: CT Angio Head w/ IV Cont (05.02.21 @ 10:02) >  IMPRESSION: Normal brain CT for patient's age. No change since 5/1/2021. Normal CTA of the head and neck. No significant carotid stenosis. CT angiogram of the intracranial circulation    < from: CT Head No Cont (05.01.21 @ 22:09) >  There is again noted a trace amount of subarachnoid hemorrhage in the right central sulcus. There is no interval increase in volume of blood products or new intracranial hemorrhage. There is no significant mass effect or shift of the midline. The basal cisterns are not effaced. There is mild cerebral volume loss with prominence of the ventricles and sulci. There are minimal chronic ischemic changes in the frontoparietal white matter. There is no CT evidence of an acute vascular territorial infarct. There are atherosclerotic calcifications of the intracranial carotid arteries.    There is left-sided premalar soft tissue swelling. The skull base and bony calvarium are intact. The visualized paranasal sinuses and tympanic/mastoid cavities are clear apart from minimal ethmoid mucosal thickening. There is evidence of bilateral lens surgery.    IMPRESSION:  Stable trace amount of subarachnoid hemorrhage in the right central sulcus.      LABS:     05-04    139  |  107  |  10  ----------------------------<  121<H>  4.2   |  27  |  0.86    Ca    9.2      04 May 2021 10:29  Mg     2.1     05-04

## 2021-05-05 NOTE — DISCHARGE NOTE PROVIDER - DETAILS OF MALNUTRITION DIAGNOSIS/DIAGNOSES
This patient has been assessed with a concern for Malnutrition and was treated during this hospitalization for the following Nutrition diagnosis/diagnoses:     -  05/05/2021: Severe protein-calorie malnutrition

## 2021-05-05 NOTE — DISCHARGE NOTE PROVIDER - NSDCCAREPROVSEEN_GEN_ALL_CORE_FT
Zaynab Avila (Hospitalist)  Santos Monet (Urologist)  Valentin Campbell (Cardiologist)  Jocelyn Payne (Nurse Practitioner)  Dr. Min (Neurosurgery) Santos Monet (Urologist)  Valentin Campbell (Cardiologist)  Jocelyn Payne (Nurse Practitioner)  Dr. Min (Neurosurgery)  Angie Rosenthal

## 2021-05-05 NOTE — DISCHARGE NOTE NURSING/CASE MANAGEMENT/SOCIAL WORK - PATIENT PORTAL LINK FT
You can access the FollowMyHealth Patient Portal offered by [facility] by registering at the following website: [email or web address]. By joining [employer]’s FollowMyHealth portal, you will also be able to view your health information using other applications (apps) compatible with our system.

## 2021-05-05 NOTE — DIETITIAN INITIAL EVALUATION ADULT. - MALNUTRITION
Meets criteria for Severe protein-calorie malnutrition in the context of social and environmental circumstance

## 2021-05-05 NOTE — DISCHARGE NOTE PROVIDER - NSDCCPCAREPLAN_GEN_ALL_CORE_FT
PRINCIPAL DISCHARGE DIAGNOSIS  Diagnosis: SAH (subarachnoid hemorrhage)  Assessment and Plan of Treatment: - You were admitted due to fall and found to have a small subarachnoid hemorrhage. No acute neurosurgical intervention was needed.  - Continue Keppra 500mg twice a day until 5/8.  - Hold your Aspirin 81 mg for two weeks total ~ resume aspirin on May 16th.      SECONDARY DISCHARGE DIAGNOSES  Diagnosis: Chin laceration  Assessment and Plan of Treatment: - Due to your fall you had a chin laceration and recieved stitches. Please have your stitches removed on Thursday (tomorrow) or Friday.    Diagnosis: Orthostatic hypotension  Assessment and Plan of Treatment: - Your fall was likely due to orthostatic hypotension. Orthostatic hypotension is a drop in blood pressure that occurs when moving from a laying down (supine) position to a standing (upright) position.  - Continue Florinef once a day. ZION socks while awake.  - Continue to stay well hydrated!  - Continue to monitor your blood pressure 1 to 2 times a day and keep a log for your cardiologist.   - Follow up with your cardiologist Dr. Hdz in 1 to 2 weeks after discharge for further management - Call to make an appointment    Diagnosis: Urinary retention  Assessment and Plan of Treatment: - You were found to have urinary retention. Continue Bethanchol 50mg twice a day (new medication). Follow up with your urologist after discharge for outpatient urodynamic studies - Dr. Monet - Call to make an appointment.  You will be discharge with the titus catheter. How do I care for my catheter and drainage bag?   - Wash your hands often. Wash before and after you touch your catheter, tubing, or drainage bag.   - Clean your genital area 2 times every day. For men: Use a soapy cloth to clean the tip of your penis. Start where the catheter enters  - Secure the catheter tube so you do not pull or move the catheter. This helps prevent pain and bladder spasms.   - Keep the drainage bag below the level of your waist. This helps stop urine from moving back up the tubing and into your bladder  - Empty the drainage bag when needed. The weight of a full drainage bag can be painful. Empty the drainage bag every 3 to 6 hours or when it is full.     PRINCIPAL DISCHARGE DIAGNOSIS  Diagnosis: SAH (subarachnoid hemorrhage)  Assessment and Plan of Treatment: - You were admitted due to fall and found to have a small subarachnoid hemorrhage. No acute neurosurgical intervention was needed.  - Continue Keppra 500mg twice a day until 5/8.  - Hold your Aspirin 81 mg for two weeks total ~ resume aspirin on May 16th.      SECONDARY DISCHARGE DIAGNOSES  Diagnosis: Chin laceration  Assessment and Plan of Treatment: - Due to your fall you had a chin laceration and recieved stitches which were removed prior to discharge    Diagnosis: Orthostatic hypotension  Assessment and Plan of Treatment: - Your fall was likely due to orthostatic hypotension. Orthostatic hypotension is a drop in blood pressure that occurs when moving from a laying down (supine) position to a standing (upright) position.  - Continue Florinef once a day. ZION socks while awake.  - Continue to stay well hydrated!  - Continue to monitor your blood pressure 1 to 2 times a day and keep a log for your cardiologist.   - Follow up with your cardiologist Dr. Hdz in 1 to 2 weeks after discharge for further management - Call to make an appointment    Diagnosis: Urinary retention  Assessment and Plan of Treatment: - You were found to have urinary retention. Continue Bethanchol 50mg twice a day (new medication). Follow up with your urologist after discharge for outpatient urodynamic studies - Dr. Monet - Call to make an appointment.  You will be discharge with the titus catheter. How do I care for my catheter and drainage bag?   - Wash your hands often. Wash before and after you touch your catheter, tubing, or drainage bag.   - Clean your genital area 2 times every day. For men: Use a soapy cloth to clean the tip of your penis. Start where the catheter enters  - Secure the catheter tube so you do not pull or move the catheter. This helps prevent pain and bladder spasms.   - Keep the drainage bag below the level of your waist. This helps stop urine from moving back up the tubing and into your bladder  - Empty the drainage bag when needed. The weight of a full drainage bag can be painful. Empty the drainage bag every 3 to 6 hours or when it is full.

## 2021-05-05 NOTE — CONSULT NOTE ADULT - ASSESSMENT
80 year old woman who  presented after a traumatic fall, with no prodromal symstoms and found to have a SAH.  Positive Orthostatic on 5/1/2021-Robaxin was discontinued to prevent syncope.  Per Dr. Campbell pt will start on Florinef if orthostatic changes continue.  She routinely follows as an out patient with Dr. Campbell.  There are no signs of conduction disease on EKG or CM  She has a past history of Breast Cancer and was on a prescription medication over the last week to treat incontinence  She has a mail away pharmacy and doesn't recall te name of the medication.    Continue to monitor this pt  Consider ILR on discharge  Continue to hydrate   Maintain normal lytes and Mg level  Await echo results   Continue with Coreg  Repeat orthostatic Vital sign  Avoid any medications that result in syncope    Plan to be discussed with Dr. Barboza or Dr. Briseno  
81 yo female with PMH as above admitted for fall/ Subarachnoid hemorrhage. Urology consulted for pt with urinary retention requiring straight cath x3 with PVRs 800cc, 600cc and 700cc requiring indwelling titus. Patient seen at bedside reports she follows up with Dr. Kwon (urologist) and was found to have urge incontinence and was placed on a medication which made her "dizzy" pt unsure of the medication. Patient reports she is no longer on any medications for her bladder and recently found out she has overflow incontinence.  UA with no evidence of UTI  Recommend  - Continue Bethanechol 50 mg BID x 30 days  - Follow up with her urologist or Dr. Monet in 2 weeks for trial of void/urodynamics    Case discussed with Dr. Monet  
5/2/21:  Pt with above history with prior HTN controlled on meds with sudden fall and ? syncope with resultant head injury and small but stable subarachnoid hemorrhage.  She did not injure her hand during the fall and this not normal alert reaction to put her hands out to block her fall and thus may have been a hypotensive syncopal event...vaso-vagal (doubt), orthostatic hypotension (???) aggravated by her meds (???)  possible, autonomic dysfunction (???).  Scheduled to have a CTA of the carotids and brain today and ECHO ordered as well.  Dr Campbell will be back in the AM and follow up.
  81 y/o F w/ PMH of HTN, dyslipidemia, OA/RA, breast cancer s/p lumpectomy, MVR, p/w possible syncope    *Subarachnoid Hemorrhage  -Management as per Neurosurgery team    -H/o HTN -> Aggressive BP control  -PT consult  -Will hold NSAIDs   -Neuro checks   -Fall precautions     *Syncope  -Cardio consult for Dr. Campbell  -Tele monitoring  -EKG reviewed  -Troponins Negative x 2  -Orthostatics  -Will need neuro work up for syncope if cardiac work up is negative     *H/o dyslipidemia / OA / RA / breast cancer / MVR  -C/w home meds and f/u outpatient for further management if conditions remain stable during hospitalization     *DVT ppx  -SCDs

## 2021-05-05 NOTE — DIETITIAN INITIAL EVALUATION ADULT. - PERTINENT MEDS FT
MEDICATIONS  (STANDING):  bethanechol 50 milliGRAM(s) Oral two times a day  carvedilol 3.125 milliGRAM(s) Oral every 12 hours  DULoxetine 30 milliGRAM(s) Oral daily  enalapril 2.5 milliGRAM(s) Oral two times a day  fludroCORTISONE 0.1 milliGRAM(s) Oral two times a day  hydroxychloroquine 200 milliGRAM(s) Oral two times a day  levETIRAcetam 500 milliGRAM(s) Oral two times a day  multivitamin 1 Tablet(s) Oral daily    MEDICATIONS  (PRN):  acetaminophen   Tablet .. 650 milliGRAM(s) Oral every 6 hours PRN Mild Pain (1 - 3)  celecoxib 200 milliGRAM(s) Oral daily PRN Moderate Pain (4 - 6)  metoclopramide Injectable 5 milliGRAM(s) IV Push every 6 hours PRN nausea / vomiting  SUMAtriptan 50 milliGRAM(s) Oral daily PRN Migraine

## 2021-05-05 NOTE — DISCHARGE NOTE PROVIDER - NSDCMRMEDTOKEN_GEN_ALL_CORE_FT
acetaminophen 325 mg oral tablet: 2 tab(s) orally every 6 hours, As needed, Moderate Pain (4 - 6)  B Complex 50: 1 tab(s) orally once a day  CeleBREX 200 mg oral capsule: 1 cap(s) orally once a day, As Needed for arthritis pain  Coreg 3.125 mg oral tablet: 1 tab(s) orally 2 times a day  Cymbalta 30 mg oral delayed release capsule: 1 cap(s) orally once a day  enalapril 2.5 mg oral tablet: 1 tab(s) orally 2 times a day  Fish Oil 1000 mg oral capsule: 1 tab(s) orally once a day  Imitrex 100 mg oral tablet: 1 tab(s) orally once, As Needed migraines  Plaquenil 200 mg oral tablet: 1 tab(s) orally once a day  SUMAtriptan 50 mg oral tablet: 1 tab(s) orally every 6 hours, As needed, migraine  traMADol 100 mg/24 hours oral capsule, extended release: orally once a day, As Needed  Vitamin D3 1000 intl units oral tablet: 1 tab(s) orally once a day   acetaminophen 325 mg oral tablet: 2 tab(s) orally every 6 hours, As needed, Moderate Pain (4 - 6)  Aspirin Enteric Coated 81 mg oral delayed release tablet: 1 tab(s) orally once a day (DO NOT RESUME UNTIL 5/16/2021)  B Complex 50: 1 tab(s) orally once a day  bethanechol 50 mg oral tablet: 1 tab(s) orally 2 times a day  CeleBREX 200 mg oral capsule: 1 cap(s) orally once a day, As Needed for arthritis pain  Coreg 3.125 mg oral tablet: 1 tab(s) orally 2 times a day  Cymbalta 30 mg oral delayed release capsule: 1 cap(s) orally once a day  enalapril 2.5 mg oral tablet: 1 tab(s) orally 2 times a day  Fish Oil 1000 mg oral capsule: 1 tab(s) orally once a day  fludrocortisone 0.1 mg oral tablet: 1 tab(s) orally 2 times a day  levETIRAcetam 500 mg oral tablet: 1 tab(s) orally 2 times a day (until 5/8/2021)  Plaquenil 200 mg oral tablet: 1 tab(s) orally once a day  SUMAtriptan 50 mg oral tablet: 1 tab(s) orally every 6 hours, As needed, migraine  traMADol 50 mg oral tablet: 2 tab(s) orally every 6 hours, As Needed  Vitamin D3 1000 intl units oral tablet: 1 tab(s) orally once a day   acetaminophen 325 mg oral tablet: 2 tab(s) orally every 6 hours, As needed, Moderate Pain (4 - 6)  Aspirin Enteric Coated 81 mg oral delayed release tablet: 1 tab(s) orally once a day (DO NOT RESUME UNTIL 5/16/2021)  B Complex 50: 1 tab(s) orally once a day  bethanechol 50 mg oral tablet: 1 tab(s) orally 2 times a day  CeleBREX 200 mg oral capsule: 1 cap(s) orally once a day, As Needed for arthritis pain  Coreg 3.125 mg oral tablet: 1 tab(s) orally 2 times a day  Cymbalta 30 mg oral delayed release capsule: 1 cap(s) orally once a day  enalapril 2.5 mg oral tablet: 1 tab(s) orally 2 times a day  Fish Oil 1000 mg oral capsule: 1 tab(s) orally once a day  fludrocortisone 0.1 mg oral tablet: 1 tab(s) orally 2 times a day  levETIRAcetam 500 mg oral tablet: 1 tab(s) orally 2 times a day (until 5/8/2021)  Plaquenil 200 mg oral tablet: 1 tab(s) orally once a day  predniSONE 5 mg oral tablet: 4 tab(s) orally once a day x 5 days  3 tab(s) orally once a day x 5 days  2 tab(s) orally once a day x 5 days  1 tab(s) orally once a day x 5 days  SUMAtriptan 50 mg oral tablet: 1 tab(s) orally every 6 hours, As needed, migraine  traMADol 50 mg oral tablet: 2 tab(s) orally every 6 hours, As Needed  Vitamin D3 1000 intl units oral tablet: 1 tab(s) orally once a day

## 2021-05-06 LAB
ADD ON TEST-SPECIMEN IN LAB: SIGNIFICANT CHANGE UP
ADD ON TEST-SPECIMEN IN LAB: SIGNIFICANT CHANGE UP
ERYTHROCYTE [SEDIMENTATION RATE] IN BLOOD: 18 MM/HR — SIGNIFICANT CHANGE UP (ref 0–20)
HCT VFR BLD CALC: 39.6 % — SIGNIFICANT CHANGE UP (ref 34.5–45)
HGB BLD-MCNC: 12.7 G/DL — SIGNIFICANT CHANGE UP (ref 11.5–15.5)
MCHC RBC-ENTMCNC: 31.5 PG — SIGNIFICANT CHANGE UP (ref 27–34)
MCHC RBC-ENTMCNC: 32.1 GM/DL — SIGNIFICANT CHANGE UP (ref 32–36)
MCV RBC AUTO: 98.3 FL — SIGNIFICANT CHANGE UP (ref 80–100)
PLATELET # BLD AUTO: 182 K/UL — SIGNIFICANT CHANGE UP (ref 150–400)
RBC # BLD: 4.03 M/UL — SIGNIFICANT CHANGE UP (ref 3.8–5.2)
RBC # FLD: 12.1 % — SIGNIFICANT CHANGE UP (ref 10.3–14.5)
WBC # BLD: 7.59 K/UL — SIGNIFICANT CHANGE UP (ref 3.8–10.5)
WBC # FLD AUTO: 7.59 K/UL — SIGNIFICANT CHANGE UP (ref 3.8–10.5)

## 2021-05-06 PROCEDURE — 99232 SBSQ HOSP IP/OBS MODERATE 35: CPT

## 2021-05-06 RX ORDER — LIDOCAINE 4 G/100G
1 CREAM TOPICAL EVERY 24 HOURS
Refills: 0 | Status: DISCONTINUED | OUTPATIENT
Start: 2021-05-06 | End: 2021-05-07

## 2021-05-06 RX ORDER — MORPHINE SULFATE 50 MG/1
2 CAPSULE, EXTENDED RELEASE ORAL ONCE
Refills: 0 | Status: DISCONTINUED | OUTPATIENT
Start: 2021-05-06 | End: 2021-05-06

## 2021-05-06 RX ADMIN — LEVETIRACETAM 500 MILLIGRAM(S): 250 TABLET, FILM COATED ORAL at 09:52

## 2021-05-06 RX ADMIN — CARVEDILOL PHOSPHATE 3.12 MILLIGRAM(S): 80 CAPSULE, EXTENDED RELEASE ORAL at 22:10

## 2021-05-06 RX ADMIN — Medication 200 MILLIGRAM(S): at 10:39

## 2021-05-06 RX ADMIN — LIDOCAINE 1 PATCH: 4 CREAM TOPICAL at 22:04

## 2021-05-06 RX ADMIN — FLUDROCORTISONE ACETATE 0.1 MILLIGRAM(S): 0.1 TABLET ORAL at 09:51

## 2021-05-06 RX ADMIN — Medication 1 TABLET(S): at 09:53

## 2021-05-06 RX ADMIN — Medication 2.5 MILLIGRAM(S): at 09:51

## 2021-05-06 RX ADMIN — LEVETIRACETAM 500 MILLIGRAM(S): 250 TABLET, FILM COATED ORAL at 22:10

## 2021-05-06 RX ADMIN — LIDOCAINE 1 PATCH: 4 CREAM TOPICAL at 19:00

## 2021-05-06 RX ADMIN — CARVEDILOL PHOSPHATE 3.12 MILLIGRAM(S): 80 CAPSULE, EXTENDED RELEASE ORAL at 09:52

## 2021-05-06 RX ADMIN — Medication 650 MILLIGRAM(S): at 12:45

## 2021-05-06 RX ADMIN — Medication 200 MILLIGRAM(S): at 22:11

## 2021-05-06 RX ADMIN — Medication 2.5 MILLIGRAM(S): at 22:10

## 2021-05-06 RX ADMIN — Medication 50 MILLIGRAM(S): at 22:10

## 2021-05-06 RX ADMIN — FLUDROCORTISONE ACETATE 0.1 MILLIGRAM(S): 0.1 TABLET ORAL at 22:10

## 2021-05-06 RX ADMIN — TRAMADOL HYDROCHLORIDE 100 MILLIGRAM(S): 50 TABLET ORAL at 23:00

## 2021-05-06 RX ADMIN — DULOXETINE HYDROCHLORIDE 30 MILLIGRAM(S): 30 CAPSULE, DELAYED RELEASE ORAL at 09:52

## 2021-05-06 RX ADMIN — MORPHINE SULFATE 2 MILLIGRAM(S): 50 CAPSULE, EXTENDED RELEASE ORAL at 11:16

## 2021-05-06 RX ADMIN — TRAMADOL HYDROCHLORIDE 100 MILLIGRAM(S): 50 TABLET ORAL at 08:25

## 2021-05-06 RX ADMIN — TRAMADOL HYDROCHLORIDE 100 MILLIGRAM(S): 50 TABLET ORAL at 22:09

## 2021-05-06 RX ADMIN — LIDOCAINE 1 PATCH: 4 CREAM TOPICAL at 10:31

## 2021-05-06 RX ADMIN — Medication 50 MILLIGRAM(S): at 09:51

## 2021-05-06 RX ADMIN — CELECOXIB 200 MILLIGRAM(S): 200 CAPSULE ORAL at 10:43

## 2021-05-06 RX ADMIN — Medication 40 MILLIGRAM(S): at 12:44

## 2021-05-06 NOTE — PROGRESS NOTE ADULT - NUTRITIONAL ASSESSMENT
This patient has been assessed with a concern for Malnutrition and has been determined to have a diagnosis/diagnoses of Severe protein-calorie malnutrition.    This patient is being managed with:   Diet DASH/TLC-  Sodium & Cholesterol Restricted  Supplement Feeding Modality:  Oral  Ensure Enlive Cans or Servings Per Day:  1       Frequency:  Two Times a day  Entered: May  4 2021 11:57AM

## 2021-05-06 NOTE — PROVIDER CONTACT NOTE (OTHER) - SITUATION
Hospitalist doctor is aware of consult.
Spoke to service is aware of the consult
DR SERVICE AWARE
 was here an saw patient.  Please fax discharge papers to [phone].
DR SERVICE AWARE
Spoke with Glenna in office to inform  of consult.
Spoke with answering service (Antonino) . Doctors office is aware of consult.

## 2021-05-06 NOTE — PROGRESS NOTE ADULT - SUBJECTIVE AND OBJECTIVE BOX
CHIEF COMPLAINT/DIAGNOSIS: Fall at a restaurant    HPI: 80 year old woman with a past medical history of HTN, HLD, OA/RA and Breast cancer who presented to the ED after a fall at a restaurant. as per the patient she was leaving the restaurant after lunch when she thinks she tripped up the stairs. The patient does not remember the fall. She denies any dizziness, lightheadedness or palpitations prior to the fall. Her family brought her to the ED because she had a laceration on her chin and ecchymosis and hematoma around her left eye. CT of the head was done which showed a small subarachnoid hemorrhage in the right central sulcus at the convexity. Pt c/o mild headache, no nausea or vomiting, she denies numbness or weakness, she denies any changes in bowl or bladder function.  (01 May 2021 16:05)    5/4/2021: c/o bladder fullness, reports not hydrating well.   5/5/2021: was able to ambulate in halls had some dizziness after walking down hallway 3 times. intermittent loose stools. low concern for infectious etiology.   5/6/2021:    REVIEW OF SYSTEMS:  All other review of systems is negative unless indicated above     PHYSICAL EXAM:  Constitutional: Awake and alert, cachechtic  HEENT: PERR, EOMI, Normal Hearing, MMM  Neck: Soft and supple  Respiratory: Breath sounds are clear bilaterally  Cardiovascular: S1 and S2, regular rate and rhythm, no Murmurs, gallops or rubs  Gastrointestinal: Bowel Sounds present, soft, nontender, nondistended, no guarding, no rebound: : BETTENCOURT  Extremities: No peripheral edema  Vascular: 2+ peripheral pulses  Neurological: A/O x 3, no focal deficits  Musculoskeletal: 5/5 strength b/l upper and lower extremities  Skin: No rashes, LT sided facial ecchymosis, stitches     Vital Signs Last 24 Hrs  T(C): 36.4 (05 May 2021 02:45), Max: 36.6 (04 May 2021 11:21)  T(F): 97.6 (05 May 2021 02:45), Max: 97.8 (04 May 2021 11:21)  HR: 77 (05 May 2021 02:45) (77 - 88)  BP: 137/66 (05 May 2021 02:45) (118/63 - 142/86)  BP(mean): --  RR: 19 (05 May 2021 02:45) (16 - 20)  SpO2: 94% (05 May 2021 02:45) (94% - 98%)    LABS: All Labs Reviewed:    05-04    139  |  107  |  10  ----------------------------<  121<H>  4.2   |  27  |  0.86    Ca    9.2      04 May 2021 10:29  Mg     2.1     05-04    RADIOLOGY:  < from: US Duplex Carotid Arteries Complete, Bilateral (05.03.21 @ 12:21) >  IMPRESSION: Mild plaque in the carotid bulbs with no evidence of a hemodynamically significant internal carotid artery stenosis bilaterally.  < end of copied text >    < from: CT Angio Head w/ IV Cont (05.02.21 @ 10:02) >  IMPRESSION: Normal brain CT for patient's age. No change since 5/1/2021. Normal CTA of the head and neck. No significant carotid stenosis. CT angiogram of the intracranial circulation  < end of copied text >    < from: CT Head No Cont (05.01.21 @ 22:09) >  IMPRESSION:  Stable trace amount of subarachnoid hemorrhage in the right central sulcus.  < end of copied text >    < from: CT Maxillofacial No Cont (05.01.21 @ 14:28) >  IMPRESSION:  Cervical spine: No vertebral fracture is recognized.  Focal kyphosis at C5-C6 on a degenerative basis. Grade 1 anterior spondylolisthesis is noted at C3-4 and C4-5.   Multilevel degenerative disc disease and spondylosis at C3-4 through C7-T1 with loss of disc height and associated degenerative endplate changes. There is narrowing of the RIGHT C3-4, RIGHT C4-5 and LEFT C6-7 neural foramina due to uncovertebral spurring and facet osteophytic hypertrophy. Degenerative cord impingement is seen at C4-5 through C6-7.  Facial bones:  Facial bones intact without fracture.    Surgical small lenses. Minimal LEFT periorbital soft tissue swelling is noted.   3 cm LEFT buccal hematoma with associated soft tissue swelling is noted.  Brain:   minimal subarachnoid hemorrhage in the RIGHT central sulcus at the convexity.  < end of copied text >    ECHOCARDIOGRAM:  < from: TTE Echo Complete w/o Contrast w/ Doppler (05.03.21 @ 12:37) >   The left ventricle is normal in size and wall thickness.   Mild, diffuse hypokinesis of the left ventricle is present with an overall   estimated EF of 45-50%.   The left atrium is mildly dilated.   Normal appearing right atrium.   Patient has a history of a PFO as noted within the 05/21/2015 [facility] Exam.   A PFO is not noted with color Doppler during this exam.   Fibrocalcific changes noted to the Aortic valve leaflets with preserved   leaflet excursion.   No aortic regurgitation is present.   Well seated bioprosthetic valve in the mitral position. Mean   trans-prosthetic gradient is within normal limitations for this type of   prosthesis.   Trace mitral regurgitation is present.   EA reversal of the mitral inflow consistentwith reduced compliance of the   left ventricle.   Normal appearing tricuspid valve structure.   Mild (1+) tricuspid valve regurgitation is present.  < end of copied text >    MEDICATIONS  (STANDING):  bethanechol 50 milliGRAM(s) Oral two times a day  carvedilol 3.125 milliGRAM(s) Oral every 12 hours  DULoxetine 30 milliGRAM(s) Oral daily  enalapril 2.5 milliGRAM(s) Oral two times a day  fludroCORTISONE 0.1 milliGRAM(s) Oral two times a day  hydroxychloroquine 200 milliGRAM(s) Oral two times a day  levETIRAcetam 500 milliGRAM(s) Oral two times a day  multivitamin 1 Tablet(s) Oral daily    MEDICATIONS  (PRN):  acetaminophen   Tablet .. 650 milliGRAM(s) Oral every 6 hours PRN Mild Pain (1 - 3)  celecoxib 200 milliGRAM(s) Oral daily PRN Moderate Pain (4 - 6)  metoclopramide Injectable 5 milliGRAM(s) IV Push every 6 hours PRN nausea / vomiting  SUMAtriptan 50 milliGRAM(s) Oral daily PRN Migraine    TELEMETRY REVIEW:  5/5/2021: sinus 80s    ASSESSMENT AND PLAN:    81 y/o F w/ PMH of HTN, dyslipidemia, OA/RA, breast cancer s/p lumpectomy, MVR p/w  fall with retrograde amnesia likely syncope with no evidence of conscious attempt to protect herself with arms extension resulting in traumatic SAH    1. Traumatic Subarachnoid Hemorrhage due to fall  - CT w/ minimal subarachnoid hemorrhage in the RIGHT central sulcus at the convexity. No acute neurosurgical intervention needed  - BP control, neuro checks    - f/u repeat CT head results is stable, no extra/intra-cranial vascular dx  - Cont Keppra 500mg BID for 7 days (last dose on 5/8)  - keep ASA held x2 weeks.  - Maintain SBP < 150  - Chin Stitches -- due to be removed later this week  - PT, NSGY consult     2. Syncope, orthostasis   - Stop Robaxin as it can cause hypotension, started Florinef for persistent orthostasis   - ZION socks  - Carotid US w/o significant internal carotid artery stenosis bilaterally.   - s/p ILR placement on 5/4.  - Echo w/ mildly reduced EF 45-50%  - EP, Cardio Consult  5/5: repeat orthostatics td.     3. Urinary retention   - Bettencourt placed. Patient straight catheterized x3.  - bethanechol started 50mg BID.   - check UA > negative   - Urology consult appreciated -- needs urodynamic studies outpt. w/ Chiki or Skylar (her urologist)    4. HTN | HLD  - Maintain SBP < 150. Cont. Coreg, enalapril     5. OA/RA  - Hydroxychloroquine, celebrex, Tramadol  - Follows w/ MD Barragan    6. Malnutrition  - Nutrition eval. Start ensure supp. drinks w/ meals.    7. DVT ppx  - SCDs     CHIEF COMPLAINT/DIAGNOSIS: Fall at a restaurant    HPI: 80 year old woman with a past medical history of HTN, HLD, OA/RA and Breast cancer who presented to the ED after a fall at a restaurant. as per the patient she was leaving the restaurant after lunch when she thinks she tripped up the stairs. The patient does not remember the fall. She denies any dizziness, lightheadedness or palpitations prior to the fall. Her family brought her to the ED because she had a laceration on her chin and ecchymosis and hematoma around her left eye. CT of the head was done which showed a small subarachnoid hemorrhage in the right central sulcus at the convexity. Pt c/o mild headache, no nausea or vomiting, she denies numbness or weakness, she denies any changes in bowl or bladder function.  (01 May 2021 16:05)    5/4/2021: c/o bladder fullness, reports not hydrating well.   5/5/2021: was able to ambulate in halls had some dizziness after walking down hallway 3 times. intermittent loose stools. low concern for infectious etiology.   5/6/2021: c/o of 10/10 b/l shoulder stiffness/pain, neck stiffness/pain. Unable to raise arms, no hip or leg/knee pain.   REVIEW OF SYSTEMS:  All other review of systems is negative unless indicated above     PHYSICAL EXAM:  Constitutional: Awake and alert, cachechtic  HEENT: PERR, EOMI, Normal Hearing, MMM  Neck: Soft and supple  Respiratory: Breath sounds are clear bilaterally  Cardiovascular: S1 and S2, regular rate and rhythm, no Murmurs, gallops or rubs  Gastrointestinal: Bowel Sounds present, soft, nontender, nondistended, no guarding, no rebound: : TITUS  Extremities: No peripheral edema   Vascular: 2+ peripheral pulses  Neurological: A/O x 3, no focal deficits  Musculoskeletal: b/l shoulder stiffness/pain, unable to raise arms overhead   Skin: No rashes, LT sided facial ecchymosis, stitches     Vital Signs Last 24 Hrs  T(C): 36.5 (06 May 2021 07:38), Max: 36.7 (05 May 2021 20:00)  T(F): 97.7 (06 May 2021 07:38), Max: 98.1 (05 May 2021 20:00)  HR: 72 (06 May 2021 07:38) (72 - 89)  BP: 120/70 (06 May 2021 07:38) (118/60 - 120/70)  BP(mean): --  RR: 17 (06 May 2021 07:38) (17 - 18)  SpO2: 96% (06 May 2021 07:38) (94% - 96%)    LABS: All Labs Reviewed:                        12.7   7.59  )-----------( 182      ( 06 May 2021 06:56 )             39.6     RADIOLOGY:  < from: US Duplex Carotid Arteries Complete, Bilateral (05.03.21 @ 12:21) >  IMPRESSION: Mild plaque in the carotid bulbs with no evidence of a hemodynamically significant internal carotid artery stenosis bilaterally.  < end of copied text >    < from: CT Angio Head w/ IV Cont (05.02.21 @ 10:02) >  IMPRESSION: Normal brain CT for patient's age. No change since 5/1/2021. Normal CTA of the head and neck. No significant carotid stenosis. CT angiogram of the intracranial circulation  < end of copied text >    < from: CT Head No Cont (05.01.21 @ 22:09) >  IMPRESSION:  Stable trace amount of subarachnoid hemorrhage in the right central sulcus.  < end of copied text >    < from: CT Maxillofacial No Cont (05.01.21 @ 14:28) >  IMPRESSION:  Cervical spine: No vertebral fracture is recognized.  Focal kyphosis at C5-C6 on a degenerative basis. Grade 1 anterior spondylolisthesis is noted at C3-4 and C4-5.   Multilevel degenerative disc disease and spondylosis at C3-4 through C7-T1 with loss of disc height and associated degenerative endplate changes. There is narrowing of the RIGHT C3-4, RIGHT C4-5 and LEFT C6-7 neural foramina due to uncovertebral spurring and facet osteophytic hypertrophy. Degenerative cord impingement is seen at C4-5 through C6-7.  Facial bones:  Facial bones intact without fracture.    Surgical small lenses. Minimal LEFT periorbital soft tissue swelling is noted.   3 cm LEFT buccal hematoma with associated soft tissue swelling is noted.  Brain:   minimal subarachnoid hemorrhage in the RIGHT central sulcus at the convexity.  < end of copied text >    ECHOCARDIOGRAM:  < from: TTE Echo Complete w/o Contrast w/ Doppler (05.03.21 @ 12:37) >   The left ventricle is normal in size and wall thickness.   Mild, diffuse hypokinesis of the left ventricle is present with an overall   estimated EF of 45-50%.   The left atrium is mildly dilated.   Normal appearing right atrium.   Patient has a history of a PFO as noted within the 05/21/2015 [facility] Exam.   A PFO is not noted with color Doppler during this exam.   Fibrocalcific changes noted to the Aortic valve leaflets with preserved   leaflet excursion.   No aortic regurgitation is present.   Well seated bioprosthetic valve in the mitral position. Mean   trans-prosthetic gradient is within normal limitations for this type of   prosthesis.   Trace mitral regurgitation is present.   EA reversal of the mitral inflow consistent with reduced compliance of the   left ventricle.   Normal appearing tricuspid valve structure.   Mild (1+) tricuspid valve regurgitation is present.  < end of copied text >    MEDICATIONS  (STANDING):  bethanechol 50 milliGRAM(s) Oral two times a day  carvedilol 3.125 milliGRAM(s) Oral every 12 hours  DULoxetine 30 milliGRAM(s) Oral daily  enalapril 2.5 milliGRAM(s) Oral two times a day  fludroCORTISONE 0.1 milliGRAM(s) Oral two times a day  hydroxychloroquine 200 milliGRAM(s) Oral two times a day  levETIRAcetam 500 milliGRAM(s) Oral two times a day  lidocaine   Patch 1 Patch Transdermal every 24 hours  multivitamin 1 Tablet(s) Oral daily  sodium chloride 0.9%. 1000 milliLiter(s) (60 mL/Hr) IV Continuous <Continuous>    MEDICATIONS  (PRN):  acetaminophen   Tablet .. 650 milliGRAM(s) Oral every 6 hours PRN Mild Pain (1 - 3)  celecoxib 200 milliGRAM(s) Oral daily PRN Moderate Pain (4 - 6)  metoclopramide Injectable 5 milliGRAM(s) IV Push every 6 hours PRN nausea / vomiting  SUMAtriptan 50 milliGRAM(s) Oral daily PRN Migraine  traMADol 100 milliGRAM(s) Oral every 8 hours PRN Severe Pain (7 - 10)    Home Medications:  acetaminophen 325 mg oral tablet: 2 tab(s) orally every 6 hours, As needed, Moderate Pain (4 - 6) (01 May 2021 16:14)  Aspirin Enteric Coated 81 mg oral delayed release tablet: 1 tab(s) orally once a day (DO NOT RESUME UNTIL 5/16/2021) (05 May 2021 11:40)  B Complex 50: 1 tab(s) orally once a day (05 May 2021 10:28)  CeleBREX 200 mg oral capsule: 1 cap(s) orally once a day, As Needed for arthritis pain (01 May 2021 16:14)  Coreg 3.125 mg oral tablet: 1 tab(s) orally 2 times a day (01 May 2021 16:14)  Cymbalta 30 mg oral delayed release capsule: 1 cap(s) orally once a day (01 May 2021 16:14)  enalapril 2.5 mg oral tablet: 1 tab(s) orally 2 times a day (01 May 2021 16:14)  Fish Oil 1000 mg oral capsule: 1 tab(s) orally once a day (05 May 2021 10:28)  Plaquenil 200 mg oral tablet: 1 tab(s) orally once a day (01 May 2021 16:14)  SUMAtriptan 50 mg oral tablet: 1 tab(s) orally every 6 hours, As needed, migraine (01 May 2021 16:14)  traMADol 50 mg oral tablet: 2 tab(s) orally every 6 hours, As Needed (05 May 2021 11:40)  Vitamin D3 1000 intl units oral tablet: 1 tab(s) orally once a day (01 May 2021 16:14)    TELEMETRY REVIEW:  5/6/2021: sinus 80s    ASSESSMENT AND PLAN:    79 y/o F w/ PMH of HTN, dyslipidemia, OA/RA, breast cancer s/p lumpectomy, MVR p/w  fall with retrograde amnesia likely syncope resulting in traumatic SAH. Fall likely 2/2 orthostatic hypotension. Hospital course complicated by persistent orthostasis, acute urinary retention, and rheumatoid flare.     1. Traumatic Subarachnoid Hemorrhage due to fall  - CT w/ minimal subarachnoid hemorrhage in the RIGHT central sulcus at the convexity. No acute neurosurgical intervention needed  - BP control, neuro checks    - Cont Keppra 500mg BID for 7 days (last dose on 5/8)  - keep ASA 81 held x2 weeks.  - Maintain SBP < 150  - Chin Stitches -- due to be removed later this week  - PT, NSGY consult     2. Syncope, orthostasis   - Stop Robaxin as it can cause hypotension, started Florinef for persistent orthostasis   - Carotid US w/o significant internal carotid artery stenosis bilaterally.   - Echo w/ mildly reduced EF 45-50%  - s/p ILR placement on 5/4.  - ZION socks. gentle hydration given  - EP, Cardio Consult  5/5: repeat orthostatics, positive pt w/ dizziness standing. gentle IVF    3. Acute Urinary retention   - Titus placed.    - Bethanechol started 50mg BID.   - UA > negative   - Urology consult appreciated   ** Needs urodynamic studies outpt w/ Chiki or Skylar (her urologist). Titus to remain in on discharge **    4. Bilateral Shoulder/Neck Stiffness/Pain likely due to RA Flare-up  - F/u CRP, ESR, Cyclic AB, RF labs  - IV sol 40mg x1, followed by slow taper 20mg x5d, 15mg x5d, 10mg x5d, 5mg x5d  - Cont. Tramadol, Celebrex prn   - Hydroxychloroquine BID  - Rheum reccs appreciated   - Patient follows with Dr. Barragan (rheum) outpatient    5. HTN | HLD  - Maintain SBP < 150. Cont. Coreg, enalapril     6. Malnutrition  - Nutrition eval. Start ensure supp. drinks w/ meals.    7. DVT ppx  - SCDs    Sign Out: 79 y/o F w/ PMH of HTN, dyslipidemia, OA/RA, breast cancer s/p lumpectomy, MVR p/w  fall with retrograde amnesia likely syncope resulting in traumatic SAH. Fall likely 2/2 orthostatic hypotension. Hospital course complicated by persistent orthostasis, acute urinary retention, and rheumatoid flare. on 5/6 patient received IV [id number] x1. slow taper recommended by rheumatology. patient unable to get up today due to pain/stiffness. gentle fluids given x24 hours, ZION socks, florinef initiated on 5/4. repeat orthostatics needed when patient more mobile. Plan to be d/c home w/ homecare/PT. Regards to urinary retention, needs education on titus care, titus to remain in on discharge. Chin stitches to be removed either tm/over weekend.  CHIEF COMPLAINT/DIAGNOSIS: Fall at a restaurant    HPI: 80 year old woman with a past medical history of HTN, HLD, OA/RA and Breast cancer who presented to the ED after a fall at a restaurant. as per the patient she was leaving the restaurant after lunch when she thinks she tripped up the stairs. The patient does not remember the fall. She denies any dizziness, lightheadedness or palpitations prior to the fall. Her family brought her to the ED because she had a laceration on her chin and ecchymosis and hematoma around her left eye. CT of the head was done which showed a small subarachnoid hemorrhage in the right central sulcus at the convexity. Pt c/o mild headache, no nausea or vomiting, she denies numbness or weakness, she denies any changes in bowl or bladder function.  (01 May 2021 16:05)    5/4/2021: c/o bladder fullness, reports not hydrating well.   5/5/2021: was able to ambulate in halls had some dizziness after walking down hallway 3 times. intermittent loose stools. low concern for infectious etiology.   5/6/2021: c/o of 10/10 b/l shoulder stiffness/pain, neck stiffness/pain. Unable to raise arms, no hip or leg/knee pain.   REVIEW OF SYSTEMS:  All other review of systems is negative unless indicated above     PHYSICAL EXAM:  Constitutional: Awake and alert, cachechtic  HEENT: PERR, EOMI, Normal Hearing, MMM  Neck: Soft and supple  Respiratory: Breath sounds are clear bilaterally  Cardiovascular: S1 and S2, regular rate and rhythm, no Murmurs, gallops or rubs  Gastrointestinal: Bowel Sounds present, soft, nontender, nondistended, no guarding, no rebound: : TITUS  Extremities: No peripheral edema   Vascular: 2+ peripheral pulses  Neurological: A/O x 3, no focal deficits  Musculoskeletal: b/l shoulder stiffness/pain, unable to raise arms overhead   Skin: No rashes, LT sided facial ecchymosis, stitches     Vital Signs Last 24 Hrs  T(C): 36.5 (06 May 2021 07:38), Max: 36.7 (05 May 2021 20:00)  T(F): 97.7 (06 May 2021 07:38), Max: 98.1 (05 May 2021 20:00)  HR: 72 (06 May 2021 07:38) (72 - 89)  BP: 120/70 (06 May 2021 07:38) (118/60 - 120/70)  BP(mean): --  RR: 17 (06 May 2021 07:38) (17 - 18)  SpO2: 96% (06 May 2021 07:38) (94% - 96%)    LABS: All Labs Reviewed:                        12.7   7.59  )-----------( 182      ( 06 May 2021 06:56 )             39.6     RADIOLOGY:  < from: US Duplex Carotid Arteries Complete, Bilateral (05.03.21 @ 12:21) >  IMPRESSION: Mild plaque in the carotid bulbs with no evidence of a hemodynamically significant internal carotid artery stenosis bilaterally.  < end of copied text >    < from: CT Angio Head w/ IV Cont (05.02.21 @ 10:02) >  IMPRESSION: Normal brain CT for patient's age. No change since 5/1/2021. Normal CTA of the head and neck. No significant carotid stenosis. CT angiogram of the intracranial circulation  < end of copied text >    < from: CT Head No Cont (05.01.21 @ 22:09) >  IMPRESSION:  Stable trace amount of subarachnoid hemorrhage in the right central sulcus.  < end of copied text >    < from: CT Maxillofacial No Cont (05.01.21 @ 14:28) >  IMPRESSION:  Cervical spine: No vertebral fracture is recognized.  Focal kyphosis at C5-C6 on a degenerative basis. Grade 1 anterior spondylolisthesis is noted at C3-4 and C4-5.   Multilevel degenerative disc disease and spondylosis at C3-4 through C7-T1 with loss of disc height and associated degenerative endplate changes. There is narrowing of the RIGHT C3-4, RIGHT C4-5 and LEFT C6-7 neural foramina due to uncovertebral spurring and facet osteophytic hypertrophy. Degenerative cord impingement is seen at C4-5 through C6-7.  Facial bones:  Facial bones intact without fracture.    Surgical small lenses. Minimal LEFT periorbital soft tissue swelling is noted.   3 cm LEFT buccal hematoma with associated soft tissue swelling is noted.  Brain:   minimal subarachnoid hemorrhage in the RIGHT central sulcus at the convexity.  < end of copied text >    ECHOCARDIOGRAM:  < from: TTE Echo Complete w/o Contrast w/ Doppler (05.03.21 @ 12:37) >   The left ventricle is normal in size and wall thickness.   Mild, diffuse hypokinesis of the left ventricle is present with an overall   estimated EF of 45-50%.   The left atrium is mildly dilated.   Normal appearing right atrium.   Patient has a history of a PFO as noted within the 05/21/2015 [facility] Exam.   A PFO is not noted with color Doppler during this exam.   Fibrocalcific changes noted to the Aortic valve leaflets with preserved   leaflet excursion.   No aortic regurgitation is present.   Well seated bioprosthetic valve in the mitral position. Mean   trans-prosthetic gradient is within normal limitations for this type of   prosthesis.   Trace mitral regurgitation is present.   EA reversal of the mitral inflow consistent with reduced compliance of the   left ventricle.   Normal appearing tricuspid valve structure.   Mild (1+) tricuspid valve regurgitation is present.  < end of copied text >    MEDICATIONS  (STANDING):  bethanechol 50 milliGRAM(s) Oral two times a day  carvedilol 3.125 milliGRAM(s) Oral every 12 hours  DULoxetine 30 milliGRAM(s) Oral daily  enalapril 2.5 milliGRAM(s) Oral two times a day  fludroCORTISONE 0.1 milliGRAM(s) Oral two times a day  hydroxychloroquine 200 milliGRAM(s) Oral two times a day  levETIRAcetam 500 milliGRAM(s) Oral two times a day  lidocaine   Patch 1 Patch Transdermal every 24 hours  multivitamin 1 Tablet(s) Oral daily  sodium chloride 0.9%. 1000 milliLiter(s) (60 mL/Hr) IV Continuous <Continuous>    MEDICATIONS  (PRN):  acetaminophen   Tablet .. 650 milliGRAM(s) Oral every 6 hours PRN Mild Pain (1 - 3)  celecoxib 200 milliGRAM(s) Oral daily PRN Moderate Pain (4 - 6)  metoclopramide Injectable 5 milliGRAM(s) IV Push every 6 hours PRN nausea / vomiting  SUMAtriptan 50 milliGRAM(s) Oral daily PRN Migraine  traMADol 100 milliGRAM(s) Oral every 8 hours PRN Severe Pain (7 - 10)    Home Medications:  acetaminophen 325 mg oral tablet: 2 tab(s) orally every 6 hours, As needed, Moderate Pain (4 - 6) (01 May 2021 16:14)  Aspirin Enteric Coated 81 mg oral delayed release tablet: 1 tab(s) orally once a day (DO NOT RESUME UNTIL 5/16/2021) (05 May 2021 11:40)  B Complex 50: 1 tab(s) orally once a day (05 May 2021 10:28)  CeleBREX 200 mg oral capsule: 1 cap(s) orally once a day, As Needed for arthritis pain (01 May 2021 16:14)  Coreg 3.125 mg oral tablet: 1 tab(s) orally 2 times a day (01 May 2021 16:14)  Cymbalta 30 mg oral delayed release capsule: 1 cap(s) orally once a day (01 May 2021 16:14)  enalapril 2.5 mg oral tablet: 1 tab(s) orally 2 times a day (01 May 2021 16:14)  Fish Oil 1000 mg oral capsule: 1 tab(s) orally once a day (05 May 2021 10:28)  Plaquenil 200 mg oral tablet: 1 tab(s) orally once a day (01 May 2021 16:14)  SUMAtriptan 50 mg oral tablet: 1 tab(s) orally every 6 hours, As needed, migraine (01 May 2021 16:14)  traMADol 50 mg oral tablet: 2 tab(s) orally every 6 hours, As Needed (05 May 2021 11:40)  Vitamin D3 1000 intl units oral tablet: 1 tab(s) orally once a day (01 May 2021 16:14)    TELEMETRY REVIEW:  5/6/2021: sinus 80s    ASSESSMENT AND PLAN:    79 y/o F w/ PMH of HTN, dyslipidemia, OA/RA, breast cancer s/p lumpectomy, MVR p/w  fall with retrograde amnesia likely syncope resulting in traumatic SAH. Fall likely 2/2 orthostatic hypotension. Hospital course complicated by persistent orthostasis, acute urinary retention, and rheumatoid flare.     1. Traumatic Subarachnoid Hemorrhage due to fall  - CT w/ minimal subarachnoid hemorrhage in the RIGHT central sulcus at the convexity. No acute neurosurgical intervention needed  - BP control, neuro checks    - Cont Keppra 500mg BID for 7 days (last dose on 5/8)  - keep ASA 81 held x2 weeks.  - Maintain SBP < 150  - Chin Stitches -- due to be removed later this week/weekend  - PT >> recc home w/ home PT  - NSGY f/u appreciated     2. Syncope, orthostasis   - Stop Robaxin as it can cause hypotension, started Florinef for persistent orthostasis   - Carotid US w/o significant internal carotid artery stenosis bilaterally.   - Echo w/ mildly reduced EF 45-50%  - s/p ILR placement on 5/4.  - ZION socks. gentle hydration given  - EP, Cardio Consult  5/5: repeat orthostatics, positive pt w/ dizziness standing. gentle IVF    3. Acute Urinary retention   - Titus placed.    - Bethanechol started 50mg BID.   - UA > negative   - Urology consult appreciated   ** Needs urodynamic studies outpt w/ Chiki or Skylar (her urologist). Titus to remain in on discharge **    4. Bilateral Shoulder/Neck Stiffness/Pain likely due to RA Flare-up  - F/u CRP, ESR, Cyclic AB, RF labs  - IV sol 40mg x1, followed by slow taper 20mg x5d, 15mg x5d, 10mg x5d, 5mg x5d  - Cont. Tramadol, Celebrex prn   - Hydroxychloroquine BID  - Rheum reccs appreciated   - Patient follows with Dr. Barragan (rheum) outpatient    5. HTN | HLD  - Maintain SBP < 150. Cont. Coreg, enalapril     6. Severe Protein Calorie Malnutrition  - Nutrition eval appreciated  - ensure supp. drinks w/ meals.    7. DVT ppx  - SCDs    Sign Out: 79 y/o F w/ PMH of HTN, dyslipidemia, OA/RA, breast cancer s/p lumpectomy, MVR p/w  fall with retrograde amnesia likely syncope resulting in traumatic SAH. Fall likely 2/2 orthostatic hypotension. Hospital course complicated by persistent orthostasis, acute urinary retention, and rheumatoid flare. on 5/6 patient received IV [id number] x1. slow taper recommended by rheumatology. patient unable to get up today due to pain/stiffness. gentle fluids given x24 hours, ZION socks, florinef initiated on 5/4. repeat orthostatics needed when patient more mobile. Plan to be d/c home w/ homecare/PT. Regards to urinary retention, needs education on titus care, titus to remain in on discharge. Chin stitches to be removed either tm/over weekend.

## 2021-05-06 NOTE — PROGRESS NOTE ADULT - SUBJECTIVE AND OBJECTIVE BOX
CHIEF COMPLAINT:  Patient is a 80y old  Female who presents with a chief complaint of Subarachnoid hemorrhage (01 May 2021 20:41)      HPI: 21:  Pt is a very pleasant 80 year old woman well known to our service and Dr Campbell with a past medical history of HTN, HLD, s/p MVR yrs ago by Dr Faria at [facility], OA/RA and Breast cancer who presented to the ED after a fall at a restaurant. as per the patient she was leaving the restaurant after lunch when she thinks she tripped up the stairs. The patient does not remember the fall and does not recall anything until after the EMS was already there. She denies any dizziness, lightheadedness or palpitations prior to the fall. Her family brought her to the ED because she had a laceration on her chin and ecchymosis and hematoma around her left eye. CT of the head was done which showed a small subarachnoid hemorrhage in the right central sulcus at the convexity. Pt c/o mild headache, no nausea or vomiting, she denies numbness or weakness, she denies any changes in bowl or bladder function.  She was having orthostatic drops in BP in the SICU, chronically taking Coreg and Enalapril for her BP as an outpt.          5/6less dizziness with standing-- still orthostatic , but less so--- was able to walk to bathroom and back    PMHx:  PAST MEDICAL & SURGICAL HISTORY:  Mitral valve disease-s/p mitral valve replacement-Dr Faria at [facility]  RA (rheumatoid arthritis)  OA (osteoarthritis)  Migraine  HLD (hyperlipidemia)  Breast cancer-left  treated with RT and surgery  Chronic pain-secondary to OA and RA  History of lumpectomy of left breast-with sentinal node biopsy   History of bilateral knee replacement-right , left   H/O spinal fusion-lumbar 2005  History of tonsillectomy  S/P bunionectomy-left x 2   History of left hip replacement-2017  History of cataract surgery-left cataract sx      FAMILY HISTORY:   FAMILY HISTORY:  No pertinent family history in first degree relatives      ALLERGIES:  Allergies  No Known Allergies      REVIEW OF SYSTEMS:  10 point ROS was obtained  Pertinent positives and negatives are as above  All other review of systems is negative unless indicated above    Vital Signs Last 24 Hrs  T(C): 36.7 (05 May 2021 20:00), Max: 36.7 (05 May 2021 20:00)  T(F): 98.1 (05 May 2021 20:00), Max: 98.1 (05 May 2021 20:00)  HR: 89 (05 May 2021 20:00) (89 - 89)  BP: 118/60 (05 May 2021 20:00) (118/60 - 118/60)  BP(mean): --  RR: 18 (05 May 2021 20:00) (18 - 18)  SpO2: 94% (05 May 2021 20:00) (94% - 94%)    Orthostatic VS    21 @ 12:54  Lying BP: 125/71 HR: 79   Sitting BP: 118/71 HR: 82  Standing BP: 108/73 HR: 98  Site: --   Mode: --    21 @ 11:21  Lying BP: 157/78 HR: 81   Sitting BP: 143/91 HR: 85  Standing BP: 124/88 HR: 106  Site: --   Mode: --    21 @ 10:40  Lying BP: 157/79 HR: 87   Sitting BP: 137/78 HR: 90  Standing BP: 128/80 HR: 105  Site: upper right arm   Mode: electronic      PHYSICAL EXAM:   Constitutional: NAD, awake and alert, well-developed  HEENT: PERR, EOMI, Normal Hearing, MMM, abrasion on chin and ecchymosis on face (healing)  Neck: Soft and supple, No LAD, No JVD  Respiratory: Breath sounds are clear bilaterally, No wheezing, rales or rhonchi, well healed mid-thoracotomy scar  Cardiovascular: S1 and S2, regular rate and rhythm, soft ST[place]ORD at LLSB and base as before, no gallops or rubs  Gastrointestinal: Bowel Sounds present, soft, nontender, nondistended, no guarding, no rebound  Extremities: No peripheral edema  Vascular: 2+ peripheral pulses  Neurological: A/O x 3, no focal deficits  Musculoskeletal: 5/5 strength b/l upper and lower extremities      MEDICATIONS  (STANDING):  bethanechol 50 milliGRAM(s) Oral two times a day  carvedilol 3.125 milliGRAM(s) Oral every 12 hours  DULoxetine 30 milliGRAM(s) Oral daily  enalapril 2.5 milliGRAM(s) Oral two times a day  fludroCORTISONE 0.1 milliGRAM(s) Oral two times a day  hydroxychloroquine 200 milliGRAM(s) Oral two times a day  levETIRAcetam 500 milliGRAM(s) Oral two times a day  multivitamin 1 Tablet(s) Oral daily  sodium chloride 0.9%. 1000 milliLiter(s) (60 mL/Hr) IV Continuous <Continuous>    MEDICATIONS  (PRN):  acetaminophen   Tablet .. 650 milliGRAM(s) Oral every 6 hours PRN Mild Pain (1 - 3)  celecoxib 200 milliGRAM(s) Oral daily PRN Moderate Pain (4 - 6)  metoclopramide Injectable 5 milliGRAM(s) IV Push every 6 hours PRN nausea / vomiting  SUMAtriptan 50 milliGRAM(s) Oral daily PRN Migraine  traMADol 100 milliGRAM(s) Oral every 8 hours PRN Severe Pain (7 - 10)        LABS: All Labs Reviewed:                     139  |  107  |  10  ----------------------------<  121<H>  4.2   |  27  |  0.86    Ca    9.2      04 May 2021 10:29  Mg     2.1     -      BLOOD CULTURES:   LIPID PROFILE     RADIOLOGY:    CXR: 21:  INTERPRETATION:  XR CHEST  Single AP view  HISTORY:  syncope, fall; r/o trauma  Comparison: Chest x-ray 2019  Status post sternotomy and CABG.  Mitral annuloplasty.  The cardiac silhouette is within normal limits. The lungs are clear. No pleural abnormality.  IMPRESSION: No acute disease.      X-ray of Pelvis: 21:  INTERPRETATION:  XR PELVIS AP ONLY 1 OR 2 VIEWS  HISTORY:  r/o trauma s/p syncope/fall  Views:  AP pelvis;  The bony pelvis, acetabula, bilateral inferior and superior pubic rami demonstrate intact cortical margins with no evidence of an acute fracture.  Bilateral femoral heads, visualized bilateral femoral necks and proximal shafts demonstrate intact cortical margins with no evidence of an acute fracture.  There are mild osteoarthritic degenerative changes of the right hip.  There has been a left hip arthroplasty.  Osseous and metallic elements are in good alignment. There is no evidence of prosthetic loosening.  Bilateral sacroiliac joints are unremarkable.  IMPRESSION:  No acute fracture or dislocation.    Repeat CT of Head: 21;  FINDINGS:  There is again noted a trace amount of subarachnoid hemorrhage in the right central sulcus. There is no interval increase in volume of blood products or new intracranial hemorrhage. There is no significant mass effect or shift of the midline. The basal cisterns are not effaced. There is mild cerebral volume loss with prominence of the ventricles and sulci. There are minimal chronic ischemic changes in the frontoparietal white matter. There is no CT evidence of an acute vascular territorial infarct. There are atherosclerotic calcifications of the intracranial carotid arteries.  There is left-sided pre-malar soft tissue swelling. The skull base and bony calvarium are intact. The visualized paranasal sinuses and tympanic/mastoid cavities are clear apart from minimal ethmoid mucosal thickening. There is evidence of bilateral lens surgery.  IMPRESSION:  Stable trace amount of subarachnoid hemorrhage in the right central sulcus.    CT of Maxillofacial, Brain and C-Spine: 21:  FINDINGS:   CT dated 2019 available for review.  The brain demonstrates minimal subarachnoid hemorrhage in the RIGHT central sulcus at the convexity.   No acute cerebral cortical infarct is seen. No mass effect is found in the brain.  The ventricles, sulci and basal cisterns appear unremarkable.  The orbits are unremarkable.  The paranasal sinuses are clear.  The nasal cavity appears intact.  The nasopharynx is symmetric.  The central skull base, petrous temporal bones and calvarium remain intact.    CT facial bones without IV contrast  FINDINGS:   No prior similar studies are available for review.  Facial bones are intact without fracture. 3 cm LEFT buccal hematoma is noted with associated soft tissue swelling.  The paranasal sinuses are clear.  No abnormal paranasal sinus fluid collection is found.  No osseous erosion or expansion is present.  The nasal bones are intact without fracture.  The nasal septum shows a RIGHT-sided deviation with moderate osteophyte.  The ostiomeatal complexes appear normally formed.  The orbits are significant for surgically small lenses. Minimal LEFT periorbital soft tissue swelling is noted.    The globes are intact.  Intraconal and extraconal fat is preserved.  The extraocular muscles remain symmetric.  The superior ophthalmic veins are also symmetric.  Orbital rims remain intact.  The deep facial spaces are intact.   No radiopaque foreign body is seen.  The nasopharynx is symmetric.  The central skull base is intact.  The visualized intracranial contents appear unremarkable.    CT cervical spine without IV contrast  FINDINGS:   CT dated 2019 available for review  Cervical vertebral body heights are maintained. No vertebral fracture is seen. No destructive bone lesion is found.  Alignment is significant for focal kyphosis at C5-C6 on a degenerative basis. Grade 1 anterior spondylolisthesis is noted at C3-4 and C4-5. Facet joints appear intact and aligned. There is calcification of the posterior peridontoid ligaments.  Cervical intervertebral disc spaces show degenerative disc disease and spondylosis at C3-4 through C7-T1 with loss of disc height and associated degenerative endplate changes. There is narrowing of the RIGHT C3-4, RIGHT C4-5 and LEFT C6-7 neural foramina due to uncovertebral spurring and facet osteophytic hypertrophy. Degenerative cord impingement is seen at C4-5 through C6-7.  The skull base appears intact.  No neck mass is recognized.  Paraspinal soft tissues appear intact. Visualized lymph nodes appear to be within physiologic size limits.  IMPRESSION:  Cervical spine: No vertebral fracture is recognized.  Focal kyphosis at C5-C6 on a degenerative basis. Grade 1 anterior spondylolisthesis is noted at C3-4 and C4-5.   Multilevel degenerative disc disease and spondylosis at C3-4 through C7-T1 with loss of disc height and associated degenerative endplate changes. There is narrowing of the RIGHT C3-4, RIGHT C4-5 and LEFT C6-7 neural foramina due to uncovertebral spurring and facet osteophytic hypertrophy. Degenerative cord impingement is seen at C4-5 through C6-7.  Facial bones:  Facial bones intact without fracture.    Surgical small lenses. Minimal LEFT periorbital soft tissue swelling is noted.   3 cm LEFT buccal hematoma with associated soft tissue swelling is noted.  Brain:   minimal subarachnoid hemorrhage in the RIGHT central sulcus at the convexity.      EK21:  Normal sinus rhythm  Normal ECG    TELEMETRY:  NSR    ECHO: < from: TTE Echo Complete w/o Contrast w/ Doppler (21 @ 12:37) >     Summary     The left ventricle is normal in size and wall thickness.   Mild, diffuse hypokinesis of the left ventricle is present with an overall   estimated EF of 45-50%.   The left atrium is mildly dilated.   Normal appearing right atrium.   Patient has a history of a PFO as noted within the 2015 [facility] Exam.   A PFO is not noted with color Doppler during this exam.   Fibrocalcific changes noted to the Aortic valve leaflets with preserved   leaflet excursion.   No aortic regurgitation is present.   Well seated bioprosthetic valve in the mitral position. Mean   trans-prosthetic gradient is within normal limitations for this type of   prosthesis.   Trace mitral regurgitation is present.   EA reversal of the mitral inflow consistentwith reduced compliance of the   left ventricle.   Normal appearing tricuspid valve structure.   Mild (1+) tricuspid valve regurgitation is present.     Signature     ----------------------------------------------------------------   Electronically signed by Geronimo Rosales MD(Interpreting   physician) on 2021 05:11 AM   ----------------------------------------------------------------    < end of copied text >

## 2021-05-06 NOTE — PROGRESS NOTE ADULT - ASSESSMENT
5/2/21:  Pt with above history with prior HTN controlled on meds with sudden fall and ? syncope with resultant head injury and small but stable subarachnoid hemorrhage.  She did not injure her hand during the fall and this not normal alert reaction to put her hands out to block her fall and thus may have been a hypotensive syncopal event...vaso-vagal (doubt), orthostatic hypotension (???) aggravated by her meds (???)  possible, autonomic dysfunction (???).  Scheduled to have a CTA of the carotids and brain today and ECHO ordered as well.  Dr Campbell will be back in the AM and follow up.    5/3 feeling better today  check orthostatics again  echocardiogram pending  continue tele monitoring- SR so far  recommend Loop recorder to evaluate for arrythmic cause of syncope resulting in trauma  follow up imaging as per NS    5/4 still orthostatic, continue florinef  check daily chem and orthostatics  echo reveals  low normal EF ( unchanged)  consideration for loop recorder prior to DC, no arrythmias so far  gently hydration    5/5  although still orthostatic,  drop in blood pressure not as great  no arrythmia on tele, s/p  LOOP placement  continue low dose  florinef- monitor chem and electrolyutes  PT, continue orthstatics  Mild CHF systolic ( chronic - continue ACE and BB   urology eval for urine retention-- possibility that distended bladder contributed to orthostatics due to increased vagal tone     5/6 patient feeling better overall  continue florinef, BP improving- recommend gentle hydration  no cardiac contraindication to DC

## 2021-05-06 NOTE — PROVIDER CONTACT NOTE (OTHER) - DATE AND TIME:
01-May-2021 20:42
03-May-2021 09:12
06-May-2021 11:27
01-May-2021 18:40
03-May-2021 09:28
04-May-2021 19:11
04-May-2021 11:07

## 2021-05-07 VITALS
TEMPERATURE: 97 F | HEART RATE: 69 BPM | SYSTOLIC BLOOD PRESSURE: 119 MMHG | DIASTOLIC BLOOD PRESSURE: 55 MMHG | OXYGEN SATURATION: 97 % | RESPIRATION RATE: 18 BRPM

## 2021-05-07 LAB
CRP SERPL-MCNC: 17 MG/L — HIGH
RHEUMATOID FACT SERPL-ACNC: <10 IU/ML — SIGNIFICANT CHANGE UP (ref 0–13)

## 2021-05-07 PROCEDURE — 99239 HOSP IP/OBS DSCHRG MGMT >30: CPT

## 2021-05-07 RX ORDER — LEVETIRACETAM 250 MG/1
1 TABLET, FILM COATED ORAL
Qty: 4 | Refills: 0
Start: 2021-05-07 | End: 2021-05-08

## 2021-05-07 RX ORDER — BETHANECHOL CHLORIDE 25 MG
1 TABLET ORAL
Qty: 60 | Refills: 0
Start: 2021-05-07 | End: 2021-06-05

## 2021-05-07 RX ORDER — LEVETIRACETAM 250 MG/1
500 TABLET, FILM COATED ORAL
Refills: 0 | Status: DISCONTINUED | OUTPATIENT
Start: 2021-05-07 | End: 2021-05-07

## 2021-05-07 RX ADMIN — TRAMADOL HYDROCHLORIDE 100 MILLIGRAM(S): 50 TABLET ORAL at 06:10

## 2021-05-07 RX ADMIN — LEVETIRACETAM 500 MILLIGRAM(S): 250 TABLET, FILM COATED ORAL at 10:40

## 2021-05-07 RX ADMIN — FLUDROCORTISONE ACETATE 0.1 MILLIGRAM(S): 0.1 TABLET ORAL at 10:40

## 2021-05-07 RX ADMIN — Medication 200 MILLIGRAM(S): at 10:40

## 2021-05-07 RX ADMIN — Medication 20 MILLIGRAM(S): at 10:43

## 2021-05-07 RX ADMIN — Medication 1 TABLET(S): at 10:41

## 2021-05-07 RX ADMIN — Medication 50 MILLIGRAM(S): at 10:35

## 2021-05-07 RX ADMIN — DULOXETINE HYDROCHLORIDE 30 MILLIGRAM(S): 30 CAPSULE, DELAYED RELEASE ORAL at 10:39

## 2021-05-07 RX ADMIN — TRAMADOL HYDROCHLORIDE 100 MILLIGRAM(S): 50 TABLET ORAL at 07:45

## 2021-05-07 RX ADMIN — CARVEDILOL PHOSPHATE 3.12 MILLIGRAM(S): 80 CAPSULE, EXTENDED RELEASE ORAL at 10:39

## 2021-05-07 NOTE — PROGRESS NOTE ADULT - PROBLEM SELECTOR PROBLEM 3
SAH (subarachnoid hemorrhage)

## 2021-05-07 NOTE — PROGRESS NOTE ADULT - PROBLEM SELECTOR PROBLEM 1
Closed head injury with brief loss of consciousness

## 2021-05-07 NOTE — PROGRESS NOTE ADULT - SUBJECTIVE AND OBJECTIVE BOX
CHIEF COMPLAINT:  Patient is a 80y old  Female who presents with a chief complaint of Subarachnoid hemorrhage (01 May 2021 20:41)      HPI: 21:  Pt is a very pleasant 80 year old woman well known to our service and Dr Campbell with a past medical history of HTN, HLD, s/p MVR yrs ago by Dr Faria at [facility], OA/RA and Breast cancer who presented to the ED after a fall at a restaurant. as per the patient she was leaving the restaurant after lunch when she thinks she tripped up the stairs. The patient does not remember the fall and does not recall anything until after the EMS was already there. She denies any dizziness, lightheadedness or palpitations prior to the fall. Her family brought her to the ED because she had a laceration on her chin and ecchymosis and hematoma around her left eye. CT of the head was done which showed a small subarachnoid hemorrhage in the right central sulcus at the convexity. Pt c/o mild headache, no nausea or vomiting, she denies numbness or weakness, she denies any changes in bowl or bladder function.  She was having orthostatic drops in BP in the SICU, chronically taking Coreg and Enalapril for her BP as an outpt.          5/6 less dizziness with standing-- still orthostatic , but less so--- was able to walk to bathroom and back  5/7 no acute issues overnight    PMHx:  PAST MEDICAL & SURGICAL HISTORY:  Mitral valve disease-s/p mitral valve replacement-Dr Faria at [facility]  RA (rheumatoid arthritis)  OA (osteoarthritis)  Migraine  HLD (hyperlipidemia)  Breast cancer-left  treated with RT and surgery  Chronic pain-secondary to OA and RA  History of lumpectomy of left breast-with sentinal node biopsy   History of bilateral knee replacement-right , left   H/O spinal fusion-lumbar 2005  History of tonsillectomy  S/P bunionectomy-left x 2   History of left hip replacement-2017  History of cataract surgery-left cataract sx      FAMILY HISTORY:   FAMILY HISTORY:  No pertinent family history in first degree relatives      ALLERGIES:  Allergies  No Known Allergies      REVIEW OF SYSTEMS:  10 point ROS was obtained  Pertinent positives and negatives are as above  All other review of systems is negative unless indicated above      Vital Signs Last 24 Hrs  T(C): 36.6 (06 May 2021 19:38), Max: 36.6 (06 May 2021 19:38)  T(F): 97.9 (06 May 2021 19:38), Max: 97.9 (06 May 2021 19:38)  HR: 79 (06 May 2021 19:38) (72 - 79)  BP: 125/60 (06 May 2021 19:38) (120/70 - 125/60)  BP(mean): --  RR: 18 (06 May 2021 19:38) (17 - 18)  SpO2: 94% (06 May 2021 19:38) (94% - 96%)      PHYSICAL EXAM:   Constitutional: NAD, awake and alert, well-developed  HEENT: PERR, EOMI, Normal Hearing, MMM, abrasion on chin and ecchymosis on face (healing)  Neck: Soft and supple, No LAD, No JVD  Respiratory: Breath sounds are clear bilaterally, No wheezing, rales or rhonchi, well healed mid-thoracotomy scar  Cardiovascular: S1 and S2, regular rate and rhythm, soft STANFORD at LLSB and base as before, no gallops or rubs  Gastrointestinal: Bowel Sounds present, soft, nontender, nondistended, no guarding, no rebound  Extremities: No peripheral edema  Vascular: 2+ peripheral pulses  Neurological: A/O x 3, no focal deficits  Musculoskeletal: 5/5 strength b/l upper and lower extremities        MEDICATIONS  (STANDING):  bethanechol 50 milliGRAM(s) Oral two times a day  carvedilol 3.125 milliGRAM(s) Oral every 12 hours  DULoxetine 30 milliGRAM(s) Oral daily  enalapril 2.5 milliGRAM(s) Oral two times a day  fludroCORTISONE 0.1 milliGRAM(s) Oral two times a day  hydroxychloroquine 200 milliGRAM(s) Oral two times a day  levETIRAcetam 500 milliGRAM(s) Oral two times a day  lidocaine   Patch 1 Patch Transdermal every 24 hours  multivitamin 1 Tablet(s) Oral daily  predniSONE   Tablet 20 milliGRAM(s) Oral daily  sodium chloride 0.9%. 1000 milliLiter(s) (60 mL/Hr) IV Continuous <Continuous>    MEDICATIONS  (PRN):  acetaminophen   Tablet .. 650 milliGRAM(s) Oral every 6 hours PRN Mild Pain (1 - 3)  celecoxib 200 milliGRAM(s) Oral daily PRN Moderate Pain (4 - 6)  metoclopramide Injectable 5 milliGRAM(s) IV Push every 6 hours PRN nausea / vomiting  SUMAtriptan 50 milliGRAM(s) Oral daily PRN Migraine  traMADol 100 milliGRAM(s) Oral every 8 hours PRN Severe Pain (7 - 10)        LABS: All Labs Reviewed:                             12.7   7.59  )-----------( 182      ( 06 May 2021 06:56 )             39.6       BLOOD CULTURES:   LIPID PROFILE     RADIOLOGY:    CXR: 21:  INTERPRETATION:  XR CHEST  Single AP view  HISTORY:  syncope, fall; r/o trauma  Comparison: Chest x-ray 2019  Status post sternotomy and CABG.  Mitral annuloplasty.  The cardiac silhouette is within normal limits. The lungs are clear. No pleural abnormality.  IMPRESSION: No acute disease.      X-ray of Pelvis: 21:  INTERPRETATION:  XR PELVIS AP ONLY 1 OR 2 VIEWS  HISTORY:  r/o trauma s/p syncope/fall  Views:  AP pelvis;  The bony pelvis, acetabula, bilateral inferior and superior pubic rami demonstrate intact cortical margins with no evidence of an acute fracture.  Bilateral femoral heads, visualized bilateral femoral necks and proximal shafts demonstrate intact cortical margins with no evidence of an acute fracture.  There are mild osteoarthritic degenerative changes of the right hip.  There has been a left hip arthroplasty.  Osseous and metallic elements are in good alignment. There is no evidence of prosthetic loosening.  Bilateral sacroiliac joints are unremarkable.  IMPRESSION:  No acute fracture or dislocation.    Repeat CT of Head: 21;  FINDINGS:  There is again noted a trace amount of subarachnoid hemorrhage in the right central sulcus. There is no interval increase in volume of blood products or new intracranial hemorrhage. There is no significant mass effect or shift of the midline. The basal cisterns are not effaced. There is mild cerebral volume loss with prominence of the ventricles and sulci. There are minimal chronic ischemic changes in the frontoparietal white matter. There is no CT evidence of an acute vascular territorial infarct. There are atherosclerotic calcifications of the intracranial carotid arteries.  There is left-sided pre-malar soft tissue swelling. The skull base and bony calvarium are intact. The visualized paranasal sinuses and tympanic/mastoid cavities are clear apart from minimal ethmoid mucosal thickening. There is evidence of bilateral lens surgery.  IMPRESSION:  Stable trace amount of subarachnoid hemorrhage in the right central sulcus.    CT of Maxillofacial, Brain and C-Spine: 21:  FINDINGS:   CT dated 2019 available for review.  The brain demonstrates minimal subarachnoid hemorrhage in the RIGHT central sulcus at the convexity.   No acute cerebral cortical infarct is seen. No mass effect is found in the brain.  The ventricles, sulci and basal cisterns appear unremarkable.  The orbits are unremarkable.  The paranasal sinuses are clear.  The nasal cavity appears intact.  The nasopharynx is symmetric.  The central skull base, petrous temporal bones and calvarium remain intact.    CT facial bones without IV contrast  FINDINGS:   No prior similar studies are available for review.  Facial bones are intact without fracture. 3 cm LEFT buccal hematoma is noted with associated soft tissue swelling.  The paranasal sinuses are clear.  No abnormal paranasal sinus fluid collection is found.  No osseous erosion or expansion is present.  The nasal bones are intact without fracture.  The nasal septum shows a RIGHT-sided deviation with moderate osteophyte.  The ostiomeatal complexes appear normally formed.  The orbits are significant for surgically small lenses. Minimal LEFT periorbital soft tissue swelling is noted.    The globes are intact.  Intraconal and extraconal fat is preserved.  The extraocular muscles remain symmetric.  The superior ophthalmic veins are also symmetric.  Orbital rims remain intact.  The deep facial spaces are intact.   No radiopaque foreign body is seen.  The nasopharynx is symmetric.  The central skull base is intact.  The visualized intracranial contents appear unremarkable.    CT cervical spine without IV contrast  FINDINGS:   CT dated 2019 available for review  Cervical vertebral body heights are maintained. No vertebral fracture is seen. No destructive bone lesion is found.  Alignment is significant for focal kyphosis at C5-C6 on a degenerative basis. Grade 1 anterior spondylolisthesis is noted at C3-4 and C4-5. Facet joints appear intact and aligned. There is calcification of the posterior peridontoid ligaments.  Cervical intervertebral disc spaces show degenerative disc disease and spondylosis at C3-4 through C7-T1 with loss of disc height and associated degenerative endplate changes. There is narrowing of the RIGHT C3-4, RIGHT C4-5 and LEFT C6-7 neural foramina due to uncovertebral spurring and facet osteophytic hypertrophy. Degenerative cord impingement is seen at C4-5 through C6-7.  The skull base appears intact.  No neck mass is recognized.  Paraspinal soft tissues appear intact. Visualized lymph nodes appear to be within physiologic size limits.  IMPRESSION:  Cervical spine: No vertebral fracture is recognized.  Focal kyphosis at C5-C6 on a degenerative basis. Grade 1 anterior spondylolisthesis is noted at C3-4 and C4-5.   Multilevel degenerative disc disease and spondylosis at C3-4 through C7-T1 with loss of disc height and associated degenerative endplate changes. There is narrowing of the RIGHT C3-4, RIGHT C4-5 and LEFT C6-7 neural foramina due to uncovertebral spurring and facet osteophytic hypertrophy. Degenerative cord impingement is seen at C4-5 through C6-7.  Facial bones:  Facial bones intact without fracture.    Surgical small lenses. Minimal LEFT periorbital soft tissue swelling is noted.   3 cm LEFT buccal hematoma with associated soft tissue swelling is noted.  Brain:   minimal subarachnoid hemorrhage in the RIGHT central sulcus at the convexity.      EK21:  Normal sinus rhythm  Normal ECG    TELEMETRY:  NSR    ECHO: < from: TTE Echo Complete w/o Contrast w/ Doppler (21 @ 12:37) >     Summary     The left ventricle is normal in size and wall thickness.   Mild, diffuse hypokinesis of the left ventricle is present with an overall   estimated EF of 45-50%.   The left atrium is mildly dilated.   Normal appearing right atrium.   Patient has a history of a PFO as noted within the 2015 [facility] Exam.   A PFO is not noted with color Doppler during this exam.   Fibrocalcific changes noted to the Aortic valve leaflets with preserved   leaflet excursion.   No aortic regurgitation is present.   Well seated bioprosthetic valve in the mitral position. Mean   trans-prosthetic gradient is within normal limitations for this type of   prosthesis.   Trace mitral regurgitation is present.   EA reversal of the mitral inflow consistentwith reduced compliance of the   left ventricle.   Normal appearing tricuspid valve structure.   Mild (1+) tricuspid valve regurgitation is present.     Signature     ----------------------------------------------------------------   Electronically signed by Geronimo Rosales MD(Interpreting   physician) on 2021 05:11 AM   ----------------------------------------------------------------    < end of copied text >

## 2021-05-07 NOTE — PROGRESS NOTE ADULT - PROBLEM SELECTOR PROBLEM 7
Mitral valve replaced

## 2021-05-07 NOTE — PROGRESS NOTE ADULT - PROBLEM SELECTOR PROBLEM 2
Chin laceration, initial encounter

## 2021-05-07 NOTE — PROGRESS NOTE ADULT - PROBLEM SELECTOR PROBLEM 5
R/O Orthostatic hypotension

## 2021-05-07 NOTE — PROGRESS NOTE ADULT - ASSESSMENT
5/2/21:  Pt with above history with prior HTN controlled on meds with sudden fall and ? syncope with resultant head injury and small but stable subarachnoid hemorrhage.  She did not injure her hand during the fall and this not normal alert reaction to put her hands out to block her fall and thus may have been a hypotensive syncopal event...vaso-vagal (doubt), orthostatic hypotension (???) aggravated by her meds (???)  possible, autonomic dysfunction (???).  Scheduled to have a CTA of the carotids and brain today and ECHO ordered as well.  Dr Campbell will be back in the AM and follow up.    5/3 feeling better today  check orthostatics again  echocardiogram pending  continue tele monitoring- SR so far  recommend Loop recorder to evaluate for arrythmic cause of syncope resulting in trauma  follow up imaging as per NS    5/4 still orthostatic, continue florinef  check daily chem and orthostatics  echo reveals  low normal EF ( unchanged)  consideration for loop recorder prior to DC, no arrythmias so far  gently hydration    5/5  although still orthostatic,  drop in blood pressure not as great  no arrythmia on tele, s/p  LOOP placement  continue low dose  florinef- monitor chem and electrolyutes  PT, continue orthstatics  Mild CHF systolic ( chronic - continue ACE and BB   urology eval for urine retention-- possibility that distended bladder contributed to orthostatics due to increased vagal tone     5/6 patient feeling better overall  continue florinef, BP improving- recommend gentle hydration  no cardiac contraindication to DC    5/7  no acute cardiac issues overnight  continue florinef, encourage PO hydration  OV follow up next week

## 2021-05-07 NOTE — PROGRESS NOTE ADULT - NSICDXPILOT_GEN_ALL_CORE
[place]

## 2021-05-07 NOTE — PROGRESS NOTE ADULT - REASON FOR ADMISSION
Subarachnoid hemorrhage
Subarachnoid hemorrhage
Right traumatic subarachnoid hemorrhage
Subarachnoid hemorrhage

## 2021-05-07 NOTE — PROGRESS NOTE ADULT - PROVIDER SPECIALTY LIST ADULT
Hospitalist
Hospitalist
Neurosurgery
Hospitalist
Neurosurgery
Electrophysiology
Neurosurgery
Cardiology
Hospitalist
Hospitalist
Cardiology

## 2021-05-08 LAB
CCP IGG SERPL-ACNC: <8 UNITS — SIGNIFICANT CHANGE UP
RF+CCP IGG SER-IMP: NEGATIVE — SIGNIFICANT CHANGE UP

## 2021-05-10 ENCOUNTER — NON-APPOINTMENT (OUTPATIENT)
Age: 81
End: 2021-05-10

## 2021-05-11 ENCOUNTER — APPOINTMENT (OUTPATIENT)
Dept: UROLOGY | Facility: CLINIC | Age: 81
End: 2021-05-11

## 2021-05-12 ENCOUNTER — APPOINTMENT (OUTPATIENT)
Dept: UROLOGY | Facility: CLINIC | Age: 81
End: 2021-05-12
Payer: MEDICARE

## 2021-05-12 VITALS
DIASTOLIC BLOOD PRESSURE: 79 MMHG | SYSTOLIC BLOOD PRESSURE: 128 MMHG | BODY MASS INDEX: 22.49 KG/M2 | HEIGHT: 65 IN | HEART RATE: 79 BPM | OXYGEN SATURATION: 94 % | WEIGHT: 135 LBS

## 2021-05-12 PROCEDURE — 99203 OFFICE O/P NEW LOW 30 MIN: CPT

## 2021-05-12 NOTE — REVIEW OF SYSTEMS
[Eyesight Problems] : eyesight problems [Shortness Of Breath] : shortness of breath [Constipation] : constipation [Urine retention] : urine retention [Wait a long time to urinate] : waits a long time to urinate [Slow urine stream] : slow urine stream [Interrupted urine stream] : interrupted urine stream [Joint Pain] : joint pain [Dizziness] : dizziness [Limb Weakness] : limb weakness [Difficulty Walking] : difficulty walking [Anxiety] : anxiety [Hot Flashes] : hot flashes [Muscle Weakness] : muscle weakness [see HPI] : see HPI [Negative] : Endocrine [FreeTextEntry2] : High blood pressure [FreeTextEntry3] : Leak urine, Dribbling of urine

## 2021-05-12 NOTE — END OF VISIT
[FreeTextEntry3] : Catheter was removed and she is on bethanechol at this time.  She will take Macrodantin for 5 days and follow-up with urodynamic study in 1 week.

## 2021-05-12 NOTE — PHYSICAL EXAM
[General Appearance - Well Developed] : well developed [Normal Appearance] : normal appearance [General Appearance - Well Nourished] : well nourished [Well Groomed] : well groomed [General Appearance - In No Acute Distress] : no acute distress [Edema] : no peripheral edema [Respiration, Rhythm And Depth] : normal respiratory rhythm and effort [Exaggerated Use Of Accessory Muscles For Inspiration] : no accessory muscle use [Abdomen Soft] : soft [Abdomen Tenderness] : non-tender [Costovertebral Angle Tenderness] : no ~M costovertebral angle tenderness [Urinary Bladder Findings] : the bladder was normal on palpation [Normal Station and Gait] : the gait and station were normal for the patient's age [] : no rash [No Focal Deficits] : no focal deficits [Oriented To Time, Place, And Person] : oriented to person, place, and time [Mood] : the mood was normal [Affect] : the affect was normal [Not Anxious] : not anxious [No Palpable Adenopathy] : no palpable adenopathy

## 2021-05-12 NOTE — HISTORY OF PRESENT ILLNESS
[FreeTextEntry1] : This patient was recently hospitalized for a fall and had a catheter placed for retention.  She does not remember having issues prior to this period of time.

## 2021-05-14 RX ORDER — AMOXICILLIN 500 MG/1
500 TABLET, FILM COATED ORAL
Qty: 4 | Refills: 0 | Status: ACTIVE | COMMUNITY
Start: 2021-04-09

## 2021-05-18 ENCOUNTER — APPOINTMENT (OUTPATIENT)
Dept: ELECTROPHYSIOLOGY | Facility: CLINIC | Age: 81
End: 2021-05-18
Payer: MEDICARE

## 2021-05-18 ENCOUNTER — NON-APPOINTMENT (OUTPATIENT)
Age: 81
End: 2021-05-18

## 2021-05-18 VITALS
SYSTOLIC BLOOD PRESSURE: 127 MMHG | OXYGEN SATURATION: 96 % | WEIGHT: 135 LBS | DIASTOLIC BLOOD PRESSURE: 73 MMHG | BODY MASS INDEX: 22.49 KG/M2 | RESPIRATION RATE: 16 BRPM | HEIGHT: 65 IN | HEART RATE: 70 BPM

## 2021-05-18 DIAGNOSIS — N39.490 OVERFLOW INCONTINENCE: ICD-10-CM

## 2021-05-18 DIAGNOSIS — M19.90 UNSPECIFIED OSTEOARTHRITIS, UNSPECIFIED SITE: ICD-10-CM

## 2021-05-18 DIAGNOSIS — S06.6X1A TRAUMATIC SUBARACHNOID HEMORRHAGE WITH LOSS OF CONSCIOUSNESS OF 30 MINUTES OR LESS, INITIAL ENCOUNTER: ICD-10-CM

## 2021-05-18 DIAGNOSIS — Z96.642 PRESENCE OF LEFT ARTIFICIAL HIP JOINT: ICD-10-CM

## 2021-05-18 DIAGNOSIS — G89.29 OTHER CHRONIC PAIN: ICD-10-CM

## 2021-05-18 DIAGNOSIS — N39.41 URGE INCONTINENCE: ICD-10-CM

## 2021-05-18 DIAGNOSIS — Z98.1 ARTHRODESIS STATUS: ICD-10-CM

## 2021-05-18 DIAGNOSIS — G43.909 MIGRAINE, UNSPECIFIED, NOT INTRACTABLE, WITHOUT STATUS MIGRAINOSUS: ICD-10-CM
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
## 2021-05-18 DIAGNOSIS — E78.00 PURE HYPERCHOLESTEROLEMIA, UNSPECIFIED: ICD-10-CM

## 2021-05-18 DIAGNOSIS — M06.9 RHEUMATOID ARTHRITIS, UNSPECIFIED: ICD-10-CM

## 2021-05-18 DIAGNOSIS — S01.81XA LACERATION WITHOUT FOREIGN BODY OF OTHER PART OF HEAD, INITIAL ENCOUNTER: ICD-10-CM

## 2021-05-18 DIAGNOSIS — I11.0 HYPERTENSIVE HEART DISEASE WITH HEART FAILURE: ICD-10-CM
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
## 2021-05-18 DIAGNOSIS — R33.9 RETENTION OF URINE, UNSPECIFIED: ICD-10-CM

## 2021-05-18 DIAGNOSIS — Z96.1 PRESENCE OF INTRAOCULAR LENS: ICD-10-CM

## 2021-05-18 DIAGNOSIS — I50.22 CHRONIC SYSTOLIC (CONGESTIVE) HEART FAILURE: ICD-10-CM

## 2021-05-18 DIAGNOSIS — Z85.3 PERSONAL HISTORY OF MALIGNANT NEOPLASM OF BREAST: ICD-10-CM

## 2021-05-18 DIAGNOSIS — Z95.818 PRESENCE OF OTHER CARDIAC IMPLANTS AND GRAFTS: ICD-10-CM

## 2021-05-18 DIAGNOSIS — Z98.42 CATARACT EXTRACTION STATUS, LEFT EYE: ICD-10-CM
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
## 2021-05-18 DIAGNOSIS — E43 UNSPECIFIED SEVERE PROTEIN-CALORIE MALNUTRITION: ICD-10-CM

## 2021-05-18 DIAGNOSIS — I95.1 ORTHOSTATIC HYPOTENSION: ICD-10-CM

## 2021-05-18 DIAGNOSIS — Z95.2 PRESENCE OF PROSTHETIC HEART VALVE: ICD-10-CM
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
## 2021-05-18 DIAGNOSIS — Z96.653 PRESENCE OF ARTIFICIAL KNEE JOINT, BILATERAL: ICD-10-CM

## 2021-05-18 DIAGNOSIS — Z79.82 LONG TERM (CURRENT) USE OF ASPIRIN: ICD-10-CM

## 2021-05-18 PROCEDURE — 99212 OFFICE O/P EST SF 10 MIN: CPT

## 2021-05-18 PROCEDURE — 93285 PRGRMG DEV EVAL SCRMS IP: CPT

## 2021-05-18 RX ORDER — ENALAPRIL MALEATE 2.5 MG/1
2.5 TABLET ORAL
Refills: 0 | Status: DISCONTINUED | COMMUNITY
End: 2021-05-18

## 2021-05-18 RX ORDER — PANTOPRAZOLE 40 MG/1
40 TABLET, DELAYED RELEASE ORAL
Refills: 0 | Status: DISCONTINUED | COMMUNITY
End: 2021-05-18

## 2021-05-18 RX ORDER — CELECOXIB 200 MG/1
200 CAPSULE ORAL
Refills: 0 | Status: DISCONTINUED | COMMUNITY
End: 2021-05-18

## 2021-05-19 ENCOUNTER — APPOINTMENT (OUTPATIENT)
Dept: UROLOGY | Facility: CLINIC | Age: 81
End: 2021-05-19
Payer: MEDICARE

## 2021-05-19 PROCEDURE — 51784 ANAL/URINARY MUSCLE STUDY: CPT

## 2021-05-19 PROCEDURE — 51797 INTRAABDOMINAL PRESSURE TEST: CPT

## 2021-05-19 PROCEDURE — 51728 CYSTOMETROGRAM W/VP: CPT

## 2021-05-19 NOTE — ASSESSMENT
[FreeTextEntry1] : ILR reveals one episode of a pause which is actiually undersensing.\par No true pauses.\par No other events recorded.\par Underlying rhythm is normal sinus.\par She will continue remote monitoring monthly with Dr. Campbell.\par F/U in clinic in 6 months.

## 2021-05-19 NOTE — PHYSICAL EXAM
[Normal] : clear lung fields, good air entry, no respiratory distress [No Rash] : no rash [de-identified] : resolving ecchymosis of neck [de-identified] : ILR site is intact with dermabond.

## 2021-05-19 NOTE — REASON FOR VISIT
[Other: ____] : [unfilled] [Family Member] : family member [FreeTextEntry1] : This is an 80 yr old female with PMHx of HTN, HLD, OA/RA, breast CA s/p lumpectomy, MR who suffered a fall with retrograde amnesia resulting in traumatic SAH.\par She had an ILR placed to monitor for arrhythmias.

## 2021-08-20 NOTE — ED PROCEDURE NOTE - CPROC ED TOLERANCE1
Regi 45 Transitions Follow Up Call    2021    Patient: Elder Thor  Patient : 1954   MRN: [id number]  Reason for Admission: CHF  Discharge Date: 8/3/21 RARS: Readmission Risk Score: 23         Spoke with: Jean Carloserica Jolie states he is feeling better  Denies CP. Dizziness has SOB with exertion, turned in at home colonoscopy test today to PCP. Changing from Lisinipril to chaz in a few days . Sees cardiology again in two weeks. No concerns noted. Care Transitions Follow Up Call    Needs to be reviewed by the provider   Additional needs identified to be addressed with provider: No  none             Method of communication with provider : none      Care Transition Nurse (CTN) contacted the patient by telephone to follow up after admission on 21. Verified name and  with patient as identifiers. Addressed changes since last contact: feeling better did at home colon screen d/t low BP turned in to PCP tday. changed from [address] to [place] will stop for 4 days then start new medication. Discussed follow-up appointments. If no appointment was previously scheduled, appointment scheduling offered: Yes. Is follow up appointment scheduled within 7 days of discharge? Yes. Advance Care Planning:   Does patient have an Advance Directive: reviewed and current, reviewed and needs to be updated, not on file; education provided, patient declined education, decision maker updated and referral to internal ACP facilitator. CTN reviewed discharge instructions, medical action plan and red flags with patient and discussed any barriers to care and/or understanding of plan of care after discharge. Discussed appropriate site of care based on symptoms and resources available to patient including: PCP, Specialist, Home health and When to call 911. The patient agrees to contact the PCP office for questions related to their healthcare.      Patients top risk factors for readmission: ineffective coping, lack of knowledge about disease and medication management  Interventions to address risk factors: Obtained and reviewed discharge summary and/or continuity of care documents          CTN provided contact information for future needs. Plan for follow-up call in 5-7 days based on severity of symptoms and risk factors. Plan for next call: symptom management-routine          Care Transitions Subsequent and Final Call    Subsequent and Final Calls  Do you have any ongoing symptoms?: Yes  Onset of Patient-reported symptoms: Today  Patient-reported symptoms: Fatigue, Weakness, Shortness of Breath  Interventions for patient-reported symptoms: Notified PCP/Physician  Have your medications changed?: No  Do you have any questions related to your medications?: No  Do you currently have any active services?: Yes  Are you currently active with any services?: Outpatient/Community Services  Do you have any needs or concerns that I can assist you with?: No  Identified Barriers: Lack of Education, Fear of Failure  Care Transitions Interventions    Pharmacist: Completed      Registered Dietician: Completed    Other Interventions:            Follow Up  Future Appointments   Date Time Provider Keerthi Laceyi   8/23/2021  2:30 PM Abelardo Larry DO Resp Spec [facility]   8/26/2021  2:00 PM [facility] CLINIC  1 [facility] CHF CLI [facility]   8/30/2021  1:30 PM LIDIA Shepard CNP [facility]   10/25/2021  2:00 PM Amaris Vazquez MD sv gr [facility]   10/27/2021  1:30 PM LIDIA Shepard CNP [facility]   12/14/2021  2:10 PM Zay Cartagena MD [facility] [place] None       Johny Villareal LPN Patient tolerated procedure well.

## 2021-11-16 ENCOUNTER — APPOINTMENT (OUTPATIENT)
Dept: ELECTROPHYSIOLOGY | Facility: CLINIC | Age: 81
End: 2021-11-16

## 2022-02-03 ENCOUNTER — NON-APPOINTMENT (OUTPATIENT)
Age: 82
End: 2022-02-03

## 2022-02-17 ENCOUNTER — APPOINTMENT (OUTPATIENT)
Dept: NEUROLOGY | Facility: CLINIC | Age: 82
End: 2022-02-17
Payer: MEDICARE

## 2022-02-17 VITALS
HEIGHT: 65 IN | TEMPERATURE: 97.8 F | BODY MASS INDEX: 22.99 KG/M2 | HEART RATE: 75 BPM | WEIGHT: 138 LBS | DIASTOLIC BLOOD PRESSURE: 80 MMHG | SYSTOLIC BLOOD PRESSURE: 158 MMHG

## 2022-02-17 DIAGNOSIS — R26.81 UNSTEADINESS ON FEET: ICD-10-CM

## 2022-02-17 DIAGNOSIS — H81.90 UNSPECIFIED DISORDER OF VESTIBULAR FUNCTION, UNSPECIFIED EAR: ICD-10-CM

## 2022-02-17 DIAGNOSIS — H53.8 OTHER VISUAL DISTURBANCES: ICD-10-CM

## 2022-02-17 PROCEDURE — 99204 OFFICE O/P NEW MOD 45 MIN: CPT

## 2022-02-17 RX ORDER — ASPIRIN 81 MG
81 TABLET, DELAYED RELEASE (ENTERIC COATED) ORAL
Refills: 0 | Status: ACTIVE | COMMUNITY

## 2022-02-17 RX ORDER — HYDROXYCHLOROQUINE SULFATE 200 MG/1
200 TABLET, FILM COATED ORAL
Refills: 0 | Status: ACTIVE | COMMUNITY

## 2022-02-17 RX ORDER — ATORVASTATIN CALCIUM 10 MG/1
10 TABLET, FILM COATED ORAL
Refills: 0 | Status: ACTIVE | COMMUNITY

## 2022-02-17 NOTE — DATA REVIEWED
[de-identified] : 6/21 brain Mild age related cortical atrophy with patchy mod bilat pontine and stable patchy bilat periventricular punctate left > Rt supra tentorial white matter foci

## 2022-02-17 NOTE — PHYSICAL EXAM
[General Appearance - Alert] : alert [Oriented To Time, Place, And Person] : oriented to person, place, and time [General Appearance - In No Acute Distress] : in no acute distress [Impaired Insight] : insight and judgment were intact [Affect] : the affect was normal [Person] : oriented to person [Place] : oriented to place [Time] : oriented to time [Short Term Intact] : short term memory impaired [Concentration Intact] : normal concentrating ability [Visual Intact] : visual attention was ~T not ~L decreased [Naming Objects] : no difficulty naming common objects [Repeating Phrases] : no difficulty repeating a phrase [Writing A Sentence] : no difficulty writing a sentence [Fluency] : fluency intact [Comprehension] : comprehension intact [Reading] : reading intact [Past History] : adequate knowledge of personal past history [Cranial Nerves Optic (II)] : visual acuity intact bilaterally,  visual fields full to confrontation, pupils equal round and reactive to light [Cranial Nerves Oculomotor (III)] : extraocular motion intact [Cranial Nerves Trigeminal (V)] : facial sensation intact symmetrically [Cranial Nerves Facial (VII)] : face symmetrical [Cranial Nerves Glossopharyngeal (IX)] : tongue and palate midline [Cranial Nerves Accessory (XI - Cranial And Spinal)] : head turning and shoulder shrug symmetric [Cranial Nerves Hypoglossal (XII)] : there was no tongue deviation with protrusion [Motor Strength] : muscle strength was normal in all four extremities [No Muscle Atrophy] : normal bulk in all four extremities [Sensation Tactile Decrease] : light touch was intact [Limited Balance] : the patient's balance was impaired [Past-pointing] : there was no past-pointing [Tremor] : no tremor present [1+] : Patella left 1+ [0] : Ankle jerk left 0 [Plantar Reflex Right Only] : normal on the right [Plantar Reflex Left Only] : normal on the left [FreeTextEntry4] : Poor recall  [FreeTextEntry6] : Mild gen weakness hand  weakness  [FreeTextEntry5] : Platinum/ Horizontal diplopia [FreeTextEntry8] : Unsteady due to arthralgias [Sclera] : the sclera and conjunctiva were normal [PERRL With Normal Accommodation] : pupils were equal in size, round, reactive to light, with normal accommodation [Outer Ear] : the ears and nose were normal in appearance [Oropharynx] : the oropharynx was normal [Neck Appearance] : the appearance of the neck was normal [Neck Cervical Mass (___cm)] : no neck mass was observed [Jugular Venous Distention Increased] : there was no jugular-venous distention [Thyroid Diffuse Enlargement] : the thyroid was not enlarged [Thyroid Nodule] : there were no palpable thyroid nodules [Heart Rate And Rhythm] : heart rate was normal and rhythm regular [Heart Sounds] : normal S1 and S2 [Heart Sounds Gallop] : no gallops [Murmurs] : no murmurs [Heart Sounds Pericardial Friction Rub] : no pericardial rub [Full Pulse] : the pedal pulses are present [Edema] : there was no peripheral edema [Bowel Sounds] : normal bowel sounds [Abdomen Soft] : soft [Abdomen Tenderness] : non-tender [Abdomen Mass (___ Cm)] : no abdominal mass palpated [No CVA Tenderness] : no ~M costovertebral angle tenderness [No Spinal Tenderness] : no spinal tenderness [Nail Clubbing] : no clubbing  or cyanosis of the fingernails [Motor Tone] : muscle strength and tone were normal [FreeTextEntry1] : Joint deformities with arthralgias/ unsteady gait [Skin Color & Pigmentation] : normal skin color and pigmentation [Skin Turgor] : normal skin turgor [] : no rash

## 2022-02-17 NOTE — DISCUSSION/SUMMARY
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, 2021 coming here for evaluation for bilateral blurring of the vision with cognitive impairment and memory loss with unsteady gait imbalance with underlying rheumatoid arthritis and multiple medical problems.\par Last MRI done post trauma did not reveal any acute pathology.\par Recommend EEG.\par Get reports from ophthalmology.\par Cognitive impairment related or unrelated to traumatic injury unclear.\par May need repeat MRI if symptoms are new.\par Consider carotid Dopplers if they have not done recently.\par Continue other medications.\par Followup evaluation after the workup is completed.\par Discussed with patient. She wants me to contact her daughter.\par Followup evaluation with you for other medical problems.\par

## 2022-02-17 NOTE — REVIEW OF SYSTEMS
[Feeling Poorly] : feeling poorly [Decr. Concentrating Ability] : decreased concentrating ability [Changed Thought Patterns] : changed thought patterns [Poor Coordination] : poor coordination [Dizziness] : dizziness [Fainting] : fainting [Vertigo] : vertigo [Migraine Headache] : migraine headaches [Difficulty Walking] : difficulty walking [Eyesight Problems] : eyesight problems [Loss Of Hearing] : hearing loss [Arthralgias] : arthralgias [Joint Pain] : joint pain [Joint Swelling] : joint swelling [Joint Stiffness] : joint stiffness [de-identified] : Blurring of vision [FreeTextEntry3] : Diplopia [FreeTextEntry4] : RASHEEDA [FreeTextEntry9] : RA with deformities

## 2022-02-17 NOTE — REASON FOR VISIT
[Consultation] : a consultation visit [FreeTextEntry1] : Evaluation for Blurring of vision , persistent dizziness and inability fo focus and concentrate with inbalance since in  in 5/21

## 2022-02-17 NOTE — HISTORY OF PRESENT ILLNESS
[FreeTextEntry1] : She is 81-year-old patient with a history of tension, hyperlipidemia, osteoarthritis/rheumatoid arthritis, breast cancer, seen in [facility] emergency room and admitted to hospital last year with syncopal episode with traumatic subarachnoid hemorrhage last year 5/1/21 after a fall and syncope complaining about headache, blurring of vision, inability to focus concentrate and unsteadiness and imbalance since that time.\par Patient was evaluated in the hospital and discharged home subsequently after seen and evaluated by neurosurgery no surgical intervention was required and been evaluated by Dr. Min and persistent symptoms of dizziness and blurring of the vision and imbalance.\par Patient had MRI scan done by you in June which patient doesn't recall having did not reveal any acute intestinal pathology.Nonspecific white matter changes and atrophy.\par Patient was seen by ophthalmology for blurring of the vision was told nothing wrong with her eyes but history of double vision for which she uses prism lenses.\par Patient is poor historian and difficult to obtain history. No headaches currently but intermittent migraines for which she uses Imitrex and also history of vertigo occasionally. Under care of a rheumatologist currently taking Plaquenil and tramadol for pain. Recently started on prednisone for PMR.\par No recent lab work is available.\par Arthralgias and joint pains with weakness per patient. Imbalance and unsteadiness for which she uses cane doesn't use walker. No paresthesias or numbness.\par No history of CVA or stroke in the past.\par Hard of hearing uses hearing aids.\par \par \par

## 2022-02-23 ENCOUNTER — APPOINTMENT (OUTPATIENT)
Dept: NEUROLOGY | Facility: CLINIC | Age: 82
End: 2022-02-23
Payer: MEDICARE

## 2022-02-23 DIAGNOSIS — S06.6X9A TRAUMATIC SUBARACHNOID HEMORRHAGE WITH LOSS OF CONSCIOUSNESS OF UNSPECIFIED DURATION, INITIAL ENCOUNTER: ICD-10-CM

## 2022-02-23 DIAGNOSIS — R55 SYNCOPE AND COLLAPSE: ICD-10-CM

## 2022-02-23 PROCEDURE — 95816 EEG AWAKE AND DROWSY: CPT

## 2022-02-28 ENCOUNTER — NON-APPOINTMENT (OUTPATIENT)
Age: 82
End: 2022-02-28

## 2022-02-28 PROBLEM — R55 SYNCOPE AND COLLAPSE: Status: ACTIVE | Noted: 2021-05-18

## 2022-02-28 PROBLEM — S06.6X9A TRAUMATIC SUBARACHNOID HEMORRHAGE: Status: ACTIVE | Noted: 2022-02-17

## 2022-04-11 DIAGNOSIS — R33.9 RETENTION OF URINE, UNSPECIFIED: ICD-10-CM

## 2022-04-27 ENCOUNTER — APPOINTMENT (OUTPATIENT)
Dept: ORTHOPEDIC SURGERY | Facility: CLINIC | Age: 82
End: 2022-04-27
Payer: MEDICARE

## 2022-04-27 VITALS — WEIGHT: 137 LBS | BODY MASS INDEX: 22.82 KG/M2 | HEIGHT: 65 IN

## 2022-04-27 DIAGNOSIS — Z00.00 ENCOUNTER FOR GENERAL ADULT MEDICAL EXAMINATION W/OUT ABNORMAL FINDINGS: ICD-10-CM

## 2022-04-27 PROCEDURE — 73010 X-RAY EXAM OF SHOULDER BLADE: CPT | Mod: LT

## 2022-04-27 PROCEDURE — 99204 OFFICE O/P NEW MOD 45 MIN: CPT

## 2022-04-27 PROCEDURE — 73030 X-RAY EXAM OF SHOULDER: CPT | Mod: LT

## 2022-04-27 NOTE — IMAGING
[Left] : left shoulder [FreeTextEntry1] : The GH joint has advanced OA. There is central wear. There is glenoid and humeral deformity. There are AC cysts and spurs. The AHI is decreased. [FreeTextEntry5] : There is a type II acromion.

## 2022-04-27 NOTE — DATA REVIEWED
[FreeTextEntry1] : MRI 04/26/2022: There is Glenohumeral arthritis is advanced. The central wear is noted.

## 2022-04-27 NOTE — CONSULT LETTER
[Dear  ___] : Dear  [unfilled], [Consult Letter:] : I had the pleasure of evaluating your patient, [unfilled]. [Please see my note below.] : Please see my note below. [Consult Closing:] : Thank you very much for allowing me to participate in the care of this patient.  If you have any questions, please do not hesitate to contact me. [Sincerely,] : Sincerely, [FreeTextEntry3] : Daniel Franks M.D.\par Shoulder Surgery

## 2022-04-27 NOTE — PHYSICAL EXAM
[Right] : right shoulder [Left] : left shoulder [Sitting] : sitting [Severe] : severe [] : no ecchymosis [FreeTextEntry3] : This is anterior. [TWNoteComboBox4] : passive forward flexion 75 degrees [de-identified] : external rotation 10 degrees

## 2022-04-27 NOTE — REASON FOR VISIT
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 Right is worse than left today. Patient has arthritis in her shoulders. Reaching is painful. Sleep is occasionally affected. There is no n/t. Patient uses Tramadol and Celebrex pills for arthritis with temporary relief. There was no injury.

## 2022-04-27 NOTE — HISTORY OF PRESENT ILLNESS
[Gradual] : gradual [5] : 5 [2] : 2 [Dull/Aching] : dull/aching [Stabbing] : stabbing [Constant] : constant [Household chores] : household chores [Leisure] : leisure [Sleep] : sleep [Social interactions] : social interactions [Meds] : meds [Retired] : Work status: retired [de-identified] : New patient with bilateral shoulder pain for over a year that is worsening. No known injury. [] : no [FreeTextEntry1] : Shoulders [FreeTextEntry7] : down aarm to just above her elbow [FreeTextEntry9] : tiger balm [de-identified] : activity [de-identified] : 2021 [de-identified] : Dr Robert Barragan [de-identified] : 3/2022

## 2022-04-27 NOTE — ASSESSMENT
[FreeTextEntry1] : We discussed the underlying pathology. \par Treatment options reviewed. \par The MRI findings were discussed.\par A shoulder replacement is the surgical option.\par She will consider these options.\par A right CT scan is indicated.\par Her surgical course and recovery were briefly discussed. \par ST vs. LT issues were discussed.\par She will stop Celebrex.\par Mobic was prescribed.\par Cautions discussed. \par Their questions were answered.

## 2022-05-10 ENCOUNTER — APPOINTMENT (OUTPATIENT)
Dept: ORTHOPEDIC SURGERY | Facility: CLINIC | Age: 82
End: 2022-05-10
Payer: MEDICARE

## 2022-05-10 VITALS — BODY MASS INDEX: 22.33 KG/M2 | WEIGHT: 134 LBS | HEIGHT: 65 IN

## 2022-05-10 PROCEDURE — J3490M: CUSTOM

## 2022-05-10 PROCEDURE — 99214 OFFICE O/P EST MOD 30 MIN: CPT | Mod: 25

## 2022-05-10 PROCEDURE — 20611 DRAIN/INJ JOINT/BURSA W/US: CPT | Mod: 50

## 2022-05-10 NOTE — ASSESSMENT
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 \par Discussed trial of visco supplementation.\par Continue Mobic prn\par B/L GH injections given. \par \par Procedure Note:\par Large Joint Injection was performed because of pain and inflammation, failure of conservative treatment.  \par Medications:\par Depo-Medrol: 1 cc, 80 mg.\par Lidocaine: 2 cc, 1%. \par Marcaine: 2 cc, .25%. \par \par Medication was injected in bilateral glenohumeral joints. Patient has tried OTC's including aspirin, Ibuprofen, Aleve etc or prescription NSAIDs, and/or exercises at home and/ or physical therapy without satisfactory response. The risks, benefits, and alternatives to cortisone injection were explained in full to the patient. Risks outlined include but are not limited to infection, sepsis, bleeding, scarring, skin discoloration, temporary increase in pain, syncopal episode, failure to resolve symptoms, allergic reaction, symptom recurrence, and elevation of blood sugar in diabetics. Patient understood the risks. All questions were answered. After discussion of options, patient requested an injection. Oral informed consent was obtained and sterile prep of the injection site was performed using alcohol. Sterile technique was utilized for the procedure including the preparation of the solutions used for the injection. Ethyl chloride spray was used topically.  Sterile technique used. Patient tolerated procedure well. Post Procedure Instructions: Patient was advised to call if redness, pain, or fever occur and apply ice for 15 min. out of every hour for the next 12-24 hours as tolerated. patient was advised to rest the joint(s) for 2 days.\par \par Ultrasound Guidance was used for the following reasons: for Glenohumeral injection. \par Ultrasound guided injection was performed of the shoulder, visualization of the needle and placement of injection was performed without complication.\par \par The advantages, disadvantages, complications and alternatives of continued non-operative treatment versus operative treatment were discussed with the patient.   We specifically discussed the risks of bleeding, infection, damage to neurovascular structures, failure of wound healing, wound dehiscence, scaring, failure of arthroplasty, need for revision surgery, shoulder pain, shoulder stiffness, shoulder dislocation and complications of surgery and anesthesia in general including deep venous thrombosis, pulmonary embolism, myocardial infarction, stroke, loss of limb and death.  The patient understood and agreed to proceed.\par

## 2022-05-10 NOTE — PHYSICAL EXAM
[4 ___] : forward flexion 4[unfilled]/5 [4___] : external rotation 4[unfilled]/5 [Bilateral] : shoulder bilaterally [] : crepitus at glenohumeral joint [FreeTextEntry9] : FE  R 80  L 90\par ER  R 5    L 5

## 2022-05-10 NOTE — HISTORY OF PRESENT ILLNESS
[5] : 5 [2] : 2 [] : yes [Occasional] : occasional [Household chores] : household chores [Sleep] : sleep [de-identified] : 5/10/22: 80 yo RHD female with bilateral shoulder pain since 2020, worse the past year. Right is worse than left. There is pain posterior and anterior. She has pain radiating to her elbow. There is a constant pain, worse with reaching. She saw Dr. Deutsch and had CT and MRI. She takes Mobic. \par \par CT RIGHT SHOULDER:\par 1. Advanced glenohumeral arthropathy with chronic remodeling of the articular surfaces.\par 2. Large glenohumeral joint effusion with innumerable intra-articular bodies, similar to prior MRI.\par 3. Amorphous mineralization along the joint capsule and the rotator cuff footprint may reflect CPPD versus sequela of chronic synovitis.\par 4. Moderate acromioclavicular joint arthropathy.\par 5. Subacromial-subdeltoid bursitis.\par 6. Diffusely diminutive rotator cuff tendons and muscle atrophy may reflect underlying tendon tear as described on prior MRI.\par 7. Lateral downsloping of the acromion with subacromial spurring can contribute to impingement like symptoms in the appropriate clinical setting.\par 8. Similar appearance of an irregular nodule in the posterior segment of the right upper lobe when compared to prior CT thorax \par \par MRI RIGHT SHOULDER: \par 1.  Advanced glenohumeral arthropathy with chronic remodeling of the glenohumeral articular surfaces, unchanged when compared to prior CT thorax 5/11/2020.\par 2.  Rotator cuff tendinosis and diffuse muscle atrophy as described. Diffusely diminutive supraspinatus with high-grade partial-thickness articular surface tear. Mild infraspinatus and subscapularis articular surface fraying. Moderate-grade interstitial tear at the superior subscapularis.\par 3.  Longitudinal split tear of the intra-articular long head of the biceps tendon with distal reconstitution. Mild tenosynovitis. Perched extra-articular segment over the lesser tuberosity.\par 4.  Large glenohumeral joint effusion with synovitis and innumerable intra-articular bodies.\par 5.  Moderate acromioclavicular joint arthropathy.\par 6.  Severe subacromial-subdeltoid bursitis.advanced GH DJD, PRCT, bursitis.\par \par PMHx: HLD, HTN, RA, valve replacement [FreeTextEntry1] : right shoulder  [FreeTextEntry5] : patient feels the same as last visit [FreeTextEntry7] : into the arm

## 2022-05-10 NOTE — DATA REVIEWED
[CT Scan] : CT scan [MRI] : MRI [Right] : of the right [Shoulder] : shoulder [I independently reviewed and interpreted images and report] : I independently reviewed and interpreted images and report [FreeTextEntry1] : advanced GH djd, PRCT, bursitis

## 2022-05-11 ENCOUNTER — APPOINTMENT (OUTPATIENT)
Dept: OTOLARYNGOLOGY | Facility: CLINIC | Age: 82
End: 2022-05-11
Payer: MEDICARE

## 2022-05-11 VITALS
TEMPERATURE: 97.5 F | WEIGHT: 134 LBS | HEIGHT: 65 IN | HEART RATE: 78 BPM | DIASTOLIC BLOOD PRESSURE: 83 MMHG | SYSTOLIC BLOOD PRESSURE: 150 MMHG | BODY MASS INDEX: 22.33 KG/M2

## 2022-05-11 DIAGNOSIS — R26.89 OTHER ABNORMALITIES OF GAIT AND MOBILITY: ICD-10-CM

## 2022-05-11 DIAGNOSIS — H91.8X9 OTHER SPECIFIED HEARING LOSS, UNSPECIFIED EAR: ICD-10-CM

## 2022-05-11 DIAGNOSIS — R42 DIZZINESS AND GIDDINESS: ICD-10-CM

## 2022-05-11 DIAGNOSIS — H90.5 UNSPECIFIED SENSORINEURAL HEARING LOSS: ICD-10-CM

## 2022-05-11 PROCEDURE — 99204 OFFICE O/P NEW MOD 45 MIN: CPT

## 2022-05-11 PROCEDURE — 92567 TYMPANOMETRY: CPT

## 2022-05-11 PROCEDURE — 92557 COMPREHENSIVE HEARING TEST: CPT

## 2022-05-11 NOTE — HISTORY OF PRESENT ILLNESS
[de-identified] : c/o problems with balance - has prism glasses - has seen opticians and opthalmologists - told no problems with eyes.  Saw neurologist - had neg EEG - also had MRI done - all negative.  Told of fluid in ears by primary care.  Patient also told of ear wax.  Hx of fall about one year ago and problems started after.  Almost blacked out.  May have been BP issue.  \par Feels off balance.  No blackout.  No problems turing in bed. \par Patient does wear hearing aides.

## 2022-05-11 NOTE — REASON FOR VISIT
[Initial Consultation] : an initial consultation for [Family Member] : family member [Other: _____] : [unfilled] [FreeTextEntry2] : Blurred vision; dizziness; feeling sick; tired; possible fluid behind in ears

## 2022-05-11 NOTE — ASSESSMENT
[FreeTextEntry1] : Patient with c/o problems with balance - no hx of spinning. Also has had blurred vision - unable to be corrected by opthalmology and optometry.  Has had negative neuro eval to date including neg EEG and neg non contrast MRI of brain - results reviewed\par Normal ear exam today  - patient does wear hearing aides and has bilat symmetric snhl with worse discrim in right ear - patient aware of this issue for over 10 years.  MRI not ordered after discussion since patient did have recent MRI and has had ear issues for 10 years - however did discuss consideration of contrast MRI \par Also hx of head injury one year ago.\par Feel problem is not vestibular - would recommend return to neurology  - ? post concussion issue - would also consider vestibular rehab .  follow up as necessary

## 2022-05-11 NOTE — REVIEW OF SYSTEMS
[Dizziness] : dizziness [Vertigo] : vertigo [Negative] : Heme/Lymph [FreeTextEntry7] : uncomfortable stomach

## 2022-05-23 ENCOUNTER — INPATIENT (INPATIENT)
Facility: HOSPITAL | Age: 82
LOS: 3 days | Discharge: HOME | DRG: 384 | End: 2022-05-27
Attending: INTERNAL MEDICINE | Admitting: HOSPITALIST
Payer: MEDICARE

## 2022-05-23 VITALS
RESPIRATION RATE: 18 BRPM | OXYGEN SATURATION: 98 % | DIASTOLIC BLOOD PRESSURE: 86 MMHG | WEIGHT: 134.04 LBS | SYSTOLIC BLOOD PRESSURE: 172 MMHG | HEART RATE: 74 BPM | HEIGHT: 66 IN | TEMPERATURE: 98 F

## 2022-05-23 DIAGNOSIS — Z98.89 OTHER SPECIFIED POSTPROCEDURAL STATES: Chronic | ICD-10-CM

## 2022-05-23 DIAGNOSIS — Z98.1 ARTHRODESIS STATUS: Chronic | ICD-10-CM

## 2022-05-23 DIAGNOSIS — N17.9 ACUTE KIDNEY FAILURE, UNSPECIFIED: ICD-10-CM

## 2022-05-23 DIAGNOSIS — Z95.2 PRESENCE OF PROSTHETIC HEART VALVE: Chronic | ICD-10-CM

## 2022-05-23 DIAGNOSIS — Z98.49 CATARACT EXTRACTION STATUS, UNSPECIFIED EYE: Chronic | ICD-10-CM

## 2022-05-23 DIAGNOSIS — Z96.653 PRESENCE OF ARTIFICIAL KNEE JOINT, BILATERAL: Chronic | ICD-10-CM

## 2022-05-23 DIAGNOSIS — Z96.642 PRESENCE OF LEFT ARTIFICIAL HIP JOINT: Chronic | ICD-10-CM

## 2022-05-23 LAB
ALBUMIN SERPL ELPH-MCNC: 3.3 G/DL — SIGNIFICANT CHANGE UP (ref 3.3–5)
ALP SERPL-CCNC: 52 U/L — SIGNIFICANT CHANGE UP (ref 40–120)
ALT FLD-CCNC: 41 U/L — SIGNIFICANT CHANGE UP (ref 12–78)
ANION GAP SERPL CALC-SCNC: 6 MMOL/L — SIGNIFICANT CHANGE UP (ref 5–17)
APPEARANCE UR: CLEAR — SIGNIFICANT CHANGE UP
AST SERPL-CCNC: 49 U/L — HIGH (ref 15–37)
BASOPHILS # BLD AUTO: 0.11 K/UL — SIGNIFICANT CHANGE UP (ref 0–0.2)
BASOPHILS NFR BLD AUTO: 1 % — SIGNIFICANT CHANGE UP (ref 0–2)
BILIRUB SERPL-MCNC: 0.4 MG/DL — SIGNIFICANT CHANGE UP (ref 0.2–1.2)
BILIRUB UR-MCNC: NEGATIVE — SIGNIFICANT CHANGE UP
BUN SERPL-MCNC: 27 MG/DL — HIGH (ref 7–23)
CALCIUM SERPL-MCNC: 8.7 MG/DL — SIGNIFICANT CHANGE UP (ref 8.5–10.1)
CHLORIDE SERPL-SCNC: 104 MMOL/L — SIGNIFICANT CHANGE UP (ref 96–108)
CO2 SERPL-SCNC: 26 MMOL/L — SIGNIFICANT CHANGE UP (ref 22–31)
COLOR SPEC: YELLOW — SIGNIFICANT CHANGE UP
CREAT SERPL-MCNC: 1.58 MG/DL — HIGH (ref 0.5–1.3)
DIFF PNL FLD: NEGATIVE — SIGNIFICANT CHANGE UP
EGFR: 32 ML/MIN/1.73M2 — LOW
EOSINOPHIL # BLD AUTO: 0.01 K/UL — SIGNIFICANT CHANGE UP (ref 0–0.5)
EOSINOPHIL NFR BLD AUTO: 0.1 % — SIGNIFICANT CHANGE UP (ref 0–6)
GLUCOSE SERPL-MCNC: 95 MG/DL — SIGNIFICANT CHANGE UP (ref 70–99)
GLUCOSE UR QL: NEGATIVE — SIGNIFICANT CHANGE UP
HCT VFR BLD CALC: 40.2 % — SIGNIFICANT CHANGE UP (ref 34.5–45)
HGB BLD-MCNC: 12.9 G/DL — SIGNIFICANT CHANGE UP (ref 11.5–15.5)
IMM GRANULOCYTES NFR BLD AUTO: 0.8 % — SIGNIFICANT CHANGE UP (ref 0–1.5)
KETONES UR-MCNC: NEGATIVE — SIGNIFICANT CHANGE UP
LEUKOCYTE ESTERASE UR-ACNC: NEGATIVE — SIGNIFICANT CHANGE UP
LYMPHOCYTES # BLD AUTO: 0.88 K/UL — LOW (ref 1–3.3)
LYMPHOCYTES # BLD AUTO: 7.9 % — LOW (ref 13–44)
MAGNESIUM SERPL-MCNC: 1.9 MG/DL — SIGNIFICANT CHANGE UP (ref 1.6–2.6)
MCHC RBC-ENTMCNC: 31.9 PG — SIGNIFICANT CHANGE UP (ref 27–34)
MCHC RBC-ENTMCNC: 32.1 GM/DL — SIGNIFICANT CHANGE UP (ref 32–36)
MCV RBC AUTO: 99.3 FL — SIGNIFICANT CHANGE UP (ref 80–100)
MONOCYTES # BLD AUTO: 0.54 K/UL — SIGNIFICANT CHANGE UP (ref 0–0.9)
MONOCYTES NFR BLD AUTO: 4.8 % — SIGNIFICANT CHANGE UP (ref 2–14)
NEUTROPHILS # BLD AUTO: 9.51 K/UL — HIGH (ref 1.8–7.4)
NEUTROPHILS NFR BLD AUTO: 85.4 % — HIGH (ref 43–77)
NITRITE UR-MCNC: NEGATIVE — SIGNIFICANT CHANGE UP
PH UR: 6 — SIGNIFICANT CHANGE UP (ref 5–8)
PHOSPHATE SERPL-MCNC: 3.2 MG/DL — SIGNIFICANT CHANGE UP (ref 2.5–4.5)
PLATELET # BLD AUTO: 156 K/UL — SIGNIFICANT CHANGE UP (ref 150–400)
POTASSIUM SERPL-MCNC: 5.3 MMOL/L — SIGNIFICANT CHANGE UP (ref 3.5–5.3)
POTASSIUM SERPL-SCNC: 5.3 MMOL/L — SIGNIFICANT CHANGE UP (ref 3.5–5.3)
PROT SERPL-MCNC: 6.7 GM/DL — SIGNIFICANT CHANGE UP (ref 6–8.3)
PROT UR-MCNC: NEGATIVE — SIGNIFICANT CHANGE UP
RBC # BLD: 4.05 M/UL — SIGNIFICANT CHANGE UP (ref 3.8–5.2)
RBC # FLD: 11.7 % — SIGNIFICANT CHANGE UP (ref 10.3–14.5)
SARS-COV-2 RNA SPEC QL NAA+PROBE: SIGNIFICANT CHANGE UP
SODIUM SERPL-SCNC: 136 MMOL/L — SIGNIFICANT CHANGE UP (ref 135–145)
SP GR SPEC: 1.01 — SIGNIFICANT CHANGE UP (ref 1.01–1.02)
TROPONIN I, HIGH SENSITIVITY RESULT: 26.23 NG/L — SIGNIFICANT CHANGE UP
UROBILINOGEN FLD QL: NEGATIVE — SIGNIFICANT CHANGE UP
WBC # BLD: 11.14 K/UL — HIGH (ref 3.8–10.5)
WBC # FLD AUTO: 11.14 K/UL — HIGH (ref 3.8–10.5)

## 2022-05-23 PROCEDURE — 88342 IMHCHEM/IMCYTCHM 1ST ANTB: CPT

## 2022-05-23 PROCEDURE — 97162 PT EVAL MOD COMPLEX 30 MIN: CPT | Mod: GP

## 2022-05-23 PROCEDURE — 84100 ASSAY OF PHOSPHORUS: CPT

## 2022-05-23 PROCEDURE — 88312 SPECIAL STAINS GROUP 1: CPT

## 2022-05-23 PROCEDURE — 71045 X-RAY EXAM CHEST 1 VIEW: CPT | Mod: 26

## 2022-05-23 PROCEDURE — 82533 TOTAL CORTISOL: CPT

## 2022-05-23 PROCEDURE — 99285 EMERGENCY DEPT VISIT HI MDM: CPT

## 2022-05-23 PROCEDURE — 82024 ASSAY OF ACTH: CPT

## 2022-05-23 PROCEDURE — 97530 THERAPEUTIC ACTIVITIES: CPT | Mod: GP

## 2022-05-23 PROCEDURE — 85025 COMPLETE CBC W/AUTO DIFF WBC: CPT

## 2022-05-23 PROCEDURE — U0003: CPT

## 2022-05-23 PROCEDURE — 97116 GAIT TRAINING THERAPY: CPT | Mod: GP

## 2022-05-23 PROCEDURE — 99497 ADVNCD CARE PLAN 30 MIN: CPT | Mod: 25

## 2022-05-23 PROCEDURE — U0005: CPT

## 2022-05-23 PROCEDURE — 80048 BASIC METABOLIC PNL TOTAL CA: CPT

## 2022-05-23 PROCEDURE — 81003 URINALYSIS AUTO W/O SCOPE: CPT

## 2022-05-23 PROCEDURE — 82088 ASSAY OF ALDOSTERONE: CPT

## 2022-05-23 PROCEDURE — 84484 ASSAY OF TROPONIN QUANT: CPT

## 2022-05-23 PROCEDURE — 99223 1ST HOSP IP/OBS HIGH 75: CPT

## 2022-05-23 PROCEDURE — 85027 COMPLETE CBC AUTOMATED: CPT

## 2022-05-23 PROCEDURE — 88305 TISSUE EXAM BY PATHOLOGIST: CPT

## 2022-05-23 PROCEDURE — 36415 COLL VENOUS BLD VENIPUNCTURE: CPT

## 2022-05-23 PROCEDURE — 83735 ASSAY OF MAGNESIUM: CPT

## 2022-05-23 PROCEDURE — 87086 URINE CULTURE/COLONY COUNT: CPT

## 2022-05-23 PROCEDURE — 93010 ELECTROCARDIOGRAM REPORT: CPT

## 2022-05-23 PROCEDURE — 85652 RBC SED RATE AUTOMATED: CPT

## 2022-05-23 PROCEDURE — 93005 ELECTROCARDIOGRAM TRACING: CPT

## 2022-05-23 RX ORDER — HYDROXYCHLOROQUINE SULFATE 200 MG
1 TABLET ORAL
Qty: 0 | Refills: 0 | DISCHARGE

## 2022-05-23 RX ORDER — ATORVASTATIN CALCIUM 80 MG/1
10 TABLET, FILM COATED ORAL AT BEDTIME
Refills: 0 | Status: DISCONTINUED | OUTPATIENT
Start: 2022-05-23 | End: 2022-05-27

## 2022-05-23 RX ORDER — SODIUM CHLORIDE 9 MG/ML
3 INJECTION INTRAMUSCULAR; INTRAVENOUS; SUBCUTANEOUS ONCE
Refills: 0 | Status: COMPLETED | OUTPATIENT
Start: 2022-05-23 | End: 2022-05-23

## 2022-05-23 RX ORDER — CHOLECALCIFEROL (VITAMIN D3) 125 MCG
1 CAPSULE ORAL
Qty: 0 | Refills: 0 | DISCHARGE

## 2022-05-23 RX ORDER — TRAMADOL HYDROCHLORIDE 50 MG/1
50 TABLET ORAL EVERY 6 HOURS
Refills: 0 | Status: DISCONTINUED | OUTPATIENT
Start: 2022-05-23 | End: 2022-05-27

## 2022-05-23 RX ORDER — CELECOXIB 200 MG/1
1 CAPSULE ORAL
Qty: 0 | Refills: 0 | DISCHARGE

## 2022-05-23 RX ORDER — SODIUM CHLORIDE 9 MG/ML
500 INJECTION INTRAMUSCULAR; INTRAVENOUS; SUBCUTANEOUS ONCE
Refills: 0 | Status: COMPLETED | OUTPATIENT
Start: 2022-05-23 | End: 2022-05-23

## 2022-05-23 RX ORDER — CARVEDILOL PHOSPHATE 80 MG/1
6.25 CAPSULE, EXTENDED RELEASE ORAL EVERY 12 HOURS
Refills: 0 | Status: DISCONTINUED | OUTPATIENT
Start: 2022-05-23 | End: 2022-05-27

## 2022-05-23 RX ORDER — ACETAMINOPHEN 500 MG
650 TABLET ORAL EVERY 6 HOURS
Refills: 0 | Status: DISCONTINUED | OUTPATIENT
Start: 2022-05-23 | End: 2022-05-27

## 2022-05-23 RX ORDER — OMEGA-3 ACID ETHYL ESTERS 1 G
1 CAPSULE ORAL
Qty: 0 | Refills: 0 | DISCHARGE

## 2022-05-23 RX ORDER — ONDANSETRON 8 MG/1
4 TABLET, FILM COATED ORAL EVERY 6 HOURS
Refills: 0 | Status: DISCONTINUED | OUTPATIENT
Start: 2022-05-23 | End: 2022-05-27

## 2022-05-23 RX ORDER — CHOLECALCIFEROL (VITAMIN D3) 125 MCG
1000 CAPSULE ORAL DAILY
Refills: 0 | Status: DISCONTINUED | OUTPATIENT
Start: 2022-05-23 | End: 2022-05-27

## 2022-05-23 RX ORDER — ASPIRIN/CALCIUM CARB/MAGNESIUM 324 MG
81 TABLET ORAL DAILY
Refills: 0 | Status: DISCONTINUED | OUTPATIENT
Start: 2022-05-23 | End: 2022-05-27

## 2022-05-23 RX ORDER — CARVEDILOL PHOSPHATE 80 MG/1
1 CAPSULE, EXTENDED RELEASE ORAL
Qty: 0 | Refills: 0 | DISCHARGE

## 2022-05-23 RX ADMIN — SODIUM CHLORIDE 1000 MILLILITER(S): 9 INJECTION INTRAMUSCULAR; INTRAVENOUS; SUBCUTANEOUS at 16:00

## 2022-05-23 RX ADMIN — SODIUM CHLORIDE 3 MILLILITER(S): 9 INJECTION INTRAMUSCULAR; INTRAVENOUS; SUBCUTANEOUS at 15:06

## 2022-05-23 RX ADMIN — ONDANSETRON 4 MILLIGRAM(S): 8 TABLET, FILM COATED ORAL at 16:00

## 2022-05-23 NOTE — ED PROVIDER NOTE - CARE PLAN
1 Principal Discharge DX:	TRACIE (acute kidney injury)  Secondary Diagnosis:	Adrenal insufficiency

## 2022-05-23 NOTE — ED ADULT NURSE NOTE - CAS EDN DISCHARGE ASSESSMENT
no acute respiratory distress, ambulating with steady gait with cane and minimal assistance, c/o nausea, administered zofran as ordered with positive results, tolerated dinner well, voiding without difficulty in bathroom, denies pain, resting comfortably in bed./Alert and oriented to person, place and time

## 2022-05-23 NOTE — ED ADULT TRIAGE NOTE - CHIEF COMPLAINT QUOTE
Pt presents to er with complaints of bilateral vision change for past several months and abnormal kidney function, states she was sent in for further evaluation of adrenal insufficiency, pt denies chest pain, sob at this time.

## 2022-05-23 NOTE — H&P ADULT - NSHPSOCIALHISTORY_GEN_ALL_CORE
Pt is  and lives alone.  She denies tob/ETOH/drug abuse  She runs a cookie/chocolate import /export company.

## 2022-05-23 NOTE — H&P ADULT - NSHPPHYSICALEXAM_GEN_ALL_CORE
ICU Vital Signs Last 24 Hrs  T(C): 36.4 (23 May 2022 11:47), Max: 36.4 (23 May 2022 11:47)  T(F): 97.6 (23 May 2022 11:47), Max: 97.6 (23 May 2022 11:47)  HR: 74 (23 May 2022 11:47) (74 - 74)  BP: 172/86 (23 May 2022 11:47) (172/86 - 172/86)  BP(mean): 111 (23 May 2022 11:47) (111 - 111)    RR: 18 (23 May 2022 11:47) (18 - 18)  SpO2: 98% (23 May 2022 11:47) (98% - 98%)

## 2022-05-23 NOTE — H&P ADULT - CONVERSATION DETAILS
Pt reports she would like to be Full Code.  She would like IVF, antibiotics and a feeding tube trial.  She states her HCP is her daughter Clotilde Montemayor.      MOLST completed after discussion and review of details with pt.

## 2022-05-23 NOTE — H&P ADULT - NSICDXFAMILYHX_GEN_ALL_CORE_FT
FAMILY HISTORY:  Father  Still living? No  Family history of colon cancer in father, Age at diagnosis: 51-60    Mother  Still living? No  Family history of stomach cancer, Age at diagnosis: 61-70

## 2022-05-23 NOTE — H&P ADULT - ASSESSMENT
Pt is admitted with     ARF  Severe nausea and associated abd discomfort and constipation  Weakness and Dizziness  Hypertension  Adrenal Insufficiency  Recent Flare of Polymyalgia  Recent new  pulmonary nodules seen on outpt CT imaging  - s/p 500ml NS in the ED, cont with 75ml/hr.   - follow renal function  - pt is now off enalapril, triamterene, meloxicam.   - add protonix and prn antiemetics  - ACTH stimulation test  - Nephrology consult  - GI consult to consider EGD for possible peptic ulcer disease or malignancy  - outpt f/u with Rheumatology (appt on May 30th) and Endocrine  - outpt f/u for pulmonary nodules  - DVT proph: lovenox  - Adv Dir: FULL Code.  MOLST completed in ED.

## 2022-05-23 NOTE — ED PROVIDER NOTE - OBJECTIVE STATEMENT
81 F hx TN, dyslipidemia, OA/RA, breast cancer s/p lumpectomy, MVR  here sent in by Dr. Campbell pt here complaining feeling dizzy, weak and some nausea for the last several weeks. pt states outpatient CT, MRI and blood work shows kidney function is rising. pt denies any pain. denies vomiting, diarrhea. no sick contacts. vaccinated against covid. 81 F hx HTN, dyslipidemia, OA/RA, breast cancer s/p lumpectomy, MVR  here sent in by Dr. Campbell pt here complaining feeling dizzy, weak and some nausea for the last several weeks. pt states outpatient CT, MRI and blood work shows kidney function is rising. pt denies any pain. denies vomiting, diarrhea. no sick contacts. vaccinated against covid. 81 F hx HTN, dyslipidemia, OA/RA, breast cancer s/p lumpectomy, MVR  here sent in by Dr. Campbell pt here complaining feeling dizzy, weak and some nausea for the last several weeks. pt states she has had recent outpatient "normal" CT body, "normal" MRI brain and blood work shows kidney function is rising. pt denies any pain. denies vomiting, diarrhea. no sick contacts. vaccinated against covid.

## 2022-05-23 NOTE — PATIENT PROFILE ADULT - FALL HARM RISK - HARM RISK INTERVENTIONS

## 2022-05-23 NOTE — ED PROVIDER NOTE - PROGRESS NOTE DETAILS
Attending Maldonado, called and d/w Dr. Theodore.  Pt with creat increasing to 1.99 and had fluid in office on Friday.  Pt had cortical stim test that was very low.  Concern pt may have adrenal insuffiencey Attending asher Maldonado/sarath Valentin for admission.

## 2022-05-23 NOTE — H&P ADULT - REASON FOR ADMISSION
ARF  Weakness and Dizziness  Hypertension  Adrenal Insufficiency ARF  Severe nausea   Weakness and Dizziness  Hypertension  Adrenal Insufficiency

## 2022-05-23 NOTE — ED ADULT NURSE NOTE - OBJECTIVE STATEMENT
c/o dizziness and nausea building gradually, patient states she stopped driving in past weeks due to symptoms, c/o poor appetite, c/o intermittent abdominal tenderness, patient states she was sent by dr. dolan due to decreased kidney function and adrenal insufficiency HX: breast cancer, high cholesterol, HTN, arthritis

## 2022-05-23 NOTE — PHARMACOTHERAPY INTERVENTION NOTE - COMMENTS
Med history complete, reviewed medications and allergies with patient provided med list, patients list states triamterene 5.25 mg unable to confirm dose patient is taking, reviewed medication list with doctor first med profile

## 2022-05-23 NOTE — H&P ADULT - HISTORY OF PRESENT ILLNESS
Pt is a pleasant  82 y/o  Female with PMhx of  HTN on enalapril Coreg, triamterene, dyslipidemia, OA/RA on Plaquenil, prednisone and tramadol, and Celebrex, breast cancer s/p lumpectomy, MVR, Loop recorder  who was sent to [facility]  by Dr. Campbell for TRACIE,  weakness and  complaint of  feeling dizzy, weak and some nausea for the last several weeks.     pt states outpatient CT, MRI and blood work shows kidney function is rising. pt denies any pain. denies vomiting, diarrhea. no sick contacts.     Pt is vaccinated against covid. Pt is a pleasant  82 y/o  Female with PMhx of  HTN on enalapril, Coreg, triamterene, dyslipidemia, OA/RA on Plaquenil, prednisone and tramadol, breast cancer s/p lumpectomy, MVR, Loop recorder  who was sent to [facility]  by Dr. Campbell for TRACIE,  weakness and  complaint of  feeling dizzy, weak and nauseous for the last several weeks.   Pt had a creatinine of 1.9 on 5/20/22 and received a L of NS at Dr. Campbell's office.  She was recently taken off enalapril , triamterene, meloxicam by Dr. Campbell.  Pt reports she stopped Celebrex 3 weeks ago and she herself stopped Cymbalta a month ago.  She denies current NSAID use. Five weeks ago her Rheumatologist increased her prednisone from 5mg to 10 mg for a Polymyalgia Flare.  Pt reports having a sensation of her ears feeling full and dizziness for the last 4 months and had  an unremarkable work up for this by Dr. Pena.      Pt reports she had 2 months of  nausea which recently became severe and is associated with abdominal discomfort.  Pt reports she feels so nauseous now that she cannot walk.  She denies vomiting.    She denies early satiety.  She reports intermittent constipation and reports she had a bowel movement yesterday and that her last bowel movement was otherwise a week ago.  She denies recent diarrhea but admits having loose stools for some days two weeks ago.   Pt reports six months of exertional dyspnea.  She  denies fever/chills, no URI complaints, no chest pain, no HA, no rash,  no sick contacts.  She c/o urinary urgency and was treated for a UTI until 5/21 with amoxicillin.   Per Dr. Campbell an outpatient random cortisol test result was low at  2 and an outpatient CT chest/abd/pelvis shows new 0.7x 0.6 cm LLL and 0.7 x 0.5 cm RLL pulmonary nodules with an unchanged 0.8 cm RUL nodule.      Pt is vaccinated against covid.  She reports her last colonoscopy was in 2016 by Dr. Kelsey.

## 2022-05-24 LAB
ACTH STIM ACTH BASELINE: 17.4 PG/ML — SIGNIFICANT CHANGE UP (ref 7.2–63.3)
ACTH STIM CORTISOL BASELINE: 5.3 UG/DL — LOW (ref 6–18.4)
CULTURE RESULTS: SIGNIFICANT CHANGE UP
HCT VFR BLD CALC: 29.3 % — LOW (ref 34.5–45)
HGB BLD-MCNC: 9.6 G/DL — LOW (ref 11.5–15.5)
MCHC RBC-ENTMCNC: 32.5 PG — SIGNIFICANT CHANGE UP (ref 27–34)
MCHC RBC-ENTMCNC: 32.8 GM/DL — SIGNIFICANT CHANGE UP (ref 32–36)
MCV RBC AUTO: 99.3 FL — SIGNIFICANT CHANGE UP (ref 80–100)
PLATELET # BLD AUTO: 101 K/UL — LOW (ref 150–400)
RBC # BLD: 2.95 M/UL — LOW (ref 3.8–5.2)
RBC # FLD: 11.8 % — SIGNIFICANT CHANGE UP (ref 10.3–14.5)
SPECIMEN SOURCE: SIGNIFICANT CHANGE UP
WBC # BLD: 5.91 K/UL — SIGNIFICANT CHANGE UP (ref 3.8–10.5)
WBC # FLD AUTO: 5.91 K/UL — SIGNIFICANT CHANGE UP (ref 3.8–10.5)

## 2022-05-24 PROCEDURE — 99222 1ST HOSP IP/OBS MODERATE 55: CPT

## 2022-05-24 PROCEDURE — 99223 1ST HOSP IP/OBS HIGH 75: CPT

## 2022-05-24 PROCEDURE — 99233 SBSQ HOSP IP/OBS HIGH 50: CPT

## 2022-05-24 RX ORDER — ENOXAPARIN SODIUM 100 MG/ML
30 INJECTION SUBCUTANEOUS EVERY 24 HOURS
Refills: 0 | Status: DISCONTINUED | OUTPATIENT
Start: 2022-05-24 | End: 2022-05-27

## 2022-05-24 RX ORDER — PANTOPRAZOLE SODIUM 20 MG/1
40 TABLET, DELAYED RELEASE ORAL
Refills: 0 | Status: DISCONTINUED | OUTPATIENT
Start: 2022-05-24 | End: 2022-05-27

## 2022-05-24 RX ORDER — COSYNTROPIN 0.25 MG/ML
0.25 INJECTION, SOLUTION INTRAVENOUS ONCE
Refills: 0 | Status: COMPLETED | OUTPATIENT
Start: 2022-05-24 | End: 2022-05-24

## 2022-05-24 RX ORDER — SODIUM CHLORIDE 9 MG/ML
500 INJECTION INTRAMUSCULAR; INTRAVENOUS; SUBCUTANEOUS
Refills: 0 | Status: DISCONTINUED | OUTPATIENT
Start: 2022-05-24 | End: 2022-05-27

## 2022-05-24 RX ORDER — SUCRALFATE 1 G
1 TABLET ORAL
Refills: 0 | Status: DISCONTINUED | OUTPATIENT
Start: 2022-05-24 | End: 2022-05-27

## 2022-05-24 RX ADMIN — Medication 650 MILLIGRAM(S): at 22:10

## 2022-05-24 RX ADMIN — TRAMADOL HYDROCHLORIDE 50 MILLIGRAM(S): 50 TABLET ORAL at 19:10

## 2022-05-24 RX ADMIN — ATORVASTATIN CALCIUM 10 MILLIGRAM(S): 80 TABLET, FILM COATED ORAL at 21:40

## 2022-05-24 RX ADMIN — TRAMADOL HYDROCHLORIDE 50 MILLIGRAM(S): 50 TABLET ORAL at 11:07

## 2022-05-24 RX ADMIN — ENOXAPARIN SODIUM 30 MILLIGRAM(S): 100 INJECTION SUBCUTANEOUS at 05:09

## 2022-05-24 RX ADMIN — Medication 10 MILLIGRAM(S): at 11:08

## 2022-05-24 RX ADMIN — Medication 650 MILLIGRAM(S): at 21:40

## 2022-05-24 RX ADMIN — PANTOPRAZOLE SODIUM 40 MILLIGRAM(S): 20 TABLET, DELAYED RELEASE ORAL at 05:09

## 2022-05-24 RX ADMIN — Medication 81 MILLIGRAM(S): at 11:07

## 2022-05-24 RX ADMIN — CARVEDILOL PHOSPHATE 6.25 MILLIGRAM(S): 80 CAPSULE, EXTENDED RELEASE ORAL at 21:40

## 2022-05-24 RX ADMIN — TRAMADOL HYDROCHLORIDE 50 MILLIGRAM(S): 50 TABLET ORAL at 12:00

## 2022-05-24 RX ADMIN — Medication 1 GRAM(S): at 18:13

## 2022-05-24 RX ADMIN — TRAMADOL HYDROCHLORIDE 50 MILLIGRAM(S): 50 TABLET ORAL at 18:13

## 2022-05-24 RX ADMIN — CARVEDILOL PHOSPHATE 6.25 MILLIGRAM(S): 80 CAPSULE, EXTENDED RELEASE ORAL at 11:08

## 2022-05-24 RX ADMIN — COSYNTROPIN 0.25 MILLIGRAM(S): 0.25 INJECTION, SOLUTION INTRAVENOUS at 12:40

## 2022-05-24 RX ADMIN — Medication 1000 UNIT(S): at 11:07

## 2022-05-24 NOTE — CONSULT NOTE ADULT - ASSESSMENT
81 year old female with unrelenting nausea, dizziness, abdominal bloating. Recommend PPI and carafate for now. ? gastroparesis.  Gastric emptying study in am.  Plan:  ::NPO p MN except meds  ::Gastric emptying study in am  ::Anti-emetics prn  ::PPI  ::Carafate pre meals and at bedtime    Plan discussed with Dr. Moreno 81 year old female with unrelenting nausea, dizziness, abdominal bloating. Recommend PPI and carafate for now. ? gastroparesis.  Gastric emptying study in am.    WIll plan for endoscopic eval but due to add on schedule and other urgent cases cannot occur till Thursday. Will likely need GES either way, so for now:    Plan:  ::NPO p MN except meds  ::Gastric emptying study in am  ::Anti-emetics prn  ::PPI  ::Carafate pre meals and at bedtime

## 2022-05-24 NOTE — CONSULT NOTE ADULT - SUBJECTIVE AND OBJECTIVE BOX
82 y/o  Female with PMhx of  HTN on enalapril, Coreg, triamterene, dyslipidemia, OA/RA on Plaquenil, prednisone and tramadol, breast cancer s/p lumpectomy, MVR, Loop recorder  who was sent to [facility] for TRACIE,  weakness and  complaint of  feeling dizzy, weak and nauseous for the last several weeks.    Per documents had a creatinine of 1.9 on 22 and received IVF at cardiology office.  She was recently taken off enalapril , triamterene, meloxicam due to increasing creatinine and Celebrex. + nausea and abdominal discomfort as outpatient as well      PAST MEDICAL & SURGICAL HISTORY:  Mitral valve disease      RA (rheumatoid arthritis)      OA (osteoarthritis)      Migraine      HLD (hyperlipidemia)      Breast cancer  left  treated with RT and surgery      Chronic pain  secondary to OA and RA      History of lumpectomy of left breast  with sentinal node biopsy       History of bilateral knee replacement  right , left       H/O spinal fusion  lumbar 2005      History of tonsillectomy      S/P bunionectomy  left x 2       History of left hip replacement  2017      H/O mitral valve replacement      History of cataract surgery  left cataract sx          MEDICATIONS  (STANDING):  aspirin enteric coated 81 milliGRAM(s) Oral daily  atorvastatin 10 milliGRAM(s) Oral at bedtime  carvedilol 6.25 milliGRAM(s) Oral every 12 hours  cholecalciferol 1000 Unit(s) Oral daily  enoxaparin Injectable 30 milliGRAM(s) SubCutaneous every 24 hours  pantoprazole    Tablet 40 milliGRAM(s) Oral before breakfast  predniSONE   Tablet 10 milliGRAM(s) Oral daily  sodium chloride 0.9%. 500 milliLiter(s) (75 mL/Hr) IV Continuous <Continuous>  sucralfate suspension 1 Gram(s) Oral four times a day    MEDICATIONS  (PRN):  acetaminophen     Tablet .. 650 milliGRAM(s) Oral every 6 hours PRN Mild Pain (1 - 3)  ondansetron Injectable 4 milliGRAM(s) IV Push every 6 hours PRN Nausea and/or Vomiting  traMADol 50 milliGRAM(s) Oral every 6 hours PRN Mild Pain (1 - 3)      Allergies    No Known Allergies    Intolerances        SOCIAL HISTORY:  no etoh/cigg    FAMILY HISTORY:  Family history of stomach cancer (Mother)  smoker    Family history of colon cancer in father (Father)  smoker        REVIEW OF SYSTEMS:    CONSTITUTIONAL: stable weakness, no fevers or chills now  EYES/ENT: No visual changes;  No vertigo or throat pain   NECK: No pain or stiffness  RESPIRATORY: No cough, wheezing, hemoptysis; No shortness of breath  CARDIOVASCULAR: No chest pain or palpitations  GASTROINTESTINAL: No abdominal or epigastric pain. No nausea, vomiting, or hematemesis; No diarrhea or constipation. No melena or hematochezia.  GENITOURINARY: No dysuria, frequency or hematuria  NEUROLOGICAL: No numbness or weakness  SKIN: No itching, burning, rashes, or lesions   All other review of systems is negative unless indicated above.      T(C): , Max: 36.8 (22 @ 21:22)  T(F): , Max: 98.3 (22 @ 21:22)  HR: 103 (22 @ 08:45)  BP: 126/75 (22 @ 08:45)  BP(mean): --  RR: 16 (22 @ 08:45)  SpO2: 99% (22 @ 08:45)  Wt(kg): --     @ 07:01  -   @ 07:00  --------------------------------------------------------  IN: 300 mL / OUT: 250 mL / NET: 50 mL     @ 07:01  -   @ 12:45  --------------------------------------------------------  IN: 0 mL / OUT: 500 mL / NET: -500 mL          PHYSICAL EXAM:    Constitutional: NAD ,frail  HEENT:  MM  dist  Cardiovascular: S1 and S2   Extremities: chronic peripheral edema  Neurological: A/O x 3   : No Valladares  Skin: No rashes  Access: Not applicable        LABS:                        13.6   8.99  )-----------( 154      ( 24 May 2022 11:50 )             41.9     23 May 2022 13:36    136    |  104    |  27     ----------------------------<  95     5.3     |  26     |  1.58     Ca    8.7        23 May 2022 13:36  Phos  3.2       23 May 2022 13:36  Mg     1.9       23 May 2022 13:36    TPro  6.7    /  Alb  3.3    /  TBili  0.4    /  DBili  x      /  AST  49     /  ALT  41     /  AlkPhos  52     23 May 2022 13:36          Urine Studies:  Urinalysis Basic - ( 23 May 2022 15:34 )    Color: Yellow / Appearance: Clear / S.010 / pH: x  Gluc: x / Ketone: Negative  / Bili: Negative / Urobili: Negative   Blood: x / Protein: Negative / Nitrite: Negative   Leuk Esterase: Negative / RBC: x / WBC x   Sq Epi: x / Non Sq Epi: x / Bacteria: x            RADIOLOGY & ADDITIONAL STUDIES:

## 2022-05-24 NOTE — CONSULT NOTE ADULT - REASON FOR ADMISSION
ARF  Severe nausea   Weakness and Dizziness  Hypertension  Adrenal Insufficiency

## 2022-05-24 NOTE — PROGRESS NOTE ADULT - SUBJECTIVE AND OBJECTIVE BOX
Patient is a 81y old  Female who presents with a chief complaint of ARF  Severe nausea   Weakness and Dizziness  Hypertension  Adrenal Insufficiency (24 May 2022 12:43)        HPI:  Pt is a pleasant  80 y/o  Female with PMhx of  HTN on enalapril, Coreg, triamterene, dyslipidemia, OA/RA on Plaquenil, prednisone and tramadol, breast cancer s/p lumpectomy, MVR, Loop recorder  who was sent to [facility]  by Dr. Campbell for TRACIE,  weakness and  complaint of  feeling dizzy, weak and nauseous for the last several weeks.   Pt had a creatinine of 1.9 on 22 and received a L of NS at Dr. Campbell's office.  She was recently taken off enalapril , triamterene, meloxicam by Dr. Campbell.  Pt reports she stopped Celebrex 3 weeks ago and she herself stopped Cymbalta a month ago.  She denies current NSAID use. Five weeks ago her Rheumatologist increased her prednisone from 5mg to 10 mg for a Polymyalgia Flare.  Pt reports having a sensation of her ears feeling full and dizziness for the last 4 months and had  an unremarkable work up for this by Dr. Pena.      Pt reports she had 2 months of  nausea which recently became severe and is associated with abdominal discomfort.  Pt reports she feels so nauseous now that she cannot walk.  She denies vomiting.    She denies early satiety.  She reports intermittent constipation and reports she had a bowel movement yesterday and that her last bowel movement was otherwise a week ago.  She denies recent diarrhea but admits having loose stools for some days two weeks ago.   Pt reports six months of exertional dyspnea.  She  denies fever/chills, no URI complaints, no chest pain, no HA, no rash,  no sick contacts.  She c/o urinary urgency and was treated for a UTI until  with amoxicillin.   Per Dr. Campbell an outpatient random cortisol test result was low at  2 and an outpatient CT chest/abd/pelvis shows new 0.7x 0.6 cm LLL and 0.7 x 0.5 cm RLL pulmonary nodules with an unchanged 0.8 cm RUL nodule.      Pt is vaccinated against covid.  She reports her last colonoscopy was in  by Dr. Kelsey.   (23 May 2022 17:09)      Review of system- All 10 systems reviewed and is as per HPI otherwise negative.           T(C): 36.5 (22 @ 08:45), Max: 36.8 (22 @ 21:22)  HR: 103 (22 @ 08:45) (74 - 103)  BP: 126/75 (22 @ 08:45) (126/75 - 151/79)  RR: 16 (22 @ 08:45) (16 - 16)  SpO2: 99% (22 @ 08:45) (98% - 99%)  Wt(kg): --    PHYSICAL EXAM:    GENERAL: Comfortable, no acute distress  HEAD:  Atraumatic, Normocephalic  EYES: EOMI, PERRLA  HEENT: Moist mucous membranes  NECK: Supple, No JVD  NERVOUS SYSTEM:  Alert & Oriented X3, Motor Strength 5/5 B/L upper and lower extremities  CHEST/LUNG: Clear to auscultation bilaterally  HEART: Regular rate and rhythm; No murmurs, rubs, or gallops  ABDOMEN: Soft, Nontender, Nondistended; Bowel sounds present  GENITOURINARY- Voiding, no palpable bladder  EXTREMITIES:  No clubbing, cyanosis, or edema  MUSCULOSKELTAL- No muscle tenderness, No joint tenderness  SKIN-no rash        LABS:                        13.6   8.99  )-----------( 154      ( 24 May 2022 11:50 )             41.9         136  |  104  |  27<H>  ----------------------------<  95  5.3   |  26  |  1.58<H>    Ca    8.7      23 May 2022 13:36  Phos  3.2       Mg     1.9         TPro  6.7  /  Alb  3.3  /  TBili  0.4  /  DBili  x   /  AST  49<H>  /  ALT  41  /  AlkPhos  52        Urinalysis Basic - ( 23 May 2022 15:34 )    Color: Yellow / Appearance: Clear / S.010 / pH: x  Gluc: x / Ketone: Negative  / Bili: Negative / Urobili: Negative   Blood: x / Protein: Negative / Nitrite: Negative   Leuk Esterase: Negative / RBC: x / WBC x   Sq Epi: x / Non Sq Epi: x / Bacteria: x      MEDS  acetaminophen     Tablet .. 650 milliGRAM(s) Oral every 6 hours PRN  aspirin enteric coated 81 milliGRAM(s) Oral daily  atorvastatin 10 milliGRAM(s) Oral at bedtime  carvedilol 6.25 milliGRAM(s) Oral every 12 hours  cholecalciferol 1000 Unit(s) Oral daily  enoxaparin Injectable 30 milliGRAM(s) SubCutaneous every 24 hours  ondansetron Injectable 4 milliGRAM(s) IV Push every 6 hours PRN  pantoprazole    Tablet 40 milliGRAM(s) Oral before breakfast  predniSONE   Tablet 10 milliGRAM(s) Oral daily  sodium chloride 0.9%. 500 milliLiter(s) IV Continuous <Continuous>  sucralfate suspension 1 Gram(s) Oral four times a day  traMADol 50 milliGRAM(s) Oral every 6 hours PRN               C/C: renal failure/generalized weakness/lightheadedness.      HPI: 81F,  PMhx of  HTN on enalapril, Coreg, triamterene, dyslipidemia, OA/RA on Plaquenil, prednisone and tramadol, breast cancer s/p lumpectomy, MVR, Loop recorder  who was sent to [facility]  by Dr. Campbell for TRACIE,  weakness and  complaint of  feeling dizzy, weak and nauseous for the last several weeks.   Pt had a creatinine of 1.9 on 22 and received a L of NS at Dr. Campbell's office.  She was recently taken off enalapril , triamterene, meloxicam by Dr. Campbell.  Pt reports she stopped Celebrex 3 weeks ago and she herself stopped Cymbalta a month ago.  She denies current NSAID use. Five weeks ago her Rheumatologist increased her prednisone from 5mg to 10 mg for a Polymyalgia Flare.  Pt reports having a sensation of her ears feeling full and dizziness for the last 4 months and had  an unremarkable work up for this by Dr. Pena.      Pt reports she had 2 months of  nausea which recently became severe and is associated with abdominal discomfort.  Pt reports she feels so nauseous now that she cannot walk.  She denies vomiting.    She denies early satiety.  She reports intermittent constipation and reports she had a bowel movement yesterday and that her last bowel movement was otherwise a week ago.  She denies recent diarrhea but admits having loose stools for some days two weeks ago.   Pt reports six months of exertional dyspnea.  She  denies fever/chills, no URI complaints, no chest pain, no HA, no rash,  no sick contacts.  She c/o urinary urgency and was treated for a UTI until  with amoxicillin.   Per Dr. Campbell an outpatient random cortisol test result was low at  2 and an outpatient CT chest/abd/pelvis shows new 0.7x 0.6 cm LLL and 0.7 x 0.5 cm RLL pulmonary nodules with an unchanged 0.8 cm RUL nodule.      Pt is vaccinated against covid.  She reports her last colonoscopy was in  by Dr. Kelsey.       She was admitted for further evaluation.     pt seen and examined this am. Her primary complaint is that she feels lightheaded, having visual difficulties with blurring of vision, can't focus on objects for too long.   Feels her gait is unsteady and that she can not walk in a straight line.   Feels her symptoms started after a concussion 14 mos ago. upon chart review she had a small traumatic Subarachnoid hemorrhage . She had fallen presumably due to orthostatic hypotension at the time. Was seen by neurosx and was recommended short course of keppra.  As her symptoms were persistent, She was seen by Dr. Kidd as an outpt and had an mri/eeg and per pateint, it was felt her issue was not neurological.  Saw ENT and her w/u was negative there as well.   Saw opthalmology and was told her eyes were fine.   Follows with Dr. Barragan for rheumatology and was started on prednisone about 3 mos ago, her doses have ranged between 5 and 10 mg.   Her symptoms started long before starting prednisone per patient. She states the symptoms are present all the time, regardless of position.       Review of system- All 10 systems reviewed and is as per HPI otherwise negative.       Vital Signs Last 24 Hrs  T(C): 36.5 (24 May 2022 08:45), Max: 36.8 (23 May 2022 21:22)  T(F): 97.7 (24 May 2022 08:45), Max: 98.3 (23 May 2022 21:22)  HR: 103 (24 May 2022 08:45) (74 - 103)  BP: 126/75 (24 May 2022 08:45) (126/75 - 151/79)  RR: 16 (24 May 2022 08:45) (16 - 16)  SpO2: 99% (24 May 2022 08:45) (98% - 99%)    PHYSICAL EXAM:    GENERAL: Comfortable, no acute distress  HEAD:  Atraumatic, Normocephalic  EYES: EOMI, PERRLA  HEENT: Moist mucous membranes  NECK: Supple, No JVD  NERVOUS SYSTEM:  Alert & Oriented X3, Motor Strength 5/5 B/L upper and lower extremities  CHEST/LUNG: Clear to auscultation bilaterally  HEART: Regular rate and rhythm; No murmurs, rubs, or gallops  ABDOMEN: Soft, Nontender, Nondistended; Bowel sounds present  GENITOURINARY- Voiding, no palpable bladder  EXTREMITIES:  No clubbing, cyanosis, or edema  MUSCULOSKELETAL- No muscle tenderness, No joint tenderness  SKIN-no rash    LABS:                        13.6   8.99  )-----------( 154      ( 24 May 2022 11:50 )             41.9     05-    139  |  107  |  20  ----------------------------<  85  3.8   |  27  |  1.29    Ca    8.6      24 May 2022 11:50  Phos  3.2     05-  Mg     1.9         TPro  6.7  /  Alb  3.3  /  TBili  0.4  /  DBili  x   /  AST  49<H>  /  ALT  41  /  AlkPhos  52  05-      Urinalysis Basic - ( 23 May 2022 15:34 )    Color: Yellow / Appearance: Clear / S.010 / pH: x  Gluc: x / Ketone: Negative  / Bili: Negative / Urobili: Negative   Blood: x / Protein: Negative / Nitrite: Negative   Leuk Esterase: Negative / RBC: x / WBC x   Sq Epi: x / Non Sq Epi: x / Bacteria: x      MEDS  acetaminophen     Tablet .. 650 milliGRAM(s) Oral every 6 hours PRN  aspirin enteric coated 81 milliGRAM(s) Oral daily  atorvastatin 10 milliGRAM(s) Oral at bedtime  carvedilol 6.25 milliGRAM(s) Oral every 12 hours  cholecalciferol 1000 Unit(s) Oral daily  enoxaparin Injectable 30 milliGRAM(s) SubCutaneous every 24 hours  ondansetron Injectable 4 milliGRAM(s) IV Push every 6 hours PRN  pantoprazole    Tablet 40 milliGRAM(s) Oral before breakfast  predniSONE   Tablet 10 milliGRAM(s) Oral daily  sodium chloride 0.9%. 500 milliLiter(s) IV Continuous <Continuous>  sucralfate suspension 1 Gram(s) Oral four times a day  traMADol 50 milliGRAM(s) Oral every 6 hours PRN    ASSESSMENT AND PLAN:    81f, PMH as above a/w:    1. Acute renal failure:  -likely prerenal.   -improving.     2. Lightheadedness/visual difficulties/gait imbalance/cognitive issues/persistent nausea:  -check orthostatics.   -r/o adrenal insufficiency- (outpt random cortisol low)--->ACTH stim test today.   -neurology evaluation.     3. HTN  -continue bb  -bp stable.     4. Fibromyalgia:  -on prednisone 10mg as recent ESR was elevated per pt.     5. Rheumatoid arthritis/OA:  -supposed to have bilateral shoulder replaced in near future.   -pain control  -ambulate.   -physical therapy    6. DVT px:  -lovenox.           C/C: renal failure/generalized weakness/lightheadedness.      HPI: 81F,  PMhx of  HTN on enalapril, Coreg, triamterene, dyslipidemia, OA/RA on Plaquenil, prednisone and tramadol, breast cancer s/p lumpectomy, MVR, Loop recorder  who was sent to [facility]  by Dr. Campbell for TRACIE,  weakness and  complaint of  feeling dizzy, weak and nauseous for the last several weeks.   Pt had a creatinine of 1.9 on 22 and received a L of NS at Dr. Campbell's office.  She was recently taken off enalapril , triamterene, meloxicam by Dr. Campbell.  Pt reports she stopped Celebrex 3 weeks ago and she herself stopped Cymbalta a month ago.  She denies current NSAID use. Five weeks ago her Rheumatologist increased her prednisone from 5mg to 10 mg for a Polymyalgia Flare.  Pt reports having a sensation of her ears feeling full and dizziness for the last 4 months and had  an unremarkable work up for this by Dr. Pena.      Pt reports she had 2 months of  nausea which recently became severe and is associated with abdominal discomfort.  Pt reports she feels so nauseous now that she cannot walk.  She denies vomiting.    She denies early satiety.  She reports intermittent constipation and reports she had a bowel movement yesterday and that her last bowel movement was otherwise a week ago.  She denies recent diarrhea but admits having loose stools for some days two weeks ago.   Pt reports six months of exertional dyspnea.  She  denies fever/chills, no URI complaints, no chest pain, no HA, no rash,  no sick contacts.  She c/o urinary urgency and was treated for a UTI until  with amoxicillin.   Per Dr. Campbell an outpatient random cortisol test result was low at  2 and an outpatient CT chest/abd/pelvis shows new 0.7x 0.6 cm LLL and 0.7 x 0.5 cm RLL pulmonary nodules with an unchanged 0.8 cm RUL nodule.      Pt is vaccinated against covid.  She reports her last colonoscopy was in  by Dr. Kelsey.       She was admitted for further evaluation.     pt seen and examined this am. Her primary complaint is that she feels lightheaded, having visual difficulties with blurring of vision, can't focus on objects for too long.   Feels her gait is unsteady and that she can not walk in a straight line.   Feels her symptoms started after a concussion 14 mos ago. upon chart review she had a small traumatic Subarachnoid hemorrhage . She had fallen presumably due to orthostatic hypotension at the time. Was seen by neurosx and was recommended short course of keppra.  As her symptoms were persistent, She was seen by Dr. Kidd as an outpt and had an mri/eeg and per pateint, it was felt her issue was not neurological.  Saw ENT and her w/u was negative there as well.   Saw opthalmology and was told her eyes were fine.   Follows with Dr. Barragan for rheumatology and was started on prednisone about 3 mos ago, her doses have ranged between 5 and 10 mg.   Her symptoms started long before starting prednisone per patient. She states the symptoms are present all the time, regardless of position.       Review of system- All 10 systems reviewed and is as per HPI otherwise negative.       Vital Signs Last 24 Hrs  T(C): 36.5 (24 May 2022 08:45), Max: 36.8 (23 May 2022 21:22)  T(F): 97.7 (24 May 2022 08:45), Max: 98.3 (23 May 2022 21:22)  HR: 103 (24 May 2022 08:45) (74 - 103)  BP: 126/75 (24 May 2022 08:45) (126/75 - 151/79)  RR: 16 (24 May 2022 08:45) (16 - 16)  SpO2: 99% (24 May 2022 08:45) (98% - 99%)    PHYSICAL EXAM:    GENERAL: Comfortable, no acute distress  HEAD:  Atraumatic, Normocephalic  EYES: EOMI, PERRLA  HEENT: Moist mucous membranes  NECK: Supple, No JVD  NERVOUS SYSTEM:  Alert & Oriented X3, Motor Strength 5/5 B/L upper and lower extremities  CHEST/LUNG: Clear to auscultation bilaterally  HEART: Regular rate and rhythm; No murmurs, rubs, or gallops  ABDOMEN: Soft, Nontender, Nondistended; Bowel sounds present  GENITOURINARY- Voiding, no palpable bladder  EXTREMITIES:  No clubbing, cyanosis, or edema  MUSCULOSKELETAL- No muscle tenderness, No joint tenderness  SKIN-no rash    LABS:                        13.6   8.99  )-----------( 154      ( 24 May 2022 11:50 )             41.9     05-    139  |  107  |  20  ----------------------------<  85  3.8   |  27  |  1.29    Ca    8.6      24 May 2022 11:50  Phos  3.2     05-  Mg     1.9         TPro  6.7  /  Alb  3.3  /  TBili  0.4  /  DBili  x   /  AST  49<H>  /  ALT  41  /  AlkPhos  52  05-      Urinalysis Basic - ( 23 May 2022 15:34 )    Color: Yellow / Appearance: Clear / S.010 / pH: x  Gluc: x / Ketone: Negative  / Bili: Negative / Urobili: Negative   Blood: x / Protein: Negative / Nitrite: Negative   Leuk Esterase: Negative / RBC: x / WBC x   Sq Epi: x / Non Sq Epi: x / Bacteria: x      MEDS  acetaminophen     Tablet .. 650 milliGRAM(s) Oral every 6 hours PRN  aspirin enteric coated 81 milliGRAM(s) Oral daily  atorvastatin 10 milliGRAM(s) Oral at bedtime  carvedilol 6.25 milliGRAM(s) Oral every 12 hours  cholecalciferol 1000 Unit(s) Oral daily  enoxaparin Injectable 30 milliGRAM(s) SubCutaneous every 24 hours  ondansetron Injectable 4 milliGRAM(s) IV Push every 6 hours PRN  pantoprazole    Tablet 40 milliGRAM(s) Oral before breakfast  predniSONE   Tablet 10 milliGRAM(s) Oral daily  sodium chloride 0.9%. 500 milliLiter(s) IV Continuous <Continuous>  sucralfate suspension 1 Gram(s) Oral four times a day  traMADol 50 milliGRAM(s) Oral every 6 hours PRN    ASSESSMENT AND PLAN:    81f, PMH as above a/w:    1. Acute renal failure:  -likely prerenal in setting of ace-i/nsaids/diuretics  -improving.     2. Lightheadedness/visual difficulties/gait imbalance/cognitive issues/persistent nausea:  -check orthostatics.   -r/o adrenal insufficiency- (outpt random cortisol low)--->ACTH stim test today.   -neurology evaluation.     3. HTN  -continue bb  -bp stable.     4. Fibromyalgia:  -on prednisone 10mg as recent ESR was elevated per pt.     5. Rheumatoid arthritis/OA:  -supposed to have bilateral shoulder replaced in near future.   -pain control  -ambulate.   -physical therapy    6. DVT px:  -lovenox.

## 2022-05-24 NOTE — CONSULT NOTE ADULT - ASSESSMENT
82 y/o  Female with PMhx of  HTN on enalapril, Coreg, triamterene, dyslipidemia, OA/RA on Plaquenil, prednisone and tramadol, breast cancer s/p lumpectomy, MVR, Loop recorder  who was sent to [facility] for TRACIE,  weakness and  complaint of  feeling dizzy, weak and nauseous for the last several weeks.    Per documents had a creatinine of 1.9 on 5/20/22 and received IVF at cardiology office.  She was recently taken off enalapril , triamterene, meloxicam due to increasing creatinine and Celebrex. + nausea and abdominal discomfort as outpatient as well    TRACIE  -Apparent outpatient peak near 2.0, trending down with hold of nsaids (multiple) and ace/diuretic  -Keep off of these for now  -Avoid nsaids going forward  -Trend labs, ivf can likely stop by AM  -Renal imaging if further rise in function    thanks, will follow with you  d/c with staff

## 2022-05-24 NOTE — CONSULT NOTE ADULT - ASSESSMENT
Pt is a pleasant  82 y/o  Female with PMhx of  HTN on enalapril, Coreg, triamterene, dyslipidemia, OA/RA on Plaquenil, prednisone and tramadol, breast cancer s/p lumpectomy, MVR, Loop recorder  who was sent to [facility]  by Dr. Campbell for TRACIE,  weakness and  complaint of  feeling dizzy, weak and nauseous for the last several weeks.   Pt had a creatinine of 1.9 on 5/20/22 and received a L of NS at Dr. Campbell's office.  She was recently taken off enalapril , triamterene, meloxicam by Dr. Campbell.  Pt reports she stopped Celebrex 3 weeks ago and she herself stopped Cymbalta a month ago.  She denies current NSAID use. Five weeks ago her Rheumatologist increased her prednisone from 5mg to 10 mg for a Polymyalgia Flare.  Pt reports having a sensation of her ears feeling full and dizziness for the last 4 months and had  an unremarkable work up for this by Dr. Pena.      Pt reports she had 2 months of  nausea which recently became severe and is associated with abdominal discomfort.  Pt reports she feels so nauseous now that she cannot walk.  She denies vomiting.    She denies early satiety.  She reports intermittent constipation and reports she had a bowel movement yesterday and that her last bowel movement was otherwise a week ago.  She denies recent diarrhea but admits having loose stools for some days two weeks ago.   Pt reports six months of exertional dyspnea.  She  denies fever/chills, no URI complaints, no chest pain, no HA, no rash,  no sick contacts.  She c/o urinary urgency and was treated for a UTI until 5/21 with amoxicillin.   Per Dr. Campbell an outpatient random cortisol test result was low at  2 and an outpatient CT chest/abd/pelvis shows new 0.7x 0.6 cm LLL and 0.7 x 0.5 cm RLL pulmonary nodules with an unchanged 0.8 cm RUL nodule.      Pt is vaccinated against covid.  She reports her last colonoscopy was in 2016 by Dr. Kelsey.   (23 May 2022 17:09)    She states that on 5/1/21 she fell and hit her head and sustained a concussion. Records show that she had a subarachnoid hemorrhage at this time.   Since that time she has not felt herself.  She reports difficulty with vision, balance and attention.  She also reports feeling "woozy" and having a spinning sensation.    Post-concussion syndrome  -It does seem that the patient has a post-concussion syndrome that has been chronic  -I suggest follow-up with vestibular therapy - recommend [facility]onsales in [place]  -She is not reporting headaches at this time, so I would not start a daily preventative medication  -Ophthalmology has stated that eye exam is normal. Can consider neuro-ophthalmology. She may have some convergence insufficiency. She has been using prism glasses for years. It is unclear what her underlying vision issue is.    I do not think that a repeat MRI brain at this time will be of significant value.    Discussed with Dr. Franks.  Patient may follow up in the office.

## 2022-05-24 NOTE — CONSULT NOTE ADULT - SUBJECTIVE AND OBJECTIVE BOX
CHIEF COMPLAINT: Patient is a 81y old  Female who presents with a chief complaint of ARF  Severe nausea   Weakness and Dizziness  Hypertension  Adrenal Insufficiency (23 May 2022 17:09)      HPI:  Pt is a pleasant  80 y/o  Female with PMhx of  HTN on enalapril, Coreg, triamterene, dyslipidemia, OA/RA on Plaquenil, prednisone and tramadol, breast cancer s/p lumpectomy, MVR, Loop recorder  who was sent to [place] ED for TRACIE,  weakness and  complaint of  feeling dizzy, weak and nauseous for the last several weeks.   Pt had a creatinine of 1.9 on 5/20/22 and received a L of NS at my office.  She was recently taken off enalapril , triamterene, meloxicam due to increasing creatinine.   Pt reports she stopped Celebrex 3 weeks ago and she herself stopped Cymbalta a month ago.  She denies current NSAID use.   Five weeks ago her Rheumatologist increased her prednisone from 5mg to 10 mg for a Polymyalgia Flare.  Pt reports having a sensation of her ears feeling full and dizziness for the last 4 months and had  an unremarkable work up for this by Dr. Pena.      Pt reports she had 2 months of  nausea which recently became severe and is associated with abdominal discomfort.  Pt reports she feels so nauseous now that she cannot walk.  She denies vomiting.    She denies early satiety.  She reports intermittent constipation and reports she had a bowel movement yesterday and that her last bowel movement was otherwise a week ago.  She denies recent diarrhea but admits having loose stools for some days two weeks ago.   Pt reports six months of exertional dyspnea.  She  denies fever/chills, no URI complaints, no chest pain, no HA, no rash,  no sick contacts.  She c/o urinary urgency and was treated for a UTI until 5/21 with amoxicillin.   An outpatient random cortisol test result was low at  2     An outpatient CT chest/abd/pelvis on 5/12/22 at [facility] radiology in [place] shows new 0.7x 0.6 cm LLL and 0.7 x 0.5 cm RLL pulmonary nodules with an unchanged 0.8 cm RUL nodule.        PMHx: PAST MEDICAL & SURGICAL HISTORY:  Mitral valve disease  RA (rheumatoid arthritis)  OA (osteoarthritis)  Migraine  HLD (hyperlipidemia)  Breast cancer left 1990 treated with RT and surgery  Chronic pain  secondary to OA and RA  History of lumpectomy of left breast  with sentinal node biopsy 1990  History of bilateral knee replacement  right 2009, left 2011  H/O spinal fusion  lumbar 2005  History of tonsillectomy  S/P bunionectomy  left x 2 2007  History of left hip replacement  2017  H/O mitral valve replacement    History of cataract surgery  left cataract sx      Soc Hx: lives at home- no toxic habits      Allergies: Allergies    No Known Allergies    Intolerances        Vital Signs Last 24 Hrs  T(C): 36.6 (23 May 2022 21:58), Max: 36.8 (23 May 2022 21:22)  T(F): 97.8 (23 May 2022 21:58), Max: 98.3 (23 May 2022 21:22)  HR: 76 (23 May 2022 21:58) (74 - 76)  BP: 151/79 (23 May 2022 21:58) (146/67 - 172/86)  BP(mean): 111 (23 May 2022 11:47) (111 - 111)  RR: 16 (23 May 2022 21:58) (16 - 18)  SpO2: 98% (23 May 2022 21:58) (98% - 99%)    I&O's Summary          PHYSICAL EXAM:   Constitutional: NAD,weak appearing   Respiratory: Breath sounds are clear bilaterally, No wheezing, rales or rhonchi  Cardiovascular: S1 and S2, regular rate and rhythm, no Murmurs, gallops or rubs  Gastrointestinal: Bowel Sounds present, soft, nontender, nondistended, no guarding, no rebound  Extremities: No peripheral edema  Vascular: 2+ peripheral pulses  Neurological: A/O x 3, no focal deficits    MEDICATIONS:  MEDICATIONS  (STANDING):  aspirin enteric coated 81 milliGRAM(s) Oral daily  atorvastatin 10 milliGRAM(s) Oral at bedtime  carvedilol 6.25 milliGRAM(s) Oral every 12 hours  cholecalciferol 1000 Unit(s) Oral daily  enoxaparin Injectable 30 milliGRAM(s) SubCutaneous every 24 hours  pantoprazole    Tablet 40 milliGRAM(s) Oral before breakfast  predniSONE   Tablet 10 milliGRAM(s) Oral daily  sodium chloride 0.9%. 500 milliLiter(s) (75 mL/Hr) IV Continuous <Continuous>      LABS: All Labs Reviewed:                        12.9   11.14 )-----------( 156      ( 23 May 2022 13:36 )             40.2     05-23    136  |  104  |  27<H>  ----------------------------<  95  5.3   |  26  |  1.58<H>    Ca    8.7      23 May 2022 13:36  Phos  3.2     05-23  Mg     1.9     05-23    TPro  6.7  /  Alb  3.3  /  TBili  0.4  /  DBili  x   /  AST  49<H>  /  ALT  41  /  AlkPhos  52  05-23        EKG: Sinus rhythm, iLBBB, LVH pattern

## 2022-05-24 NOTE — CONSULT NOTE ADULT - NS ATTEND AMEND GEN_ALL_CORE FT
81 year old woman with months of worsening nauesa and negative outpatient work up, admitted with failure to thrive.     Gastric emptying study.   Will need endoscopy.

## 2022-05-24 NOTE — CONSULT NOTE ADULT - ASSESSMENT
81 f with  TRACIE , weakness and failure to thrive    1) TRACIE- off of diuretics and NSAIDS- recommend fluids and renal evaluation    2)Severe nausea and associated abd discomfort and constipation- on PPI and GI called for evaluation     3) concern for Adrenal Insufficiency- consider endocrine evaluation- she has been on long tern steroids with changing doses - ACTH stimulation test    Recent new  pulmonary nodules seen on outpt CT imaging- outpt f/u for pulmonary nodules    will follow

## 2022-05-25 LAB
ALDOST SERPL-MCNC: 19.5 NG/DL — SIGNIFICANT CHANGE UP
ERYTHROCYTE [SEDIMENTATION RATE] IN BLOOD: 8 MM/HR — SIGNIFICANT CHANGE UP (ref 0–20)

## 2022-05-25 PROCEDURE — 99232 SBSQ HOSP IP/OBS MODERATE 35: CPT

## 2022-05-25 RX ORDER — FLUDROCORTISONE ACETATE 0.1 MG/1
0.1 TABLET ORAL DAILY
Refills: 0 | Status: DISCONTINUED | OUTPATIENT
Start: 2022-05-25 | End: 2022-05-26

## 2022-05-25 RX ADMIN — CARVEDILOL PHOSPHATE 6.25 MILLIGRAM(S): 80 CAPSULE, EXTENDED RELEASE ORAL at 11:17

## 2022-05-25 RX ADMIN — PANTOPRAZOLE SODIUM 40 MILLIGRAM(S): 20 TABLET, DELAYED RELEASE ORAL at 06:27

## 2022-05-25 RX ADMIN — FLUDROCORTISONE ACETATE 0.1 MILLIGRAM(S): 0.1 TABLET ORAL at 19:07

## 2022-05-25 RX ADMIN — Medication 10 MILLIGRAM(S): at 11:18

## 2022-05-25 RX ADMIN — ATORVASTATIN CALCIUM 10 MILLIGRAM(S): 80 TABLET, FILM COATED ORAL at 21:48

## 2022-05-25 RX ADMIN — Medication 1000 UNIT(S): at 11:18

## 2022-05-25 RX ADMIN — Medication 81 MILLIGRAM(S): at 11:18

## 2022-05-25 RX ADMIN — SODIUM CHLORIDE 75 MILLILITER(S): 9 INJECTION INTRAMUSCULAR; INTRAVENOUS; SUBCUTANEOUS at 23:31

## 2022-05-25 RX ADMIN — Medication 1 GRAM(S): at 11:17

## 2022-05-25 RX ADMIN — CARVEDILOL PHOSPHATE 6.25 MILLIGRAM(S): 80 CAPSULE, EXTENDED RELEASE ORAL at 21:47

## 2022-05-25 RX ADMIN — ENOXAPARIN SODIUM 30 MILLIGRAM(S): 100 INJECTION SUBCUTANEOUS at 06:27

## 2022-05-25 RX ADMIN — TRAMADOL HYDROCHLORIDE 50 MILLIGRAM(S): 50 TABLET ORAL at 06:27

## 2022-05-25 RX ADMIN — Medication 1 GRAM(S): at 23:18

## 2022-05-25 NOTE — PHYSICAL THERAPY INITIAL EVALUATION ADULT - GENERAL OBSERVATIONS, REHAB EVAL
The pt was pleasant and cooperative, received on 2N, supine and eager to participate in PT evaluation. 1 IV, and bilateral SCDs in place.

## 2022-05-25 NOTE — PROGRESS NOTE ADULT - SUBJECTIVE AND OBJECTIVE BOX
Patient is a 81y Female who reports occasional LH still       MEDICATIONS  (STANDING):  aspirin enteric coated 81 milliGRAM(s) Oral daily  atorvastatin 10 milliGRAM(s) Oral at bedtime  carvedilol 6.25 milliGRAM(s) Oral every 12 hours  cholecalciferol 1000 Unit(s) Oral daily  enoxaparin Injectable 30 milliGRAM(s) SubCutaneous every 24 hours  pantoprazole    Tablet 40 milliGRAM(s) Oral before breakfast  predniSONE   Tablet 10 milliGRAM(s) Oral daily  sodium chloride 0.9%. 500 milliLiter(s) (75 mL/Hr) IV Continuous <Continuous>  sucralfate suspension 1 Gram(s) Oral four times a day    MEDICATIONS  (PRN):  acetaminophen     Tablet .. 650 milliGRAM(s) Oral every 6 hours PRN Mild Pain (1 - 3)  ondansetron Injectable 4 milliGRAM(s) IV Push every 6 hours PRN Nausea and/or Vomiting  traMADol 50 milliGRAM(s) Oral every 6 hours PRN Mild Pain (1 - 3)        T(C): , Max: 36.7 (22 @ 21:15)  T(F): , Max: 98 (22 @ 21:15)  HR: 69 (22 @ 09:37)  BP: 143/80 (22 @ 09:37)  BP(mean): --  RR: 18 (22 @ 09:37)  SpO2: 98% (22 @ 09:37)  Wt(kg): --     @ 07:01  -   @ 07:00  --------------------------------------------------------  IN: 0 mL / OUT: 500 mL / NET: -500 mL          PHYSICAL EXAM:    Constitutional: NAD, frail  HEENT:  MM  Neck: No LAD, No JVD  Respiratory: dist  Cardiovascular: S1 and S2   Gastrointestinal:   NT/ND  Extremities: chr peripheral edema  Neurological: A/O x 3            LABS:                        13.6   8.99  )-----------( 154      ( 24 May 2022 11:50 )             41.9     24 May 2022 11:50    139    |  107    |  20     ----------------------------<  85     3.8     |  27     |  1.29   23 May 2022 13:36    136    |  104    |  27     ----------------------------<  95     5.3     |  26     |  1.58     Ca    8.6        24 May 2022 11:50  Ca    8.7        23 May 2022 13:36  Phos  3.2       23 May 2022 13:36  Mg     1.9       23 May 2022 13:36    TPro  6.7    /  Alb  3.3    /  TBili  0.4    /  DBili  x      /  AST  49     /  ALT  41     /  AlkPhos  52     23 May 2022 13:36          Urine Studies:  Urinalysis Basic - ( 23 May 2022 15:34 )    Color: Yellow / Appearance: Clear / S.010 / pH: x  Gluc: x / Ketone: Negative  / Bili: Negative / Urobili: Negative   Blood: x / Protein: Negative / Nitrite: Negative   Leuk Esterase: Negative / RBC: x / WBC x   Sq Epi: x / Non Sq Epi: x / Bacteria: x            RADIOLOGY & ADDITIONAL STUDIES:

## 2022-05-25 NOTE — PROGRESS NOTE ADULT - SUBJECTIVE AND OBJECTIVE BOX
CHIEF COMPLAINT: Patient is a 81y old  Female who presents with a chief complaint of ARF  Severe nausea   Weakness and Dizziness  Hypertension  Adrenal Insufficiency (23 May 2022 17:09)      HPI:  Pt is a pleasant  82 y/o  Female with PMhx of  HTN on enalapril, Coreg, triamterene, dyslipidemia, OA/RA on Plaquenil, prednisone and tramadol, breast cancer s/p lumpectomy, MVR, Loop recorder  who was sent to [place] ED for TRACIE,  weakness and  complaint of  feeling dizzy, weak and nauseous for the last several weeks.   Pt had a creatinine of 1.9 on 5/20/22 and received a L of NS at my office.  She was recently taken off enalapril , triamterene, meloxicam due to increasing creatinine.   Pt reports she stopped Celebrex 3 weeks ago and she herself stopped Cymbalta a month ago.  She denies current NSAID use.   Five weeks ago her Rheumatologist increased her prednisone from 5mg to 10 mg for a Polymyalgia Flare.  Pt reports having a sensation of her ears feeling full and dizziness for the last 4 months and had  an unremarkable work up for this by Dr. Pena.      Pt reports she had 2 months of  nausea which recently became severe and is associated with abdominal discomfort.  Pt reports she feels so nauseous now that she cannot walk.  She denies vomiting.    She denies early satiety.  She reports intermittent constipation and reports she had a bowel movement yesterday and that her last bowel movement was otherwise a week ago.  She denies recent diarrhea but admits having loose stools for some days two weeks ago.   Pt reports six months of exertional dyspnea.  She  denies fever/chills, no URI complaints, no chest pain, no HA, no rash,  no sick contacts.  She c/o urinary urgency and was treated for a UTI until 5/21 with amoxicillin.   An outpatient random cortisol test result was low at  2     An outpatient CT chest/abd/pelvis on 5/12/22 at [facility] radiology in [place] shows new 0.7x 0.6 cm LLL and 0.7 x 0.5 cm RLL pulmonary nodules with an unchanged 0.8 cm RUL nodule.      5/25/22- feeling slightly better this AM- still with dizziness/ vertigo symptoms      PMHx: PAST MEDICAL & SURGICAL HISTORY:  Mitral valve disease  RA (rheumatoid arthritis)  OA (osteoarthritis)  Migraine  HLD (hyperlipidemia)  Breast cancer left 1990 treated with RT and surgery  Chronic pain  secondary to OA and RA  History of lumpectomy of left breast  with sentinal node biopsy 1990  History of bilateral knee replacement  right 2009, left 2011  H/O spinal fusion  lumbar 2005  History of tonsillectomy  S/P bunionectomy  left x 2 2007  History of left hip replacement  2017  H/O mitral valve replacement    History of cataract surgery  left cataract sx      Soc Hx: lives at home- no toxic habits      Allergies: Allergies    No Known Allergies    Intolerances      Vital Signs Last 24 Hrs  T(C): 36.5 (25 May 2022 00:08), Max: 36.7 (24 May 2022 21:15)  T(F): 97.7 (25 May 2022 00:08), Max: 98 (24 May 2022 21:15)  HR: 68 (25 May 2022 00:08) (68 - 103)  BP: 104/56 (25 May 2022 00:08) (104/56 - 126/75)  BP(mean): --  RR: 16 (25 May 2022 00:08) (16 - 16)  SpO2: 97% (25 May 2022 00:08) (96% - 99%)      PHYSICAL EXAM:   Constitutional: NAD, weak appearing   Respiratory: Breath sounds are clear bilaterally  Cardiovascular: S1 and S2, regular rate and rhythm, HSM2/6  Vascular: 2+ peripheral pulses  Neurological: A/O x 3, no focal deficits    MEDICATIONS  (STANDING):  aspirin enteric coated 81 milliGRAM(s) Oral daily  atorvastatin 10 milliGRAM(s) Oral at bedtime  carvedilol 6.25 milliGRAM(s) Oral every 12 hours  cholecalciferol 1000 Unit(s) Oral daily  enoxaparin Injectable 30 milliGRAM(s) SubCutaneous every 24 hours  pantoprazole    Tablet 40 milliGRAM(s) Oral before breakfast  predniSONE   Tablet 10 milliGRAM(s) Oral daily  sodium chloride 0.9%. 500 milliLiter(s) (75 mL/Hr) IV Continuous <Continuous>  sucralfate suspension 1 Gram(s) Oral four times a day    MEDICATIONS  (PRN):  acetaminophen     Tablet .. 650 milliGRAM(s) Oral every 6 hours PRN Mild Pain (1 - 3)  ondansetron Injectable 4 milliGRAM(s) IV Push every 6 hours PRN Nausea and/or Vomiting  traMADol 50 milliGRAM(s) Oral every 6 hours PRN Mild Pain (1 - 3)      LABS: All Labs Reviewed:                                         13.6   8.99  )-----------( 154      ( 24 May 2022 11:50 )             41.9     05-24    139  |  107  |  20  ----------------------------<  85  3.8   |  27  |  1.29    Ca    8.6      24 May 2022 11:50  Phos  3.2     05-23  Mg     1.9     05-23    TPro  6.7  /  Alb  3.3  /  TBili  0.4  /  DBili  x   /  AST  49<H>  /  ALT  41  /  AlkPhos  52  05-23    EKG: Sinus rhythm, iLBBB, LVH pattern

## 2022-05-25 NOTE — PROGRESS NOTE ADULT - ASSESSMENT
81 year old female with unrelenting nausea, dizziness, abdominal bloating. Recommend PPI and carafate for now. ? gastroparesis.  Gastric emptying study in am.    Will plan for endoscopic eval tomorrow after GES.    Plan:  ::NPO p MN except meds  ::Gastric emptying study in am  ::EGD tomorrow PM  ::Anti-emetics prn  ::PPI  ::Carafate pre meals and at bedtime    Plan discussed with Dr. Moreno 81 year old female with unrelenting nausea, dizziness, abdominal bloating. Recommend PPI and carafate for now. ? gastroparesis.  .    Will plan for endoscopic eval tomorrow     Plan:  ::NPO p MN except meds  ::EGD tomorrow   ::Anti-emetics prn  ::PPI  ::Carafate pre meals and at bedtime    Plan discussed with Dr. Moreno

## 2022-05-25 NOTE — PHYSICAL THERAPY INITIAL EVALUATION ADULT - DID THE PATIENT HAVE SURGERY?
as per Neurology note: She states that on 5/1/21 she fell and hit her head and sustained a concussion. Records show that she had a subarachnoid hemorrhage at this time.

## 2022-05-25 NOTE — PROGRESS NOTE ADULT - ASSESSMENT
82 y/o  Female with PMhx of  HTN on enalapril, Coreg, triamterene, dyslipidemia, OA/RA on Plaquenil, prednisone and tramadol, breast cancer s/p lumpectomy, MVR, Loop recorder  who was sent to [place] ED for TRACIE,  weakness and  complaint of  feeling dizzy, weak and nauseous for the last several weeks.    Per documents had a creatinine of 1.9 on 5/20/22 and received IVF at cardiology office.  She was recently taken off enalapril , triamterene, meloxicam due to increasing creatinine and Celebrex. + nausea and abdominal discomfort as outpatient as well    TRACIE  -Apparent outpatient peak near 2.0, trending down with hold of nsaids (multiple) and ace/diuretic now stabilized  -Keep off of these for now, Avoid nsaids going forward  -Trend labs, ivf can stop if no orthostasis and taking PO    Weakness/LH  -Neuro/[facility]vs for plan  -Endo workup ongoing    Will sign off, call with any new questions or issues  dc with staff bedside

## 2022-05-25 NOTE — PROGRESS NOTE ADULT - SUBJECTIVE AND OBJECTIVE BOX
C/C: renal failure/generalized weakness/lightheadedness.      HPI: 81F, PMH of HTN on enalapril, Coreg, triamterene, dyslipidemia, OA/RA on Plaquenil, Polymyalgia rheumatica on prednisone,  breast cancer s/p lumpectomy, MVR, Traumatic SAH last year, found to have orthostatic hypotension,  Loop recorder  who was sent to [facility]  by Dr. Campbell for TRACIE,  weakness and  complaint of  feeling dizzy, weak and nauseous for the last several weeks.   Pt had a creatinine of 1.9 on 22 and received a L of NS at Dr. Campbell's office.  She was recently taken off enalapril , triamterene, meloxicam by Dr. Campbell.  Pt reports she stopped Celebrex 3 weeks ago and she herself stopped Cymbalta a month ago.  She denied current NSAID use. Five weeks pta her Rheumatologist increased her prednisone from 5mg to 10 mg for a Polymyalgia Flare.  Pt reports having a sensation of her ears feeling full and dizziness for the last 4 months and had  an unremarkable work up for this by Dr. Pena.        Pt reported she  had 2 months of  nausea which recently became severe and is associated with abdominal discomfort.  Pt reports she feels so nauseous now that she cannot walk.  She denies vomiting.    She denies early satiety.  She reports intermittent constipation and reports she had a bowel movement yesterday and that her last bowel movement was otherwise a week ago.  She denies recent diarrhea but admits having loose stools for some days two weeks ago.   Pt reports six months of exertional dyspnea.  She  denies fever/chills, no URI complaints, no chest pain, no HA, no rash,  no sick contacts.  She c/o urinary urgency and was treated for a UTI until  with amoxicillin.   Per Dr. Campbell an outpatient random cortisol test result was low at  2 and an outpatient CT chest/abd/pelvis shows new 0.7x 0.6 cm LLL and 0.7 x 0.5 cm RLL pulmonary nodules with an unchanged 0.8 cm RUL nodule.      Pt is vaccinated against covid.  She reports her last colonoscopy was in  by Dr. Kelsey.       She was admitted for further evaluation.     Upon my evaluation yesterday, her primary complaint was that she was lightheaded,  having visual difficulties with blurring of vision, can't focus on objects for too long.   Feels her gait is unsteady and that she can not walk in a straight line.   Feels her symptoms started after a concussion 14 mos ago. upon chart review she had a small traumatic Subarachnoid hemorrhage . She had fallen presumably due to orthostatic hypotension at the time. Was seen by neurosx and was recommended short course of keppra. and discharged on florinef.   As her symptoms were persistent, She was seen by Dr. Kidd as an outpt and had an mri/eeg and per patient, it was felt her issue was not neurological.  Saw ENT and her w/u was negative there as well.   Saw opthalmology and was told her eyes were fine.   Follows with Dr. Barragan for rheumatology and was started on prednisone about 3 mos ago, her doses have ranged between 5 and 10 mg.   Her symptoms started long before starting prednisone per patient. She states the symptoms are present all the time.     today, she admits to having worsening of her fogginess/lightheadedness when sitting up right and getting up to walk.   She feels better when laying in bed. her orthostatic vitals are positive.         Review of system- All 10 systems reviewed and is as per HPI otherwise negative.       Vital Signs Last 24 Hrs  T(C): 36.3 (25 May 2022 09:37), Max: 36.7 (24 May 2022 21:15)  T(F): 97.4 (25 May 2022 09:37), Max: 98 (24 May 2022 21:15)  HR: 69 (25 May 2022 09:37) (68 - 96)  BP: 143/80 (25 May 2022 09:37) (104/56 - 143/80)  BP(mean): --  RR: 18 (25 May 2022 09:37) (16 - 18)  SpO2: 98% (25 May 2022 09:37) (96% - 98%)  PHYSICAL EXAM:    GENERAL: Comfortable, no acute distress  HEAD:  Atraumatic, Normocephalic  EYES: EOMI, PERRLA  HEENT: Moist mucous membranes  NECK: Supple, No JVD  NERVOUS SYSTEM:  Alert & Oriented X3, Motor Strength 5/5 B/L upper and lower extremities  CHEST/LUNG: Clear to auscultation bilaterally  HEART: Regular rate and rhythm; No murmurs, rubs, or gallops  ABDOMEN: Soft, Nontender, Nondistended; Bowel sounds present  GENITOURINARY- Voiding, no palpable bladder  EXTREMITIES:  No clubbing, cyanosis, or edema  MUSCULOSKELETAL- No muscle tenderness, No joint tenderness  SKIN-no rash    LABS:                        13.6   8.99  )-----------( 154      ( 24 May 2022 11:50 )             41.9     05-24    139  |  107  |  20  ----------------------------<  85  3.8   |  27  |  1.29    Ca    8.6      24 May 2022 11:50  Phos  3.2       Mg     1.9         TPro  6.7  /  Alb  3.3  /  TBili  0.4  /  DBili  x   /  AST  49<H>  /  ALT  41  /  AlkPhos  52        Urinalysis Basic - ( 23 May 2022 15:34 )    Color: Yellow / Appearance: Clear / S.010 / pH: x  Gluc: x / Ketone: Negative  / Bili: Negative / Urobili: Negative   Blood: x / Protein: Negative / Nitrite: Negative   Leuk Esterase: Negative / RBC: x / WBC x   Sq Epi: x / Non Sq Epi: x / Bacteria: x      MEDS  acetaminophen     Tablet .. 650 milliGRAM(s) Oral every 6 hours PRN  aspirin enteric coated 81 milliGRAM(s) Oral daily  atorvastatin 10 milliGRAM(s) Oral at bedtime  carvedilol 6.25 milliGRAM(s) Oral every 12 hours  cholecalciferol 1000 Unit(s) Oral daily  enoxaparin Injectable 30 milliGRAM(s) SubCutaneous every 24 hours  ondansetron Injectable 4 milliGRAM(s) IV Push every 6 hours PRN  pantoprazole    Tablet 40 milliGRAM(s) Oral before breakfast  predniSONE   Tablet 10 milliGRAM(s) Oral daily  sodium chloride 0.9%. 500 milliLiter(s) IV Continuous <Continuous>  sucralfate suspension 1 Gram(s) Oral four times a day  traMADol 50 milliGRAM(s) Oral every 6 hours PRN    ASSESSMENT AND PLAN:    81f, PMH as above a/w:    1. Acute renal failure:  -likely prerenal in setting of ace-i/nsaids/diuretics  -improving.     2. Lightheadedness/visual difficulties/gait imbalance/cognitive issues/persistent nausea:  -check orthostatics.   -r/o adrenal insufficiency- (outpt random cortisol low)--->ACTH stim test today.   -neurology evaluation.     3. HTN  -continue bb  -bp stable.     4. Fibromyalgia:  -on prednisone 10mg as recent ESR was elevated per pt.     5. Rheumatoid arthritis/OA:  -supposed to have bilateral shoulder replaced in near future.   -pain control  -ambulate.   -physical therapy    6. DVT px:  -lovenox.           C/C: renal failure/generalized weakness/lightheadedness.      HPI: 81F, PMH of HTN on enalapril, Coreg, triamterene, dyslipidemia, OA/RA on Plaquenil, Polymyalgia rheumatica on prednisone,  breast cancer s/p lumpectomy, MVR, Traumatic SAH last year, found to have orthostatic hypotension,  Loop recorder  who was sent to [facility]  by Dr. Campbell for TRACIE,  weakness and  complaint of  feeling dizzy, weak and nauseous for the last several weeks.   Pt had a creatinine of 1.9 on 22 and received a L of NS at Dr. Campbell's office.  She was recently taken off enalapril , triamterene, meloxicam by Dr. Campbell.  Pt reports she stopped Celebrex 3 weeks ago and she herself stopped Cymbalta a month ago.  She denied current NSAID use. Five weeks pta her Rheumatologist increased her prednisone from 5mg to 10 mg for a Polymyalgia Flare.  Pt reports having a sensation of her ears feeling full and dizziness for the last 4 months and had  an unremarkable work up for this by Dr. Pena.        Pt reported she  had 2 months of  nausea which recently became severe and is associated with abdominal discomfort.  Pt reports she feels so nauseous now that she cannot walk.  She denies vomiting.    She denies early satiety.  She reports intermittent constipation and reports she had a bowel movement yesterday and that her last bowel movement was otherwise a week ago.  She denies recent diarrhea but admits having loose stools for some days two weeks ago.   Pt reports six months of exertional dyspnea.  She  denies fever/chills, no URI complaints, no chest pain, no HA, no rash,  no sick contacts.  She c/o urinary urgency and was treated for a UTI until  with amoxicillin.   Per Dr. Campbell an outpatient random cortisol test result was low at  2 and an outpatient CT chest/abd/pelvis shows new 0.7x 0.6 cm LLL and 0.7 x 0.5 cm RLL pulmonary nodules with an unchanged 0.8 cm RUL nodule.      Pt is vaccinated against covid.  She reports her last colonoscopy was in  by Dr. Kelsey.       She was admitted for further evaluation.     Upon my evaluation yesterday, her primary complaint was that she was lightheaded,  having visual difficulties with blurring of vision, can't focus on objects for too long.   Feels her gait is unsteady and that she can not walk in a straight line.   Feels her symptoms started after a concussion 14 mos ago. upon chart review she had a small traumatic Subarachnoid hemorrhage . She had fallen presumably due to orthostatic hypotension at the time. Was seen by neurosx and was recommended short course of keppra. and discharged on florinef.   As her symptoms were persistent, She was seen by Dr. Kidd as an outpt and had an mri/eeg and per patient, it was felt her issue was not neurological.  Saw ENT and her w/u was negative there as well.   Saw opthalmology and was told her eyes were fine.   Follows with Dr. Barragan for rheumatology and was started on prednisone about 3 mos ago, her doses have ranged between 5 and 10 mg.   Her symptoms started long before starting prednisone per patient. She states the symptoms are present all the time.     today, she admits to having worsening of her fogginess/lightheadedness when sitting up right and getting up to walk.   She feels better when laying in bed. her orthostatic vitals are positive.         Review of system- All 10 systems reviewed and is as per HPI otherwise negative.       Vital Signs Last 24 Hrs  T(C): 36.3 (25 May 2022 09:37), Max: 36.7 (24 May 2022 21:15)  T(F): 97.4 (25 May 2022 09:37), Max: 98 (24 May 2022 21:15)  HR: 69 (25 May 2022 09:37) (68 - 96)  BP: 143/80 (25 May 2022 09:37) (104/56 - 143/80)  RR: 18 (25 May 2022 09:37) (16 - 18)  SpO2: 98% (25 May 2022 09:37) (96% - 98%)    PHYSICAL EXAM:    GENERAL: Comfortable, no acute distress  HEAD:  Atraumatic, Normocephalic  EYES: EOMI, PERRLA  HEENT: Moist mucous membranes  NECK: Supple, No JVD  NERVOUS SYSTEM:  Alert & Oriented X3, Motor Strength 5/5 B/L upper and lower extremities  CHEST/LUNG: Clear to auscultation bilaterally  HEART: Regular rate and rhythm; No murmurs, rubs, or gallops  ABDOMEN: Soft, Nontender, Nondistended; Bowel sounds present  GENITOURINARY- Voiding, no palpable bladder  EXTREMITIES:  No clubbing, cyanosis, or edema  MUSCULOSKELETAL- No muscle tenderness, No joint tenderness  SKIN-no rash    LABS:                        13.6   8.99  )-----------( 154      ( 24 May 2022 11:50 )             41.9     05-24    139  |  107  |  20  ----------------------------<  85  3.8   |  27  |  1.29    Ca    8.6      24 May 2022 11:50  Phos  3.2     05-  Mg     1.9     -    TPro  6.7  /  Alb  3.3  /  TBili  0.4  /  DBili  x   /  AST  49<H>  /  ALT  41  /  AlkPhos  52        Urinalysis Basic - ( 23 May 2022 15:34 )    Color: Yellow / Appearance: Clear / S.010 / pH: x  Gluc: x / Ketone: Negative  / Bili: Negative / Urobili: Negative   Blood: x / Protein: Negative / Nitrite: Negative   Leuk Esterase: Negative / RBC: x / WBC x   Sq Epi: x / Non Sq Epi: x / Bacteria: x      MEDS  acetaminophen     Tablet .. 650 milliGRAM(s) Oral every 6 hours PRN  aspirin enteric coated 81 milliGRAM(s) Oral daily  atorvastatin 10 milliGRAM(s) Oral at bedtime  carvedilol 6.25 milliGRAM(s) Oral every 12 hours  cholecalciferol 1000 Unit(s) Oral daily  enoxaparin Injectable 30 milliGRAM(s) SubCutaneous every 24 hours  ondansetron Injectable 4 milliGRAM(s) IV Push every 6 hours PRN  pantoprazole    Tablet 40 milliGRAM(s) Oral before breakfast  predniSONE   Tablet 10 milliGRAM(s) Oral daily  sodium chloride 0.9%. 500 milliLiter(s) IV Continuous <Continuous>  sucralfate suspension 1 Gram(s) Oral four times a day  traMADol 50 milliGRAM(s) Oral every 6 hours PRN    ASSESSMENT AND PLAN:    81f, PMH as above a/w:    1. Acute renal failure:  -likely prerenal in setting of ace-i/nsaids/diuretics  -improved    2. Orthostatic hypotension, r/o adrenal insufficiency:  -ACTH stim test performed while pt on prednisone, d/w endocrine, can not interpret results.   -pt on prednisone 10mg at this time which is more than replacement dose for chronic insufficiency anyway.   -start florinef 0.1mg daily.   -if orthostatics improved, can dc home and f/u outpt for endocrine eval.     3. Lightheadedness/visual difficulties/gait imbalance/cognitive issues/persistent nausea:  -post concussion syndrome.   -neuro eval apprecaited.   -f/u with with Dylan Gonsales for vestibular therapy.   -outpt f/u [facility] neuropthalmology.     4. HTN  -continue bb    5. Polymyalgia rheumatica  -on prednisone 10mg as recent ESR was elevated per pt.   -repeat ESR here.     6. Rheumatoid arthritis/OA:  -supposed to have bilateral shoulder replaced in near future.   -pain control  -ambulate.   -physical therapy    7. DVT px:  -lovenox.    dispo:  repeat orthostatics in am.   dc planning if stable with close outpt f/u for further w/u

## 2022-05-25 NOTE — PROGRESS NOTE ADULT - SUBJECTIVE AND OBJECTIVE BOX
Patient is a 81y old  Female who presents with a chief complaint of ARF  Severe nausea   Weakness and Dizziness  Hypertension  Adrenal Insufficiency (24 May 2022 12:50)    HPI:  Patient seen at bedside eating lunch. Still reporting intermittent nausea and dizziness. Denies exacerbating with position changes. Denies relation to eating. Still with + bloating and belching.      PAST MEDICAL & SURGICAL HISTORY:  Mitral valve disease  RA (rheumatoid arthritis)  OA (osteoarthritis)  Migraine      HLD (hyperlipidemia)      Breast cancer  left 1990 treated with RT and surgery      Chronic pain  secondary to OA and RA      History of lumpectomy of left breast  with sentinal node biopsy 1990      History of bilateral knee replacement  right 2009, left 2011      H/O spinal fusion  lumbar 2005      History of tonsillectomy      S/P bunionectomy  left x 2 2007      History of left hip replacement  2017      H/O mitral valve replacement      History of cataract surgery  left cataract sx    MEDICATIONS  (STANDING):  aspirin enteric coated 81 milliGRAM(s) Oral daily  atorvastatin 10 milliGRAM(s) Oral at bedtime  carvedilol 6.25 milliGRAM(s) Oral every 12 hours  cholecalciferol 1000 Unit(s) Oral daily  enoxaparin Injectable 30 milliGRAM(s) SubCutaneous every 24 hours  pantoprazole    Tablet 40 milliGRAM(s) Oral before breakfast  predniSONE   Tablet 10 milliGRAM(s) Oral daily  sodium chloride 0.9%. 500 milliLiter(s) (75 mL/Hr) IV Continuous <Continuous>  sucralfate suspension 1 Gram(s) Oral four times a day    MEDICATIONS  (PRN):  acetaminophen     Tablet .. 650 milliGRAM(s) Oral every 6 hours PRN Mild Pain (1 - 3)  ondansetron Injectable 4 milliGRAM(s) IV Push every 6 hours PRN Nausea and/or Vomiting  traMADol 50 milliGRAM(s) Oral every 6 hours PRN Mild Pain (1 - 3)      Allergies    No Known Allergies    Intolerances    SOCIAL HISTORY:  no smoking, drinking or drugs    FAMILY HISTORY:  Family history of stomach cancer (Mother)  smoker    Family history of colon cancer in father (Father)  smoker    Vital Signs Last 24 Hrs  T(C): 36.3 (05-25-22 @ 09:37), Max: 36.7 (05-24-22 @ 21:15)  T(F): 97.4 (05-25-22 @ 09:37), Max: 98 (05-24-22 @ 21:15)  HR: 69 (05-25-22 @ 09:37) (68 - 97)  BP: 143/80 (05-25-22 @ 09:37) (104/56 - 143/80)  BP(mean): --  RR: 18 (05-25-22 @ 09:37) (16 - 18)  SpO2: 98% (05-25-22 @ 09:37) (96% - 98%)    PHYSICAL EXAM:    Constitutional: in mild distress, well-developed, non-toxic appearing  HEENT: masked, good phonation, not icteric  Neck: supple, no lymphadenopathy  Respiratory: clear to ascultation bilaterally, no wheezing  Cardiovascular: S1 and S2, regular rate and rhythm, no murmurs rubs or gallops  Gastrointestinal: soft, non-tender, mildly distended, +bowel sounds, no rebound or guarding, no surgical scars, no drains  Extremities: No peripheral edema, no cyanosis or clubbing  Vascular: 2+ peripheral pulses, no venous stasis  Neurological: A/O x 3, no focal deficits, no asterixis  Psychiatric: sad mood, normal affect  Skin: No rashes, not jaundiced                          13.6   8.99  )-----------( 154      ( 24 May 2022 11:50 )             41.9       05-24    139  |  107  |  20  ----------------------------<  85  3.8   |  27  |  1.29    Ca    8.6      24 May 2022 11:50  Phos  3.2     05-23  Mg     1.9     05-23    TPro  6.7  /  Alb  3.3  /  TBili  0.4  /  DBili  x   /  AST  49<H>  /  ALT  41  /  AlkPhos  52  05-23      RADIOLOGY & ADDITIONAL STUDIES: Patient is a 81y old  Female who presents with a chief complaint of ARF  Severe nausea   Weakness and Dizziness  Hypertension  Adrenal Insufficiency (24 May 2022 12:50)    Follow up for nausea.       Patient seen at bedside eating lunch. Still reporting intermittent nausea and dizziness. Denies exacerbating with position changes. Denies relation to eating. Still with + bloating and belching.    MEDICATIONS  (STANDING):  aspirin enteric coated 81 milliGRAM(s) Oral daily  atorvastatin 10 milliGRAM(s) Oral at bedtime  carvedilol 6.25 milliGRAM(s) Oral every 12 hours  cholecalciferol 1000 Unit(s) Oral daily  enoxaparin Injectable 30 milliGRAM(s) SubCutaneous every 24 hours  pantoprazole    Tablet 40 milliGRAM(s) Oral before breakfast  predniSONE   Tablet 10 milliGRAM(s) Oral daily  sodium chloride 0.9%. 500 milliLiter(s) (75 mL/Hr) IV Continuous <Continuous>  sucralfate suspension 1 Gram(s) Oral four times a day    MEDICATIONS  (PRN):  acetaminophen     Tablet .. 650 milliGRAM(s) Oral every 6 hours PRN Mild Pain (1 - 3)  ondansetron Injectable 4 milliGRAM(s) IV Push every 6 hours PRN Nausea and/or Vomiting  traMADol 50 milliGRAM(s) Oral every 6 hours PRN Mild Pain (1 - 3)    Vital Signs Last 24 Hrs  T(C): 36.3 (05-25-22 @ 09:37), Max: 36.7 (05-24-22 @ 21:15)  T(F): 97.4 (05-25-22 @ 09:37), Max: 98 (05-24-22 @ 21:15)  HR: 69 (05-25-22 @ 09:37) (68 - 97)  BP: 143/80 (05-25-22 @ 09:37) (104/56 - 143/80)  BP(mean): --  RR: 18 (05-25-22 @ 09:37) (16 - 18)  SpO2: 98% (05-25-22 @ 09:37) (96% - 98%)    PHYSICAL EXAM:    Constitutional: in no distress today, well-developed, non-toxic appearing  HEENT: masked, good phonation, not icteric  Neck: supple, no lymphadenopathy  Respiratory: clear to ascultation bilaterally, no wheezing  Cardiovascular: S1 and S2, regular rate and rhythm, no murmurs rubs or gallops  Gastrointestinal: soft, non-tender, mildly distended, +bowel sounds, no rebound or guarding, no surgical scars, no drains  Extremities: No peripheral edema, no cyanosis or clubbing  Vascular: 2+ peripheral pulses, no venous stasis  Neurological: A/O x 3, no focal deficits, no asterixis  Psychiatric: sad mood, normal affect  Skin: No rashes, not jaundiced                          13.6   8.99  )-----------( 154      ( 24 May 2022 11:50 )             41.9       05-24    139  |  107  |  20  ----------------------------<  85  3.8   |  27  |  1.29    Ca    8.6      24 May 2022 11:50  Phos  3.2     05-23  Mg     1.9     05-23    TPro  6.7  /  Alb  3.3  /  TBili  0.4  /  DBili  x   /  AST  49<H>  /  ALT  41  /  AlkPhos  52  05-23      RADIOLOGY & ADDITIONAL STUDIES:

## 2022-05-25 NOTE — PROGRESS NOTE ADULT - ASSESSMENT
81 f with  TRACIE , weakness and failure to thrive    1) TRACIE- off of diuretics and NSAIDS- recommend fluids and renal evaluation-- renal function improving    2)Severe nausea, abd discomfort and constipation- on PPI - GI following- emptying study today, EGD tentatively tomorrow    3) concern for Adrenal Insufficiency- suppressed ACTH baseline level and insufficient increase in cortisol on stimulation test    4) Rheum- patient sees Dr Marcelo as an outpatient- he was to have he get follow up labs early next week- if possible, patient requests that his requested labs be done here prior to discharge to minimize need for blood draws    5) Dizziness- seen by Neurology- no acute issues, but dizziness remains. PT, vestibular therapy recommended by neurology      Recent new  pulmonary nodules seen on outpt CT imaging- outpt f/u for pulmonary nodules    will follow 130

## 2022-05-25 NOTE — PHYSICAL THERAPY INITIAL EVALUATION ADULT - PERTINENT HX OF CURRENT PROBLEM, REHAB EVAL
81y old  Female who presents with a chief complaint of ARF - 80 y/o  Female with PMhx of  HTN on enalapril, Coreg, triamterene, dyslipidemia, OA/RA on Plaquenil, prednisone and tramadol, breast cancer s/p lumpectomy, MVR, Loop recorder  who was sent to [place] ED for TRACIE,  weakness and  complaint of  feeling dizzy, weak and nauseous for the last several weeks.

## 2022-05-26 ENCOUNTER — RESULT REVIEW (OUTPATIENT)
Age: 82
End: 2022-05-26

## 2022-05-26 LAB
ADD ON TEST-SPECIMEN IN LAB: SIGNIFICANT CHANGE UP
ANION GAP SERPL CALC-SCNC: 7 MMOL/L — SIGNIFICANT CHANGE UP (ref 5–17)
BASOPHILS # BLD AUTO: 0.07 K/UL — SIGNIFICANT CHANGE UP (ref 0–0.2)
BASOPHILS NFR BLD AUTO: 1 % — SIGNIFICANT CHANGE UP (ref 0–2)
BUN SERPL-MCNC: 21 MG/DL — SIGNIFICANT CHANGE UP (ref 7–23)
CALCIUM SERPL-MCNC: 8.4 MG/DL — LOW (ref 8.5–10.1)
CHLORIDE SERPL-SCNC: 111 MMOL/L — HIGH (ref 96–108)
CO2 SERPL-SCNC: 24 MMOL/L — SIGNIFICANT CHANGE UP (ref 22–31)
CREAT SERPL-MCNC: 1.05 MG/DL — SIGNIFICANT CHANGE UP (ref 0.5–1.3)
EGFR: 53 ML/MIN/1.73M2 — LOW
EOSINOPHIL # BLD AUTO: 0.02 K/UL — SIGNIFICANT CHANGE UP (ref 0–0.5)
EOSINOPHIL NFR BLD AUTO: 0.3 % — SIGNIFICANT CHANGE UP (ref 0–6)
GLUCOSE SERPL-MCNC: 89 MG/DL — SIGNIFICANT CHANGE UP (ref 70–99)
HCT VFR BLD CALC: 38.7 % — SIGNIFICANT CHANGE UP (ref 34.5–45)
HGB BLD-MCNC: 12.6 G/DL — SIGNIFICANT CHANGE UP (ref 11.5–15.5)
IMM GRANULOCYTES NFR BLD AUTO: 1 % — SIGNIFICANT CHANGE UP (ref 0–1.5)
LYMPHOCYTES # BLD AUTO: 1.24 K/UL — SIGNIFICANT CHANGE UP (ref 1–3.3)
LYMPHOCYTES # BLD AUTO: 16.9 % — SIGNIFICANT CHANGE UP (ref 13–44)
MAGNESIUM SERPL-MCNC: 1.9 MG/DL — SIGNIFICANT CHANGE UP (ref 1.6–2.6)
MCHC RBC-ENTMCNC: 32.1 PG — SIGNIFICANT CHANGE UP (ref 27–34)
MCHC RBC-ENTMCNC: 32.6 GM/DL — SIGNIFICANT CHANGE UP (ref 32–36)
MCV RBC AUTO: 98.5 FL — SIGNIFICANT CHANGE UP (ref 80–100)
MONOCYTES # BLD AUTO: 0.47 K/UL — SIGNIFICANT CHANGE UP (ref 0–0.9)
MONOCYTES NFR BLD AUTO: 6.4 % — SIGNIFICANT CHANGE UP (ref 2–14)
NEUTROPHILS # BLD AUTO: 5.46 K/UL — SIGNIFICANT CHANGE UP (ref 1.8–7.4)
NEUTROPHILS NFR BLD AUTO: 74.4 % — SIGNIFICANT CHANGE UP (ref 43–77)
PHOSPHATE SERPL-MCNC: 2.2 MG/DL — LOW (ref 2.5–4.5)
PLATELET # BLD AUTO: 144 K/UL — LOW (ref 150–400)
POTASSIUM SERPL-MCNC: 3.5 MMOL/L — SIGNIFICANT CHANGE UP (ref 3.5–5.3)
POTASSIUM SERPL-SCNC: 3.5 MMOL/L — SIGNIFICANT CHANGE UP (ref 3.5–5.3)
RBC # BLD: 3.93 M/UL — SIGNIFICANT CHANGE UP (ref 3.8–5.2)
RBC # FLD: 11.7 % — SIGNIFICANT CHANGE UP (ref 10.3–14.5)
SODIUM SERPL-SCNC: 142 MMOL/L — SIGNIFICANT CHANGE UP (ref 135–145)
WBC # BLD: 7.33 K/UL — SIGNIFICANT CHANGE UP (ref 3.8–10.5)
WBC # FLD AUTO: 7.33 K/UL — SIGNIFICANT CHANGE UP (ref 3.8–10.5)

## 2022-05-26 PROCEDURE — 99233 SBSQ HOSP IP/OBS HIGH 50: CPT

## 2022-05-26 PROCEDURE — 88342 IMHCHEM/IMCYTCHM 1ST ANTB: CPT | Mod: 26

## 2022-05-26 PROCEDURE — 88305 TISSUE EXAM BY PATHOLOGIST: CPT | Mod: 26

## 2022-05-26 PROCEDURE — 93010 ELECTROCARDIOGRAM REPORT: CPT

## 2022-05-26 PROCEDURE — 43239 EGD BIOPSY SINGLE/MULTIPLE: CPT

## 2022-05-26 PROCEDURE — 88312 SPECIAL STAINS GROUP 1: CPT | Mod: 26

## 2022-05-26 RX ORDER — LOSARTAN POTASSIUM 100 MG/1
25 TABLET, FILM COATED ORAL DAILY
Refills: 0 | Status: DISCONTINUED | OUTPATIENT
Start: 2022-05-26 | End: 2022-05-27

## 2022-05-26 RX ORDER — HYDRALAZINE HCL 50 MG
10 TABLET ORAL ONCE
Refills: 0 | Status: COMPLETED | OUTPATIENT
Start: 2022-05-26 | End: 2022-05-26

## 2022-05-26 RX ORDER — NITROGLYCERIN 6.5 MG
1 CAPSULE, EXTENDED RELEASE ORAL AT BEDTIME
Refills: 0 | Status: DISCONTINUED | OUTPATIENT
Start: 2022-05-26 | End: 2022-05-27

## 2022-05-26 RX ORDER — POLYETHYLENE GLYCOL 3350 17 G/17G
17 POWDER, FOR SOLUTION ORAL
Refills: 0 | Status: DISCONTINUED | OUTPATIENT
Start: 2022-05-26 | End: 2022-05-27

## 2022-05-26 RX ORDER — SENNA PLUS 8.6 MG/1
2 TABLET ORAL AT BEDTIME
Refills: 0 | Status: DISCONTINUED | OUTPATIENT
Start: 2022-05-26 | End: 2022-05-27

## 2022-05-26 RX ADMIN — Medication 650 MILLIGRAM(S): at 10:59

## 2022-05-26 RX ADMIN — ENOXAPARIN SODIUM 30 MILLIGRAM(S): 100 INJECTION SUBCUTANEOUS at 06:06

## 2022-05-26 RX ADMIN — CARVEDILOL PHOSPHATE 6.25 MILLIGRAM(S): 80 CAPSULE, EXTENDED RELEASE ORAL at 21:27

## 2022-05-26 RX ADMIN — Medication 1 GRAM(S): at 12:32

## 2022-05-26 RX ADMIN — TRAMADOL HYDROCHLORIDE 50 MILLIGRAM(S): 50 TABLET ORAL at 16:54

## 2022-05-26 RX ADMIN — Medication 81 MILLIGRAM(S): at 10:58

## 2022-05-26 RX ADMIN — SENNA PLUS 2 TABLET(S): 8.6 TABLET ORAL at 21:27

## 2022-05-26 RX ADMIN — Medication 650 MILLIGRAM(S): at 11:29

## 2022-05-26 RX ADMIN — Medication 10 MILLIGRAM(S): at 10:59

## 2022-05-26 RX ADMIN — ONDANSETRON 4 MILLIGRAM(S): 8 TABLET, FILM COATED ORAL at 08:57

## 2022-05-26 RX ADMIN — ATORVASTATIN CALCIUM 10 MILLIGRAM(S): 80 TABLET, FILM COATED ORAL at 21:28

## 2022-05-26 RX ADMIN — Medication 10 MILLIGRAM(S): at 07:01

## 2022-05-26 RX ADMIN — TRAMADOL HYDROCHLORIDE 50 MILLIGRAM(S): 50 TABLET ORAL at 23:07

## 2022-05-26 RX ADMIN — Medication 1 GRAM(S): at 23:00

## 2022-05-26 RX ADMIN — LOSARTAN POTASSIUM 25 MILLIGRAM(S): 100 TABLET, FILM COATED ORAL at 20:06

## 2022-05-26 RX ADMIN — TRAMADOL HYDROCHLORIDE 50 MILLIGRAM(S): 50 TABLET ORAL at 22:10

## 2022-05-26 RX ADMIN — Medication 1 GRAM(S): at 16:55

## 2022-05-26 RX ADMIN — CARVEDILOL PHOSPHATE 6.25 MILLIGRAM(S): 80 CAPSULE, EXTENDED RELEASE ORAL at 10:58

## 2022-05-26 RX ADMIN — TRAMADOL HYDROCHLORIDE 50 MILLIGRAM(S): 50 TABLET ORAL at 17:19

## 2022-05-26 RX ADMIN — POLYETHYLENE GLYCOL 3350 17 GRAM(S): 17 POWDER, FOR SOLUTION ORAL at 21:26

## 2022-05-26 RX ADMIN — Medication 650 MILLIGRAM(S): at 16:49

## 2022-05-26 RX ADMIN — Medication 650 MILLIGRAM(S): at 17:19

## 2022-05-26 NOTE — PROGRESS NOTE ADULT - SUBJECTIVE AND OBJECTIVE BOX
C/C: renal failure/generalized weakness/lightheadedness.      HPI: 81F, PMH of HTN on enalapril, Coreg, triamterene, dyslipidemia, OA/RA on Plaquenil, Polymyalgia rheumatica on prednisone,  breast cancer s/p lumpectomy, MVR, Traumatic SAH last year, found to have orthostatic hypotension,  Loop recorder  who was sent to [place] ED  by Dr. Campbell for TRACIE,  weakness and  complaint of  feeling dizzy, weak and nauseous for the last several weeks.   Pt had a creatinine of 1.9 on 22 and received a L of NS at Dr. Campbell's office.  She was recently taken off enalapril , triamterene, meloxicam by Dr. Campbell.  Pt reports she stopped Celebrex 3 weeks ago and she herself stopped Cymbalta a month ago.  She denied current NSAID use. Five weeks pta her Rheumatologist increased her prednisone from 5mg to 10 mg for a Polymyalgia Flare.  Pt reports having a sensation of her ears feeling full and dizziness for the last 4 months and had  an unremarkable work up for this by Dr. Pena.        Pt reported she  had 2 months of  nausea which recently became severe and is associated with abdominal discomfort.  Pt reports she feels so nauseous now that she cannot walk.  She denies vomiting.    She denies early satiety.  She reports intermittent constipation and reports she had a bowel movement yesterday and that her last bowel movement was otherwise a week ago.  She denies recent diarrhea but admits having loose stools for some days two weeks ago.   Pt reports six months of exertional dyspnea.  She  denies fever/chills, no URI complaints, no chest pain, no HA, no rash,  no sick contacts.  She c/o urinary urgency and was treated for a UTI until  with amoxicillin.   Per Dr. Campbell an outpatient random cortisol test result was low at  2 and an outpatient CT chest/abd/pelvis shows new 0.7x 0.6 cm LLL and 0.7 x 0.5 cm RLL pulmonary nodules with an unchanged 0.8 cm RUL nodule.      Pt is vaccinated against covid.  She reports her last colonoscopy was in  by Dr. Kelsey.       She was admitted for further evaluation.     : pt seen at bedside, s/p upper endo, + lge gastric ulcer, biopsied, c/o right sided CP, just lateral to the sternum, no diaphoresis, no radiation to neck or arms,  ekg performed, VSS,  felt better shortly after with no further cp, ealry this am, prior to endo pt was noted to have HTN B/P 195/98 and rec'd hydralazine    Review of system- All 10 systems reviewed and is as per HPI otherwise negative.     Vital Signs Last 24 Hrs  T(C): 37 (22 @ 10:49), Max: 37 (22 @ 10:49)  T(F): 98.6 (22 @ 10:49), Max: 98.6 (22 @ 10:49)  HR: 100 (22 @ 10:49) (70 - 100)  BP: 158/92 (22 @ 10:49) (122/70 - 195/98)  BP(mean): --  RR: 18 (22 @ 10:49) (16 - 18)  SpO2: 98% (22 @ 10:49) (97% - 99%)      PHYSICAL EXAM:    GENERAL: Comfortable, no acute distress  HEAD:  Atraumatic, Normocephalic  EYES: EOMI, PERRLA  HEENT: Moist mucous membranes  NECK: Supple, No JVD  NERVOUS SYSTEM:  Alert & Oriented X3, Motor Strength 5/5 B/L upper and lower extremities  CHEST/LUNG: Clear to auscultation bilaterally  HEART: Regular rate and rhythm; No murmurs, rubs, or gallops  ABDOMEN: Soft, Nontender, Nondistended; Bowel sounds present  GENITOURINARY- Voiding, no palpable bladder  EXTREMITIES:  No clubbing, cyanosis, or edema  MUSCULOSKELETAL- No muscle tenderness, No joint tenderness  SKIN-no rash    LABS:                                        12.6   7.33  )-----------( 144      ( 26 May 2022 09:11 )             38.7           142  |  111<H>  |  21  ----------------------------<  89  3.5   |  24  |  1.05    Ca    8.4<L>      26 May 2022 09:11  Phos  2.2       Mg     1.9               Urinalysis Basic - ( 23 May 2022 15:34 )    Color: Yellow / Appearance: Clear / S.010 / pH: x  Gluc: x / Ketone: Negative  / Bili: Negative / Urobili: Negative   Blood: x / Protein: Negative / Nitrite: Negative   Leuk Esterase: Negative / RBC: x / WBC x   Sq Epi: x / Non Sq Epi: x / Bacteria: x      MEDICATIONS  (STANDING):  aspirin enteric coated 81 milliGRAM(s) Oral daily  atorvastatin 10 milliGRAM(s) Oral at bedtime  carvedilol 6.25 milliGRAM(s) Oral every 12 hours  cholecalciferol 1000 Unit(s) Oral daily  enoxaparin Injectable 30 milliGRAM(s) SubCutaneous every 24 hours  losartan 25 milliGRAM(s) Oral daily  nitroglycerin    Patch 0.1 mG/Hr(s) 1 patch Transdermal at bedtime  pantoprazole    Tablet 40 milliGRAM(s) Oral before breakfast  predniSONE   Tablet 10 milliGRAM(s) Oral daily  sodium chloride 0.9%. 500 milliLiter(s) (75 mL/Hr) IV Continuous <Continuous>  sucralfate suspension 1 Gram(s) Oral four times a day    MEDICATIONS  (PRN):  acetaminophen     Tablet .. 650 milliGRAM(s) Oral every 6 hours PRN Mild Pain (1 - 3)  ondansetron Injectable 4 milliGRAM(s) IV Push every 6 hours PRN Nausea and/or Vomiting  traMADol 50 milliGRAM(s) Oral every 6 hours PRN Mild Pain (1 - 3)    ASSESSMENT AND PLAN:    81f, PMH as above a/w:    1: abd pain   s/p upper endo  + large gastric ulcer noted in the antrum with heaped up edges,  biopsy pending  cont ppi    2. Acute renal failure:  resolved    3. Orthostatic hypotension, r/o adrenal insufficiency:  -ACTH stim test performed while pt on prednisone, d/w endocrine, can not interpret results.   -pt on prednisone 10mg at this time which is more than replacement dose for chronic insufficiency anyway.   - d/c florinef 2/2 hypertension this morning  - losartan to be given @ 2000  - Nitrodur patch to be placed q hs and removed after 8 hours  - apply compression stocking    4. Chestpain  non specific  likely r/t procedure  ekg without any ischemic changes  resolved    5 Lightheadedness/visual difficulties/gait imbalance/cognitive issues/persistent nausea:  -post concussion syndrome.   -neuro eval apprecaited.   -f/u with with Dylan Gonsales for vestibular therapy.   -outpt f/u with neuropthalmology.     6. HTN  -continue bb    7 Polymyalgia rheumatica  -on prednisone 10mg as recent ESR was elevated per pt.   -repeat ESR here.     8. Rheumatoid arthritis/OA:  -supposed to have bilateral shoulder replaced in near future.   -pain control  -ambulate.   -physical therapy    9. DVT px:  -lovenox.    dispo:  repeat orthostatics in am.   dc planning if stable with close outpt f/u for further w/u           C/C: renal failure/generalized weakness/lightheadedness.      HPI: 81F, PMH of HTN on enalapril, Coreg, triamterene, dyslipidemia, OA/RA on Plaquenil, Polymyalgia rheumatica on prednisone,  breast cancer s/p lumpectomy, MVR, Traumatic SAH last year, found to have orthostatic hypotension,  Loop recorder  who was sent to [place] ED  by Dr. Campbell for TRACIE,  weakness and  complaint of  feeling dizzy, weak and nauseous for the last several weeks.   Pt had a creatinine of 1.9 on 22 and received a L of NS at Dr. Campbell's office.  She was recently taken off enalapril , triamterene, meloxicam by Dr. Campbell.  Pt reports she stopped Celebrex 3 weeks ago and she herself stopped Cymbalta a month ago.  She denied current NSAID use. Five weeks pta her Rheumatologist increased her prednisone from 5mg to 10 mg for a Polymyalgia Flare.  Pt reports having a sensation of her ears feeling full and dizziness for the last 4 months and had  an unremarkable work up for this by Dr. Pena.        Pt reported she  had 2 months of  nausea which recently became severe and is associated with abdominal discomfort.  Pt reports she feels so nauseous now that she cannot walk.  She denies vomiting.    She denies early satiety.  She reports intermittent constipation and reports she had a bowel movement yesterday and that her last bowel movement was otherwise a week ago.  She denies recent diarrhea but admits having loose stools for some days two weeks ago.   Pt reports six months of exertional dyspnea.  She  denies fever/chills, no URI complaints, no chest pain, no HA, no rash,  no sick contacts.  She c/o urinary urgency and was treated for a UTI until  with amoxicillin.   Per Dr. Campbell an outpatient random cortisol test result was low at  2 and an outpatient CT chest/abd/pelvis shows new 0.7x 0.6 cm LLL and 0.7 x 0.5 cm RLL pulmonary nodules with an unchanged 0.8 cm RUL nodule.      Pt is vaccinated against covid.  She reports her last colonoscopy was in  by Dr. Kelsey.       She was admitted for further evaluation.     : pt seen at bedside, s/p upper endo, + lge gastric ulcer, biopsied, c/o right sided CP, just lateral to the sternum, no diaphoresis, no radiation to neck or arms,  ekg performed, VSS,  felt better shortly after with no further cp, ealry this am, prior to endo pt was noted to have HTN B/P 195/98 and rec'd hydralazine    Review of system- All 10 systems reviewed and is as per HPI otherwise negative.     Vital Signs Last 24 Hrs  T(C): 37 (22 @ 10:49), Max: 37 (22 @ 10:49)  T(F): 98.6 (22 @ 10:49), Max: 98.6 (22 @ 10:49)  HR: 100 (22 @ 10:49) (70 - 100)  BP: 158/92 (22 @ 10:49) (122/70 - 195/98)  BP(mean): --  RR: 18 (22 @ 10:49) (16 - 18)  SpO2: 98% (22 @ 10:49) (97% - 99%)      PHYSICAL EXAM:    GENERAL: Comfortable, no acute distress  HEAD:  Atraumatic, Normocephalic  EYES: EOMI, PERRLA  HEENT: Moist mucous membranes  NECK: Supple, No JVD  NERVOUS SYSTEM:  Alert & Oriented X3, Motor Strength 5/5 B/L upper and lower extremities  CHEST/LUNG: Clear to auscultation bilaterally  HEART: Regular rate and rhythm; No murmurs, rubs, or gallops  ABDOMEN: Soft, Nontender, Nondistended; Bowel sounds present  GENITOURINARY- Voiding, no palpable bladder  EXTREMITIES:  No clubbing, cyanosis, or edema  MUSCULOSKELETAL- No muscle tenderness, No joint tenderness  SKIN-no rash    LABS:                                        12.6   7.33  )-----------( 144      ( 26 May 2022 09:11 )             38.7           142  |  111<H>  |  21  ----------------------------<  89  3.5   |  24  |  1.05    Ca    8.4<L>      26 May 2022 09:11  Phos  2.2       Mg     1.9               Urinalysis Basic - ( 23 May 2022 15:34 )    Color: Yellow / Appearance: Clear / S.010 / pH: x  Gluc: x / Ketone: Negative  / Bili: Negative / Urobili: Negative   Blood: x / Protein: Negative / Nitrite: Negative   Leuk Esterase: Negative / RBC: x / WBC x   Sq Epi: x / Non Sq Epi: x / Bacteria: x      MEDICATIONS  (STANDING):  aspirin enteric coated 81 milliGRAM(s) Oral daily  atorvastatin 10 milliGRAM(s) Oral at bedtime  carvedilol 6.25 milliGRAM(s) Oral every 12 hours  cholecalciferol 1000 Unit(s) Oral daily  enoxaparin Injectable 30 milliGRAM(s) SubCutaneous every 24 hours  losartan 25 milliGRAM(s) Oral daily  nitroglycerin    Patch 0.1 mG/Hr(s) 1 patch Transdermal at bedtime  pantoprazole    Tablet 40 milliGRAM(s) Oral before breakfast  predniSONE   Tablet 10 milliGRAM(s) Oral daily  sodium chloride 0.9%. 500 milliLiter(s) (75 mL/Hr) IV Continuous <Continuous>  sucralfate suspension 1 Gram(s) Oral four times a day    MEDICATIONS  (PRN):  acetaminophen     Tablet .. 650 milliGRAM(s) Oral every 6 hours PRN Mild Pain (1 - 3)  ondansetron Injectable 4 milliGRAM(s) IV Push every 6 hours PRN Nausea and/or Vomiting  traMADol 50 milliGRAM(s) Oral every 6 hours PRN Mild Pain (1 - 3)    ASSESSMENT AND PLAN:    81f, PMH as above a/w:    1: abd pain   s/p upper endo  + large gastric ulcer noted in the antrum with heaped up edges,  biopsy pending  cont ppi    2. Acute renal failure:  resolved    3. Orthostatic hypotension, r/o adrenal insufficiency:  -ACTH stim test performed while pt on prednisone, d/w endocrine, can not interpret results.   -pt on prednisone 10mg at this time which is more than replacement dose for chronic insufficiency anyway.   - d/c florinef 2/2 hypertension this morning  - losartan to be given @ 2000  - Nitrodur patch to be placed q hs and removed after 8 hours  - apply compression stocking  -keep HOB 30 degrees at all times    4. Chestpain  non specific  likely r/t procedure  ekg without any ischemic changes  resolved    5 Lightheadedness/visual difficulties/gait imbalance/cognitive issues/persistent nausea:  -post concussion syndrome.   -neuro eval apprecaited.   -f/u with with Dylan Gonsales for vestibular therapy.   -outpt f/u with neuropthalmology.     6. HTN  -continue bb    7 Polymyalgia rheumatica  -on prednisone 10mg as recent ESR was elevated per pt.   -repeat ESR here.     8. Rheumatoid arthritis/OA:  -supposed to have bilateral shoulder replaced in near future.   -pain control  -ambulate.   -physical therapy    9. DVT px:  -lovenox.    dispo:  repeat orthostatics in am.   dc planning if stable with close outpt f/u for further w/u

## 2022-05-26 NOTE — PROVIDER CONTACT NOTE (CHANGE IN STATUS NOTIFICATION) - SITUATION
Pt reports right sided chest pain. VSS, no diaphoresis, pain does not travel. Cardiac meds administered, EKG ordered.

## 2022-05-26 NOTE — PROGRESS NOTE ADULT - SUBJECTIVE AND OBJECTIVE BOX
CHIEF COMPLAINT: Patient is a 81y old  Female who presents with a chief complaint of ARF  Severe nausea   Weakness and Dizziness  Hypertension  Adrenal Insufficiency (23 May 2022 17:09)      HPI:  Pt is a pleasant  82 y/o  Female with PMhx of  HTN on enalapril, Coreg, triamterene, dyslipidemia, OA/RA on Plaquenil, prednisone and tramadol, breast cancer s/p lumpectomy, MVR, Loop recorder  who was sent to [place] ED for TRACIE,  weakness and  complaint of  feeling dizzy, weak and nauseous for the last several weeks.   Pt had a creatinine of 1.9 on 5/20/22 and received a L of NS at my office.  She was recently taken off enalapril , triamterene, meloxicam due to increasing creatinine.   Pt reports she stopped Celebrex 3 weeks ago and she herself stopped Cymbalta a month ago.  She denies current NSAID use.   Five weeks ago her Rheumatologist increased her prednisone from 5mg to 10 mg for a Polymyalgia Flare.  Pt reports having a sensation of her ears feeling full and dizziness for the last 4 months and had  an unremarkable work up for this by Dr. Pena.      Pt reports she had 2 months of  nausea which recently became severe and is associated with abdominal discomfort.  Pt reports she feels so nauseous now that she cannot walk.  She denies vomiting.    She denies early satiety.  She reports intermittent constipation and reports she had a bowel movement yesterday and that her last bowel movement was otherwise a week ago.  She denies recent diarrhea but admits having loose stools for some days two weeks ago.   Pt reports six months of exertional dyspnea.  She  denies fever/chills, no URI complaints, no chest pain, no HA, no rash,  no sick contacts.  She c/o urinary urgency and was treated for a UTI until 5/21 with amoxicillin.   An outpatient random cortisol test result was low at  2     An outpatient CT chest/abd/pelvis on 5/12/22 at [facility] radiology in [place] shows new 0.7x 0.6 cm LLL and 0.7 x 0.5 cm RLL pulmonary nodules with an unchanged 0.8 cm RUL nodule.      5/26/22- feeling slightly better this AM- no acute issues overnight      PMHx: PAST MEDICAL & SURGICAL HISTORY:  Mitral valve disease  RA (rheumatoid arthritis)  OA (osteoarthritis)  Migraine  HLD (hyperlipidemia)  Breast cancer left 1990 treated with RT and surgery  Chronic pain  secondary to OA and RA  History of lumpectomy of left breast  with sentinal node biopsy 1990  History of bilateral knee replacement  right 2009, left 2011  H/O spinal fusion  lumbar 2005  History of tonsillectomy  S/P bunionectomy  left x 2 2007  History of left hip replacement  2017  H/O mitral valve replacement    History of cataract surgery  left cataract sx      Soc Hx: lives at home- no toxic habits      Allergies: Allergies    No Known Allergies    Intolerances    Vital Signs Last 24 Hrs  T(C): 36.4 (26 May 2022 00:12), Max: 36.7 (25 May 2022 20:44)  T(F): 97.5 (26 May 2022 00:12), Max: 98 (25 May 2022 20:44)  HR: 70 (26 May 2022 00:12) (69 - 78)  BP: 151/69 (26 May 2022 00:12) (122/70 - 151/69)  BP(mean): --  RR: 17 (26 May 2022 00:12) (17 - 18)  SpO2: 99% (26 May 2022 00:12) (97% - 99%)    PHYSICAL EXAM:   Constitutional: NAD, weak appearing   Respiratory: Breath sounds are clear bilaterally  Cardiovascular: S1 and S2, regular rate and rhythm, HSM2/6  Vascular: 2+ peripheral pulses  Neurological: A/O x 3, no focal deficits    MEDICATIONS  (STANDING):  aspirin enteric coated 81 milliGRAM(s) Oral daily  atorvastatin 10 milliGRAM(s) Oral at bedtime  carvedilol 6.25 milliGRAM(s) Oral every 12 hours  cholecalciferol 1000 Unit(s) Oral daily  enoxaparin Injectable 30 milliGRAM(s) SubCutaneous every 24 hours  fludroCORTISONE 0.1 milliGRAM(s) Oral daily  pantoprazole    Tablet 40 milliGRAM(s) Oral before breakfast  predniSONE   Tablet 10 milliGRAM(s) Oral daily  sodium chloride 0.9%. 500 milliLiter(s) (75 mL/Hr) IV Continuous <Continuous>  sucralfate suspension 1 Gram(s) Oral four times a day    MEDICATIONS  (PRN):  acetaminophen     Tablet .. 650 milliGRAM(s) Oral every 6 hours PRN Mild Pain (1 - 3)  ondansetron Injectable 4 milliGRAM(s) IV Push every 6 hours PRN Nausea and/or Vomiting  traMADol 50 milliGRAM(s) Oral every 6 hours PRN Mild Pain (1 - 3)      LABS: All Labs Reviewed:                                         13.6   8.99  )-----------( 154      ( 24 May 2022 11:50 )             41.9     05-24    139  |  107  |  20  ----------------------------<  85  3.8   |  27  |  1.29    Ca    8.6      24 May 2022 11:50  Phos  3.2     05-23  Mg     1.9     05-23    TPro  6.7  /  Alb  3.3  /  TBili  0.4  /  DBili  x   /  AST  49<H>  /  ALT  41  /  AlkPhos  52  05-23    EKG: Sinus rhythm, iLBBB, LVH pattern

## 2022-05-26 NOTE — PROGRESS NOTE ADULT - ASSESSMENT
81 f with  TRACIE , weakness and failure to thrive    1) TRACIE- off of diuretics and NSAIDS- s/p iv fluids administraiton-- renal function improved    2)Severe nausea, abd discomfort and constipation- on PPI - GI following- EGD tentatively today    3) Rheum- patient sees Dr Marcelo as an outpatient- ESR normal    4) Dizziness- seen by Neurology- no acute issues, but dizziness remains. PT, vestibular therapy recommended by neurology    Recent new  pulmonary nodules seen on outpt CT imaging- outpt f/u for pulmonary nodules    will follow 81 f with  TRACIE , weakness and failure to thrive    1) TRACIE- off of diuretics and NSAIDS- s/p iv fluids administraiton-- renal function improved    2)Severe nausea, abd discomfort and constipation- on PPI - GI following- EGD tentatively today    3) Rheum- patient sees Dr Marcelo as an outpatient- ESR normal    4) Dizziness- seen by Neurology- no acute issues, but dizziness remains. PT, vestibular therapy recommended by neurology    5) HTN- elvated BP  this morning- -  will give 1 dose of hydrlazine to decrease BP prior to early AM procedure and add norvasc to the regiment later. GIven increase in BP, would consider holding florinef    Recent new  pulmonary nodules seen on outpt CT imaging- outpt f/u for pulmonary nodules    will follow

## 2022-05-27 ENCOUNTER — TRANSCRIPTION ENCOUNTER (OUTPATIENT)
Age: 82
End: 2022-05-27

## 2022-05-27 VITALS — DIASTOLIC BLOOD PRESSURE: 65 MMHG | SYSTOLIC BLOOD PRESSURE: 124 MMHG | HEART RATE: 79 BPM

## 2022-05-27 DIAGNOSIS — E27.49 OTHER ADRENOCORTICAL INSUFFICIENCY: ICD-10-CM

## 2022-05-27 DIAGNOSIS — M35.3 POLYMYALGIA RHEUMATICA: ICD-10-CM

## 2022-05-27 PROCEDURE — 99239 HOSP IP/OBS DSCHRG MGMT >30: CPT

## 2022-05-27 RX ORDER — INFLUENZA VIRUS VACCINE 15; 15; 15; 15 UG/.5ML; UG/.5ML; UG/.5ML; UG/.5ML
0.7 SUSPENSION INTRAMUSCULAR ONCE
Refills: 0 | Status: DISCONTINUED | OUTPATIENT
Start: 2022-05-27 | End: 2022-05-27

## 2022-05-27 RX ORDER — LOSARTAN POTASSIUM 100 MG/1
1 TABLET, FILM COATED ORAL
Qty: 30 | Refills: 0
Start: 2022-05-27 | End: 2022-06-25

## 2022-05-27 RX ORDER — SUCRALFATE 1 G
10 TABLET ORAL
Qty: 560 | Refills: 0
Start: 2022-05-27 | End: 2022-06-09

## 2022-05-27 RX ORDER — PANTOPRAZOLE SODIUM 20 MG/1
1 TABLET, DELAYED RELEASE ORAL
Qty: 42 | Refills: 0
Start: 2022-05-27

## 2022-05-27 RX ORDER — NITROGLYCERIN 6.5 MG
1 CAPSULE, EXTENDED RELEASE ORAL
Qty: 14 | Refills: 0
Start: 2022-05-27 | End: 2022-06-09

## 2022-05-27 RX ORDER — SENNA PLUS 8.6 MG/1
2 TABLET ORAL
Qty: 0 | Refills: 0 | DISCHARGE
Start: 2022-05-27

## 2022-05-27 RX ORDER — CELECOXIB 200 MG/1
1 CAPSULE ORAL
Qty: 0 | Refills: 0 | DISCHARGE

## 2022-05-27 RX ORDER — SUCRALFATE 1 G
10 TABLET ORAL
Qty: 1120 | Refills: 0
Start: 2022-05-27 | End: 2022-06-23

## 2022-05-27 RX ORDER — AMOXICILLIN 250 MG/5ML
4 SUSPENSION, RECONSTITUTED, ORAL (ML) ORAL
Qty: 0 | Refills: 0 | DISCHARGE

## 2022-05-27 RX ADMIN — Medication 1 GRAM(S): at 05:30

## 2022-05-27 RX ADMIN — Medication 5 MILLIGRAM(S): at 10:55

## 2022-05-27 RX ADMIN — ENOXAPARIN SODIUM 30 MILLIGRAM(S): 100 INJECTION SUBCUTANEOUS at 05:30

## 2022-05-27 RX ADMIN — Medication 1000 UNIT(S): at 09:05

## 2022-05-27 RX ADMIN — Medication 650 MILLIGRAM(S): at 09:05

## 2022-05-27 RX ADMIN — TRAMADOL HYDROCHLORIDE 50 MILLIGRAM(S): 50 TABLET ORAL at 06:30

## 2022-05-27 RX ADMIN — TRAMADOL HYDROCHLORIDE 50 MILLIGRAM(S): 50 TABLET ORAL at 05:44

## 2022-05-27 RX ADMIN — Medication 10 MILLIGRAM(S): at 09:06

## 2022-05-27 RX ADMIN — Medication 81 MILLIGRAM(S): at 09:05

## 2022-05-27 RX ADMIN — PANTOPRAZOLE SODIUM 40 MILLIGRAM(S): 20 TABLET, DELAYED RELEASE ORAL at 05:29

## 2022-05-27 RX ADMIN — CARVEDILOL PHOSPHATE 6.25 MILLIGRAM(S): 80 CAPSULE, EXTENDED RELEASE ORAL at 09:06

## 2022-05-27 NOTE — DISCHARGE NOTE PROVIDER - PROVIDER TOKENS
PROVIDER:[TOKEN:[[id number]],FOLLOWUP:[1 week]],PROVIDER:[TOKEN:[[id number]],FOLLOWUP:[1 week]],PROVIDER:[TOKEN:[[id number]],FOLLOWUP:[2 weeks]],PROVIDER:[TOKEN:[[id number]],FOLLOWUP:[1 week]] PROVIDER:[TOKEN:[[id number]],FOLLOWUP:[1 week]],PROVIDER:[TOKEN:[[id number]],FOLLOWUP:[1 week]],PROVIDER:[TOKEN:[[id number]],FOLLOWUP:[2 weeks]],PROVIDER:[TOKEN:[[id number]],FOLLOWUP:[1 week]],PROVIDER:[TOKEN:[[id number]],FOLLOWUP:[1 week]]

## 2022-05-27 NOTE — DISCHARGE NOTE PROVIDER - NSDCCPCAREPLAN_GEN_ALL_CORE_FT
PRINCIPAL DISCHARGE DIAGNOSIS  Diagnosis: TRACIE (acute kidney injury)  Assessment and Plan of Treatment: do not take medications like celebrex/meloxicam/advil, etc.   stay hydrated.   Your enalapril was stopped per Dr. Campbell and you were started on losartan for blood pressure.      SECONDARY DISCHARGE DIAGNOSES  Diagnosis: Orthostatic hypotension  Assessment and Plan of Treatment: -your blood pressure drops when sitting up and standing up.   -measures to help this include:  # wearing compression stockings during the day while ambulating  #sleeping with head end of bed @ 30 degrees  #stay hydrated/avoid situations which can cause dehydration.   #Change positions very slowly (sit up for a few minutes before getting out of bed)  #Take florinef every other day in the morning.   #Take your blood pressure medicine (losartan) at night when you will be laying flat  #If your blood pressure remains elevated after taking losartan at night, you can use a nitro patch while sleeping. This should be removed after 8 hours.      Diagnosis: Gastric ulcer  Assessment and Plan of Treatment: you were found to have a large gastric ulcer.   take protonix and carafate for this.  follow up with gastroenterology as advised.    Diagnosis: Polymyalgia rheumatica  Assessment and Plan of Treatment: follow up with Dr. Barragan for further prednisone dosing.    Diagnosis: Cortisol deficiency  Assessment and Plan of Treatment: You may have a condition called adrenal insufifficiency which means your adrenal glands are not producing enough cortisol.   To diagnose this condition follow up with endocrinology (Dr. Manzano for further testing )    Diagnosis: Post concussion syndrome  Assessment and Plan of Treatment: follow up with Dylan Gonsales in [place] for vestibular therapy.   [address] [place] [address]  [phone]      Diagnosis: Vision blurring  Assessment and Plan of Treatment: Follow up with Neuro opthalmology for further evaluation.   can Get referral from Dr. Campbell

## 2022-05-27 NOTE — DISCHARGE NOTE PROVIDER - CARE PROVIDERS DIRECT ADDRESSES
,DirectAddress_Unknown,DirectAddress_Unknown,DirectAddress_Unknown,DirectAddress_Unknown ,DirectAddress_Unknown,DirectAddress_Unknown,DirectAddress_Unknown,DirectAddress_Unknown,[email or web address]

## 2022-05-27 NOTE — PROGRESS NOTE ADULT - SUBJECTIVE AND OBJECTIVE BOX
CHIEF COMPLAINT: Patient is a 81y old  Female who presents with a chief complaint of ARF  Severe nausea   Weakness and Dizziness  Hypertension  Adrenal Insufficiency (23 May 2022 17:09)      HPI:  Pt is a pleasant  82 y/o  Female with PMhx of  HTN on enalapril, Coreg, triamterene, dyslipidemia, OA/RA on Plaquenil, prednisone and tramadol, breast cancer s/p lumpectomy, MVR, Loop recorder  who was sent to [facility] ED for TRACIE,  weakness and  complaint of  feeling dizzy, weak and nauseous for the last several weeks.   Pt had a creatinine of 1.9 on 5/20/22 and received a L of NS at my office.  She was recently taken off enalapril , triamterene, meloxicam due to increasing creatinine.   Pt reports she stopped Celebrex 3 weeks ago and she herself stopped Cymbalta a month ago.  She denies current NSAID use.   Five weeks ago her Rheumatologist increased her prednisone from 5mg to 10 mg for a Polymyalgia Flare.  Pt reports having a sensation of her ears feeling full and dizziness for the last 4 months and had  an unremarkable work up for this by Dr. Pena.      Pt reports she had 2 months of  nausea which recently became severe and is associated with abdominal discomfort.  Pt reports she feels so nauseous now that she cannot walk.  She denies vomiting.    She denies early satiety.  She reports intermittent constipation and reports she had a bowel movement yesterday and that her last bowel movement was otherwise a week ago.  She denies recent diarrhea but admits having loose stools for some days two weeks ago.   Pt reports six months of exertional dyspnea.  She  denies fever/chills, no URI complaints, no chest pain, no HA, no rash,  no sick contacts.  She c/o urinary urgency and was treated for a UTI until 5/21 with amoxicillin.   An outpatient random cortisol test result was low at  2     An outpatient CT chest/abd/pelvis on 5/12/22 at [facility] radiology in [place] shows new 0.7x 0.6 cm LLL and 0.7 x 0.5 cm RLL pulmonary nodules with an unchanged 0.8 cm RUL nodule.      5/26/22- feeling slightly better this AM- no acute issues overnight    5/27/22 gastric ulcer found on endoscopy      PMHx: PAST MEDICAL & SURGICAL HISTORY:  Mitral valve disease  RA (rheumatoid arthritis)  OA (osteoarthritis)  Migraine  HLD (hyperlipidemia)  Breast cancer left 1990 treated with RT and surgery  Chronic pain  secondary to OA and RA  History of lumpectomy of left breast  with sentinal node biopsy 1990  History of bilateral knee replacement  right 2009, left 2011  H/O spinal fusion  lumbar 2005  History of tonsillectomy  S/P bunionectomy  left x 2 2007  History of left hip replacement  2017  H/O mitral valve replacement    History of cataract surgery  left cataract sx      Soc Hx: lives at home- no toxic habits      Allergies: Allergies    No Known Allergies    Intolerances    1Vital Signs Last 24 Hrs  T(C): 36.6 (27 May 2022 05:23), Max: 37 (26 May 2022 10:49)  T(F): 97.8 (27 May 2022 05:23), Max: 98.6 (26 May 2022 10:49)  HR: 70 (27 May 2022 05:23) (70 - 100)  BP: 151/72 (27 May 2022 05:23) (114/62 - 161/70)  BP(mean): 73 (26 May 2022 19:54) (73 - 73)  RR: 17 (27 May 2022 05:23) (16 - 18)  SpO2: 98% (27 May 2022 05:23) (95% - 99%)    PHYSICAL EXAM:   Constitutional: NAD, weak appearing   Respiratory: Breath sounds are clear bilaterally  Cardiovascular: S1 and S2, regular rate and rhythm, HSM2/6  Vascular: 2+ peripheral pulses  Neurological: A/O x 3, no focal deficits      MEDICATIONS  (STANDING):  aspirin enteric coated 81 milliGRAM(s) Oral daily  atorvastatin 10 milliGRAM(s) Oral at bedtime  carvedilol 6.25 milliGRAM(s) Oral every 12 hours  cholecalciferol 1000 Unit(s) Oral daily  enoxaparin Injectable 30 milliGRAM(s) SubCutaneous every 24 hours  losartan 25 milliGRAM(s) Oral daily  nitroglycerin    Patch 0.1 mG/Hr(s) 1 patch Transdermal at bedtime  pantoprazole    Tablet 40 milliGRAM(s) Oral before breakfast  polyethylene glycol 3350 17 Gram(s) Oral two times a day  predniSONE   Tablet 10 milliGRAM(s) Oral daily  senna 2 Tablet(s) Oral at bedtime  sodium chloride 0.9%. 500 milliLiter(s) (75 mL/Hr) IV Continuous <Continuous>  sucralfate suspension 1 Gram(s) Oral four times a day    MEDICATIONS  (PRN):  acetaminophen     Tablet .. 650 milliGRAM(s) Oral every 6 hours PRN Mild Pain (1 - 3)  ondansetron Injectable 4 milliGRAM(s) IV Push every 6 hours PRN Nausea and/or Vomiting  traMADol 50 milliGRAM(s) Oral every 6 hours PRN Mild Pain (1 - 3)      LABS: All Labs Reviewed:                                                          12.6   7.33  )-----------( 144      ( 26 May 2022 09:11 )             38.7   05-26    142  |  111<H>  |  21  ----------------------------<  89  3.5   |  24  |  1.05    Ca    8.4<L>      26 May 2022 09:11  Phos  2.2     05-26  Mg     1.9     05-26      EKG: Sinus rhythm, iLBBB, LVH pattern

## 2022-05-27 NOTE — CHART NOTE - NSCHARTNOTEFT_GEN_A_CORE
Patient seen at bedside comfortably eating meal. To go home today---> followup appointment set up with our service. Home on PPI and carafate.
Gastric emptying study cancelled. For EGD in am. NPO p MN

## 2022-05-27 NOTE — DISCHARGE NOTE PROVIDER - NSDCMRMEDTOKEN_GEN_ALL_CORE_FT
Aspirin Enteric Coated 81 mg oral delayed release tablet: 1 tab(s) orally once a day   atorvastatin 10 mg oral tablet: 1 tab(s) orally once a day  B Complex 50: 1 tab(s) orally once a day  bisacodyl 5 mg oral delayed release tablet: 1 tab(s) orally once a day  Coreg 6.25 mg oral tablet: 1 tab(s) orally 2 times a day  Cymbalta 30 mg oral delayed release capsule: 1 cap(s) orally once a day  losartan 50 mg oral tablet: 1 tab(s) orally once a day (at bedtime)  AT 8PM  nitroglycerin 0.1 mg/hr transdermal film, extended release: 1 patch transdermal once a day (at bedtime) ONLY IF YOUR BLOOD PRESSURE IS GREATER THAN 160/90 WHEN GOING TO BED. REMOVE AFTER 8 HOURS.   pantoprazole 40 mg oral delayed release tablet: 1 tab(s) orally once a day (before a meal)  Plaquenil 200 mg oral tablet: 2 tab(s) orally once a day  predniSONE 5 mg oral tablet: 2 tab(s) orally once a day  senna oral tablet: 2 tab(s) orally once a day (at bedtime)  sucralfate 1 g/10 mL oral suspension: 10 milliliter(s) orally 4 times a day  SUMAtriptan 50 mg oral tablet: 1 tab(s) orally every 6 hours, As needed, migraine  traMADol 50 mg oral tablet: 2 tab(s) orally every 6 hours, As Needed  Vitamin D3 1000 intl units oral tablet: 2 tab(s) orally once a day

## 2022-05-27 NOTE — DISCHARGE NOTE NURSING/CASE MANAGEMENT/SOCIAL WORK - NSDCVIVACCINE_GEN_ALL_CORE_FT
Tdap; 10-Feb-2018 19:30; Naye Moreau (RN); [employer]; [id number]; IntraMuscular; Deltoid Right.; 0.5 milliLiter(s); VIS (VIS Published: 09-May-2013, VIS Presented: 10-Feb-2018);

## 2022-05-27 NOTE — DISCHARGE NOTE PROVIDER - CARE PROVIDER_API CALL
Valentin Campbell)  Cardiovascular Disease; Internal Medicine; Nuclear Cardiology  [address], [address]  [place] [address]  Phone: [phone]  Fax: [phone]  Follow Up Time: 1 week    GLORIA CHRISTIANSEN  Internal Medicine  [address] [place] [address]  Phone: [phone]  Fax: [phone])-  Follow Up Time: 1 week    Candelario Moreno)  Gastroenterology; Internal Medicine  [address], [facility] [place] [address]  Phone: [phone]  Fax: [phone]  Follow Up Time: 2 weeks    Dimitrios Condon)  Ophthalmology  [address], [address] [place] [address]  Phone: [phone]  Fax: [phone]  Follow Up Time: 1 week   Valentin Campbell)  Cardiovascular Disease; Internal Medicine; Nuclear Cardiology  [address], [address]  [place] [address]  Phone: [phone]  Fax: [phone]  Follow Up Time: 1 week    GLORIA CHRISTIANSEN  Internal Medicine  [address]  [place] [address]  Phone: [phone]  Fax: [phone])-  Follow Up Time: 1 week    Candelario Moreno)  Gastroenterology; Internal Medicine  [address] [facility] [facility] [address], [place] [address]  Phone: [phone]  Fax: [phone]  Follow Up Time: 2 weeks    Dimitrios Condon)  Ophthalmology  [address] [facility], [address] [place] [address]  Phone: [phone]  Fax: [phone]  Follow Up Time: 1 week    Matheus Stanley)  EndocrinologyMetabDiabetes; Internal Medicine  [address] [place] [address]  Phone: [phone]  Fax: [phone]  Follow Up Time: 1 week

## 2022-05-27 NOTE — DISCHARGE NOTE NURSING/CASE MANAGEMENT/SOCIAL WORK - PATIENT PORTAL LINK FT
You can access the FollowMyHealth Patient Portal offered by [facility] by registering at the following website: [email or web address]. By joining [employer]’s FollowMyHealth portal, you will also be able to view your health information using other applications (apps) compatible with our system.

## 2022-05-27 NOTE — DISCHARGE NOTE PROVIDER - NSDCFUSCHEDAPPT_GEN_ALL_CORE_FT
Candelario Moreno  [facility]  GASTRO [address] [facility] [facility]  Scheduled Appointment: 06/29/2022

## 2022-05-27 NOTE — PROGRESS NOTE ADULT - REASON FOR ADMISSION
ARF  Severe nausea   Weakness and Dizziness  Hypertension  Adrenal Insufficiency

## 2022-05-27 NOTE — PROGRESS NOTE ADULT - ASSESSMENT
81 f with  TRACIE , weakness and failure to thrive    1) TRACIE- off of diuretics and NSAIDS- s/p iv fluids administraiton-- renal function resolved    2)Severe nausea, abd discomfort and constipation- on PPI - GI following- Gastric ulcer found on EGD-- being treated     3) Rheum- patient sees Dr Marcelo as an outpatient- ESR normal    4) Dizziness- seen by Neurology- no acute issues, but dizziness remains. PT, vestibular therapy recommended by neurology    5) HTN- better BP  this morning- started on PM nitro and AM losartan- may consider resuming low dose florinef daily or q48 hrs to minimize orthostasis-- blood pressure will need to be monitoring closely and medications titrated accordingly    Recent new  pulmonary nodules seen on outpt CT imaging- outpt f/u for pulmonary nodules    DC planning

## 2022-05-27 NOTE — DISCHARGE NOTE NURSING/CASE MANAGEMENT/SOCIAL WORK - NSDCPEFALRISK_GEN_ALL_CORE
For information on Fall & Injury Prevention, visit: [email or web address] OR  [email or web address] OR  https://www.cdc.gov/steadi/patient.html

## 2022-05-27 NOTE — DISCHARGE NOTE PROVIDER - NPI NUMBER (FOR SYSADMIN USE ONLY) :
[[id number]],[[id number]],[[id number]],[[id number]] [[id number]],[[id number]],[[id number]]

## 2022-05-27 NOTE — DISCHARGE NOTE PROVIDER - HOSPITAL COURSE
81F, PMH of HTN on enalapril, Coreg, triamterene, dyslipidemia, OA/RA on Plaquenil, Polymyalgia rheumatica on prednisone, breast cancer s/p lumpectomy, MVR, Traumatic SAH last year, found to have orthostatic hypotension, had Loop recorder placed  who was sent to [place] [facility]  by Dr. Campbell for TRACIE, generalized weakness and  complaint of  feeling dizzy, weak and nauseous with abdominal discomfort for the last several weeks.    She was found to have a creatinine of 1.9 on 5/20 and received 1L ivf at Dr. Campbell's office and was taken of enalapril, triamterene and meloxicam.   5 weeks pta her prednisone dose was increased from 5 to 10mg for polymyalgia flare.   She also c/o feeling lightheaded/dizzy when walking. Has a sensation of head fullness and feels foggy. Has blurry vision and difficulty focusing with her eyes. Saw an outpt opthalmologist and was told her eyes were ok.   She had an outpt random cortisol which was low @2. Outpt ct Chest/abd/pelvis showed 0.7x 0.6 cm LLL and 0.7 x 0.5 cm RLL pulmonary nodules with an unchanged 0.8 cm RUL nodule.   in ED, her creatinine was 1.5 and she was admitted. Cosyntropin stim test was ordered and performed, however her results could not be interpreted as she was continued on prednisone.   This was d/w endocrinology and it was felt as she is already on a dose of prednisone that would be higher than her replacement dose if she did have adrenal insufficiency, she could have this testing done as an outpt after being put on hydrocortisone.   She was given ivf. Was kept off enalapril and diuretic. Was found to have orthostatic hypotension. Started on florinef, but the following am her bp was markedly elevated requiring iv medication.   She was started on losartan HS with improvement in bp. Her orthostatics have improved. she will be discharged on florinef every other day and will f/u with Dr. Campbell in office early next week. Her TRACIE has resolved as well.   She was seen by GI and underwent EGD which showed a large gastric ulcer.  Started on PPI and recommended oupt f/u. Seen by neurology who agreed she likely had a post concussive syndrome and is recommended outpt f/u with Dylan Gonsales For vestibular therapy. She is also advised outpt f/u with neuro-opthalmology for her visual difficulties (difficulty focusing/blurry vision).        Vital Signs Last 24 Hrs  T(C): 36.9 (27 May 2022 09:48), Max: 36.9 (27 May 2022 09:48)  T(F): 98.5 (27 May 2022 09:48), Max: 98.5 (27 May 2022 09:48)  HR: 79 (27 May 2022 09:48) (70 - 82)  BP: 151/76 (27 May 2022 09:48) (114/62 - 151/76)  BP(mean): 73 (26 May 2022 19:54) (73 - 73)  RR: 16 (27 May 2022 09:48) (16 - 18)  SpO2: 98% (27 May 2022 09:48) (95% - 98%)    PHYSICAL EXAM:  GENERAL: Comfortable, no acute distress   HEAD:  Normocephalic, atraumatic  EYES: EOMI, PERRLA  HEENT: Moist mucous membranes  NECK: Supple, No JVD  NERVOUS SYSTEM:  Alert & Oriented X3, Motor Strength 5/5 B/L upper and lower extremities  CHEST/LUNG: Clear to auscultation bilaterally  HEART: Regular rate and rhythm  ABDOMEN: Soft, Nontender, Nondistended, Bowel sounds present  GENITOURINARY: Voiding, no palpable bladder  EXTREMITIES:   No clubbing, cyanosis, or edema  MUSCULOSKELETAL- No muscle tenderness, no joint tenderness  SKIN-no rash    LABS:                                        12.6   7.33  )-----------( 144      ( 26 May 2022 09:11 )             38.7       05-26    142  |  111<H>  |  21  ----------------------------<  89  3.5   |  24  |  1.05    Ca    8.4<L>      26 May 2022 09:11  Phos  2.2     05-26  Mg     1.9     05-26    FINAL DIAGNOSIS:  1. Large gastric ulcer located in antrum.  2. Acute renal failure-resolved.   3. Orthostatic hypotension, r/o adrenal insufficiency  4. Chestpain-non cardiac, likely related to egd, resolved.   5 Post concussion syndrome.   6. HTN  7 Polymyalgia rheumatica  8. Rheumatoid arthritis/OA    time taken for dc 45 min  d/w pt, Dr. Campbell

## 2022-05-31 LAB — SURGICAL PATHOLOGY STUDY: SIGNIFICANT CHANGE UP

## 2022-06-07 ENCOUNTER — NON-APPOINTMENT (OUTPATIENT)
Age: 82
End: 2022-06-07

## 2022-06-24 NOTE — ED ADULT NURSE NOTE - NS PRO PASSIVE SMOKE EXP
pt s/p thrombectomy and tpa
pt s/p thrombectomy + TPA
pt s/p tpa and thrombectomy
pt s/p tpa and thrombectomy + NS @ 75 mL/h
pt s/p tpa and thrombectomy
ordered B/P parameters 110-140
pt s/p TPA + thrombectomy
pt s/p thrombectomy + tpa
Unknown

## 2022-06-29 ENCOUNTER — APPOINTMENT (OUTPATIENT)
Dept: GASTROENTEROLOGY | Facility: CLINIC | Age: 82
End: 2022-06-29

## 2022-06-29 VITALS
SYSTOLIC BLOOD PRESSURE: 130 MMHG | WEIGHT: 136 LBS | HEART RATE: 74 BPM | DIASTOLIC BLOOD PRESSURE: 81 MMHG | HEIGHT: 65 IN | BODY MASS INDEX: 22.66 KG/M2

## 2022-06-29 DIAGNOSIS — K27.9 PEPTIC ULCER, SITE UNSPECIFIED, UNSPECIFIED AS ACUTE OR CHRONIC, W/OUT HEMORRHAGE OR PERFORATION: ICD-10-CM

## 2022-06-29 PROCEDURE — 99214 OFFICE O/P EST MOD 30 MIN: CPT

## 2022-06-30 NOTE — HISTORY OF PRESENT ILLNESS
[de-identified] : 82 year old woman recently admitted to the hospital with severe abdominal pain, found to have antral ulcer, here for follow up. \par \par Since discharge patient has felt very well. No abdominal pain. No nausea or vomiting. No weight loss. Good appetite. No overt signs of GI bleeding like hematemesis, melena, hematochezia. Is taking her PPI twice daily as directed but needs a refill. Is still on carafate. \par

## 2022-06-30 NOTE — PHYSICAL EXAM
[General Appearance - Alert] : alert [General Appearance - In No Acute Distress] : in no acute distress [Sclera] : the sclera and conjunctiva were normal [PERRL With Normal Accommodation] : pupils were equal in size, round, and reactive to light [Extraocular Movements] : extraocular movements were intact [Abdomen Soft] : soft [Abdomen Tenderness] : non-tender [FreeTextEntry1] : non distended [Abnormal Walk] : normal gait [Nail Clubbing] : no clubbing  or cyanosis of the fingernails [Musculoskeletal - Swelling] : no joint swelling seen [Motor Tone] : muscle strength and tone were normal [Skin Color & Pigmentation] : normal skin color and pigmentation [Skin Turgor] : normal skin turgor [] : no rash [Motor Exam] : the motor exam was normal [No Focal Deficits] : no focal deficits [Oriented To Time, Place, And Person] : oriented to person, place, and time [Impaired Insight] : insight and judgment were intact [Affect] : the affect was normal

## 2022-07-25 ENCOUNTER — RESULT REVIEW (OUTPATIENT)
Age: 82
End: 2022-07-25

## 2022-07-25 ENCOUNTER — OUTPATIENT (OUTPATIENT)
Dept: OUTPATIENT SERVICES | Facility: HOSPITAL | Age: 82
LOS: 1 days | Discharge: HOME | End: 2022-07-25
Payer: MEDICARE

## 2022-07-25 ENCOUNTER — APPOINTMENT (OUTPATIENT)
Dept: GASTROENTEROLOGY | Facility: HOSPITAL | Age: 82
End: 2022-07-25

## 2022-07-25 VITALS
SYSTOLIC BLOOD PRESSURE: 185 MMHG | HEART RATE: 75 BPM | RESPIRATION RATE: 16 BRPM | WEIGHT: 141.1 LBS | OXYGEN SATURATION: 97 % | TEMPERATURE: 98 F | HEIGHT: 65 IN | DIASTOLIC BLOOD PRESSURE: 91 MMHG

## 2022-07-25 DIAGNOSIS — K27.9 PEPTIC ULCER, SITE UNSPECIFIED, UNSPECIFIED AS ACUTE OR CHRONIC, WITHOUT HEMORRHAGE OR PERFORATION: ICD-10-CM

## 2022-07-25 DIAGNOSIS — Z95.2 PRESENCE OF PROSTHETIC HEART VALVE: Chronic | ICD-10-CM

## 2022-07-25 DIAGNOSIS — Z98.89 OTHER SPECIFIED POSTPROCEDURAL STATES: Chronic | ICD-10-CM

## 2022-07-25 DIAGNOSIS — Z96.653 PRESENCE OF ARTIFICIAL KNEE JOINT, BILATERAL: Chronic | ICD-10-CM

## 2022-07-25 DIAGNOSIS — Z98.1 ARTHRODESIS STATUS: Chronic | ICD-10-CM

## 2022-07-25 DIAGNOSIS — Z98.49 CATARACT EXTRACTION STATUS, UNSPECIFIED EYE: Chronic | ICD-10-CM

## 2022-07-25 DIAGNOSIS — Z96.642 PRESENCE OF LEFT ARTIFICIAL HIP JOINT: Chronic | ICD-10-CM

## 2022-07-25 LAB — SARS-COV-2 RNA SPEC QL NAA+PROBE: SIGNIFICANT CHANGE UP

## 2022-07-25 PROCEDURE — 43239 EGD BIOPSY SINGLE/MULTIPLE: CPT | Mod: GC

## 2022-07-25 PROCEDURE — 87635 SARS-COV-2 COVID-19 AMP PRB: CPT

## 2022-07-25 PROCEDURE — 88312 SPECIAL STAINS GROUP 1: CPT

## 2022-07-25 PROCEDURE — 88312 SPECIAL STAINS GROUP 1: CPT | Mod: 26

## 2022-07-25 PROCEDURE — 88305 TISSUE EXAM BY PATHOLOGIST: CPT | Mod: 26

## 2022-07-25 PROCEDURE — 88305 TISSUE EXAM BY PATHOLOGIST: CPT

## 2022-07-25 NOTE — ASU PREOP CHECKLIST - TAMPON REMOVED
"Patient called stating that she had called the pharmacy to check on the refill of her prednisone and it wasn't there. After looking in patient's chart, I informed her of note left in her chart by her PCP, JERZY Ferrari that said she didn't feel it appropriate to continue with the prednisone. Patient states that her skin looks \"terrible\" and feels like her \"chronic excema\" is flaring up because of the \"weather change\". She also states that she has a job interview that she would like to have clear skin for. I advised patient that she may need to make an appointment if her skin is still inflamed to pursue a different course of action. Patient asked if Em would reconsider until she can make an appointment.     Please advise and respond.   "
Called patient to inform her of medication sent to pharmacy. Patient voiced understanding and had no further questions.   
FYI
I sent in 10 mg for 5 days, does not require a wean.   
Liz 
n/a

## 2022-07-25 NOTE — ASU PATIENT PROFILE, ADULT - FALL HARM RISK - UNIVERSAL INTERVENTIONS
Bed in lowest position, wheels locked, appropriate side rails in place/Call bell, personal items and telephone in reach/Instruct patient to call for assistance before getting out of bed or chair/Non-slip footwear when patient is out of bed/Ashley to call system/Physically safe environment - no spills, clutter or unnecessary equipment/Purposeful Proactive Rounding/Room/bathroom lighting operational, light cord in reach

## 2022-07-26 LAB — SURGICAL PATHOLOGY STUDY: SIGNIFICANT CHANGE UP

## 2022-07-27 ENCOUNTER — NON-APPOINTMENT (OUTPATIENT)
Age: 82
End: 2022-07-27

## 2022-07-28 DIAGNOSIS — J45.30 MILD PERSISTENT ASTHMA, UNCOMPLICATED: ICD-10-CM

## 2022-07-28 DIAGNOSIS — Z95.2 PRESENCE OF PROSTHETIC HEART VALVE: ICD-10-CM

## 2022-07-28 DIAGNOSIS — Z87.11 PERSONAL HISTORY OF PEPTIC ULCER DISEASE: ICD-10-CM

## 2022-07-28 DIAGNOSIS — E78.5 HYPERLIPIDEMIA, UNSPECIFIED: ICD-10-CM

## 2022-07-28 DIAGNOSIS — Z09 ENCOUNTER FOR FOLLOW-UP EXAMINATION AFTER COMPLETED TREATMENT FOR CONDITIONS OTHER THAN MALIGNANT NEOPLASM: ICD-10-CM

## 2022-07-28 DIAGNOSIS — Z85.3 PERSONAL HISTORY OF MALIGNANT NEOPLASM OF BREAST: ICD-10-CM

## 2022-07-28 DIAGNOSIS — M19.011 PRIMARY OSTEOARTHRITIS, RIGHT SHOULDER: ICD-10-CM

## 2022-07-28 DIAGNOSIS — Z96.659 PRESENCE OF UNSPECIFIED ARTIFICIAL KNEE JOINT: ICD-10-CM

## 2022-07-28 DIAGNOSIS — K29.50 UNSPECIFIED CHRONIC GASTRITIS WITHOUT BLEEDING: ICD-10-CM

## 2022-07-28 DIAGNOSIS — M19.012 PRIMARY OSTEOARTHRITIS, LEFT SHOULDER: ICD-10-CM

## 2022-07-28 DIAGNOSIS — Z79.82 LONG TERM (CURRENT) USE OF ASPIRIN: ICD-10-CM

## 2022-08-06 ENCOUNTER — LABORATORY RESULT (OUTPATIENT)
Age: 82
End: 2022-08-06

## 2022-08-09 ENCOUNTER — OUTPATIENT (OUTPATIENT)
Dept: OUTPATIENT SERVICES | Facility: HOSPITAL | Age: 82
LOS: 1 days | Discharge: HOME | End: 2022-08-09

## 2022-08-09 VITALS
DIASTOLIC BLOOD PRESSURE: 71 MMHG | HEIGHT: 65 IN | SYSTOLIC BLOOD PRESSURE: 126 MMHG | HEART RATE: 81 BPM | WEIGHT: 141.54 LBS | TEMPERATURE: 98 F | RESPIRATION RATE: 18 BRPM | OXYGEN SATURATION: 98 %

## 2022-08-09 DIAGNOSIS — Z98.1 ARTHRODESIS STATUS: Chronic | ICD-10-CM

## 2022-08-09 DIAGNOSIS — M19.111 POST-TRAUMATIC OSTEOARTHRITIS, RIGHT SHOULDER: ICD-10-CM

## 2022-08-09 DIAGNOSIS — Z01.818 ENCOUNTER FOR OTHER PREPROCEDURAL EXAMINATION: ICD-10-CM

## 2022-08-09 DIAGNOSIS — Z98.89 OTHER SPECIFIED POSTPROCEDURAL STATES: Chronic | ICD-10-CM

## 2022-08-09 DIAGNOSIS — Z86.79 PERSONAL HISTORY OF OTHER DISEASES OF THE CIRCULATORY SYSTEM: ICD-10-CM

## 2022-08-09 DIAGNOSIS — Z95.2 PRESENCE OF PROSTHETIC HEART VALVE: Chronic | ICD-10-CM

## 2022-08-09 DIAGNOSIS — M19.112 POST-TRAUMATIC OSTEOARTHRITIS, LEFT SHOULDER: ICD-10-CM

## 2022-08-09 DIAGNOSIS — Z01.812 ENCOUNTER FOR PREPROCEDURAL LABORATORY EXAMINATION: ICD-10-CM

## 2022-08-09 DIAGNOSIS — Z96.653 PRESENCE OF ARTIFICIAL KNEE JOINT, BILATERAL: Chronic | ICD-10-CM

## 2022-08-09 DIAGNOSIS — Z98.49 CATARACT EXTRACTION STATUS, UNSPECIFIED EYE: Chronic | ICD-10-CM

## 2022-08-09 DIAGNOSIS — Z96.642 PRESENCE OF LEFT ARTIFICIAL HIP JOINT: Chronic | ICD-10-CM

## 2022-08-09 LAB
A1C WITH ESTIMATED AVERAGE GLUCOSE RESULT: 5.7 % — HIGH (ref 4–5.6)
ALBUMIN SERPL ELPH-MCNC: 3.4 G/DL — SIGNIFICANT CHANGE UP (ref 3.3–5)
ALP SERPL-CCNC: 43 U/L — SIGNIFICANT CHANGE UP (ref 40–120)
ALT FLD-CCNC: 33 U/L — SIGNIFICANT CHANGE UP (ref 12–78)
ANION GAP SERPL CALC-SCNC: 8 MMOL/L — SIGNIFICANT CHANGE UP (ref 5–17)
APTT BLD: 25.1 SEC — LOW (ref 27.5–35.5)
AST SERPL-CCNC: 40 U/L — HIGH (ref 15–37)
BASOPHILS # BLD AUTO: 0.11 K/UL — SIGNIFICANT CHANGE UP (ref 0–0.2)
BASOPHILS NFR BLD AUTO: 1.1 % — SIGNIFICANT CHANGE UP (ref 0–2)
BILIRUB SERPL-MCNC: 0.4 MG/DL — SIGNIFICANT CHANGE UP (ref 0.2–1.2)
BLD GP AB SCN SERPL QL: SIGNIFICANT CHANGE UP
BUN SERPL-MCNC: 14 MG/DL — SIGNIFICANT CHANGE UP (ref 7–23)
CALCIUM SERPL-MCNC: 9.1 MG/DL — SIGNIFICANT CHANGE UP (ref 8.5–10.1)
CHLORIDE SERPL-SCNC: 99 MMOL/L — SIGNIFICANT CHANGE UP (ref 96–108)
CO2 SERPL-SCNC: 29 MMOL/L — SIGNIFICANT CHANGE UP (ref 22–31)
CREAT SERPL-MCNC: 1.03 MG/DL — SIGNIFICANT CHANGE UP (ref 0.5–1.3)
EGFR: 54 ML/MIN/1.73M2 — LOW
EOSINOPHIL # BLD AUTO: 0.03 K/UL — SIGNIFICANT CHANGE UP (ref 0–0.5)
EOSINOPHIL NFR BLD AUTO: 0.3 % — SIGNIFICANT CHANGE UP (ref 0–6)
ESTIMATED AVERAGE GLUCOSE: 117 MG/DL — HIGH (ref 68–114)
GLUCOSE SERPL-MCNC: 82 MG/DL — SIGNIFICANT CHANGE UP (ref 70–99)
HCT VFR BLD CALC: 37.4 % — SIGNIFICANT CHANGE UP (ref 34.5–45)
HGB BLD-MCNC: 12.4 G/DL — SIGNIFICANT CHANGE UP (ref 11.5–15.5)
IMM GRANULOCYTES NFR BLD AUTO: 0.7 % — SIGNIFICANT CHANGE UP (ref 0–1.5)
INR BLD: 0.92 RATIO — SIGNIFICANT CHANGE UP (ref 0.88–1.16)
LYMPHOCYTES # BLD AUTO: 1.24 K/UL — SIGNIFICANT CHANGE UP (ref 1–3.3)
LYMPHOCYTES # BLD AUTO: 11.8 % — LOW (ref 13–44)
MCHC RBC-ENTMCNC: 32.1 PG — SIGNIFICANT CHANGE UP (ref 27–34)
MCHC RBC-ENTMCNC: 33.2 G/DL — SIGNIFICANT CHANGE UP (ref 32–36)
MCV RBC AUTO: 96.9 FL — SIGNIFICANT CHANGE UP (ref 80–100)
MONOCYTES # BLD AUTO: 0.72 K/UL — SIGNIFICANT CHANGE UP (ref 0–0.9)
MONOCYTES NFR BLD AUTO: 6.9 % — SIGNIFICANT CHANGE UP (ref 2–14)
MRSA PCR RESULT.: SIGNIFICANT CHANGE UP
NEUTROPHILS # BLD AUTO: 8.3 K/UL — HIGH (ref 1.8–7.4)
NEUTROPHILS NFR BLD AUTO: 79.2 % — HIGH (ref 43–77)
NRBC # BLD: 0 /100 WBCS — SIGNIFICANT CHANGE UP (ref 0–0)
PLATELET # BLD AUTO: 215 K/UL — SIGNIFICANT CHANGE UP (ref 150–400)
POTASSIUM SERPL-MCNC: 4.1 MMOL/L — SIGNIFICANT CHANGE UP (ref 3.5–5.3)
POTASSIUM SERPL-SCNC: 4.1 MMOL/L — SIGNIFICANT CHANGE UP (ref 3.5–5.3)
PROT SERPL-MCNC: 7 GM/DL — SIGNIFICANT CHANGE UP (ref 6–8.3)
PROTHROM AB SERPL-ACNC: 11 SEC — SIGNIFICANT CHANGE UP (ref 10.5–13.4)
RBC # BLD: 3.86 M/UL — SIGNIFICANT CHANGE UP (ref 3.8–5.2)
RBC # FLD: 12.8 % — SIGNIFICANT CHANGE UP (ref 10.3–14.5)
S AUREUS DNA NOSE QL NAA+PROBE: SIGNIFICANT CHANGE UP
SODIUM SERPL-SCNC: 136 MMOL/L — SIGNIFICANT CHANGE UP (ref 135–145)
VIT D25+D1,25 OH+D1,25 PNL SERPL-MCNC: 46.3 PG/ML — SIGNIFICANT CHANGE UP (ref 19.9–79.3)
WBC # BLD: 10.47 K/UL — SIGNIFICANT CHANGE UP (ref 3.8–10.5)
WBC # FLD AUTO: 10.47 K/UL — SIGNIFICANT CHANGE UP (ref 3.8–10.5)

## 2022-08-09 PROCEDURE — 93010 ELECTROCARDIOGRAM REPORT: CPT

## 2022-08-09 NOTE — H&P PST ADULT - ASSESSMENT
83 y/o F PMH loop recorder, HTN, HLD, GERD, migraines, polymyalgia rheumatica ( on prednisone) presents to PST c/o right shoulder pain 2/2 osteoarthritis and is scheduled for right reverse shoulder arthroplasty on 22 with .    CAPRINI SCORE [CLOT]    AGE RELATED RISK FACTORS                                                       MOBILITY RELATED FACTORS  [ ] Age 41-60 years                                            (1 Point)                  [ ] Bed rest                                                        (1 Point)  [ ] Age: 61-74 years                                           (2 Points)                 [ ] Plaster cast                                                   (2 Points)  [x ] Age= 75 years                                              (3 Points)                 [ ] Bed bound for more than 72 hours                 (2 Points)    DISEASE RELATED RISK FACTORS                                               GENDER SPECIFIC FACTORS  [x ] Edema in the lower extremities                       (1 Point)                  [ ] Pregnancy                                                     (1 Point)  [ ] Varicose veins                                               (1 Point)                  [ ] Post-partum < 6 weeks                                   (1 Point)             [ ] BMI > 25 Kg/m2                                            (1 Point)                  [ ] Hormonal therapy  or oral contraception          (1 Point)                 [ ] Sepsis (in the previous month)                        (1 Point)                  [ ] History of pregnancy complications                 (1 point)  [ ] Pneumonia or serious lung disease                                               [ ] Unexplained or recurrent                     (1 Point)           (in the previous month)                               (1 Point)  [ ] Abnormal pulmonary function test                     (1 Point)                 SURGERY RELATED RISK FACTORS  [ ] Acute myocardial infarction                              (1 Point)                 [ ]  Section                                             (1 Point)  [ ] Congestive heart failure (in the previous month)  (1 Point)               [ ] Minor surgery                                                  (1 Point)   [ ] Inflammatory bowel disease                             (1 Point)                 [ ] Arthroscopic surgery                                        (2 Points)  [ ] Central venous access                                      (2 Points)                [ ] General surgery lasting more than 45 minutes   (2 Points)       [ ] Stroke (in the previous month)                          (5 Points)               [x ] Elective arthroplasty                                         (5 Points)                                                                                                                                               HEMATOLOGY RELATED FACTORS                                                 TRAUMA RELATED RISK FACTORS  [ ] Prior episodes of VTE                                     (3 Points)                [ ] Fracture of the hip, pelvis, or leg                       (5 Points)  [ ] Positive family history for VTE                         (3 Points)                 [ ] Acute spinal cord injury (in the previous month)  (5 Points)  [ ] Prothrombin 95535 A                                     (3 Points)                 [ ] Paralysis  (less than 1 month)                             (5 Points)  [ ] Factor V Leiden                                             (3 Points)                  [ ] Multiple Trauma within 1 month                        (5 Points)  [ ] Lupus anticoagulants                                     (3 Points)                                                           [ ] Anticardiolipin antibodies                               (3 Points)                                                       [ ] High homocysteine in the blood                      (3 Points)                                             [ ] Other congenital or acquired thrombophilia      (3 Points)                                                [ ] Heparin induced thrombocytopenia                  (3 Points)                                          Total Score [    9  ]    Caprini Score 0 - 2:  Low Risk, No VTE Prophylaxis required for most patients, encourage ambulation  Caprini Score 3 - 6:  At Risk, pharmacologic VTE prophylaxis is indicated for most patients (in the absence of a contraindication)  Caprini Score Greater than or = 7:  High Risk, pharmacologic VTE prophylaxis is indicated for most patients (in the absence of a contraindication)

## 2022-08-09 NOTE — H&P PST ADULT - NS MD HP INPLANTS MED DEV
both knees, orthopedic hardware in spine,hearing aids/Artificial joint both knees, orthopedic hardware in spine, hearing aids, loop recorder/Artificial joint

## 2022-08-09 NOTE — H&P PST ADULT - SYMPTOMS
dyspnea Consent (Temporal Branch)/Introductory Paragraph: The rationale for Mohs was explained to the patient and consent was obtained. The risks, benefits and alternatives to therapy were discussed in detail. Specifically, the risks of damage to the temporal branch of the facial nerve, infection, scarring, bleeding, prolonged wound healing, incomplete removal, allergy to anesthesia, and recurrence were addressed. Prior to the procedure, the treatment site was clearly identified and confirmed by the patient. All components of Universal Protocol/PAUSE Rule completed.

## 2022-08-09 NOTE — H&P PST ADULT - NSICDXPASTMEDICALHX_GEN_ALL_CORE_FT
PAST MEDICAL HISTORY:  Breast cancer left 1990 treated with RT and surgery, no f/u required    Chronic pain secondary to OA and RA    HLD (hyperlipidemia)     Implantable loop recorder present pt reports she fainted and that's why it was placed    Migraine     Mitral valve disease     OA (osteoarthritis)     RA (rheumatoid arthritis)

## 2022-08-09 NOTE — H&P PST ADULT - PROBLEM SELECTOR PLAN 1
Labs-CBC, BMP, PT/INR, PTT ,T&S, Nose Cx, EKG   Medical/cardiac clearance required  Preop Hibiclens x 3 day instructions reviewed and given. Instructed on if Cx is positive use Mupirocin 5 days and checklist given in booklet   Take routine meds DOS with small sips of water, avoid NSAIDs and OTC supplements, verbalized understanding information on proper nutrition, increase protein and better food choices provided in booklet.    Ensure clear given   Pt aware COVID-19 PCR test needed 3-5 days prior to surgery   Anesthesiologist to review PST labs, EKG, required clearances, and optimization for surgery

## 2022-08-09 NOTE — H&P PST ADULT - NSICDXPASTSURGICALHX_GEN_ALL_CORE_FT
PAST SURGICAL HISTORY:  H/O mitral valve replacement 2015    H/O spinal fusion lumbar 2005    History of bilateral knee replacement right 2009, left 2011    History of cataract surgery left cataract sx    History of left hip replacement 2017    History of lumpectomy of left breast with sentinal node biopsy 1990    History of tonsillectomy     S/P bunionectomy left x 2 2007

## 2022-08-09 NOTE — H&P PST ADULT - HISTORY OF PRESENT ILLNESS
75 year old female with long history of mitral valve disease. Started becoming SOB with exertion, has been getting progressively worse 2 years. She went to her cardiologist after 2 years and was found to have severe mitral regurgitation. Now for surgical intervention.  83 y/o F PMH loop recorder, HTN, HLD, GERD, migraines, polymyalgia rheumatica ( on prednisone), breast CA presents to PST c/o right shoulder pain 2/2 osteoarthritis and is scheduled for right reverse shoulder arthroplasty on 8/29/22 with .    This patient denies any fever, cough, sob, flu like symptoms or travel outside of the [place] in the past 30 days

## 2022-08-09 NOTE — H&P PST ADULT - BLOOD TRANSFUSION, PREVIOUS, PROFILE
Patient reports good appetite and eating ~75% of trays. Reports good appetite PTA and following diabetic diet. Takes MVI. NKFA. No cultural/Denominational preferences reported. Speech recommendations 6/30: dysphagia II mechanical soft, thin liquids (consistent with current diet order). Reports tolerating diet. Reports no BM since admission- high fiber foods encouraged, miralax noted to have been ordered today. Patient reports documented wts of 159.7# and 211.6# on on 6/29 are inaccurate and wt has been stable at 140#. no

## 2022-08-24 RX ORDER — HYDROMORPHONE HYDROCHLORIDE 2 MG/ML
0.5 INJECTION INTRAMUSCULAR; INTRAVENOUS; SUBCUTANEOUS
Refills: 0 | Status: DISCONTINUED | OUTPATIENT
Start: 2022-08-29 | End: 2022-08-31

## 2022-08-24 RX ORDER — LOSARTAN POTASSIUM 100 MG/1
50 TABLET, FILM COATED ORAL DAILY
Refills: 0 | Status: DISCONTINUED | OUTPATIENT
Start: 2022-08-29 | End: 2022-08-31

## 2022-08-24 RX ORDER — ONDANSETRON 8 MG/1
8 TABLET, FILM COATED ORAL EVERY 8 HOURS
Refills: 0 | Status: DISCONTINUED | OUTPATIENT
Start: 2022-08-29 | End: 2022-08-31

## 2022-08-24 RX ORDER — ACETAMINOPHEN 500 MG
650 TABLET ORAL EVERY 6 HOURS
Refills: 0 | Status: DISCONTINUED | OUTPATIENT
Start: 2022-08-29 | End: 2022-08-31

## 2022-08-24 RX ORDER — CARVEDILOL PHOSPHATE 80 MG/1
6.25 CAPSULE, EXTENDED RELEASE ORAL EVERY 12 HOURS
Refills: 0 | Status: DISCONTINUED | OUTPATIENT
Start: 2022-08-29 | End: 2022-08-31

## 2022-08-24 RX ORDER — ATORVASTATIN CALCIUM 80 MG/1
10 TABLET, FILM COATED ORAL AT BEDTIME
Refills: 0 | Status: DISCONTINUED | OUTPATIENT
Start: 2022-08-29 | End: 2022-08-31

## 2022-08-24 RX ORDER — PANTOPRAZOLE SODIUM 20 MG/1
40 TABLET, DELAYED RELEASE ORAL
Refills: 0 | Status: DISCONTINUED | OUTPATIENT
Start: 2022-08-29 | End: 2022-08-31

## 2022-08-24 RX ORDER — LANOLIN ALCOHOL/MO/W.PET/CERES
3 CREAM (GRAM) TOPICAL AT BEDTIME
Refills: 0 | Status: DISCONTINUED | OUTPATIENT
Start: 2022-08-29 | End: 2022-08-31

## 2022-08-24 RX ORDER — SODIUM CHLORIDE 9 MG/ML
1000 INJECTION, SOLUTION INTRAVENOUS
Refills: 0 | Status: DISCONTINUED | OUTPATIENT
Start: 2022-08-29 | End: 2022-08-31

## 2022-08-24 RX ORDER — ASPIRIN/CALCIUM CARB/MAGNESIUM 324 MG
325 TABLET ORAL DAILY
Refills: 0 | Status: DISCONTINUED | OUTPATIENT
Start: 2022-08-30 | End: 2022-08-31

## 2022-08-24 RX ORDER — ACETAMINOPHEN 500 MG
1000 TABLET ORAL ONCE
Refills: 0 | Status: COMPLETED | OUTPATIENT
Start: 2022-08-29 | End: 2022-08-30

## 2022-08-24 RX ORDER — OXYCODONE HYDROCHLORIDE 5 MG/1
5 TABLET ORAL EVERY 4 HOURS
Refills: 0 | Status: DISCONTINUED | OUTPATIENT
Start: 2022-08-29 | End: 2022-08-31

## 2022-08-24 RX ORDER — DULOXETINE HYDROCHLORIDE 30 MG/1
30 CAPSULE, DELAYED RELEASE ORAL DAILY
Refills: 0 | Status: DISCONTINUED | OUTPATIENT
Start: 2022-08-29 | End: 2022-08-31

## 2022-08-24 RX ORDER — POLYETHYLENE GLYCOL 3350 17 G/17G
17 POWDER, FOR SOLUTION ORAL AT BEDTIME
Refills: 0 | Status: DISCONTINUED | OUTPATIENT
Start: 2022-08-29 | End: 2022-08-31

## 2022-08-24 RX ORDER — KETOROLAC TROMETHAMINE 30 MG/ML
30 SYRINGE (ML) INJECTION EVERY 8 HOURS
Refills: 0 | Status: DISCONTINUED | OUTPATIENT
Start: 2022-08-29 | End: 2022-08-29

## 2022-08-24 RX ORDER — OXYCODONE HYDROCHLORIDE 5 MG/1
10 TABLET ORAL EVERY 4 HOURS
Refills: 0 | Status: DISCONTINUED | OUTPATIENT
Start: 2022-08-29 | End: 2022-08-31

## 2022-08-24 RX ORDER — SUMATRIPTAN SUCCINATE 4 MG/.5ML
50 INJECTION, SOLUTION SUBCUTANEOUS EVERY 6 HOURS
Refills: 0 | Status: DISCONTINUED | OUTPATIENT
Start: 2022-08-29 | End: 2022-08-31

## 2022-08-24 RX ORDER — SENNA PLUS 8.6 MG/1
2 TABLET ORAL AT BEDTIME
Refills: 0 | Status: DISCONTINUED | OUTPATIENT
Start: 2022-08-29 | End: 2022-08-31

## 2022-08-27 ENCOUNTER — NON-APPOINTMENT (OUTPATIENT)
Age: 82
End: 2022-08-27

## 2022-08-28 ENCOUNTER — TRANSCRIPTION ENCOUNTER (OUTPATIENT)
Age: 82
End: 2022-08-28

## 2022-08-29 ENCOUNTER — TRANSCRIPTION ENCOUNTER (OUTPATIENT)
Age: 82
End: 2022-08-29

## 2022-08-29 ENCOUNTER — INPATIENT (INPATIENT)
Facility: HOSPITAL | Age: 82
LOS: 1 days | Discharge: NURSING, REHAB OR LONG-TERM CARE | End: 2022-08-31
Attending: ORTHOPAEDIC SURGERY | Admitting: ORTHOPAEDIC SURGERY

## 2022-08-29 ENCOUNTER — RESULT REVIEW (OUTPATIENT)
Age: 82
End: 2022-08-29

## 2022-08-29 ENCOUNTER — APPOINTMENT (OUTPATIENT)
Dept: ORTHOPEDIC SURGERY | Facility: HOSPITAL | Age: 82
End: 2022-08-29

## 2022-08-29 VITALS
TEMPERATURE: 98 F | OXYGEN SATURATION: 95 % | DIASTOLIC BLOOD PRESSURE: 85 MMHG | HEART RATE: 69 BPM | RESPIRATION RATE: 17 BRPM | SYSTOLIC BLOOD PRESSURE: 142 MMHG | HEIGHT: 65 IN | WEIGHT: 138.89 LBS

## 2022-08-29 DIAGNOSIS — Z98.49 CATARACT EXTRACTION STATUS, UNSPECIFIED EYE: Chronic | ICD-10-CM

## 2022-08-29 DIAGNOSIS — Z98.89 OTHER SPECIFIED POSTPROCEDURAL STATES: Chronic | ICD-10-CM

## 2022-08-29 DIAGNOSIS — Z96.642 PRESENCE OF LEFT ARTIFICIAL HIP JOINT: Chronic | ICD-10-CM

## 2022-08-29 DIAGNOSIS — Z95.2 PRESENCE OF PROSTHETIC HEART VALVE: Chronic | ICD-10-CM

## 2022-08-29 DIAGNOSIS — Z98.1 ARTHRODESIS STATUS: Chronic | ICD-10-CM

## 2022-08-29 DIAGNOSIS — Z96.653 PRESENCE OF ARTIFICIAL KNEE JOINT, BILATERAL: Chronic | ICD-10-CM

## 2022-08-29 PROCEDURE — 23472 RECONSTRUCT SHOULDER JOINT: CPT | Mod: AS,RT

## 2022-08-29 PROCEDURE — 88304 TISSUE EXAM BY PATHOLOGIST: CPT | Mod: 26

## 2022-08-29 PROCEDURE — 73030 X-RAY EXAM OF SHOULDER: CPT | Mod: 26,RT

## 2022-08-29 PROCEDURE — 23472 RECONSTRUCT SHOULDER JOINT: CPT | Mod: RT

## 2022-08-29 PROCEDURE — 88311 DECALCIFY TISSUE: CPT | Mod: 26

## 2022-08-29 DEVICE — IMPLANTABLE DEVICE: Type: IMPLANTABLE DEVICE | Site: RIGHT | Status: FUNCTIONAL

## 2022-08-29 DEVICE — SCREW STAR 4.5X15MM: Type: IMPLANTABLE DEVICE | Site: RIGHT | Status: FUNCTIONAL

## 2022-08-29 DEVICE — PLATE GLENOID BASE S: Type: IMPLANTABLE DEVICE | Site: RIGHT | Status: FUNCTIONAL

## 2022-08-29 DEVICE — GLENOSPHERE CONCENTRIC S 5MM: Type: IMPLANTABLE DEVICE | Site: RIGHT | Status: FUNCTIONAL

## 2022-08-29 RX ORDER — HYDROMORPHONE HYDROCHLORIDE 2 MG/ML
0.5 INJECTION INTRAMUSCULAR; INTRAVENOUS; SUBCUTANEOUS
Refills: 0 | Status: DISCONTINUED | OUTPATIENT
Start: 2022-08-29 | End: 2022-08-29

## 2022-08-29 RX ORDER — SODIUM CHLORIDE 9 MG/ML
3 INJECTION INTRAMUSCULAR; INTRAVENOUS; SUBCUTANEOUS EVERY 8 HOURS
Refills: 0 | Status: DISCONTINUED | OUTPATIENT
Start: 2022-08-29 | End: 2022-08-29

## 2022-08-29 RX ORDER — SODIUM CHLORIDE 9 MG/ML
1000 INJECTION, SOLUTION INTRAVENOUS
Refills: 0 | Status: DISCONTINUED | OUTPATIENT
Start: 2022-08-29 | End: 2022-08-29

## 2022-08-29 RX ORDER — CEFAZOLIN SODIUM 1 G
2000 VIAL (EA) INJECTION EVERY 8 HOURS
Refills: 0 | Status: COMPLETED | OUTPATIENT
Start: 2022-08-29 | End: 2022-08-29

## 2022-08-29 RX ORDER — ONDANSETRON 8 MG/1
4 TABLET, FILM COATED ORAL ONCE
Refills: 0 | Status: DISCONTINUED | OUTPATIENT
Start: 2022-08-29 | End: 2022-08-29

## 2022-08-29 RX ADMIN — Medication 650 MILLIGRAM(S): at 18:50

## 2022-08-29 RX ADMIN — ATORVASTATIN CALCIUM 10 MILLIGRAM(S): 80 TABLET, FILM COATED ORAL at 21:33

## 2022-08-29 RX ADMIN — Medication 100 MILLIGRAM(S): at 23:53

## 2022-08-29 RX ADMIN — SENNA PLUS 2 TABLET(S): 8.6 TABLET ORAL at 21:33

## 2022-08-29 RX ADMIN — Medication 30 MILLIGRAM(S): at 16:20

## 2022-08-29 RX ADMIN — CARVEDILOL PHOSPHATE 6.25 MILLIGRAM(S): 80 CAPSULE, EXTENDED RELEASE ORAL at 16:00

## 2022-08-29 RX ADMIN — OXYCODONE HYDROCHLORIDE 5 MILLIGRAM(S): 5 TABLET ORAL at 22:33

## 2022-08-29 RX ADMIN — Medication 3 MILLIGRAM(S): at 21:33

## 2022-08-29 RX ADMIN — Medication 100 MILLIGRAM(S): at 16:00

## 2022-08-29 RX ADMIN — Medication 650 MILLIGRAM(S): at 17:51

## 2022-08-29 RX ADMIN — POLYETHYLENE GLYCOL 3350 17 GRAM(S): 17 POWDER, FOR SOLUTION ORAL at 21:52

## 2022-08-29 RX ADMIN — SODIUM CHLORIDE 100 MILLILITER(S): 9 INJECTION, SOLUTION INTRAVENOUS at 17:01

## 2022-08-29 RX ADMIN — LOSARTAN POTASSIUM 50 MILLIGRAM(S): 100 TABLET, FILM COATED ORAL at 20:26

## 2022-08-29 RX ADMIN — OXYCODONE HYDROCHLORIDE 5 MILLIGRAM(S): 5 TABLET ORAL at 21:33

## 2022-08-29 RX ADMIN — Medication 30 MILLIGRAM(S): at 23:53

## 2022-08-29 RX ADMIN — Medication 30 MILLIGRAM(S): at 16:04

## 2022-08-29 NOTE — PHYSICAL THERAPY INITIAL EVALUATION ADULT - TRANSFER TRAINING, PT EVAL
Pt will be able to perform sit to stand, stand pivot transfer using cane and maintaining WB precautions  independently in 2 to 3 weeks

## 2022-08-29 NOTE — PHYSICAL THERAPY INITIAL EVALUATION ADULT - GAIT TRAINING, PT EVAL
Pt will be able to ambulate 350 feet using cane and maintaining WB precautions  independently in 2 weeks

## 2022-08-29 NOTE — DISCHARGE NOTE PROVIDER - NSDCFUADDAPPT_GEN_ALL_CORE_FT
Follow up with your surgeon in two weeks. Call for appointment.  If you need more pain medication, call your surgeon's office. For medication refills or authorizations, please call [phone]    We recommend that you call and schedule a follow up appointment with your primary care physician for repeat blood work (CBC and BMP) for post hospital discharge follow-up care 2-4 weeks after your surgery.     Call your surgeon if you have increased redness/pain/drainage or fever. Return to ER for shortness of breath/calf tenderness.    DO NOT TAKE PLAQUENIL FOR TWO WEEKS AFTER SURGERY

## 2022-08-29 NOTE — DISCHARGE NOTE PROVIDER - NSDCMRMEDTOKEN_GEN_ALL_CORE_FT
Aspirin Enteric Coated 81 mg oral delayed release tablet: 1 tab(s) orally once a day   atorvastatin 10 mg oral tablet: 1 tab(s) orally once a day  B Complex 50: 1 tab(s) orally once a day  Coreg 6.25 mg oral tablet: 1 tab(s) orally 2 times a day  Cymbalta 30 mg oral delayed release capsule: 1 cap(s) orally once a day  fludrocortisone 0.1 mg oral tablet: 1 tab(s) orally 2 times a week  losartan 50 mg oral tablet: 1 tab(s) orally once a day (at bedtime)  AT 8PM  perform COVID-19 anytime from aug 25-27 :   Plaquenil 200 mg oral tablet: 2 tab(s) orally once a day  predniSONE 5 mg oral tablet: 2 tab(s) orally once a day  SUMAtriptan 100 mg oral tablet: 1 tab(s) orally once  SUMAtriptan 50 mg oral tablet: 1 tab(s) orally every 6 hours, As needed, migraine  traMADol 50 mg oral tablet: 2 tab(s) orally every 6 hours, As Needed  Vitamin D3 1000 intl units oral tablet: 2 tab(s) orally once a day   acetaminophen 325 mg oral tablet: 2 tab(s) orally every 6 hours  aspirin 325 mg oral tablet: 1 tab(s) orally once a day  atorvastatin 10 mg oral tablet: 1 tab(s) orally once a day  B Complex 50: 1 tab(s) orally once a day  betamethasone valerate 0.1% topical cream: 1 application topically 2 times a day to right breast   Coreg 6.25 mg oral tablet: 1 tab(s) orally 2 times a day  Cymbalta 30 mg oral delayed release capsule: 1 cap(s) orally once a day  fludrocortisone 0.1 mg oral tablet: 1 tab(s) orally 2 times a week  losartan 50 mg oral tablet: 1 tab(s) orally once a day (at bedtime)  AT 8PM  oxyCODONE 10 mg oral tablet: 1 tab(s) orally every 4 hours, As needed, Severe Pain (7 - 10)  oxyCODONE 5 mg oral tablet: 1 tab(s) orally every 4 hours, As needed, Moderate Pain (4 - 6)  pantoprazole 40 mg oral delayed release tablet: 1 tab(s) orally once a day (before a meal)  Plaquenil 200 mg oral tablet: 2 tab(s) orally once a day: HOLD FOR TWO WEEKS POST OP!!!!  polyethylene glycol 3350 oral powder for reconstitution: 17 gram(s) orally once a day (at bedtime)  predniSONE 5 mg oral tablet: 2 tab(s) orally once a day  senna leaf extract oral tablet: 2 tab(s) orally once a day (at bedtime)  SUMAtriptan 100 mg oral tablet: 1 tab(s) orally once  SUMAtriptan 50 mg oral tablet: 1 tab(s) orally every 6 hours, As needed, migraine  Vitamin D3 1000 intl units oral tablet: 2 tab(s) orally once a day

## 2022-08-29 NOTE — OCCUPATIONAL THERAPY INITIAL EVALUATION ADULT - RANGE OF MOTION EXAMINATION, UPPER EXTREMITY
RUE not tested in sling, right UE no shoulder ROM s/p right reverse TSA/Left UE Active ROM was WFL (within functional limits)

## 2022-08-29 NOTE — CONSULT NOTE ADULT - SUBJECTIVE AND OBJECTIVE BOX
JULIAN ORO is a 82y Female s/p RIGHT REVERSE SHOULDER ARTHROPLASTY BICEPS TENODESIS      w/ h/o Mitral valve disease    RA (rheumatoid arthritis)    OA (osteoarthritis)    Migraine    HLD (hyperlipidemia)    Breast cancer    Chronic pain    Implantable loop recorder present      denies any chest pain shortness of breath palpitation dizziness lightheadedness nausea vomiting fever or chills    History of lumpectomy of left breast    History of bilateral knee replacement    H/O spinal fusion    History of tonsillectomy    S/P bunionectomy    History of left hip replacement    H/O mitral valve replacement    History of cataract surgery      No pertinent family history in first degree relatives    Family history of stomach cancer (Mother)    Family history of colon cancer in father (Father)      SH: doesnot smoke or drink at this time    No Known Allergies    acetaminophen     Tablet .. 650 milliGRAM(s) Oral every 6 hours  acetaminophen   IVPB .. 1000 milliGRAM(s) IV Intermittent once  atorvastatin 10 milliGRAM(s) Oral at bedtime  carvedilol 6.25 milliGRAM(s) Oral every 12 hours  ceFAZolin   IVPB 2000 milliGRAM(s) IV Intermittent every 8 hours  DULoxetine 30 milliGRAM(s) Oral daily  HYDROmorphone  Injectable 0.5 milliGRAM(s) IV Push every 3 hours PRN  ketorolac   Injectable 30 milliGRAM(s) IV Push every 8 hours  lactated ringers. 1000 milliLiter(s) IV Continuous <Continuous>  losartan 50 milliGRAM(s) Oral daily  melatonin 3 milliGRAM(s) Oral at bedtime  ondansetron Injectable 8 milliGRAM(s) IV Push every 8 hours PRN  oxyCODONE    IR 5 milliGRAM(s) Oral every 4 hours PRN  oxyCODONE    IR 10 milliGRAM(s) Oral every 4 hours PRN  pantoprazole    Tablet 40 milliGRAM(s) Oral before breakfast  polyethylene glycol 3350 17 Gram(s) Oral at bedtime  predniSONE   Tablet 10 milliGRAM(s) Oral daily  senna 2 Tablet(s) Oral at bedtime  SUMAtriptan 50 milliGRAM(s) Oral every 6 hours PRN    T(C): 36.5 (08-29-22 @ 19:52), Max: 36.6 (08-29-22 @ 06:10)  HR: 75 (08-29-22 @ 19:52) (65 - 95)  BP: 171/80 (08-29-22 @ 19:52) (142/85 - 185/96)  RR: 18 (08-29-22 @ 19:52) (12 - 18)  SpO2: 95% (08-29-22 @ 19:52) (93% - 99%)  HEENT unremarkable  neck no JVD or bruit  heart normal S1 S2 RRR no gallops or rubs  chest clear to auscultation  abd sof nontender non distended +bs  ext no calf tenderness    A/P   DVT PX  pain control  bowel regimen   wound care as per ortho  GI PX  antiemetics prn  incentive spirometer

## 2022-08-29 NOTE — PHYSICAL THERAPY INITIAL EVALUATION ADULT - IMPAIRED TRANSFERS: SIT/STAND, REHAB EVAL
ataxic/impaired balance/impaired coordination/decreased flexibility/impaired postural control/decreased strength

## 2022-08-29 NOTE — DISCHARGE NOTE PROVIDER - NSDCCPTREATMENT_GEN_ALL_CORE_FT
PRINCIPAL PROCEDURE  Procedure: Reverse total replacement of right shoulder joint  Findings and Treatment:

## 2022-08-29 NOTE — OCCUPATIONAL THERAPY INITIAL EVALUATION ADULT - ADL RETRAINING, OT EVAL
Patient will be able to perform functional tasks with independence, using one-handed techniques, within 4 weeks.

## 2022-08-29 NOTE — PHYSICAL THERAPY INITIAL EVALUATION ADULT - ADDITIONAL COMMENTS
As per pt, she lives alone in a house with 4 to 5 steps to living room with left rail up, another 6 steps with wide b/l rails to kitchen, then another 12 steps with right rail up to her bed room. She was independent in all functional mobility using cane, PTA. Her Daughter lives nearby can assist when needed but not in the day time as she, her spouse work at day time

## 2022-08-29 NOTE — PATIENT PROFILE ADULT - FALL HARM RISK - RISK INTERVENTIONS
Assistance OOB with selected safe patient handling equipment/Assistance with ambulation/Communicate Fall Risk and Risk Factors to all staff, patient, and family/Discuss with provider need for PT consult/Monitor gait and stability/Provide patient with walking aids - walker, cane, crutches/Reinforce activity limits and safety measures with patient and family/Sit up slowly, dangle for a short time, stand at bedside before walking/Use of alarms - bed, chair and/or voice tab/Visual Cue: Yellow wristband/Bed in lowest position, wheels locked, appropriate side rails in place/Call bell, personal items and telephone in reach/Instruct patient to call for assistance before getting out of bed or chair/Non-slip footwear when patient is out of bed/[place] to call system/Physically safe environment - no spills, clutter or unnecessary equipment/Purposeful Proactive Rounding/Room/bathroom lighting operational, light cord in reach

## 2022-08-29 NOTE — DISCHARGE NOTE PROVIDER - HOSPITAL COURSE
82yFemale with history of osteoarthritis of right shoulder  presenting for right RSA by Dr Mayur Baca on  8/29/2022. Risk and benefits of surgery were explained to the patient. The patient understood and agreed to proceed with surgery. Patient underwent the procedure with no intraoperative complications. Pt was brought in stable condition to the PACU. Once stable in PACU, pt was brought to the floor. During hospital stay pt was followed by Medicine,  during this admission. Pt had an uneventful hospital course. Pt is stable for discharge to [rehab/home] on POD# 82yFemale with history of osteoarthritis of right shoulder  presenting for right RSA by Dr Mayur Baca on  8/29/2022. Risk and benefits of surgery were explained to the patient. The patient understood and agreed to proceed with surgery. Patient underwent the procedure with no intraoperative complications. Pt was brought in stable condition to the PACU. Once stable in PACU, pt was brought to the floor. During hospital stay pt was followed by Medicine,  during this admission. Pt had an uneventful hospital course. Pt is stable for discharge to rehab on POD# 1 82yFemale with history of osteoarthritis of right shoulder  presenting for right RSA by Dr Mayur Baca on  8/29/2022. Risk and benefits of surgery were explained to the patient. The patient understood and agreed to proceed with surgery. Patient underwent the procedure with no intraoperative complications. Pt was brought in stable condition to the PACU. Once stable in PACU, pt was brought to the floor. During hospital stay pt was followed by Medicine,  during this admission. Pt had an uneventful hospital course. Pt is stable for discharge to rehab on POD# 2

## 2022-08-29 NOTE — PHYSICAL THERAPY INITIAL EVALUATION ADULT - TRANSFER SAFETY CONCERNS NOTED: SIT/STAND, REHAB EVAL
decreased balance during turns/decreased safety awareness/decreased proprioception/decreased sequencing ability

## 2022-08-29 NOTE — DISCHARGE NOTE PROVIDER - CARE PROVIDER_API CALL
Mayur Baca)  Orthopaedic Surgery  [address] [place] [address]  Phone: [phone]  Fax: [phone]  Follow Up Time:

## 2022-08-29 NOTE — OCCUPATIONAL THERAPY INITIAL EVALUATION ADULT - GENERAL OBSERVATIONS, REHAB EVAL
Pt encountered semisupine in bed, NAD, AXOX3, +heplock, +sling to RUE, no c/o pain s/p reverse right TSA.

## 2022-08-29 NOTE — OCCUPATIONAL THERAPY INITIAL EVALUATION ADULT - BALANCE TRAINING, PT EVAL
Pt. will increase static/dynamic sitting balance by 1/2 grade in order to assist with UB dressing sitting at EOB in 1 week.

## 2022-08-29 NOTE — OCCUPATIONAL THERAPY INITIAL EVALUATION ADULT - ADDITIONAL COMMENTS
Pt reported she lives alone in a private house c 4-5 stairs to enter c L handrail. Once inside, pt must negotiate 6 stairs c B/L wide handrails to reach kitchen followed by 12 c R handrail to reach bedroom/bathroom. Pt bathroom is equipped c a tub-shower combination and standard low toilet seat.

## 2022-08-29 NOTE — PHYSICAL THERAPY INITIAL EVALUATION ADULT - IMPAIRMENTS FOUND, PT EVAL
aerobic capacity/endurance/gait, locomotion, and balance/joint integrity and mobility/muscle strength/poor safety awareness/ROM

## 2022-08-29 NOTE — OCCUPATIONAL THERAPY INITIAL EVALUATION ADULT - IMPAIRED TRANSFERS: SIT/STAND, REHAB EVAL
impaired balance/impaired coordination/decreased flexibility/narrow base of support/decreased ROM/decreased strength

## 2022-08-29 NOTE — DISCHARGE NOTE PROVIDER - NSDCFUSCHEDAPPT_GEN_ALL_CORE_FT
Mayur Baca  [facility]  [facility] [address] [place] [address]  Scheduled Appointment: 09/13/2022

## 2022-08-29 NOTE — PHYSICAL THERAPY INITIAL EVALUATION ADULT - GAIT DISTANCE, PT EVAL
09/20/18 0852    atorvastatin (LIPITOR) tablet 20 mg  20 mg Oral Once per day on Mon Wed Fri Shanda Moreno MD   20 mg at 09/19/18 2038    acetaminophen (TYLENOL) tablet 650 mg  650 mg Oral Q6H PRN Shanda Moreno MD        baclofen (LIORESAL) tablet 10 mg  10 mg Oral TID PRN Shanda Moreon MD        carvedilol (COREG) tablet 6.25 mg  6.25 mg Oral BID WC Shanda Moreno MD   6.25 mg at 09/20/18 1837    diltiazem (CARDIZEM) tablet 60 mg  60 mg Oral 2 times per day on Mon Wed Fri Shanda Moreno MD        levETIRAcetam (KEPPRA) tablet 500 mg  500 mg Oral BID Shanda Moreno MD   500 mg at 09/20/18 2150    midodrine (PROAMATINE) tablet 10 mg  10 mg Oral Once per day on Mon Wed Fri Shanda Moreno MD        montelukast (SINGULAIR) tablet 10 mg  10 mg Oral Nightly Shanda Moreno MD   10 mg at 09/20/18 2150    pantoprazole (PROTONIX) tablet 40 mg  40 mg Oral QAM AC Shanda Moreno MD   40 mg at 09/20/18 0852    promethazine (PHENERGAN) tablet 12.5 mg  12.5 mg Oral Q6H PRN Shanda Moreno MD        sodium chloride flush 0.9 % injection 10 mL  10 mL Intravenous 2 times per day Shanda Moreno MD   10 mL at 09/20/18 2150    sodium chloride flush 0.9 % injection 10 mL  10 mL Intravenous PRN Shanda Moreno MD        magnesium hydroxide (MILK OF MAGNESIA) 400 MG/5ML suspension 30 mL  30 mL Oral Daily PRN Shanda Moreno MD        ondansetron (ZOFRAN) injection 4 mg  4 mg Intravenous Q6H PRN Shanda Moreno MD        mometasone-formoterol (DULERA) 200-5 MCG/ACT inhaler 2 puff  2 puff Inhalation BID Shanda Moreno MD   2 puff at 09/20/18 2041    hydrALAZINE (APRESOLINE) injection 5 mg  5 mg Intravenous Q6H PRN Shanda Moreno MD        ipratropium-albuterol (DUONEB) nebulizer solution 3 mL  1 vial Inhalation 4x daily Shanda Moreno MD   3 mL at 09/20/18 2041    ipratropium-albuterol (DUONEB) nebulizer solution 1 ampule  1 ampule Inhalation Q4H PRN Shanda Moreno MD         Allergies   Allergen Reactions    Latex Hives    Darvocet 25 feet

## 2022-08-29 NOTE — OCCUPATIONAL THERAPY INITIAL EVALUATION ADULT - TRANSFER SAFETY CONCERNS NOTED: BED/CHAIR, REHAB EVAL
decreased balance during turns/losing balance/decreased proprioception/decreased sequencing ability/decreased step length

## 2022-08-29 NOTE — PHYSICAL THERAPY INITIAL EVALUATION ADULT - GENERAL OBSERVATIONS, REHAB EVAL
Pt is received in sittting in the wheelchair returning back from xray, transferred with Alice x1 using left sided cane

## 2022-08-29 NOTE — PHYSICAL THERAPY INITIAL EVALUATION ADULT - BALANCE TRAINING, PT EVAL
Pt will improve static & dynamic standing balance to Good using cane maintaining WB precaution  to perform ADL, Gait independently  in 2 weeks

## 2022-08-29 NOTE — BRIEF OPERATIVE NOTE - NSICDXBRIEFPROCEDURE_GEN_ALL_CORE_FT
PROCEDURES:  Reverse total replacement of right shoulder joint 29-Aug-2022 13:08:38  Tosin Centeno

## 2022-08-29 NOTE — ASU PREOP CHECKLIST - BLOOD AVAILABLE
Your Child's Health             [age]-Year-Old Visit          [place] YFN Esquivel  September 25, 2020    Visit Vitals  Pulse 132   Resp 24   Ht 2' 10.25\" (0.87 m)   Wt 11.8 kg   HC 49.5 cm (19.49\")   BMI 15.59 kg/m²     Weight: 26.01 lbs    NUTRITION:  Emphasize variety of foods over quantity.  Many children at this age resist meats and vegetables.  Ask relatives and friends for tips.  Parent magazines often have useful suggestions.  Children should drink between 16 and 24 ounces of milk per day for growth of bones and teeth.      Obesity and being overweight are serious problems in the [place].  You can help your child avoid these problems by following these guidelines:  1. Limit TV and video total time to 2 hours per day or less.  2. Don't encourage your children to eat if they tell you they are full.  Healthy children will not starve themselves.  3. Don't reward your children for cleaning their plates.  If they always leave food, reduce the size of the portions and tell them to ask for more if they are still hungry.  4. Don't allow children to dish out their own portions.  They will imitate adults or imitate what they have seen on TV; in both cases, the amount will be too large.  This results in increased calories and waste.  At least for children above 5 years of age and for adults, people eat more when given larger portions.    DENTAL CARE:  All children should brush their teeth twice a day.  Use a pea-sized lump of fluoride toothpaste and a soft toothbrush.  If Elmo swallows a little of the toothpaste, it will cause no harm; however, you should not let her eat paste from the tube.  Excess fluoride can cause spotted teeth.    DEVELOPMENT:  A two-year-old child can most often wash and dry his or her hands, help with simple tasks, and put on some clothes.  A rapidly increasing vocabulary generally exists along with only a 5-10 minute attention span.  The short attention span allows you to distract Elmo  out of impending tantrums.  Two-year-old children have a tendency to walk into things, to run too fast, and to have no fear of heights or danger.    Listen to Elmo and read to her often. This will help with language development.  Children who have been read to have higher grades all the way through school.    INDEPENDENCE:  The more independent a child can become, the more self-esteem will result.  Never do for a child what the child is capable of doing alone.  It may take you longer to do a job with \"help\" from Elmo, but otherwise she may grow up not knowing how to cook or clean.  You might think that Elmo would be grateful for your cooking and cleaning, but if you do everything for her, she will come to regard you as a servant and lose respect for you.  Help Elmo learn to dress and play and do simple household tasks.  At this age, she may actually enjoy doing these things.    TELEVISION:  Be careful what your child may see when you are watching television.  Scary adult content can lead to sleep and behavior problems.  Limit television and video to wholesome programs, and permit no more than two hours of viewing per day.  Excess television viewing is related to overweight problems in children.  Expect Elmo to be upset if the children in the TV show she is watching are upset.  Children cannot distinguish commercials from the program's content until they are 6-8 years old.  They are very susceptible to advertising, and TV will increase their demands on you for toys and junk food.    TOILET TRAINING:  Please keep in mind the following factors:  1. Most children are physically ready to begin toilet training at 18 months.  2. The average age for girls in the [place] to be trained is almost 3 years; boys are generally trained when they are a little older.  3. Toilet training should be a positive experience if possible.  If Elmo is resistant at the first attempt, it is best to forget about if for a few months and  then try again.  A child too small for the toilet seat may need a potty chair or may need to grow a little before beginning on the adult toilet.    DISCIPLINE:  Be as consistent as possible with discipline.  Think of it as teaching where repetition is necessary for learning.  If disrespectful language and hitting are difficult now, think of how hard it will be if you allow Elmo to go untrained until the teens.  Elmo craves your attention, so give her more attention for good behavior and less attention (for example, time-outs) for bad behavior.  You can give a time-out to a two-year-old by simply not looking at or talking to the child for 2-5 minutes, although a \"naughty chair\" also works very well for some children.    ACCIDENT PREVENTION:  1. Car Safety:  An approved car seat in the back seat of the car is required by [place] law until Elmo is four years old and weighs at least 40 pounds.  Please consider using one of the many free programs that check to make sure the seat is properly installed.  2. Street and Garage Safety: Children at this age can undo door latches. There have been some tragic accidents involving children who were found out in the snow or who ran out the door and ended up under the wheel of a car backing out of the driveway.  3. Pets: Two-year-old children don't know how to treat pets.  This is the most common age for dog bites  Eventually, children need to be taught not to hand-feed dogs and not to play with them when they are eating or sleeping.    SMOKING:  Children exposed to tobacco smoke have more ear infections and pneumonia. Cold symptoms last longer. If you smoke, please quit.  If you cannot quit, smoke outside. Do not smoke near Elmo, and do not let others smoke near her.    LEAD EXPOSURE:  If there is lead in the house, Elmo may be vulnerable.  Let us know if any of the following apply:  1. Your home was built before 1960 and has peeling or chipping or chalking paint.   Homes built in older cities at the turn of the last century may have lead pipes.  Check to see if any of the above applies to Elmo's sitter's home, , , or any other house where Elmo spends time.  2. You may have other sources of lead in the home, including:  (a) herbal remedies like bam, michelle, ghasard, kandu, azarcon, pay-loo-ah, or balagoli; (b) antique toys, especially those made of lead or pewter; (c) imported mini blinds; and (d) imported canned goods, especially acid foods like tomato and citrus.  Do not cook or store foods in utensils made of crystal, pewter, or imported pottery.  3. You have another child or housemate who is being followed or treated for an elevated lead level.  4. Elmo lives with an adult whose job or hobby involves lead exposure (soldering, battery manufacture, recycling, casting, stained glass, etc.).  5. You live near an active lead smelter, a battery recycling plant, or a plant that processes hot metal.    TUBERCULOSIS:  There is such a low rate of tuberculosis in our area that frequent skin tests are no longer recommended.  However, certain situations do increase Elmo's risk, including contact with AIDS patients, nursing home residents, prisoners, immigrants, or homeless persons.  Please let us know if Elmo has contacts with such persons, or if she has contact with anyone known to have or suspected of having tuberculosis.    SUN EXPOSURE:  If you plan to have Elmo outside for more than 30 minutes, please follow the guidelines in our Sun Exposure handout.    MEDICATION FOR FEVER OR PAIN:   Acetaminophen liquid (e.g., Tylenol or Tempra) may be given every four hours as needed for pain or fever.  Acetaminophen liquid is less concentrated than the infant dropper bottle type.  Be sure to check which product CONCENTRATION you are using.    CHILDREN’S Tylenol/Acetaminophen  (160 MG/5 mL)    Child’s Weight: Dose:  24 - 35 pounds:   160 mg (5.0 mL (1  Teaspoon))    CHILDREN’S Tylenol/Acetaminophen MELTAWAYS ( 80 MG tablets)    Child’s Weight:  Dose:  24 - 35 pounds:    160 mg (2 meltaway tablets)    CHILDREN'S Ibuprofen liquid (100MG/5mL) (e.g., Advil or Motrin) may be given every six hours as needed for pain or fever. Please check the concentration of your ibuprofen to be sure you are giving the correct amount.      Child’s Weight:  Dose:  24 - 35 pounds:    100 mg  (5 mL (1 Teaspoon))    If Elmo is outside this weight range, call your pediatrician’s office for advice.    Most Recent Immunizations   Administered Date(s) Administered   • DTaP (INFANRIX)(DAPTACEL,INFANRIX) 12/26/2018   • DTaP/Hep B/IPV 03/01/2018   • HIB, Unspecified Formulation 01/04/2018   • Hep A, ped/adol, 2 dose 03/26/2019   • Hep B, adolescent or pediatric 2017   • Hib (PRP-T) 12/26/2018   • Influenza, injectable, quadrivalent, preservative-free 10/08/2018   • Influenza, seasonal, injectable, trivalent 04/02/2018   • MMR 09/20/2018   • Pneumococcal Conjugate 13 valent 09/20/2018   • Rotavirus - pentavalent 03/01/2018   • Varicella 09/20/2018       If Elmo develops any of the following reactions within 72 hours after an immunization, notify your pediatrician by calling the pediatric phone nurse:  1. A temperature of 105 degrees or above.  2. More than 3 hours of continuous crying.  3. A shrill, high-pitched cry.  4. A pale, limp spell.  5. A seizure or fainting spell.  In this case, you should call 911 or go immediately to the emergency room.    NEXT VISIT:  2 1/2 YEARS OF AGE    Thank you for entrusting your care to [facility].     n/a

## 2022-08-30 LAB
ANION GAP SERPL CALC-SCNC: 4 MMOL/L — LOW (ref 5–17)
BUN SERPL-MCNC: 25 MG/DL — HIGH (ref 7–23)
CALCIUM SERPL-MCNC: 8.5 MG/DL — SIGNIFICANT CHANGE UP (ref 8.5–10.1)
CHLORIDE SERPL-SCNC: 101 MMOL/L — SIGNIFICANT CHANGE UP (ref 96–108)
CO2 SERPL-SCNC: 29 MMOL/L — SIGNIFICANT CHANGE UP (ref 22–31)
CREAT SERPL-MCNC: 1.27 MG/DL — SIGNIFICANT CHANGE UP (ref 0.5–1.3)
EGFR: 42 ML/MIN/1.73M2 — LOW
FLUAV AG NPH QL: SIGNIFICANT CHANGE UP
FLUBV AG NPH QL: SIGNIFICANT CHANGE UP
GLUCOSE SERPL-MCNC: 136 MG/DL — HIGH (ref 70–99)
HCT VFR BLD CALC: 34.2 % — LOW (ref 34.5–45)
HGB BLD-MCNC: 10.8 G/DL — LOW (ref 11.5–15.5)
MCHC RBC-ENTMCNC: 31.6 G/DL — LOW (ref 32–36)
MCHC RBC-ENTMCNC: 32.2 PG — SIGNIFICANT CHANGE UP (ref 27–34)
MCV RBC AUTO: 102.1 FL — HIGH (ref 80–100)
NRBC # BLD: 0 /100 WBCS — SIGNIFICANT CHANGE UP (ref 0–0)
PLATELET # BLD AUTO: 169 K/UL — SIGNIFICANT CHANGE UP (ref 150–400)
POTASSIUM SERPL-MCNC: 4.5 MMOL/L — SIGNIFICANT CHANGE UP (ref 3.5–5.3)
POTASSIUM SERPL-SCNC: 4.5 MMOL/L — SIGNIFICANT CHANGE UP (ref 3.5–5.3)
RBC # BLD: 3.35 M/UL — LOW (ref 3.8–5.2)
RBC # FLD: 12.7 % — SIGNIFICANT CHANGE UP (ref 10.3–14.5)
SARS-COV-2 RNA SPEC QL NAA+PROBE: SIGNIFICANT CHANGE UP
SODIUM SERPL-SCNC: 134 MMOL/L — LOW (ref 135–145)
WBC # BLD: 11.62 K/UL — HIGH (ref 3.8–10.5)
WBC # FLD AUTO: 11.62 K/UL — HIGH (ref 3.8–10.5)

## 2022-08-30 RX ORDER — POLYETHYLENE GLYCOL 3350 17 G/17G
17 POWDER, FOR SOLUTION ORAL
Qty: 0 | Refills: 0 | DISCHARGE
Start: 2022-08-30

## 2022-08-30 RX ORDER — ASPIRIN/CALCIUM CARB/MAGNESIUM 324 MG
1 TABLET ORAL
Qty: 0 | Refills: 0 | DISCHARGE

## 2022-08-30 RX ORDER — LOSARTAN POTASSIUM 100 MG/1
100 TABLET, FILM COATED ORAL DAILY
Refills: 0 | Status: DISCONTINUED | OUTPATIENT
Start: 2022-08-30 | End: 2022-08-30

## 2022-08-30 RX ORDER — ACETAMINOPHEN 500 MG
2 TABLET ORAL
Qty: 0 | Refills: 0 | DISCHARGE
Start: 2022-08-30

## 2022-08-30 RX ORDER — OXYCODONE HYDROCHLORIDE 5 MG/1
1 TABLET ORAL
Qty: 0 | Refills: 0 | DISCHARGE
Start: 2022-08-30

## 2022-08-30 RX ORDER — PANTOPRAZOLE SODIUM 20 MG/1
1 TABLET, DELAYED RELEASE ORAL
Qty: 0 | Refills: 0 | DISCHARGE
Start: 2022-08-30

## 2022-08-30 RX ORDER — ASPIRIN/CALCIUM CARB/MAGNESIUM 324 MG
1 TABLET ORAL
Qty: 0 | Refills: 0 | DISCHARGE
Start: 2022-08-30

## 2022-08-30 RX ORDER — TRAMADOL HYDROCHLORIDE 50 MG/1
2 TABLET ORAL
Qty: 0 | Refills: 0 | DISCHARGE

## 2022-08-30 RX ORDER — HYDROXYCHLOROQUINE SULFATE 200 MG
2 TABLET ORAL
Qty: 0 | Refills: 0 | DISCHARGE

## 2022-08-30 RX ORDER — SENNA PLUS 8.6 MG/1
2 TABLET ORAL
Qty: 0 | Refills: 0 | DISCHARGE
Start: 2022-08-30

## 2022-08-30 RX ADMIN — CARVEDILOL PHOSPHATE 6.25 MILLIGRAM(S): 80 CAPSULE, EXTENDED RELEASE ORAL at 06:02

## 2022-08-30 RX ADMIN — ONDANSETRON 8 MILLIGRAM(S): 8 TABLET, FILM COATED ORAL at 12:08

## 2022-08-30 RX ADMIN — PANTOPRAZOLE SODIUM 40 MILLIGRAM(S): 20 TABLET, DELAYED RELEASE ORAL at 06:02

## 2022-08-30 RX ADMIN — Medication 650 MILLIGRAM(S): at 09:30

## 2022-08-30 RX ADMIN — ATORVASTATIN CALCIUM 10 MILLIGRAM(S): 80 TABLET, FILM COATED ORAL at 21:22

## 2022-08-30 RX ADMIN — Medication 1 APPLICATION(S): at 18:19

## 2022-08-30 RX ADMIN — OXYCODONE HYDROCHLORIDE 10 MILLIGRAM(S): 5 TABLET ORAL at 06:02

## 2022-08-30 RX ADMIN — Medication 400 MILLIGRAM(S): at 01:10

## 2022-08-30 RX ADMIN — LOSARTAN POTASSIUM 50 MILLIGRAM(S): 100 TABLET, FILM COATED ORAL at 06:03

## 2022-08-30 RX ADMIN — Medication 3 MILLIGRAM(S): at 21:22

## 2022-08-30 RX ADMIN — DULOXETINE HYDROCHLORIDE 30 MILLIGRAM(S): 30 CAPSULE, DELAYED RELEASE ORAL at 11:27

## 2022-08-30 RX ADMIN — Medication 10 MILLIGRAM(S): at 06:02

## 2022-08-30 RX ADMIN — Medication 650 MILLIGRAM(S): at 13:32

## 2022-08-30 RX ADMIN — Medication 325 MILLIGRAM(S): at 11:27

## 2022-08-30 RX ADMIN — Medication 650 MILLIGRAM(S): at 14:30

## 2022-08-30 RX ADMIN — OXYCODONE HYDROCHLORIDE 5 MILLIGRAM(S): 5 TABLET ORAL at 22:20

## 2022-08-30 RX ADMIN — OXYCODONE HYDROCHLORIDE 5 MILLIGRAM(S): 5 TABLET ORAL at 18:00

## 2022-08-30 RX ADMIN — OXYCODONE HYDROCHLORIDE 5 MILLIGRAM(S): 5 TABLET ORAL at 21:22

## 2022-08-30 RX ADMIN — Medication 1000 MILLIGRAM(S): at 01:40

## 2022-08-30 RX ADMIN — CARVEDILOL PHOSPHATE 6.25 MILLIGRAM(S): 80 CAPSULE, EXTENDED RELEASE ORAL at 18:19

## 2022-08-30 RX ADMIN — Medication 650 MILLIGRAM(S): at 20:15

## 2022-08-30 RX ADMIN — SENNA PLUS 2 TABLET(S): 8.6 TABLET ORAL at 21:22

## 2022-08-30 RX ADMIN — Medication 30 MILLIGRAM(S): at 00:25

## 2022-08-30 RX ADMIN — OXYCODONE HYDROCHLORIDE 10 MILLIGRAM(S): 5 TABLET ORAL at 07:02

## 2022-08-30 RX ADMIN — POLYETHYLENE GLYCOL 3350 17 GRAM(S): 17 POWDER, FOR SOLUTION ORAL at 21:22

## 2022-08-30 RX ADMIN — Medication 650 MILLIGRAM(S): at 08:59

## 2022-08-30 RX ADMIN — Medication 650 MILLIGRAM(S): at 19:17

## 2022-08-30 RX ADMIN — OXYCODONE HYDROCHLORIDE 5 MILLIGRAM(S): 5 TABLET ORAL at 17:07

## 2022-08-31 ENCOUNTER — TRANSCRIPTION ENCOUNTER (OUTPATIENT)
Age: 82
End: 2022-08-31

## 2022-08-31 VITALS
TEMPERATURE: 98 F | OXYGEN SATURATION: 96 % | SYSTOLIC BLOOD PRESSURE: 127 MMHG | DIASTOLIC BLOOD PRESSURE: 89 MMHG | HEART RATE: 77 BPM | RESPIRATION RATE: 16 BRPM

## 2022-08-31 LAB
ANION GAP SERPL CALC-SCNC: 6 MMOL/L — SIGNIFICANT CHANGE UP (ref 5–17)
BUN SERPL-MCNC: 31 MG/DL — HIGH (ref 7–23)
CALCIUM SERPL-MCNC: 8.1 MG/DL — LOW (ref 8.5–10.1)
CHLORIDE SERPL-SCNC: 100 MMOL/L — SIGNIFICANT CHANGE UP (ref 96–108)
CO2 SERPL-SCNC: 29 MMOL/L — SIGNIFICANT CHANGE UP (ref 22–31)
CREAT SERPL-MCNC: 1.21 MG/DL — SIGNIFICANT CHANGE UP (ref 0.5–1.3)
EGFR: 44 ML/MIN/1.73M2 — LOW
GLUCOSE SERPL-MCNC: 94 MG/DL — SIGNIFICANT CHANGE UP (ref 70–99)
HCT VFR BLD CALC: 29.2 % — LOW (ref 34.5–45)
HGB BLD-MCNC: 9.5 G/DL — LOW (ref 11.5–15.5)
MCHC RBC-ENTMCNC: 32.1 PG — SIGNIFICANT CHANGE UP (ref 27–34)
MCHC RBC-ENTMCNC: 32.5 G/DL — SIGNIFICANT CHANGE UP (ref 32–36)
MCV RBC AUTO: 98.6 FL — SIGNIFICANT CHANGE UP (ref 80–100)
NRBC # BLD: 0 /100 WBCS — SIGNIFICANT CHANGE UP (ref 0–0)
PLATELET # BLD AUTO: 170 K/UL — SIGNIFICANT CHANGE UP (ref 150–400)
POTASSIUM SERPL-MCNC: 4.2 MMOL/L — SIGNIFICANT CHANGE UP (ref 3.5–5.3)
POTASSIUM SERPL-SCNC: 4.2 MMOL/L — SIGNIFICANT CHANGE UP (ref 3.5–5.3)
RBC # BLD: 2.96 M/UL — LOW (ref 3.8–5.2)
RBC # FLD: 12.8 % — SIGNIFICANT CHANGE UP (ref 10.3–14.5)
SODIUM SERPL-SCNC: 135 MMOL/L — SIGNIFICANT CHANGE UP (ref 135–145)
WBC # BLD: 12.24 K/UL — HIGH (ref 3.8–10.5)
WBC # FLD AUTO: 12.24 K/UL — HIGH (ref 3.8–10.5)

## 2022-08-31 RX ADMIN — Medication 650 MILLIGRAM(S): at 07:25

## 2022-08-31 RX ADMIN — Medication 650 MILLIGRAM(S): at 01:50

## 2022-08-31 RX ADMIN — OXYCODONE HYDROCHLORIDE 5 MILLIGRAM(S): 5 TABLET ORAL at 11:41

## 2022-08-31 RX ADMIN — OXYCODONE HYDROCHLORIDE 5 MILLIGRAM(S): 5 TABLET ORAL at 06:15

## 2022-08-31 RX ADMIN — Medication 650 MILLIGRAM(S): at 06:24

## 2022-08-31 RX ADMIN — Medication 650 MILLIGRAM(S): at 11:41

## 2022-08-31 RX ADMIN — Medication 1 APPLICATION(S): at 06:14

## 2022-08-31 RX ADMIN — OXYCODONE HYDROCHLORIDE 5 MILLIGRAM(S): 5 TABLET ORAL at 12:14

## 2022-08-31 RX ADMIN — Medication 650 MILLIGRAM(S): at 00:50

## 2022-08-31 RX ADMIN — OXYCODONE HYDROCHLORIDE 5 MILLIGRAM(S): 5 TABLET ORAL at 07:25

## 2022-08-31 RX ADMIN — PANTOPRAZOLE SODIUM 40 MILLIGRAM(S): 20 TABLET, DELAYED RELEASE ORAL at 06:15

## 2022-08-31 RX ADMIN — LOSARTAN POTASSIUM 50 MILLIGRAM(S): 100 TABLET, FILM COATED ORAL at 06:15

## 2022-08-31 RX ADMIN — CARVEDILOL PHOSPHATE 6.25 MILLIGRAM(S): 80 CAPSULE, EXTENDED RELEASE ORAL at 06:15

## 2022-08-31 RX ADMIN — Medication 325 MILLIGRAM(S): at 11:41

## 2022-08-31 RX ADMIN — Medication 10 MILLIGRAM(S): at 06:15

## 2022-08-31 RX ADMIN — Medication 650 MILLIGRAM(S): at 12:14

## 2022-08-31 NOTE — DISCHARGE NOTE NURSING/CASE MANAGEMENT/SOCIAL WORK - PATIENT PORTAL LINK FT
You can access the FollowMyHealth Patient Portal offered by [facility] by registering at the following website: [email or web address]. By joining "[employer]"’s FollowMyHealth portal, you will also be able to view your health information using other applications (apps) compatible with our system.

## 2022-08-31 NOTE — PROGRESS NOTE ADULT - SUBJECTIVE AND OBJECTIVE BOX
82yFemale s/p right RSA POD#1. Pt seen and examined in NAD. Pain controlled. Pt with blood pressure fluctuations overnight. Pt c/o pruritis right breast. Also C/O urinary retention.     PE:   Neuro: AAOX3  Right breast: Linear plaque with scale.   RUE: Sling in place. +ecchymosis at incision/axilla. Prineo dressing C/D/I. +ROM wrist/digits. Digits with arthritic changes. +ok/thumbsup/fingercross signs.  strength: 5/5.  RP2+ NVI.   LUE: Skin intact. +ROM shoulder/elbow/wrist/fingers. +ok/thumbsup/fingercross signs.  strength: 5/5.  RP2+ NVI.   B/L LE: Skin intact. +ROM hip/knee/ankle/toes. Ankle Dorsi/plantarflexion: 5/5. Calf: soft, compressible and nontender. DP/PT 2+ NVI.                             10.8   11.62 )-----------( 169      ( 30 Aug 2022 07:20 )             34.2       08-30    134<L>  |  101  |  25<H>  ----------------------------<  136<H>  4.5   |  29  |  1.27    Ca    8.5      30 Aug 2022 07:20          A/P: 82yFemale s/p right RSA POD#1.  Post op Xray reviewed - hardware in place  Right breast - contact dermatitis from EKG lead? Valisone cream BID  Monitor BP's  Bladder scan for residual: 83 cc - reassurance   Pain controlled  PT/OT: NWB RUE no ROM to shoulder   DVT ppx: SCDs and asa 325mg daily   Wound care, Isometric exercises, incentive spirometry   Medical consult appreciated  Discharge planning today   All the above discussed and understood by pt   D/W DR Baca  
JULIAN ORO is a 82y Female s/p RIGHT REVERSE SHOULDER ARTHROPLASTY BICEPS TENODESIS        denies any chest pain shortness of breath palpitation dizziness lightheadedness nausea vomiting fever or chills    T(C): 36.3 (08-31-22 @ 09:42), Max: 36.6 (08-30-22 @ 17:02)  HR: 74 (08-31-22 @ 09:42) (74 - 94)  BP: 118/62 (08-31-22 @ 09:42) (118/62 - 165/86)  RR: 18 (08-31-22 @ 09:42) (18 - 18)  SpO2: 95% (08-31-22 @ 09:42) (94% - 97%)  no jvd/bruit  s1 s2 rrr  cta  s/nt/nd  no calf tend                        9.5    12.24 )-----------( 170      ( 31 Aug 2022 05:45 )             29.2   08-31    135  |  100  |  31<H>  ----------------------------<  94  4.2   |  29  |  1.21    Ca    8.1<L>      31 Aug 2022 05:45        cont dvt px  pain control  bowel regimen  antiemetics  incentive spirometer
82yFemale s/p right RSA POD#0. Pt seen and examined in NAD. Pain controlled. Pt denies any new complaints. Pt denies CP/SOB/N/V/D/numbness/tingling/bowel or bladder dysfunction.     PE:   Neuro: AAOX3  RUE: Sling in place. Prineo dressing C/D/I. +ecchymosis at incision/axillary fold. +ROM wrist/fingers. Decreased sensation RUE.   LUE: Skin intact. +ROM shoulder/elbow/wrist/fingers. +ok/thumbsup/fingercross signs.  strength: 5/5.  RP2+ NVI.   B/L LE: Skin intact. +ROM hip/knee/ankle/toes. Ankle Dorsi/plantarflexion: 5/5. Calf: soft, compressible and nontender. DP/PT 2+ NVI.           A/P: 82yFemale s/p  right RSA POD#0.   F/U Post op Xray  Pain controlled  PT/OT: NWB RUE. No ROM To shoulder.   DVT ppx: SCDs and asa 325mg daily  Wound care, Isometric exercises, incentive spirometry   Medical consult pending   Discharge: planning for home tomorrow  All the above discussed and understood by pt   
82yFemale s/p right RSA POD#2. Pt seen and examined in NAD. Pain controlled. Pt denies any new complaints. Pt denies CP/SOB/N/V/D/numbness/tingling/bowel or bladder dysfunction. Feeling much better today but confused about restrictions/exercises.     PE:   Neuro: AAOX3  Right breast: Contact dermatitis improving.   RUE: Sling in place. +ecchymosis incision, axilla and upper arm. +ROM wrist/fingers. +ok/thumbsup/fingercross signs.  strength: 5/5.  RP2+ NVI  LUE: Skin intact. +ROM shoulder/elbow/wrist/fingers. +ok/thumbsup/fingercross signs.  strength: 5/5.  RP2+ NVI.   B/L LE: Skin intact. +ROM hip/knee/ankle/toes. Ankle Dorsi/plantarflexion: 5/5. Calf: soft, compressible and nontender. DP/PT 2+ NVI.                             9.5    12.24 )-----------( 170      ( 31 Aug 2022 05:45 )             29.2       08-31    135  |  100  |  31<H>  ----------------------------<  94  4.2   |  29  |  1.21    Ca    8.1<L>      31 Aug 2022 05:45          A/P: 82yFemale s/p right RSA POD#2   Pain controlled  PT/OT: NWB RUE no ROM to shoulder.  DVT ppx: SCDs and asa 325mg daily   Wound care, Isometric exercises, incentive spirometry   Medical consult appreciated  Discharge: planning for rehab   All the above discussed and understood by pt   DW Dr. Baca
82yFemale s/p right TSA POD#1. Pt seen and examined in NAD. Pain controlled. Pt denies any new complaints. Pt denies CP/SOB/N/V/D/numbness/tingling/bowel or bladder dysfunction.     PE:   Neuro: AAOX3  RUE: Sling in place. Prineo dressing C/D/I. +ROM wrist/fingers. +ok/thumbsup/fingercross signs.  strength: 5/5.  RP2+ NVI.   B/L UE: Skin intact. +ROM shoulder/elbow/wrist/fingers. +ok/thumbsup/fingercross signs.  strength: 5/5.  RP2+ NVI.   B/L LE: Skin intact. +ROM hip/knee/ankle/toes. Ankle Dorsi/plantarflexion: 5/5. Calf: soft, compressible and nontender. DP/PT 2+ NVI.                             10.8   11.62 )-----------( 169      ( 30 Aug 2022 07:20 )             34.2       08-30    134<L>  |  101  |  25<H>  ----------------------------<  136<H>  4.5   |  29  |  1.27    Ca    8.5      30 Aug 2022 07:20          A/P: 82yFemale s/p right TSA POD#1.  Post op Xray reviewed   Pain control: Oxycodone and tylenol. Reports minimal relief after 2nd dose toradol.   PT/OT: NWB RUE. ROM to shoulder 140/40  DVT ppx: SCDs and asa 325mg daily   Wound care, Isometric exercises, incentive spirometry   Medical consult appreciated  Discharge: planning home today   All the above discussed and understood by pt   D/W DR Baca   
JULIAN ORO is a 82y Female s/p RIGHT REVERSE SHOULDER ARTHROPLASTY BICEPS TENODESIS        denies any chest pain shortness of breath palpitation dizziness lightheadedness nausea vomiting fever or chills    T(C): 36.7 (08-30-22 @ 09:35), Max: 36.7 (08-30-22 @ 09:35)  HR: 91 (08-30-22 @ 10:10) (68 - 95)  BP: 129/66 (08-30-22 @ 10:10) (128/74 - 185/96)  RR: 18 (08-30-22 @ 10:10) (16 - 18)  SpO2: 95% (08-30-22 @ 10:10) (94% - 99%)  no jvd/bruit  s1 s2 rrr  cta  s/nt/nd  no calf tend                        10.8   11.62 )-----------( 169      ( 30 Aug 2022 07:20 )             34.2   08-30    134<L>  |  101  |  25<H>  ----------------------------<  136<H>  4.5   |  29  |  1.27    Ca    8.5      30 Aug 2022 07:20        cont dvt px  pain control  bowel regimen  antiemetics  incentive spirometer

## 2022-08-31 NOTE — DISCHARGE NOTE NURSING/CASE MANAGEMENT/SOCIAL WORK - NSDCFUADDAPPT_GEN_ALL_CORE_FT
Follow up with your surgeon in two weeks. Call for appointment.  If you need more pain medication, call your surgeon's office. For medication refills or authorizations, please call [phone]    We recommend that you call and schedule a follow up appointment with your primary care physician for repeat blood work (CBC and BMP) for post hospital discharge follow-up care 2-4 weeks after your surgery.     Call your surgeon if you have increased redness/pain/drainage or fever. Return to ER for shortness of breath/calf tenderness.    DO NOT TAKE PLAQUENIL FOR TWO WEEKS AFTER SURGERY

## 2022-08-31 NOTE — DISCHARGE NOTE NURSING/CASE MANAGEMENT/SOCIAL WORK - NSDCVIVACCINE_GEN_ALL_CORE_FT
Tdap; 10-Feb-2018 19:30; Naye Moreau (RN); [employer]; [id number]; IntraMuscular; Deltoid Right.; 0.5 milliLiter(s); VIS (VIS Published: 09-May-2013, VIS Presented: 10-Feb-2018);

## 2022-08-31 NOTE — DISCHARGE NOTE NURSING/CASE MANAGEMENT/SOCIAL WORK - NSDCPEFALRISK_GEN_ALL_CORE
For information on Fall & Injury Prevention, visit: [email or web address] OR  [email or web address] OR  https://www.cdc.gov/steadi/patient.html

## 2022-09-02 DIAGNOSIS — Z79.52 LONG TERM (CURRENT) USE OF SYSTEMIC STEROIDS: ICD-10-CM

## 2022-09-02 DIAGNOSIS — M06.9 RHEUMATOID ARTHRITIS, UNSPECIFIED: ICD-10-CM

## 2022-09-02 DIAGNOSIS — M19.011 PRIMARY OSTEOARTHRITIS, RIGHT SHOULDER: ICD-10-CM

## 2022-09-02 DIAGNOSIS — F32.A DEPRESSION, UNSPECIFIED: ICD-10-CM

## 2022-09-02 DIAGNOSIS — Z96.653 PRESENCE OF ARTIFICIAL KNEE JOINT, BILATERAL: ICD-10-CM

## 2022-09-02 DIAGNOSIS — Z92.3 PERSONAL HISTORY OF IRRADIATION: ICD-10-CM

## 2022-09-02 DIAGNOSIS — G43.909 MIGRAINE, UNSPECIFIED, NOT INTRACTABLE, WITHOUT STATUS MIGRAINOSUS: ICD-10-CM

## 2022-09-02 DIAGNOSIS — K21.9 GASTRO-ESOPHAGEAL REFLUX DISEASE WITHOUT ESOPHAGITIS: ICD-10-CM

## 2022-09-02 DIAGNOSIS — I10 ESSENTIAL (PRIMARY) HYPERTENSION: ICD-10-CM

## 2022-09-02 DIAGNOSIS — Z96.642 PRESENCE OF LEFT ARTIFICIAL HIP JOINT: ICD-10-CM

## 2022-09-02 DIAGNOSIS — Z85.3 PERSONAL HISTORY OF MALIGNANT NEOPLASM OF BREAST: ICD-10-CM

## 2022-09-02 DIAGNOSIS — Z79.811 LONG TERM (CURRENT) USE OF AROMATASE INHIBITORS: ICD-10-CM

## 2022-09-02 DIAGNOSIS — Z79.82 LONG TERM (CURRENT) USE OF ASPIRIN: ICD-10-CM

## 2022-09-02 DIAGNOSIS — E78.5 HYPERLIPIDEMIA, UNSPECIFIED: ICD-10-CM

## 2022-09-02 DIAGNOSIS — R33.9 RETENTION OF URINE, UNSPECIFIED: ICD-10-CM

## 2022-09-02 DIAGNOSIS — Z79.02 LONG TERM (CURRENT) USE OF ANTITHROMBOTICS/ANTIPLATELETS: ICD-10-CM

## 2022-09-02 DIAGNOSIS — M35.3 POLYMYALGIA RHEUMATICA: ICD-10-CM

## 2022-09-13 ENCOUNTER — APPOINTMENT (OUTPATIENT)
Dept: ORTHOPEDIC SURGERY | Facility: CLINIC | Age: 82
End: 2022-09-13
Payer: MEDICARE

## 2022-09-13 VITALS — BODY MASS INDEX: 22.66 KG/M2 | WEIGHT: 136 LBS | HEIGHT: 65 IN

## 2022-09-13 PROBLEM — Z95.818 PRESENCE OF OTHER CARDIAC IMPLANTS AND GRAFTS: Chronic | Status: ACTIVE | Noted: 2022-08-09

## 2022-09-13 PROCEDURE — 73030 X-RAY EXAM OF SHOULDER: CPT | Mod: RT

## 2022-09-13 PROCEDURE — 99024 POSTOP FOLLOW-UP VISIT: CPT

## 2022-09-13 NOTE — ASSESSMENT
[FreeTextEntry1] : B/l Gh DJD.\par Now s/p R RSA.\par Xrays reviewed.\par D/c sling.\par PT for PROM in FE/ABD/ER.\par OK for light ADLs, but 1 lb WB.\par No IR.\par RTO 4 weeks.\par

## 2022-09-13 NOTE — IMAGING
[Right] : right shoulder [Components well fixed, in good position] : Components well fixed, in good position

## 2022-09-13 NOTE — HISTORY OF PRESENT ILLNESS
[5] : 5 [2] : 2 [] : yes [Occasional] : occasional [Household chores] : household chores [Sleep] : sleep [de-identified] : DOS:  8/29/22  R RSA\par \par 9/13/22: Here for first post op.  In ing. \par \par 5/10/22: 82 yo RHD female with bilateral shoulder pain since 2020, worse the past year. Right is worse than left. There is pain posterior and anterior. She has pain radiating to her elbow. There is a constant pain, worse with reaching. She saw Dr. Deutsch and had CT and MRI. She takes Mobic. \par \par CT RIGHT SHOULDER:\par 1. Advanced glenohumeral arthropathy with chronic remodeling of the articular surfaces.\par 2. Large glenohumeral joint effusion with innumerable intra-articular bodies, similar to prior MRI.\par 3. Amorphous mineralization along the joint capsule and the rotator cuff footprint may reflect CPPD versus sequela of chronic synovitis.\par 4. Moderate acromioclavicular joint arthropathy.\par 5. Subacromial-subdeltoid bursitis.\par 6. Diffusely diminutive rotator cuff tendons and muscle atrophy may reflect underlying tendon tear as described on prior MRI.\par 7. Lateral downsloping of the acromion with subacromial spurring can contribute to impingement like symptoms in the appropriate clinical setting.\par 8. Similar appearance of an irregular nodule in the posterior segment of the right upper lobe when compared to prior CT thorax \par \par MRI RIGHT SHOULDER: \par 1.  Advanced glenohumeral arthropathy with chronic remodeling of the glenohumeral articular surfaces, unchanged when compared to prior CT thorax 5/11/2020.\par 2.  Rotator cuff tendinosis and diffuse muscle atrophy as described. Diffusely diminutive supraspinatus with high-grade partial-thickness articular surface tear. Mild infraspinatus and subscapularis articular surface fraying. Moderate-grade interstitial tear at the superior subscapularis.\par 3.  Longitudinal split tear of the intra-articular long head of the biceps tendon with distal reconstitution. Mild tenosynovitis. Perched extra-articular segment over the lesser tuberosity.\par 4.  Large glenohumeral joint effusion with synovitis and innumerable intra-articular bodies.\par 5.  Moderate acromioclavicular joint arthropathy.\par 6.  Severe subacromial-subdeltoid bursitis.advanced GH DJD, PRCT, bursitis.\par \par PMHx: HLD, HTN, RA, valve replacement [FreeTextEntry1] : right shoulder  [FreeTextEntry5] : patient feels the same as last visit [FreeTextEntry7] : into the arm

## 2022-10-02 NOTE — PATIENT PROFILE ADULT - NSPROGENBLOODRESTRICT_GEN_A_NUR
25 yo f with pmh of svt s/p ablation, asthma c/o elevated ddimer. Pt states she had an injury to her R calf where she had a tear in the muscle in July and recently started PT after seeing an orthopedist. Pt states her PT did not want to start any treatment until she is ruled out for a DVT. Pt states her pain in her calf radiates up to her thigh. Pt went to [place] yesterday and had a mildly elevated ddimer  (0.49, cutoff 0.50) but was waiting too long and left prior to the CT scan. Pt reports some L rib pain x 6 days, worse with deep breaths. Denies SOB, fever, chills, cough, recent trauma. none

## 2022-10-04 ENCOUNTER — APPOINTMENT (OUTPATIENT)
Dept: ORTHOPEDIC SURGERY | Facility: CLINIC | Age: 82
End: 2022-10-04

## 2022-10-04 VITALS — BODY MASS INDEX: 22.66 KG/M2 | HEIGHT: 65 IN | WEIGHT: 136 LBS

## 2022-10-04 PROCEDURE — 99024 POSTOP FOLLOW-UP VISIT: CPT

## 2022-10-04 NOTE — ASSESSMENT
[FreeTextEntry1] : B/l Gh DJD.\par Now s/p R RSA\par PT for PROM, AROM as tolerated.\par Overhead pulley.\par OK for IR.\par RTO 6 weeks with xrays.\par

## 2022-10-04 NOTE — HISTORY OF PRESENT ILLNESS
[de-identified] : DOS:  8/29/22  R RSA\par \par 10/4/22: Follow up R shoulder. She is in PT and improving.\par \par 9/13/22: Here for first post op.  In sling. \par \par 5/10/22: 80 yo RHD female with bilateral shoulder pain since 2020, worse the past year. Right is worse than left. There is pain posterior and anterior. She has pain radiating to her elbow. There is a constant pain, worse with reaching. She saw Dr. Deutsch and had CT and MRI. She takes Mobic. \par \par CT RIGHT SHOULDER:\par 1. Advanced glenohumeral arthropathy with chronic remodeling of the articular surfaces.\par 2. Large glenohumeral joint effusion with innumerable intra-articular bodies, similar to prior MRI.\par 3. Amorphous mineralization along the joint capsule and the rotator cuff footprint may reflect CPPD versus sequela of chronic synovitis.\par 4. Moderate acromioclavicular joint arthropathy.\par 5. Subacromial-subdeltoid bursitis.\par 6. Diffusely diminutive rotator cuff tendons and muscle atrophy may reflect underlying tendon tear as described on prior MRI.\par 7. Lateral downsloping of the acromion with subacromial spurring can contribute to impingement like symptoms in the appropriate clinical setting.\par 8. Similar appearance of an irregular nodule in the posterior segment of the right upper lobe when compared to prior CT thorax \par \par MRI RIGHT SHOULDER: \par 1.  Advanced glenohumeral arthropathy with chronic remodeling of the glenohumeral articular surfaces, unchanged when compared to prior CT thorax 5/11/2020.\par 2.  Rotator cuff tendinosis and diffuse muscle atrophy as described. Diffusely diminutive supraspinatus with high-grade partial-thickness articular surface tear. Mild infraspinatus and subscapularis articular surface fraying. Moderate-grade interstitial tear at the superior subscapularis.\par 3.  Longitudinal split tear of the intra-articular long head of the biceps tendon with distal reconstitution. Mild tenosynovitis. Perched extra-articular segment over the lesser tuberosity.\par 4.  Large glenohumeral joint effusion with synovitis and innumerable intra-articular bodies.\par 5.  Moderate acromioclavicular joint arthropathy.\par 6.  Severe subacromial-subdeltoid bursitis.advanced GH DJD, PRCT, bursitis.\par \par PMHx: HLD, HTN, RA, valve replacement [FreeTextEntry1] : rt shoulder [de-identified] : physical therapy, sling

## 2022-11-15 ENCOUNTER — APPOINTMENT (OUTPATIENT)
Dept: ORTHOPEDIC SURGERY | Facility: CLINIC | Age: 82
End: 2022-11-15
Payer: MEDICARE

## 2022-11-15 PROCEDURE — 73030 X-RAY EXAM OF SHOULDER: CPT | Mod: RT

## 2022-11-15 PROCEDURE — 99213 OFFICE O/P EST LOW 20 MIN: CPT | Mod: 24,25

## 2022-11-15 PROCEDURE — 20611 DRAIN/INJ JOINT/BURSA W/US: CPT | Mod: 79,LT

## 2022-11-15 PROCEDURE — J3490M: CUSTOM

## 2022-11-15 PROCEDURE — 73010 X-RAY EXAM OF SHOULDER BLADE: CPT | Mod: RT

## 2022-11-15 NOTE — HISTORY OF PRESENT ILLNESS
[2] : 2 [0] : 0 [Retired] : Work status: retired [de-identified] : DOS:  8/29/22  R RSA\par \par 11/15/22: Here for follow up, nearly 3 months post op.    She is having left shoulder pain as well, flared up from PT. \par \par 10/4/22: Follow up R shoulder. She is in PT and improving.\par \par 9/13/22: Here for first post op.  In sling. \par \par 5/10/22: 82 yo RHD female with bilateral shoulder pain since 2020, worse the past year. Right is worse than left. There is pain posterior and anterior. She has pain radiating to her elbow. There is a constant pain, worse with reaching. She saw Dr. Deutsch and had CT and MRI. She takes Mobic. \par \par CT RIGHT SHOULDER:\par 1. Advanced glenohumeral arthropathy with chronic remodeling of the articular surfaces.\par 2. Large glenohumeral joint effusion with innumerable intra-articular bodies, similar to prior MRI.\par 3. Amorphous mineralization along the joint capsule and the rotator cuff footprint may reflect CPPD versus sequela of chronic synovitis.\par 4. Moderate acromioclavicular joint arthropathy.\par 5. Subacromial-subdeltoid bursitis.\par 6. Diffusely diminutive rotator cuff tendons and muscle atrophy may reflect underlying tendon tear as described on prior MRI.\par 7. Lateral downsloping of the acromion with subacromial spurring can contribute to impingement like symptoms in the appropriate clinical setting.\par 8. Similar appearance of an irregular nodule in the posterior segment of the right upper lobe when compared to prior CT thorax \par \par MRI RIGHT SHOULDER: \par 1.  Advanced glenohumeral arthropathy with chronic remodeling of the glenohumeral articular surfaces, unchanged when compared to prior CT thorax 5/11/2020.\par 2.  Rotator cuff tendinosis and diffuse muscle atrophy as described. Diffusely diminutive supraspinatus with high-grade partial-thickness articular surface tear. Mild infraspinatus and subscapularis articular surface fraying. Moderate-grade interstitial tear at the superior subscapularis.\par 3.  Longitudinal split tear of the intra-articular long head of the biceps tendon with distal reconstitution. Mild tenosynovitis. Perched extra-articular segment over the lesser tuberosity.\par 4.  Large glenohumeral joint effusion with synovitis and innumerable intra-articular bodies.\par 5.  Moderate acromioclavicular joint arthropathy.\par 6.  Severe subacromial-subdeltoid bursitis.advanced GH DJD, PRCT, bursitis.\par \par PMHx: HLD, HTN, RA, valve replacement [FreeTextEntry1] : rt shoulder [de-identified] : PT, HEP

## 2022-11-15 NOTE — ASSESSMENT
[FreeTextEntry1] : B/l Gh DJD.\par Now s/p R RSA\par PT for PROM, AROM as tolerated.\par Strengthening as tolerated.\par L GH injection given today.\par RTO 6 weeks.\par \par Procedure Note:\par Large Joint Injection was performed because of pain and inflammation, failure of conservative treatment.  \par Medications:\par Depo-Medrol: 1 cc, 80 mg.\par Lidocaine: 2 cc, 1%. \par Marcaine: 2 cc, .25%. \par \par Medication was injected in the left glenohumeral joint. Patient has tried OTC's including aspirin, Ibuprofen, Aleve etc or prescription NSAIDs, and/or exercises at home and/ or physical therapy without satisfactory response. The risks, benefits, and alternatives to cortisone injection were explained in full to the patient. Risks outlined include but are not limited to infection, sepsis, bleeding, scarring, skin discoloration, temporary increase in pain, syncopal episode, failure to resolve symptoms, allergic reaction, symptom recurrence, and elevation of blood sugar in diabetics. Patient understood the risks. All questions were answered. After discussion of options, patient requested an injection. Oral informed consent was obtained and sterile prep of the injection site was performed using alcohol. Sterile technique was utilized for the procedure including the preparation of the solutions used for the injection. Ethyl chloride spray was used topically.  Sterile technique used. Patient tolerated procedure well. Post Procedure Instructions: Patient was advised to call if redness, pain, or fever occur and apply ice for 15 min. out of every hour for the next 12-24 hours as tolerated. patient was advised to rest the joint(s) for 2 days.\par \par Ultrasound Guidance was used for the following reasons: for Glenohumeral injection. \par Ultrasound guided injection was performed of the shoulder, visualization of the needle and placement of injection was performed without complication.\par \par

## 2022-11-22 ENCOUNTER — EMERGENCY (EMERGENCY)
Facility: HOSPITAL | Age: 82
LOS: 0 days | Discharge: HOME | End: 2022-11-22
Attending: EMERGENCY MEDICINE
Payer: MEDICARE

## 2022-11-22 VITALS
HEART RATE: 81 BPM | DIASTOLIC BLOOD PRESSURE: 71 MMHG | SYSTOLIC BLOOD PRESSURE: 122 MMHG | RESPIRATION RATE: 18 BRPM | OXYGEN SATURATION: 96 % | TEMPERATURE: 98 F

## 2022-11-22 VITALS — WEIGHT: 139.99 LBS | HEIGHT: 64 IN

## 2022-11-22 DIAGNOSIS — Z85.3 PERSONAL HISTORY OF MALIGNANT NEOPLASM OF BREAST: ICD-10-CM

## 2022-11-22 DIAGNOSIS — E78.5 HYPERLIPIDEMIA, UNSPECIFIED: ICD-10-CM

## 2022-11-22 DIAGNOSIS — Z98.89 OTHER SPECIFIED POSTPROCEDURAL STATES: Chronic | ICD-10-CM

## 2022-11-22 DIAGNOSIS — Z98.49 CATARACT EXTRACTION STATUS, UNSPECIFIED EYE: Chronic | ICD-10-CM

## 2022-11-22 DIAGNOSIS — Z96.653 PRESENCE OF ARTIFICIAL KNEE JOINT, BILATERAL: Chronic | ICD-10-CM
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
## 2022-11-22 DIAGNOSIS — Z96.653 PRESENCE OF ARTIFICIAL KNEE JOINT, BILATERAL: ICD-10-CM
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
## 2022-11-22 DIAGNOSIS — Z95.2 PRESENCE OF PROSTHETIC HEART VALVE: Chronic | ICD-10-CM

## 2022-11-22 DIAGNOSIS — Z96.642 PRESENCE OF LEFT ARTIFICIAL HIP JOINT: Chronic | ICD-10-CM

## 2022-11-22 DIAGNOSIS — Z79.82 LONG TERM (CURRENT) USE OF ASPIRIN: ICD-10-CM

## 2022-11-22 DIAGNOSIS — M06.9 RHEUMATOID ARTHRITIS, UNSPECIFIED: ICD-10-CM

## 2022-11-22 DIAGNOSIS — Z98.1 ARTHRODESIS STATUS: Chronic | ICD-10-CM

## 2022-11-22 DIAGNOSIS — S81.811A LACERATION WITHOUT FOREIGN BODY, RIGHT LOWER LEG, INITIAL ENCOUNTER: ICD-10-CM

## 2022-11-22 DIAGNOSIS — Z90.89 ACQUIRED ABSENCE OF OTHER ORGANS: ICD-10-CM

## 2022-11-22 DIAGNOSIS — Z96.642 PRESENCE OF LEFT ARTIFICIAL HIP JOINT: ICD-10-CM

## 2022-11-22 DIAGNOSIS — M19.90 UNSPECIFIED OSTEOARTHRITIS, UNSPECIFIED SITE: ICD-10-CM
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
## 2022-11-22 DIAGNOSIS — Z95.2 PRESENCE OF PROSTHETIC HEART VALVE: ICD-10-CM

## 2022-11-22 DIAGNOSIS — G43.909 MIGRAINE, UNSPECIFIED, NOT INTRACTABLE, WITHOUT STATUS MIGRAINOSUS: ICD-10-CM

## 2022-11-22 PROCEDURE — 99283 EMERGENCY DEPT VISIT LOW MDM: CPT | Mod: 25

## 2022-11-22 PROCEDURE — 12002 RPR S/N/AX/GEN/TRNK2.6-7.5CM: CPT

## 2022-11-22 PROCEDURE — 73590 X-RAY EXAM OF LOWER LEG: CPT | Mod: 50

## 2022-11-22 PROCEDURE — 73590 X-RAY EXAM OF LOWER LEG: CPT | Mod: 26,50

## 2022-11-22 NOTE — ED STATDOCS - NSFOLLOWUPINSTRUCTIONS_ED_ALL_ED_FT
Laceration    WHAT YOU NEED TO KNOW:    A laceration is an injury to the skin and the soft tissue underneath it. Lacerations happen when you are cut or hit by something. They can happen anywhere on the body.     DISCHARGE INSTRUCTIONS:    Return to the emergency department if:     You have heavy bleeding or bleeding that does not stop after 10 minutes of holding firm, direct pressure over the wound.       Your wound opens up.     Contact your healthcare provider if:     You have a fever or chills.       Your laceration is red, warm, or swollen.      You have red streaks on your skin coming from your wound.      You have white or yellow drainage from the wound that smells bad.      You have pain that gets worse, even after treatment.       You have questions or concerns about your condition or care.     Medicines:     Prescription pain medicine may be given. Ask how to take this medicine safely.       Antibiotics help treat or prevent a bacterial infection.       Take your medicine as directed. Contact your healthcare provider if you think your medicine is not helping or if you have side effects. Tell him or her if you are allergic to any medicine. Keep a list of the medicines, vitamins, and herbs you take. Include the amounts, and when and why you take them. Bring the list or the pill bottles to follow-up visits. Carry your medicine list with you in case of an emergency.    Care for your wound as directed:     Do not get your wound wet until your healthcare provider says it is okay. Do not soak your wound in water. Do not go swimming until your healthcare provider says it is okay. Carefully wash the wound with soap and water. Gently pat the area dry or allow it to air dry.       Change your bandages when they get wet, dirty, or after washing. Apply new, clean bandages as directed. Do not apply elastic bandages or tape too tight. Do not put powders or lotions over your incision.       Apply antibiotic ointment as directed. Your healthcare provider may give you antibiotic ointment to put over your wound if you have stitches. If you have strips of tape over your incision, let them dry up and fall off on their own. If they do not fall off within 14 days, gently remove them. If you have glue over your wound, do not remove or pick at it. If your glue comes off, do not replace it with glue that you have at home.       Check your wound every day for signs of infection such as swelling, redness, or pus.     Self-care:     Apply ice on your wound for 15 to 20 minutes every hour or as directed. Use an ice pack, or put crushed ice in a plastic bag. Cover it with a towel. Ice helps prevent tissue damage and decreases swelling and pain.      Use a splint as directed. A splint will decrease movement and stress on your wound. It may help it heal faster. A splint may be used for lacerations over joints or areas of your body that bend. Ask your healthcare provider how to apply and remove a splint.       Decrease scarring of your wound by applying ointments as directed. Do not apply ointments until your healthcare provider says it is okay. You may need to wait until your wound is healed. Ask which ointment to buy and how often to use it. After your wound is healed, use sunscreen over the area when you are out in the sun. You should do this for at least 6 months to 1 year after your injury.     Follow up with your healthcare provider as directed: You may need to follow up in 24 to 48 hours to have your wound checked for infection. You will need to return in 3 to 14 days if you have stitches or staples so they can be removed. Care for your wound as directed to prevent infection and help it heal. Write down your questions so you remember to ask them during your visits.      Hematoma      A hematoma is a collection of blood. A hematoma can happen:  •Under the skin.      •In an organ.      •In a body space.      •In a joint space.      •In other tissues.      The blood can thicken (clot) to form a lump that you can see and feel. The lump is often hard and may become sore and tender. The lump can be very small or very big. Most hematomas get better in a few days to weeks. However, some hematomas may be serious and need medical care.      What are the causes?    This condition is caused by:  •An injury.      •Blood that leaks under the skin.      •Problems from surgeries.      •Medical conditions that cause bleeding or bruising.        What increases the risk?    You are more likely to develop this condition if:  •You are an older adult.      •You use medicines that thin your blood.        What are the signs or symptoms?     Symptoms depend on where the hematoma is in your body.•If the hematoma is under the skin, there is:  •A firm lump on the body.       •Pain and tenderness in the area.       •Bruising. The skin above the lump may be blue, dark blue, purple-red, or yellowish.      •If the hematoma is deep in the tissues or body spaces, there may be:  •Blood in the stomach. This may cause pain in the belly (abdomen), weakness, passing out (fainting), and shortness of breath.      •Blood in the head. This may cause a headache, weakness, trouble speaking or understanding speech, or passing out.          How is this diagnosed?    This condition is diagnosed based on:  •Your medical history.       •A physical exam.       •Imaging tests, such as ultrasound or CT scan.      •Blood tests.        How is this treated?    Treatment depends on the cause, size, and location of the hematoma. Treatment may include:  •Doing nothing. Many hematomas go away on their own without treatment.      •Surgery or close monitoring. This may be needed for large hematomas or hematomas that affect the body's organs.      •Medicines. These may be given if a medical condition caused the hematoma.        Follow these instructions at home:      Managing pain, stiffness, and swelling    •If told, put ice on the area.  •Put ice in a plastic bag.      •Place a towel between your skin and the bag.      •Leave the ice on for 20 minutes, 2–3 times a day for the first two days.      •If told, put heat on the affected area after putting ice on the area for two days. Use the heat source that your doctor tells you to use. This could be a moist heat pack or a heating pad. To do this:  •Place a towel between your skin and the heat source.      •Leave the heat on for 20–30 minutes.      •Remove the heat if your skin turns bright red. This is very important if you are unable to feel pain, heat, or cold. You may have a greater risk of getting burned.        •Raise (elevate) the affected area above the level of your heart while you are sitting or lying down.      •Wrap the affected area with an elastic bandage, if told by your doctor. Do not wrap the bandage too tightly.      •If your hematoma is on a leg or foot and is painful, your doctor may give you crutches. Use them as told by your doctor.      General instructions     •Take over-the-counter and prescription medicines only as told by your doctor.      •Keep all follow-up visits as told by your doctor. This is important.        Contact a doctor if:    •You have a fever.      •The swelling or bruising gets worse.      •You start to get more hematomas.        Get help right away if:    •Your pain gets worse.      •Your pain is not getting better with medicine.      •Your skin over the hematoma breaks or starts to bleed.    •Your hematoma is in your chest or belly and you:  •Pass out.      •Feel weak.      •Become short of breath.      •You have a hematoma on your scalp that is caused by a fall or injury, and you:  •Have a headache that gets worse.      •Have trouble speaking or understanding speech.      •Become less alert or you pass out.          Summary    •A hematoma is a collection of blood in any part of your body.      •Most hematomas get better on their own in a few days to weeks. Some may need medical care.      •Follow instructions from your doctor about how to care for your hematoma.      •Contact a doctor if the swelling or bruising gets worse, or if you are short of breath.      This information is not intended to replace advice given to you by your health care provider. Make sure you discuss any questions you have with your health care provider.

## 2022-11-22 NOTE — ED ADULT TRIAGE NOTE - CHIEF COMPLAINT QUOTE
fell up concrete steps, hit front of shins on concrete. denies head injury. abrasions to hands. pain with ambulation. ambulatory with cane at triage

## 2022-11-22 NOTE — ED STATDOCS - PROGRESS NOTE DETAILS
Pt well appearing.  Imaging unremarkable.  Wounds irrigated and dressed.  Steri strips applied.  D/c home with strict return precautions and prompt outpatient f/u.

## 2022-11-22 NOTE — ED STATDOCS - NS ED ROS FT
Constitutional: nad, well appearing  HEENT:  no nasal congestion, eye drainage or ear pain.    CVS:  no cp  Resp:  No sob, no cough  GI:  no abdominal pain, no nausea or vomiting  :  no dysuria  MSK: no joint pain or limited ROM  Skin: + abrasions, skin tears, and hematoma  Neuro: no change in mental status or level of consciousness  Heme/lymph: no bleeding

## 2022-11-22 NOTE — ED STATDOCS - PHYSICAL EXAMINATION
Constitutional: NAD, well appearing  HEENT: no rhinorrhea, PERRL, no oropharyngeal erythema or exudates, midline uvula.  TMs clear.  CVS:  RRR, no m/r/g  Resp:  CTAB  GI: soft, ntnd  MSK:  no restriction to rom, full ROM to all extremities  Neuro:  A&Ox3, 5/5 strength to all extremities,  SILT to all extremities  Skin: + Skin tear to anterior RLE, neurovascularly intact distally, no active bleeding. LLE with large anterior central hematoma. No other injuries.  psych: clear thought content  Heme/lymph:  No LAD

## 2022-11-22 NOTE — ED STATDOCS - PATIENT PORTAL LINK FT
You can access the FollowMyHealth Patient Portal offered by [facility] by registering at the following website: [email or web address]. By joining [employer]’s FollowMyHealth portal, you will also be able to view your health information using other applications (apps) compatible with our system.

## 2022-11-22 NOTE — ED STATDOCS - OBJECTIVE STATEMENT
82 year old female presents to the ED s/p mechanical trip and fall, no head strike or LOC. Tonight when walking into her house pt tripped on concrete steps, hitting bilateral shins on steps. Pt presenting with abrasions, skin tears, and hematoma on shins. Denies feeling faint or lightheaded prior to fall. Pt endorses that she is prone to skin tears. Pt is on daily Aspirin. Tetanus is up to date. Pt was ambulatory after fall. Denies and history of DM.

## 2022-12-08 ENCOUNTER — APPOINTMENT (OUTPATIENT)
Dept: INTERNAL MEDICINE | Facility: CLINIC | Age: 82
End: 2022-12-08

## 2022-12-08 ENCOUNTER — NON-APPOINTMENT (OUTPATIENT)
Age: 82
End: 2022-12-08

## 2022-12-08 VITALS
SYSTOLIC BLOOD PRESSURE: 110 MMHG | HEART RATE: 84 BPM | OXYGEN SATURATION: 95 % | BODY MASS INDEX: 23.32 KG/M2 | HEIGHT: 65 IN | DIASTOLIC BLOOD PRESSURE: 68 MMHG | WEIGHT: 140 LBS | TEMPERATURE: 97.8 F

## 2022-12-08 PROCEDURE — 94010 BREATHING CAPACITY TEST: CPT

## 2022-12-08 PROCEDURE — 99214 OFFICE O/P EST MOD 30 MIN: CPT | Mod: 25

## 2022-12-08 RX ORDER — CELECOXIB 200 MG/1
200 CAPSULE ORAL
Refills: 0 | Status: DISCONTINUED | COMMUNITY
End: 2022-12-08

## 2022-12-08 RX ORDER — MELOXICAM 7.5 MG/1
7.5 TABLET ORAL TWICE DAILY
Qty: 60 | Refills: 0 | Status: DISCONTINUED | COMMUNITY
Start: 2022-04-27 | End: 2022-12-08

## 2022-12-08 NOTE — HISTORY OF PRESENT ILLNESS
[TextBox_4] : Ms. Roa presents for a follow-up evaluation accompanied by her son.  She is an 82-year-old female with history of pulmonary nodules on CT scan of the chest.  She was last seen in this office on 5/21/2020.  At that time Mrs. Khalil was also diagnosed with mild, persistent asthma.  She was given a trial of Breo Ellipta followed by Advair discus.  She had adverse reactions to both inhalers.  She states that she had racing heart and felt "off".  She is now complaining of recurrent episodes of shortness of breath with exertion.  Activities such as going up stairs, going uphill or carrying objects or shortness of breath.  She does get some shortness of breath with regular exertion as well.  Symptoms resolved with rest.  She has no nocturnal symptoms of cough or dyspnea.  She has not been on metered-dose inhaler therapy.

## 2022-12-08 NOTE — DISCUSSION/SUMMARY
[FreeTextEntry1] : Mrs. Roa presents for a follow-up evaluation.\par Patient will be started on Symbicort 160/4.5 mcg 2 puffs twice daily for her obstructive lung disease.\par Mrs. Roa will also go for a repeat CT scan of the chest to assess for stability of previously noted pulmonary nodules.\par Follow-up as scheduled.

## 2022-12-08 NOTE — PROCEDURE
[FreeTextEntry1] : Spirometry was performed.  FEV1 is 1.09 L which is 56% predicted.  FVC is 1.61 L which is 62% predicted.  FEV1/FVC ratio 68%.  FEF 25/75 0.71 L/s which is 54% predicted.  PEF is 2.08 L/s which is 45% predicted.

## 2023-01-03 ENCOUNTER — APPOINTMENT (OUTPATIENT)
Dept: ORTHOPEDIC SURGERY | Facility: CLINIC | Age: 83
End: 2023-01-03
Payer: MEDICARE

## 2023-01-03 VITALS — BODY MASS INDEX: 23.32 KG/M2 | WEIGHT: 140 LBS | HEIGHT: 65 IN

## 2023-01-03 PROCEDURE — 99213 OFFICE O/P EST LOW 20 MIN: CPT

## 2023-01-03 NOTE — ASSESSMENT
[FreeTextEntry1] : B/l Gh DJD.\par Now s/p R RSA\par PT for PROM, AROM as tolerated.\par Strengthening as tolerated.\par L GH injection given 11/15/22, temporary relief.\par RTO 6 weeks repeat right shoulder xray.\par

## 2023-01-03 NOTE — PHYSICAL EXAM
[Right] : right shoulder [] : no tenderness over posterior shoulder [FreeTextEntry8] : acromion, spine of scapula non-tender [FreeTextEntry9] : FE 120P\par ER 30P\par IR: L4

## 2023-01-03 NOTE — HISTORY OF PRESENT ILLNESS
[2] : 2 [0] : 0 [Retired] : Work status: retired [de-identified] : DOS:  8/29/22  R RSA\par \par 1/3/23: Here for follow up, about 4 months. She is in PT. She states left shoulder injection helped temporarily.  She is having a little more soreness in the right shoulder and upper arm.\par \par 11/15/22: Here for follow up, nearly 3 months post op.    She is having left shoulder pain as well, flared up from PT. \par \par 10/4/22: Follow up R shoulder. She is in PT and improving.\par \par 9/13/22: Here for first post op.  In sling. \par \par 5/10/22: 82 yo RHD female with bilateral shoulder pain since 2020, worse the past year. Right is worse than left. There is pain posterior and anterior. She has pain radiating to her elbow. There is a constant pain, worse with reaching. She saw Dr. Deutsch and had CT and MRI. She takes Mobic. \par \par CT RIGHT SHOULDER:\par 1. Advanced glenohumeral arthropathy with chronic remodeling of the articular surfaces.\par 2. Large glenohumeral joint effusion with innumerable intra-articular bodies, similar to prior MRI.\par 3. Amorphous mineralization along the joint capsule and the rotator cuff footprint may reflect CPPD versus sequela of chronic synovitis.\par 4. Moderate acromioclavicular joint arthropathy.\par 5. Subacromial-subdeltoid bursitis.\par 6. Diffusely diminutive rotator cuff tendons and muscle atrophy may reflect underlying tendon tear as described on prior MRI.\par 7. Lateral downsloping of the acromion with subacromial spurring can contribute to impingement like symptoms in the appropriate clinical setting.\par 8. Similar appearance of an irregular nodule in the posterior segment of the right upper lobe when compared to prior CT thorax \par \par MRI RIGHT SHOULDER: \par 1.  Advanced glenohumeral arthropathy with chronic remodeling of the glenohumeral articular surfaces, unchanged when compared to prior CT thorax 5/11/2020.\par 2.  Rotator cuff tendinosis and diffuse muscle atrophy as described. Diffusely diminutive supraspinatus with high-grade partial-thickness articular surface tear. Mild infraspinatus and subscapularis articular surface fraying. Moderate-grade interstitial tear at the superior subscapularis.\par 3.  Longitudinal split tear of the intra-articular long head of the biceps tendon with distal reconstitution. Mild tenosynovitis. Perched extra-articular segment over the lesser tuberosity.\par 4.  Large glenohumeral joint effusion with synovitis and innumerable intra-articular bodies.\par 5.  Moderate acromioclavicular joint arthropathy.\par 6.  Severe subacromial-subdeltoid bursitis.advanced GH DJD, PRCT, bursitis.\par \par PMHx: HLD, HTN, RA, valve replacement [FreeTextEntry1] : rt shoulder [de-identified] : PT, HEP

## 2023-01-24 NOTE — DIETITIAN INITIAL EVALUATION ADULT. - WEIGHT CHANGE
Negative on exam for nerve impingement  Start cyclobenzaprine and medrol   Heat apps  Exercises given   F/u in 1 mon    
yes

## 2023-01-31 RX ORDER — SUMATRIPTAN SUCCINATE 4 MG/.5ML
1 INJECTION, SOLUTION SUBCUTANEOUS
Qty: 0 | Refills: 0 | DISCHARGE

## 2023-02-01 ENCOUNTER — OUTPATIENT (OUTPATIENT)
Dept: OUTPATIENT SERVICES | Facility: HOSPITAL | Age: 83
LOS: 1 days | End: 2023-02-01
Payer: MEDICARE

## 2023-02-01 DIAGNOSIS — Z95.2 PRESENCE OF PROSTHETIC HEART VALVE: Chronic | ICD-10-CM

## 2023-02-01 DIAGNOSIS — Z96.653 PRESENCE OF ARTIFICIAL KNEE JOINT, BILATERAL: Chronic | ICD-10-CM

## 2023-02-01 DIAGNOSIS — Z98.49 CATARACT EXTRACTION STATUS, UNSPECIFIED EYE: Chronic | ICD-10-CM

## 2023-02-01 DIAGNOSIS — Z98.89 OTHER SPECIFIED POSTPROCEDURAL STATES: Chronic | ICD-10-CM

## 2023-02-01 DIAGNOSIS — Z96.642 PRESENCE OF LEFT ARTIFICIAL HIP JOINT: Chronic | ICD-10-CM

## 2023-02-01 DIAGNOSIS — Z98.1 ARTHRODESIS STATUS: Chronic | ICD-10-CM

## 2023-02-01 PROCEDURE — 93320 DOPPLER ECHO COMPLETE: CPT

## 2023-02-01 PROCEDURE — 93312 ECHO TRANSESOPHAGEAL: CPT

## 2023-02-01 PROCEDURE — 93325 DOPPLER ECHO COLOR FLOW MAPG: CPT

## 2023-02-07 DIAGNOSIS — I33.0 ACUTE AND SUBACUTE INFECTIVE ENDOCARDITIS: ICD-10-CM

## 2023-02-08 ENCOUNTER — NON-APPOINTMENT (OUTPATIENT)
Age: 83
End: 2023-02-08

## 2023-02-08 ENCOUNTER — APPOINTMENT (OUTPATIENT)
Dept: INTERNAL MEDICINE | Facility: CLINIC | Age: 83
End: 2023-02-08
Payer: MEDICARE

## 2023-02-08 VITALS
DIASTOLIC BLOOD PRESSURE: 60 MMHG | WEIGHT: 132 LBS | BODY MASS INDEX: 21.99 KG/M2 | HEIGHT: 65 IN | OXYGEN SATURATION: 95 % | SYSTOLIC BLOOD PRESSURE: 106 MMHG | HEART RATE: 86 BPM | TEMPERATURE: 97.7 F

## 2023-02-08 DIAGNOSIS — I33.0 ACUTE AND SUBACUTE INFECTIVE ENDOCARDITIS: ICD-10-CM

## 2023-02-08 PROCEDURE — 99214 OFFICE O/P EST MOD 30 MIN: CPT | Mod: 25

## 2023-02-08 PROCEDURE — 94060 EVALUATION OF WHEEZING: CPT

## 2023-02-08 RX ORDER — BUDESONIDE AND FORMOTEROL FUMARATE DIHYDRATE 160; 4.5 UG/1; UG/1
160-4.5 AEROSOL RESPIRATORY (INHALATION) TWICE DAILY
Qty: 1 | Refills: 5 | Status: ACTIVE | COMMUNITY
Start: 2022-12-08 | End: 1900-01-01

## 2023-02-08 RX ORDER — CARVEDILOL 6.25 MG/1
6.25 TABLET, FILM COATED ORAL
Qty: 1 | Refills: 0 | Status: ACTIVE | COMMUNITY

## 2023-02-08 RX ORDER — TRAMADOL HYDROCHLORIDE 25 MG/1
TABLET, COATED ORAL
Refills: 0 | Status: DISCONTINUED | COMMUNITY
End: 2023-02-08

## 2023-02-08 RX ORDER — SUMATRIPTAN 100 MG/1
100 TABLET, FILM COATED ORAL
Refills: 0 | Status: DISCONTINUED | COMMUNITY
End: 2023-02-08

## 2023-02-08 RX ORDER — TOLTERODINE TARTRATE 1 MG/1
1 TABLET, FILM COATED ORAL
Refills: 0 | Status: DISCONTINUED | COMMUNITY
End: 2023-02-08

## 2023-02-08 RX ORDER — NITROFURANTOIN MACROCRYSTALS 100 MG/1
100 CAPSULE ORAL 3 TIMES DAILY
Qty: 15 | Refills: 0 | Status: DISCONTINUED | COMMUNITY
Start: 2021-05-12 | End: 2023-02-08

## 2023-02-08 NOTE — PROCEDURE
[FreeTextEntry1] : Spirometry pre and postbronchodilator therapy was performed.\par FEV1 is 0.91 L which is 47% predicted.\par FVC is 1.33 L which is 51% predicted.\par FEV1/FVC ratio 68%.\par FEF 25/75% is 0.62 L/s which is 48% predicted.\par PEF is 1.58 L/s which is 34% predicted.

## 2023-02-08 NOTE — DISCUSSION/SUMMARY
[FreeTextEntry1] : Mrs. Triplett presents for follow-up evaluation.  She continues to have shortness of breath with exertion.  Cardiac work-up has been negative.  Spirometry does show evidence of mild to moderate obstructive lung disease.  Patient is a non-smoker.  I have recommended she restart Symbicort 160/4.5 mcg 2 puffs twice daily.  Patient has also been referred for pulmonary rehabilitation.\par CT scan of the chest shows resolution of some nodules as well as evidence of new nodules.  She will have a repeat CT scan in June.  Follow-up in this office in 4 months.

## 2023-02-08 NOTE — HISTORY OF PRESENT ILLNESS
[TextBox_4] : Mrs. Roa presents for a follow-up evaluation accompanied by her daughter.  She continues to have shortness of breath with minimal exertion.  Activities such as walking a few yards will cause dyspnea.  Going up stairs or up an incline will also cause shortness of breath.  The symptoms do resolve with rest.  Mrs. Roa had a transesophageal echocardiogram approximately 1 week ago which showed no significant abnormalities.  Her mitral valve replacement is functioning normally.  Patient states she discontinued Symbicort after the first inhaler ran out because she did not feel it was helping to improve her breathing.\par Patient had a repeat CT scan of the chest on 12/12/2022 for follow-up of pulmonary nodules.  She has a history of breast cancer.  CAT scan showed resolution of a left lower lobe nodule and a right lower lobe nodule.  There were also 3 new nodules noted in the right lung.  A right upper lobe 9 x 8 mm nodule was unchanged.

## 2023-02-14 ENCOUNTER — APPOINTMENT (OUTPATIENT)
Dept: ORTHOPEDIC SURGERY | Facility: CLINIC | Age: 83
End: 2023-02-14
Payer: MEDICARE

## 2023-02-14 VITALS — BODY MASS INDEX: 21.99 KG/M2 | HEIGHT: 65 IN | WEIGHT: 132 LBS

## 2023-02-14 PROCEDURE — 99214 OFFICE O/P EST MOD 30 MIN: CPT

## 2023-02-14 PROCEDURE — 73010 X-RAY EXAM OF SHOULDER BLADE: CPT | Mod: 50

## 2023-02-14 PROCEDURE — 73030 X-RAY EXAM OF SHOULDER: CPT | Mod: 50

## 2023-02-14 NOTE — ASSESSMENT
[FreeTextEntry1] : B/l Gh DJD.\par Now s/p R RSA\par PT for PROM, AROM as tolerated.\par Strengthening as tolerated.\par L GH injection given 11/15/22, temporary relief.\par Repeat R shoulder xrays in Aug.\par \par Will get CT with 3D recons L shoulder to plan for surgery.\par Follow up in 3-4 months to plan for surgery L shoulder.

## 2023-02-14 NOTE — HISTORY OF PRESENT ILLNESS
[5] : 5 [Sharp] : sharp [Tightness] : tightness [Full time] : Work status: full time [de-identified] : DOS:  8/29/22  R RSA\par \par 2/14/23: Here for follow up. She reports some pain in her right shoulder when reaching across her body but improving. Her left is still painful.\par \par 1/3/23: Here for follow up, about 4 months. She is in PT. She states left shoulder injection helped temporarily.  She is having a little more soreness in the right shoulder and upper arm.\par \par 11/15/22: Here for follow up, nearly 3 months post op.    She is having left shoulder pain as well, flared up from PT. \par \par 10/4/22: Follow up R shoulder. She is in PT and improving.\par \par 9/13/22: Here for first post op.  In sling. \par \par 5/10/22: 82 yo RHD female with bilateral shoulder pain since 2020, worse the past year. Right is worse than left. There is pain posterior and anterior. She has pain radiating to her elbow. There is a constant pain, worse with reaching. She saw Dr. Deutsch and had CT and MRI. She takes Mobic. \par \par CT RIGHT SHOULDER:\par 1. Advanced glenohumeral arthropathy with chronic remodeling of the articular surfaces.\par 2. Large glenohumeral joint effusion with innumerable intra-articular bodies, similar to prior MRI.\par 3. Amorphous mineralization along the joint capsule and the rotator cuff footprint may reflect CPPD versus sequela of chronic synovitis.\par 4. Moderate acromioclavicular joint arthropathy.\par 5. Subacromial-subdeltoid bursitis.\par 6. Diffusely diminutive rotator cuff tendons and muscle atrophy may reflect underlying tendon tear as described on prior MRI.\par 7. Lateral downsloping of the acromion with subacromial spurring can contribute to impingement like symptoms in the appropriate clinical setting.\par 8. Similar appearance of an irregular nodule in the posterior segment of the right upper lobe when compared to prior CT thorax \par \par MRI RIGHT SHOULDER: \par 1.  Advanced glenohumeral arthropathy with chronic remodeling of the glenohumeral articular surfaces, unchanged when compared to prior CT thorax 5/11/2020.\par 2.  Rotator cuff tendinosis and diffuse muscle atrophy as described. Diffusely diminutive supraspinatus with high-grade partial-thickness articular surface tear. Mild infraspinatus and subscapularis articular surface fraying. Moderate-grade interstitial tear at the superior subscapularis.\par 3.  Longitudinal split tear of the intra-articular long head of the biceps tendon with distal reconstitution. Mild tenosynovitis. Perched extra-articular segment over the lesser tuberosity.\par 4.  Large glenohumeral joint effusion with synovitis and innumerable intra-articular bodies.\par 5.  Moderate acromioclavicular joint arthropathy.\par 6.  Severe subacromial-subdeltoid bursitis.advanced GH DJD, PRCT, bursitis.\par \par PMHx: HLD, HTN, RA, valve replacement [] : no [FreeTextEntry1] : L shoulder [FreeTextEntry5] : no injury [de-identified] : none

## 2023-02-14 NOTE — PHYSICAL EXAM
[Right] : right shoulder [] : no tenderness over posterior shoulder [FreeTextEntry8] : acromion, spine of scapula non-tender [FreeTextEntry9] : FE R 140P\par ER R 40P\par IR R sacrum\par IR: L4

## 2023-02-14 NOTE — IMAGING
[Bilateral] : shoulder bilaterally [Glenohumeral arthritis] : Glenohumeral arthritis [Components well fixed, in good position] : Components well fixed, in good position

## 2023-03-28 NOTE — ED PROVIDER NOTE - CONSTITUTIONAL, MLM
normal... Elderly WF, awake, alert, no respiratory discomfort, + in acute distress due to L hip pain. electronic

## 2023-05-09 NOTE — ED ADULT TRIAGE NOTE - NS ED TRIAGE AVPU SCALE
Impression: Age-related nuclear cataract, bilateral: H25.13. Plan: Discussed diagnosis in detail with patient. No treatment is required at this time. Will continue to observe condition and or symptoms. Patient instructed to call if condition gets worse.
Impression: Tear film insufficiency of bilateral lacrimal glands: H04.123. Plan: Discussed diagnosis with patient. Recommend artificial tears in OU for comfort, 3-4x's a day.
Alert-The patient is alert, awake and responds to voice. The patient is oriented to time, place, and person. The triage nurse is able to obtain subjective information.

## 2023-06-08 ENCOUNTER — RESULT REVIEW (OUTPATIENT)
Age: 83
End: 2023-06-08

## 2023-06-08 ENCOUNTER — APPOINTMENT (OUTPATIENT)
Dept: CT IMAGING | Facility: CLINIC | Age: 83
End: 2023-06-08
Payer: MEDICARE

## 2023-06-08 ENCOUNTER — OUTPATIENT (OUTPATIENT)
Dept: OUTPATIENT SERVICES | Facility: HOSPITAL | Age: 83
LOS: 1 days | End: 2023-06-08
Payer: MEDICARE

## 2023-06-08 DIAGNOSIS — Z98.89 OTHER SPECIFIED POSTPROCEDURAL STATES: Chronic | ICD-10-CM

## 2023-06-08 DIAGNOSIS — Z96.642 PRESENCE OF LEFT ARTIFICIAL HIP JOINT: Chronic | ICD-10-CM

## 2023-06-08 DIAGNOSIS — M19.012 PRIMARY OSTEOARTHRITIS, LEFT SHOULDER: ICD-10-CM

## 2023-06-08 DIAGNOSIS — Z98.49 CATARACT EXTRACTION STATUS, UNSPECIFIED EYE: Chronic | ICD-10-CM

## 2023-06-08 DIAGNOSIS — Z96.653 PRESENCE OF ARTIFICIAL KNEE JOINT, BILATERAL: Chronic | ICD-10-CM

## 2023-06-08 DIAGNOSIS — Z95.2 PRESENCE OF PROSTHETIC HEART VALVE: Chronic | ICD-10-CM

## 2023-06-08 PROCEDURE — 71250 CT THORAX DX C-: CPT | Mod: MH

## 2023-06-08 PROCEDURE — 76376 3D RENDER W/INTRP POSTPROCES: CPT | Mod: 26

## 2023-06-08 PROCEDURE — 73200 CT UPPER EXTREMITY W/O DYE: CPT | Mod: 26,LT,MH

## 2023-06-08 PROCEDURE — 76376 3D RENDER W/INTRP POSTPROCES: CPT

## 2023-06-08 PROCEDURE — 73200 CT UPPER EXTREMITY W/O DYE: CPT | Mod: MH

## 2023-06-08 PROCEDURE — 71250 CT THORAX DX C-: CPT | Mod: 26,MH

## 2023-06-12 ENCOUNTER — NON-APPOINTMENT (OUTPATIENT)
Age: 83
End: 2023-06-12

## 2023-06-12 ENCOUNTER — APPOINTMENT (OUTPATIENT)
Dept: INTERNAL MEDICINE | Facility: CLINIC | Age: 83
End: 2023-06-12
Payer: MEDICARE

## 2023-06-12 VITALS
HEART RATE: 90 BPM | BODY MASS INDEX: 22.49 KG/M2 | HEIGHT: 65 IN | RESPIRATION RATE: 16 BRPM | OXYGEN SATURATION: 93 % | DIASTOLIC BLOOD PRESSURE: 70 MMHG | WEIGHT: 135 LBS | TEMPERATURE: 98.6 F | SYSTOLIC BLOOD PRESSURE: 124 MMHG

## 2023-06-12 PROCEDURE — 94060 EVALUATION OF WHEEZING: CPT

## 2023-06-12 PROCEDURE — 99214 OFFICE O/P EST MOD 30 MIN: CPT | Mod: 25

## 2023-06-12 RX ORDER — ALBUTEROL SULFATE 90 UG/1
108 (90 BASE) INHALANT RESPIRATORY (INHALATION)
Refills: 0 | Status: ACTIVE | COMMUNITY

## 2023-06-12 NOTE — PROCEDURE
[FreeTextEntry1] : Spirometry pre and postbronchodilator therapy was performed.\par FVC 1.51 L which is 58% predicted.  FEV1 0.94 L which is 49% predicted.\par FEV1/FVC ratio 63%.\par FEF 25/75% 0.52 L/s which is 40% predicted.  PEF 1.79 L/s which is 39% predicted.\par Postbronchodilator: FEV1 1.06 L which is 55% predicted.  PEF 1.99 L/s which is 43% predicted.  There is significant bronchodilator response.

## 2023-06-12 NOTE — HISTORY OF PRESENT ILLNESS
[TextBox_4] : Ms. Roa presents for a follow-up evaluation.  She has a history of severe, persistent asthma.  She is a lifelong non-smoker.\par She is currently on Symbicort 160/4.5 mcg 2 puffs twice daily.\par Patient just completed 3 months of pulmonary rehabilitation.  Treadmill exercise testing showed no evidence of oxygen desaturation with exercise.\par Mrs. Roa continues to complain of weakness and fatigue.  She does not have any specific shortness of breath.  Patient does have a history of polymyalgia rheumatica.  She has been on long-term steroid therapy.  Currently she is on 5 mg/day.  Her rheumatologist is Dr. Chin.\par Patient had a CT scan of the chest on 6/8/2023.  Official reading is not yet available.\par \par \par \par

## 2023-06-12 NOTE — DISCUSSION/SUMMARY
[FreeTextEntry1] : Ms. Roa presents for follow-up evaluation.\par \par 1.  She will continue on Symbicort 160/4.5 mcg 2 puffs twice daily.  I have given her an albuterol inhaler for rescue therapy.  She has been on chronic steroid therapy for many years.  She has polymyalgia rheumatica.  She is currently on prednisone 5 mg daily.\par \par 2.  Data from her last session of pulmonary rehab was reviewed.  The patient did not have significant oxygen desaturation with exercise.\par \par 3.  CT scan of the chest will be reviewed.  Official report is pending.\par \par 4.  Follow-up in 6 months with complete pulmonary function test.

## 2023-06-22 ENCOUNTER — NON-APPOINTMENT (OUTPATIENT)
Age: 83
End: 2023-06-22

## 2023-06-27 ENCOUNTER — APPOINTMENT (OUTPATIENT)
Dept: INTERNAL MEDICINE | Facility: CLINIC | Age: 83
End: 2023-06-27
Payer: MEDICARE

## 2023-06-27 VITALS
DIASTOLIC BLOOD PRESSURE: 71 MMHG | RESPIRATION RATE: 16 BRPM | HEART RATE: 80 BPM | OXYGEN SATURATION: 94 % | BODY MASS INDEX: 22.82 KG/M2 | HEIGHT: 65 IN | SYSTOLIC BLOOD PRESSURE: 124 MMHG | WEIGHT: 137 LBS | TEMPERATURE: 97.7 F

## 2023-06-27 PROCEDURE — 94727 GAS DIL/WSHOT DETER LNG VOL: CPT

## 2023-06-27 PROCEDURE — 94060 EVALUATION OF WHEEZING: CPT

## 2023-06-27 PROCEDURE — 94729 DIFFUSING CAPACITY: CPT

## 2023-07-18 ENCOUNTER — APPOINTMENT (OUTPATIENT)
Dept: ORTHOPEDIC SURGERY | Facility: CLINIC | Age: 83
End: 2023-07-18
Payer: MEDICARE

## 2023-07-18 VITALS — WEIGHT: 137 LBS | HEIGHT: 65 IN | BODY MASS INDEX: 22.82 KG/M2

## 2023-07-18 PROCEDURE — 99215 OFFICE O/P EST HI 40 MIN: CPT | Mod: 57

## 2023-07-18 NOTE — HISTORY OF PRESENT ILLNESS
[Dull/Aching] : dull/aching [de-identified] : DOS:  8/29/22  R RSA\par \par 7/18/23:  Here for follow up.  She has trouble reaching up to her head, to do her hair and to get dressed.  She continues to have pain on the left.  \par \par CT L shoulder:\par Severe glenohumeral arthrosis as described above.\par Findings suspicious for chronic high-grade partial-thickness rotator cuff tearing with muscle atrophy.\par Subacute acromial fracture without osseous healing \par \par 2/14/23: Here for follow up. She reports some pain in her right shoulder when reaching across her body but improving. Her left is still painful.\par \par 1/3/23: Here for follow up, about 4 months. She is in PT. She states left shoulder injection helped temporarily.  She is having a little more soreness in the right shoulder and upper arm.\par \par 11/15/22: Here for follow up, nearly 3 months post op.    She is having left shoulder pain as well, flared up from PT. \par \par 10/4/22: Follow up R shoulder. She is in PT and improving.\par \par 9/13/22: Here for first post op.  In sling. \par \par 5/10/22: 82 yo RHD female with bilateral shoulder pain since 2020, worse the past year. Right is worse than left. There is pain posterior and anterior. She has pain radiating to her elbow. There is a constant pain, worse with reaching. She saw Dr. Deutsch and had CT and MRI. She takes Mobic. \par \par CT RIGHT SHOULDER:\par 1. Advanced glenohumeral arthropathy with chronic remodeling of the articular surfaces.\par 2. Large glenohumeral joint effusion with innumerable intra-articular bodies, similar to prior MRI.\par 3. Amorphous mineralization along the joint capsule and the rotator cuff footprint may reflect CPPD versus sequela of chronic synovitis.\par 4. Moderate acromioclavicular joint arthropathy.\par 5. Subacromial-subdeltoid bursitis.\par 6. Diffusely diminutive rotator cuff tendons and muscle atrophy may reflect underlying tendon tear as described on prior MRI.\par 7. Lateral downsloping of the acromion with subacromial spurring can contribute to impingement like symptoms in the appropriate clinical setting.\par 8. Similar appearance of an irregular nodule in the posterior segment of the right upper lobe when compared to prior CT thorax \par \par MRI RIGHT SHOULDER: \par 1.  Advanced glenohumeral arthropathy with chronic remodeling of the glenohumeral articular surfaces, unchanged when compared to prior CT thorax 5/11/2020.\par 2.  Rotator cuff tendinosis and diffuse muscle atrophy as described. Diffusely diminutive supraspinatus with high-grade partial-thickness articular surface tear. Mild infraspinatus and subscapularis articular surface fraying. Moderate-grade interstitial tear at the superior subscapularis.\par 3.  Longitudinal split tear of the intra-articular long head of the biceps tendon with distal reconstitution. Mild tenosynovitis. Perched extra-articular segment over the lesser tuberosity.\par 4.  Large glenohumeral joint effusion with synovitis and innumerable intra-articular bodies.\par 5.  Moderate acromioclavicular joint arthropathy.\par 6.  Severe subacromial-subdeltoid bursitis.advanced GH DJD, PRCT, bursitis.\par \par PMHx: HLD, HTN, RA, valve replacement [] : no [FreeTextEntry1] : Lft shoulder [FreeTextEntry5] : reports limited range of motion

## 2023-07-18 NOTE — DATA REVIEWED
[CT Scan] : CT scan [Right] : of the right [Shoulder] : shoulder [I independently reviewed and interpreted images and report] : I independently reviewed and interpreted images and report [FreeTextEntry1] : acromial fracture, GH DJD with retroversion

## 2023-07-18 NOTE — ASSESSMENT
[FreeTextEntry1] : B/l Gh DJD.\par Now s/p R RSA\par PT for PROM, AROM as tolerated.\par Strengthening as tolerated.\par L GH injection given 11/15/22, temporary relief.\par Repeat R shoulder xrays in Aug.\par \par Will get CT with 3D recons L shoulder to plan for surgery.\par CT L shoulder: acromial fracture, GH DJD with retroversion.\par Discussed L RSA with augmented baseplate.\par Discussed inferior results with os acromiale/fracture.\par She wants to schedule surgery.

## 2023-08-22 ENCOUNTER — OUTPATIENT (OUTPATIENT)
Dept: OUTPATIENT SERVICES | Facility: HOSPITAL | Age: 83
LOS: 1 days | Discharge: HOME | End: 2023-08-22
Payer: MEDICARE

## 2023-08-22 VITALS
OXYGEN SATURATION: 98 % | HEART RATE: 81 BPM | DIASTOLIC BLOOD PRESSURE: 66 MMHG | SYSTOLIC BLOOD PRESSURE: 122 MMHG | TEMPERATURE: 98 F | RESPIRATION RATE: 18 BRPM | HEIGHT: 65 IN | WEIGHT: 130.95 LBS

## 2023-08-22 DIAGNOSIS — Z98.89 OTHER SPECIFIED POSTPROCEDURAL STATES: Chronic | ICD-10-CM

## 2023-08-22 DIAGNOSIS — Z01.818 ENCOUNTER FOR OTHER PREPROCEDURAL EXAMINATION: ICD-10-CM

## 2023-08-22 DIAGNOSIS — Z96.642 PRESENCE OF LEFT ARTIFICIAL HIP JOINT: Chronic | ICD-10-CM

## 2023-08-22 DIAGNOSIS — Z01.812 ENCOUNTER FOR PREPROCEDURAL LABORATORY EXAMINATION: ICD-10-CM

## 2023-08-22 DIAGNOSIS — Z98.49 CATARACT EXTRACTION STATUS, UNSPECIFIED EYE: Chronic | ICD-10-CM

## 2023-08-22 DIAGNOSIS — Z96.653 PRESENCE OF ARTIFICIAL KNEE JOINT, BILATERAL: Chronic | ICD-10-CM

## 2023-08-22 DIAGNOSIS — Z96.611 PRESENCE OF RIGHT ARTIFICIAL SHOULDER JOINT: Chronic | ICD-10-CM

## 2023-08-22 DIAGNOSIS — Z98.1 ARTHRODESIS STATUS: Chronic | ICD-10-CM

## 2023-08-22 DIAGNOSIS — Z95.2 PRESENCE OF PROSTHETIC HEART VALVE: Chronic | ICD-10-CM

## 2023-08-22 DIAGNOSIS — M19.012 PRIMARY OSTEOARTHRITIS, LEFT SHOULDER: ICD-10-CM

## 2023-08-22 LAB
A1C WITH ESTIMATED AVERAGE GLUCOSE RESULT: 5.8 % — HIGH (ref 4–5.6)
ALBUMIN SERPL ELPH-MCNC: 3.4 G/DL — SIGNIFICANT CHANGE UP (ref 3.3–5)
ALP SERPL-CCNC: 62 U/L — SIGNIFICANT CHANGE UP (ref 40–120)
ALT FLD-CCNC: 35 U/L — SIGNIFICANT CHANGE UP (ref 12–78)
ANION GAP SERPL CALC-SCNC: 6 MMOL/L — SIGNIFICANT CHANGE UP (ref 5–17)
APTT BLD: 28.4 SEC — SIGNIFICANT CHANGE UP (ref 24.5–35.6)
AST SERPL-CCNC: 37 U/L — SIGNIFICANT CHANGE UP (ref 15–37)
BASOPHILS # BLD AUTO: 0.09 K/UL — SIGNIFICANT CHANGE UP (ref 0–0.2)
BASOPHILS NFR BLD AUTO: 0.9 % — SIGNIFICANT CHANGE UP (ref 0–2)
BILIRUB SERPL-MCNC: 0.4 MG/DL — SIGNIFICANT CHANGE UP (ref 0.2–1.2)
BUN SERPL-MCNC: 12 MG/DL — SIGNIFICANT CHANGE UP (ref 7–23)
CALCIUM SERPL-MCNC: 8.7 MG/DL — SIGNIFICANT CHANGE UP (ref 8.5–10.1)
CHLORIDE SERPL-SCNC: 102 MMOL/L — SIGNIFICANT CHANGE UP (ref 96–108)
CO2 SERPL-SCNC: 29 MMOL/L — SIGNIFICANT CHANGE UP (ref 22–31)
CREAT SERPL-MCNC: 0.99 MG/DL — SIGNIFICANT CHANGE UP (ref 0.5–1.3)
EGFR: 56 ML/MIN/1.73M2 — LOW
EOSINOPHIL # BLD AUTO: 0.08 K/UL — SIGNIFICANT CHANGE UP (ref 0–0.5)
EOSINOPHIL NFR BLD AUTO: 0.8 % — SIGNIFICANT CHANGE UP (ref 0–6)
ESTIMATED AVERAGE GLUCOSE: 120 MG/DL — HIGH (ref 68–114)
GLUCOSE SERPL-MCNC: 97 MG/DL — SIGNIFICANT CHANGE UP (ref 70–99)
HCT VFR BLD CALC: 38.7 % — SIGNIFICANT CHANGE UP (ref 34.5–45)
HGB BLD-MCNC: 12.1 G/DL — SIGNIFICANT CHANGE UP (ref 11.5–15.5)
IMM GRANULOCYTES NFR BLD AUTO: 0.5 % — SIGNIFICANT CHANGE UP (ref 0–0.9)
INR BLD: 0.87 RATIO — SIGNIFICANT CHANGE UP (ref 0.85–1.18)
LYMPHOCYTES # BLD AUTO: 0.9 K/UL — LOW (ref 1–3.3)
LYMPHOCYTES # BLD AUTO: 9 % — LOW (ref 13–44)
MCHC RBC-ENTMCNC: 28.8 PG — SIGNIFICANT CHANGE UP (ref 27–34)
MCHC RBC-ENTMCNC: 31.3 G/DL — LOW (ref 32–36)
MCV RBC AUTO: 92.1 FL — SIGNIFICANT CHANGE UP (ref 80–100)
MONOCYTES # BLD AUTO: 0.85 K/UL — SIGNIFICANT CHANGE UP (ref 0–0.9)
MONOCYTES NFR BLD AUTO: 8.5 % — SIGNIFICANT CHANGE UP (ref 2–14)
MRSA PCR RESULT.: SIGNIFICANT CHANGE UP
NEUTROPHILS # BLD AUTO: 8.06 K/UL — HIGH (ref 1.8–7.4)
NEUTROPHILS NFR BLD AUTO: 80.3 % — HIGH (ref 43–77)
NRBC # BLD: 0 /100 WBCS — SIGNIFICANT CHANGE UP (ref 0–0)
PLATELET # BLD AUTO: 230 K/UL — SIGNIFICANT CHANGE UP (ref 150–400)
POTASSIUM SERPL-MCNC: 4.1 MMOL/L — SIGNIFICANT CHANGE UP (ref 3.5–5.3)
POTASSIUM SERPL-SCNC: 4.1 MMOL/L — SIGNIFICANT CHANGE UP (ref 3.5–5.3)
PROT SERPL-MCNC: 7.3 GM/DL — SIGNIFICANT CHANGE UP (ref 6–8.3)
PROTHROM AB SERPL-ACNC: 10.4 SEC — SIGNIFICANT CHANGE UP (ref 9.5–13)
RBC # BLD: 4.2 M/UL — SIGNIFICANT CHANGE UP (ref 3.8–5.2)
RBC # FLD: 14.5 % — SIGNIFICANT CHANGE UP (ref 10.3–14.5)
S AUREUS DNA NOSE QL NAA+PROBE: SIGNIFICANT CHANGE UP
SODIUM SERPL-SCNC: 137 MMOL/L — SIGNIFICANT CHANGE UP (ref 135–145)
VIT D25+D1,25 OH+D1,25 PNL SERPL-MCNC: 66.7 PG/ML — SIGNIFICANT CHANGE UP (ref 19.9–79.3)
WBC # BLD: 10.03 K/UL — SIGNIFICANT CHANGE UP (ref 3.8–10.5)
WBC # FLD AUTO: 10.03 K/UL — SIGNIFICANT CHANGE UP (ref 3.8–10.5)

## 2023-08-22 PROCEDURE — 93010 ELECTROCARDIOGRAM REPORT: CPT

## 2023-08-22 NOTE — H&P PST ADULT - PROBLEM SELECTOR PLAN 1
left reverse shoulder replacement.    Labs - CBC, BMP, PT/PTT, vitamin d, T&S, nose culture and EKG done   Medical eval advised  Preop 3 day antibacterial wash  instruction reviewed and given, MRSA and MSSA screening done today.  Avoid NSAID and OTC supplements for 7 days  Continue all prescribed meds as instructed  NPO after 11pm the day before surgery. Take medicines as advised the morning of surgery with sips of water.  Vaccinated for covid.   verbalized understanding of all instructions. left reverse shoulder replacement.  Labs - CBC, BMP, PT/PTT, vitamin d, T&S, nose culture and EKG done   Medical eval advised  Preop 3 day antibacterial wash  instruction reviewed and given, MRSA and MSSA screening done today.  Avoid NSAID and OTC supplements for 7 days  Continue all prescribed meds as instructed  NPO after 11pm the day before surgery. Take medicines as advised the morning of surgery with sips of water.  Vaccinated for covid.   verbalized understanding of all instructions.

## 2023-08-22 NOTE — H&P PST ADULT - OTHER CARE PROVIDERS
Miguel Campbell   Robert Day  cardio Miguel Campbell  (Cardiologist and GP)  Robert Day  (Rheumatology)

## 2023-08-22 NOTE — H&P PST ADULT - HISTORY OF PRESENT ILLNESS
84 y/o F PMH  HTN, HLD, GERD, migraines, polymyalgia rheumatica ( on prednisone), breast CA. has a loop recorder, presents to PSTwith c/o pain in the left shoulder for few years. Pain getting worse over time. Had right shoulder replacement in Aug 2022. Was seen by Dr Phuong abel done, the left shoulder with severe arthritis. Tylenol or Advil for pain as needed Scheduled for left reverse shoulder replacement on 9/11/23.  Goal: to walk without pain.  Pre op testing today   82 y/o F PMH  HTN, HLD, GERD, migraines, polymyalgia rheumatica ( on prednisone), breast CA. has a loop recorder, presents to PST with c/o pain in the left shoulder for few years. Pain getting worse over time. Had right shoulder replacement in Aug 2022. 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 Lumbar fusion many yrs ago. Was seen by Dr Phuong canchola, the left shoulder with severe arthritis. Tylenol or Advil for pain as needed Scheduled for left reverse shoulder replacement on 9/11/23.  Goal: to walk without pain.  Pre op testing today   82 y/o F PMH  HTN, HLD, GERD, migraines, polymyalgia rheumatica ( on prednisone), breast CA., has a loop recorder for irregular heart beat, presents to PST with c/o pain in the left shoulder for few years. Pain getting worse over time. Had right shoulder replacement in Aug 2022. 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 Lumbar fusion many yrs ago. Was seen by Dr Phuong abel done, the left shoulder with severe arthritis. Tylenol or Advil for pain as needed Scheduled for left reverse shoulder replacement on 9/11/23.  Goal: to walk without pain.  Pre op testing today

## 2023-08-22 NOTE — H&P PST ADULT - CARDIOVASCULAR
details… normal/regular rate and rhythm/S1 S2 present/no gallops/no rub/no murmur/no JVD/peripheral edema/pedal edema

## 2023-08-22 NOTE — H&P PST ADULT - ASSESSMENT
osteoarthritis left shoulder  CAPRINI SCORE [CLOT]    AGE RELATED RISK FACTORS                                                       MOBILITY RELATED FACTORS  [ ] Age 41-60 years                                            (1 Point)                  [ ] Bed rest                                                        (1 Point)  [ ] Age: 61-74 years                                           (2 Points)                 [ ] Plaster cast                                                   (2 Points)  [x ] Age= 75 years                                              (3 Points)                 [ ] Bed bound for more than 72 hours                 (2 Points)    DISEASE RELATED RISK FACTORS                                               GENDER SPECIFIC FACTORS  [ ] Edema in the lower extremities                       (1 Point)                  [ ] Pregnancy                                                     (1 Point)  [ ] Varicose veins                                               (1 Point)                  [ ] Post-partum < 6 weeks                                   (1 Point)             [x ] BMI > 25 Kg/m2                                            (1 Point)                  [ ] Hormonal therapy  or oral contraception          (1 Point)                 [ ] Sepsis (in the previous month)                        (1 Point)                  [ ] History of pregnancy complications                 (1 point)  [ ] Pneumonia or serious lung disease                                               [ ] Unexplained or recurrent                     (1 Point)           (in the previous month)                               (1 Point)  [ ] Abnormal pulmonary function test                     (1 Point)                 SURGERY RELATED RISK FACTORS  [ ] Acute myocardial infarction                              (1 Point)                 [ ]  Section                                             (1 Point)  [ ] Congestive heart failure (in the previous month)  (1 Point)               [ ] Minor surgery                                                  (1 Point)   [ ] Inflammatory bowel disease                             (1 Point)                 [ ] Arthroscopic surgery                                        (2 Points)  [ ] Central venous access                                      (2 Points)                [ ] General surgery lasting more than 45 minutes   (2 Points)       [ ] Stroke (in the previous month)                          (5 Points)               [x ] Elective arthroplasty                                         (5 Points)                                                                                                                                               HEMATOLOGY RELATED FACTORS                                                 TRAUMA RELATED RISK FACTORS  [ ] Prior episodes of VTE                                     (3 Points)                [ ] Fracture of the hip, pelvis, or leg                       (5 Points)  [ ] Positive family history for VTE                         (3 Points)                 [ ] Acute spinal cord injury (in the previous month)  (5 Points)  [ ] Prothrombin 76324 A                                     (3 Points)                 [ ] Paralysis  (less than 1 month)                             (5 Points)  [ ] Factor V Leiden                                             (3 Points)                  [ ] Multiple Trauma within 1 month                        (5 Points)  [ ] Lupus anticoagulants                                     (3 Points)                                                           [ ] Anticardiolipin antibodies                               (3 Points)                                                       [ ] High homocysteine in the blood                      (3 Points)                                             [ ] Other congenital or acquired thrombophilia      (3 Points)                                                [ ] Heparin induced thrombocytopenia                  (3 Points)                                          Total Score [      9    ]    Caprini Score 0 - 2:  Low Risk, No VTE Prophylaxis required for most patients, encourage ambulation  Caprini Score 3 - 6:  At Risk, pharmacologic VTE prophylaxis is indicated for most patients (in the absence of a contraindication)  Caprini Score Greater than or = 7:  High Risk, pharmacologic VTE prophylaxis is indicated for most patients (in the absence of a contraindication) osteoarthritis left shoulder  CAPRINI SCORE [CLOT]    AGE RELATED RISK FACTORS                                                       MOBILITY RELATED FACTORS  [ ] Age 41-60 years                                            (1 Point)                  [ ] Bed rest                                                        (1 Point)  [ ] Age: 61-74 years                                           (2 Points)                 [ ] Plaster cast                                                   (2 Points)  [x ] Age= 75 years                                              (3 Points)                 [ ] Bed bound for more than 72 hours                 (2 Points)    DISEASE RELATED RISK FACTORS                                               GENDER SPECIFIC FACTORS  [x ] Edema in the lower extremities                       (1 Point)                  [ ] Pregnancy                                                     (1 Point)  [ ] Varicose veins                                               (1 Point)                  [ ] Post-partum < 6 weeks                                   (1 Point)             [x ] BMI > 25 Kg/m2                                            (1 Point)                  [ ] Hormonal therapy  or oral contraception          (1 Point)                 [ ] Sepsis (in the previous month)                        (1 Point)                  [ ] History of pregnancy complications                 (1 point)  [ ] Pneumonia or serious lung disease                                               [ ] Unexplained or recurrent                     (1 Point)           (in the previous month)                               (1 Point)  [ ] Abnormal pulmonary function test                     (1 Point)                 SURGERY RELATED RISK FACTORS  [ ] Acute myocardial infarction                              (1 Point)                 [ ]  Section                                             (1 Point)  [ ] Congestive heart failure (in the previous month)  (1 Point)               [ ] Minor surgery                                                  (1 Point)   [ ] Inflammatory bowel disease                             (1 Point)                 [ ] Arthroscopic surgery                                        (2 Points)  [ ] Central venous access                                      (2 Points)                [ ] General surgery lasting more than 45 minutes   (2 Points)       [ ] Stroke (in the previous month)                          (5 Points)               [x ] Elective arthroplasty                                         (5 Points)                                                                                                                                               HEMATOLOGY RELATED FACTORS                                                 TRAUMA RELATED RISK FACTORS  [ ] Prior episodes of VTE                                     (3 Points)                [ ] Fracture of the hip, pelvis, or leg                       (5 Points)  [ ] Positive family history for VTE                         (3 Points)                 [ ] Acute spinal cord injury (in the previous month)  (5 Points)  [ ] Prothrombin 70657 A                                     (3 Points)                 [ ] Paralysis  (less than 1 month)                             (5 Points)  [ ] Factor V Leiden                                             (3 Points)                  [ ] Multiple Trauma within 1 month                        (5 Points)  [ ] Lupus anticoagulants                                     (3 Points)                                                           [ ] Anticardiolipin antibodies                               (3 Points)                                                       [ ] High homocysteine in the blood                      (3 Points)                                             [ ] Other congenital or acquired thrombophilia      (3 Points)                                                [ ] Heparin induced thrombocytopenia                  (3 Points)                                          Total Score [      10    ]    Caprini Score 0 - 2:  Low Risk, No VTE Prophylaxis required for most patients, encourage ambulation  Caprini Score 3 - 6:  At Risk, pharmacologic VTE prophylaxis is indicated for most patients (in the absence of a contraindication)  Caprini Score Greater than or = 7:  High Risk, pharmacologic VTE prophylaxis is indicated for most patients (in the absence of a contraindication) osteoarthritis left shoulder  CAPRINI SCORE [CLOT]    AGE RELATED RISK FACTORS                                                       MOBILITY RELATED FACTORS  [ ] Age 41-60 years                                            (1 Point)                  [ ] Bed rest                                                        (1 Point)  [ ] Age: 61-74 years                                           (2 Points)                 [ ] Plaster cast                                                   (2 Points)  [x ] Age= 75 years                                              (3 Points)                 [ ] Bed bound for more than 72 hours                 (2 Points)    DISEASE RELATED RISK FACTORS                                               GENDER SPECIFIC FACTORS  [x ] Edema in the lower extremities                       (1 Point)                  [ ] Pregnancy                                                     (1 Point)  [ ] Varicose veins                                               (1 Point)                  [ ] Post-partum < 6 weeks                                   (1 Point)             [ ] BMI > 25 Kg/m2                                            (1 Point)                  [ ] Hormonal therapy  or oral contraception          (1 Point)                 [ ] Sepsis (in the previous month)                        (1 Point)                  [ ] History of pregnancy complications                 (1 point)  [ ] Pneumonia or serious lung disease                                               [ ] Unexplained or recurrent                     (1 Point)           (in the previous month)                               (1 Point)  [ ] Abnormal pulmonary function test                     (1 Point)                 SURGERY RELATED RISK FACTORS  [ ] Acute myocardial infarction                              (1 Point)                 [ ]  Section                                             (1 Point)  [ ] Congestive heart failure (in the previous month)  (1 Point)               [ ] Minor surgery                                                  (1 Point)   [ ] Inflammatory bowel disease                             (1 Point)                 [ ] Arthroscopic surgery                                        (2 Points)  [ ] Central venous access                                      (2 Points)                [ ] General surgery lasting more than 45 minutes   (2 Points)       [ ] Stroke (in the previous month)                          (5 Points)               [x ] Elective arthroplasty                                         (5 Points)                                                                                                                                               HEMATOLOGY RELATED FACTORS                                                 TRAUMA RELATED RISK FACTORS  [ ] Prior episodes of VTE                                     (3 Points)                [ ] Fracture of the hip, pelvis, or leg                       (5 Points)  [ ] Positive family history for VTE                         (3 Points)                 [ ] Acute spinal cord injury (in the previous month)  (5 Points)  [ ] Prothrombin 27548 A                                     (3 Points)                 [ ] Paralysis  (less than 1 month)                             (5 Points)  [ ] Factor V Leiden                                             (3 Points)                  [ ] Multiple Trauma within 1 month                        (5 Points)  [ ] Lupus anticoagulants                                     (3 Points)                                                           [ ] Anticardiolipin antibodies                               (3 Points)                                                       [ ] High homocysteine in the blood                      (3 Points)                                             [ ] Other congenital or acquired thrombophilia      (3 Points)                                                [ ] Heparin induced thrombocytopenia                  (3 Points)                                          Total Score [      9    ]    Caprini Score 0 - 2:  Low Risk, No VTE Prophylaxis required for most patients, encourage ambulation  Caprini Score 3 - 6:  At Risk, pharmacologic VTE prophylaxis is indicated for most patients (in the absence of a contraindication)  Caprini Score Greater than or = 7:  High Risk, pharmacologic VTE prophylaxis is indicated for most patients (in the absence of a contraindication)

## 2023-08-22 NOTE — H&P PST ADULT - NSICDXPASTSURGICALHX_GEN_ALL_CORE_FT
PAST SURGICAL HISTORY:  H/O mitral valve replacement 2015    H/O spinal fusion lumbar 2005    History of bilateral knee replacement right 2009, left 2011    History of cataract surgery left cataract sx    History of left hip replacement 2017    History of lumpectomy of left breast with sentinal node biopsy 1990    History of tonsillectomy     S/P bunionectomy left x 2 2007    S/P shoulder replacement, right

## 2023-08-22 NOTE — H&P PST ADULT - NS MD HP INPLANTS MED DEV
bilateral knees, right shoulder and left hip replacements , lumbar fusion bilateral knees, right shoulder and left hip replacements , lumbar fusion, MV replacement

## 2023-08-22 NOTE — H&P PST ADULT - MUSCULOSKELETAL
details… normal/ROM intact/normal gait/strength 5/5 bilateral upper extremities/strength 5/5 bilateral lower extremities/extremities exam left shoulder very limited movement, right shoulder getting better, some limitation in movement also/normal/ROM intact/decreased ROM due to pain/normal gait/strength 5/5 bilateral lower extremities/extremities exam

## 2023-08-22 NOTE — H&P PST ADULT - CARDIOVASCULAR COMMENTS
PVD , skin laceration from  cat scratching , has band aid on. discolored due to previous scabs an PVD, peripheral edema ankles

## 2023-08-25 NOTE — H&P PST ADULT - TOBACCO USE
Unfortunately patient was denied transfer by medicine and Rad/ONc  dw with neuro ; will increase decadron to q6h and monitor symptoms Unfortunately patient was denied transfer by medicine and Rad/ONc  dw with neuro ; will increase decadron to q6h and monitor symptoms Case d/w ACP via MS Teams Never smoker

## 2023-08-29 NOTE — ASU PATIENT PROFILE, ADULT - FALL HARM RISK - HARM RISK INTERVENTIONS

## 2023-08-29 NOTE — ASU PATIENT PROFILE, ADULT - FALL HARM RISK - FACTORS NURSING JUDGEMENT
Triponin level 0.221
chase [id number]  pt on room air
lactate 3.6
po2- 48, oxygen saturation- 83% ON ROOM AIR
Yes

## 2023-08-29 NOTE — H&P ADULT - ASSESSMENT
82 y/o F with PMHx MVR 2/2 mitral stenosis in 2015, HLD, Breast CA in 1990, OA presented to cardiology for pre-op clearance for shoulder surgery. Cardiac PET done suggestive of ischemia in LAD. Referred for cardiac cath for further evaluation     ASA class:  Creatinine:  GFR:  Bleeding  Risk score:  Casper Score:  82 y/o F with PMHx MVR 2/2 mitral stenosis in 2015, HLD, Breast CA in 1990, OA presented to cardiology for pre-op clearance for shoulder surgery. Cardiac PET done suggestive of ischemia in LAD. Referred for cardiac cath for further evaluation     ASA class: II  Creatinine: 0.99  GFR: 56  Bleeding  Risk score: 4.6%  Casper Score:  5 pts

## 2023-08-29 NOTE — ASU PATIENT PROFILE, ADULT - AS SC BRADEN SENSORY
Glycopyrrolate Pregnancy And Lactation Text: This medication is Pregnancy Category B and is considered safe during pregnancy. It is unknown if it is excreted breast milk. (4) no impairment

## 2023-08-29 NOTE — H&P ADULT - HISTORY OF PRESENT ILLNESS
82 y/o F with PMHx MVR 2/2 mitral stenosis in 2015, HLD, Breast CA in 1990, OA presented to cardiology for pre-op clearance for shoulder surgery. Cardiac PET done suggestive of ischemia in LAD. Referred for cardiac cath for further evaluation

## 2023-08-29 NOTE — H&P ADULT - NSHPLABSRESULTS_GEN_ALL_CORE
8/24/23 SPECT_ ischemia of LAD inferoseptal, apical segment   8/24/23 EKG NSR rate 78  1/17/23- ECHO normal function EF 60-65%

## 2023-08-29 NOTE — H&P ADULT - PROBLEM SELECTOR PLAN 1
pre-op clearance for shoulder surgery   -plan for cardiac cath for ischemic work up  - ALEC protocol pre hydration: NS 250cc IV bolus x1   -Consent obtained for cardiac catheterization w/ coronary angiogram and possible stent placement, with possible sedation and analgia. Pt is competent, has capacity, and understands risks and benefits of procedure. Risks and benefits discussed. Risk discussed included, but not limited to MI, stroke, mortality, major bleeding, arrythmia, or infection. All questions answered

## 2023-08-30 ENCOUNTER — OUTPATIENT (OUTPATIENT)
Dept: OUTPATIENT SERVICES | Facility: HOSPITAL | Age: 83
LOS: 1 days | Discharge: HOME | End: 2023-08-30
Payer: MEDICARE

## 2023-08-30 VITALS
WEIGHT: 128.97 LBS | TEMPERATURE: 98 F | HEART RATE: 81 BPM | DIASTOLIC BLOOD PRESSURE: 75 MMHG | SYSTOLIC BLOOD PRESSURE: 145 MMHG | OXYGEN SATURATION: 95 % | RESPIRATION RATE: 16 BRPM

## 2023-08-30 VITALS
HEART RATE: 89 BPM | DIASTOLIC BLOOD PRESSURE: 64 MMHG | SYSTOLIC BLOOD PRESSURE: 121 MMHG | OXYGEN SATURATION: 96 % | RESPIRATION RATE: 16 BRPM

## 2023-08-30 DIAGNOSIS — R94.39 ABNORMAL RESULT OF OTHER CARDIOVASCULAR FUNCTION STUDY: ICD-10-CM

## 2023-08-30 DIAGNOSIS — Z96.653 PRESENCE OF ARTIFICIAL KNEE JOINT, BILATERAL: Chronic | ICD-10-CM

## 2023-08-30 DIAGNOSIS — Z95.2 PRESENCE OF PROSTHETIC HEART VALVE: Chronic | ICD-10-CM

## 2023-08-30 DIAGNOSIS — Z98.89 OTHER SPECIFIED POSTPROCEDURAL STATES: Chronic | ICD-10-CM

## 2023-08-30 DIAGNOSIS — Z98.1 ARTHRODESIS STATUS: Chronic | ICD-10-CM

## 2023-08-30 DIAGNOSIS — Z96.611 PRESENCE OF RIGHT ARTIFICIAL SHOULDER JOINT: Chronic | ICD-10-CM

## 2023-08-30 DIAGNOSIS — Z96.642 PRESENCE OF LEFT ARTIFICIAL HIP JOINT: Chronic | ICD-10-CM

## 2023-08-30 DIAGNOSIS — Z98.49 CATARACT EXTRACTION STATUS, UNSPECIFIED EYE: Chronic | ICD-10-CM

## 2023-08-30 PROCEDURE — 93458 L HRT ARTERY/VENTRICLE ANGIO: CPT

## 2023-08-30 PROCEDURE — 99152 MOD SED SAME PHYS/QHP 5/>YRS: CPT

## 2023-08-30 PROCEDURE — C1894: CPT

## 2023-08-30 PROCEDURE — C1769: CPT

## 2023-08-30 PROCEDURE — 93458 L HRT ARTERY/VENTRICLE ANGIO: CPT | Mod: 26

## 2023-08-30 PROCEDURE — C1887: CPT

## 2023-08-30 RX ORDER — SODIUM CHLORIDE 9 MG/ML
1000 INJECTION INTRAMUSCULAR; INTRAVENOUS; SUBCUTANEOUS
Refills: 0 | Status: DISCONTINUED | OUTPATIENT
Start: 2023-08-30 | End: 2023-08-30

## 2023-08-30 RX ORDER — SODIUM CHLORIDE 9 MG/ML
250 INJECTION INTRAMUSCULAR; INTRAVENOUS; SUBCUTANEOUS ONCE
Refills: 0 | Status: DISCONTINUED | OUTPATIENT
Start: 2023-08-30 | End: 2023-08-30

## 2023-08-30 NOTE — ASU PATIENT PROFILE, ADULT - PRO MENTAL HEALTH SX RECENT
A1c in last six months   Rx Refill Note  Requested Prescriptions     Pending Prescriptions Disp Refills    Ozempic, 0.25 or 0.5 MG/DOSE, 2 MG/3ML solution pen-injector [Pharmacy Med Name: OZEMPIC INJ 2MG/3ML] 3 mL 0     Sig: INJECT 0.25MG WEEKLY FOR 4 WEEKS, THEN INCREASE TO 0.5MG WEEKLY, CALL WHEN FINISH FIRST 2 MONTHS      Last office visit with prescribing clinician: 8/25/2023   Last telemedicine visit with prescribing clinician: 6/23/2023   Next office visit with prescribing clinician: 9/12/2023                         Would you like a call back once the refill request has been completed: [] Yes [] No    If the office needs to give you a call back, can they leave a voicemail: [] Yes [] No    Huong Hinson, PCT  08/30/23, 08:29 EDT    
none

## 2023-08-30 NOTE — BRIEF OPERATIVE NOTE - NSICDXBRIEFPOSTOP_GEN_ALL_CORE_FT
POST-OP DIAGNOSIS:  Nonocclusive coronary atherosclerosis of native coronary artery 30-Aug-2023 15:55:21  Emily Luna

## 2023-08-30 NOTE — PROGRESS NOTE ADULT - SUBJECTIVE AND OBJECTIVE BOX
Nurse Practitioner Progress note:     HPI:  82 y/o F with PMHx MVR 2/2 mitral stenosis in 2015, HLD, Breast CA in 1990, OA presented to cardiology for pre-op clearance for shoulder surgery. Cardiac PET done suggestive of ischemia in LAD. Referred for cardiac cath for further evaluation  (29 Aug 2023 15:14)      S/P LHC revealing non obstructive disease       T(C): 36.8 (08-30-23 @ 13:08), Max: 36.8 (08-30-23 @ 13:08)  HR: 81 (08-30-23 @ 15:40) (80 - 81)  BP: 154/64 (08-30-23 @ 15:40) (145/75 - 154/64)  RR: 16 (08-30-23 @ 15:40) (16 - 16)  SpO2: 95% (08-30-23 @ 15:40) (94% - 95%)  Wt(kg): --        PHYSICAL EXAM:  NEURO: Non-focal, AxOx3.  No neuro deficits NECK: Supple, No JVD, No LAD  CHEST/LUNG: Clear to auscultation bilaterally; No wheeze  HEART: s1 s2 Regular rate and rhythm; No murmurs, rubs, or gallops  ABDOMEN: Soft, Nontender, Nondistended; Bowel sounds present X 4 quadrants   EXTREMITIES:  2+ Peripheral Pulses, No clubbing, cyanosis, or edema   VASCULAR: Peripheral pulses palpable 2+ bilaterally  PROCEDURE SITE: right radial band removed one hour post placment ,Site is without hematoma or bleeding. Sensation and LESLEY intact. Distal pulses palpable 2+, capillary refill < 2 seconds. Patient denies pain, numbness, tingling, CP or SOB. Clean dry dressing applied     PROCEDURE RESULTS: full report to follow     ASSESSMENT: HPI:  82 y/o F with PMHx MVR 2/2 mitral stenosis in 2015, HLD, Breast CA in 1990, OA presented to cardiology for pre-op clearance for shoulder surgery. Cardiac PET done suggestive of ischemia in LAD. Referred for cardiac cath for further evaluation  (29 Aug 2023 15:14)    S/p [facility] revealing non obstructive disease    PLAN:  -VS, diet, activity as per post cath orders  -Gloria post hydration NS @ 116cc/hr x 4hrs   -Continue current medications  -Plan of care discussed with patient and Dr Feliciano  -Post cath instructions reviewed, patient verbalizes and understands instructions  Pt to be discharged home and follow up with Dr. Campbell within 2 weeks.

## 2023-09-01 DIAGNOSIS — I25.10 ATHEROSCLEROTIC HEART DISEASE OF NATIVE CORONARY ARTERY WITHOUT ANGINA PECTORIS: ICD-10-CM

## 2023-09-01 NOTE — POST DISCHARGE NOTE - DETAILS:
Post procedure phone call completed; patient understood all discharge paperwork. No questions regarding medications or pain management. MD follow up appointment made. Patient was able to rest when they were discharged. Patient will recommend [facility], no complaints of hospital stay, satisfied with care. Instructed patient to contact provider with any further questions or concerns.

## 2023-09-09 RX ORDER — TRAMADOL HYDROCHLORIDE 50 MG/1
50 TABLET ORAL EVERY 4 HOURS
Refills: 0 | Status: DISCONTINUED | OUTPATIENT
Start: 2023-09-11 | End: 2023-09-14

## 2023-09-09 RX ORDER — OXYCODONE HYDROCHLORIDE 5 MG/1
5 TABLET ORAL EVERY 4 HOURS
Refills: 0 | Status: DISCONTINUED | OUTPATIENT
Start: 2023-09-11 | End: 2023-09-14

## 2023-09-09 RX ORDER — ONDANSETRON 8 MG/1
8 TABLET, FILM COATED ORAL EVERY 8 HOURS
Refills: 0 | Status: DISCONTINUED | OUTPATIENT
Start: 2023-09-11 | End: 2023-09-14

## 2023-09-09 RX ORDER — DULOXETINE HYDROCHLORIDE 30 MG/1
30 CAPSULE, DELAYED RELEASE ORAL DAILY
Refills: 0 | Status: DISCONTINUED | OUTPATIENT
Start: 2023-09-11 | End: 2023-09-14

## 2023-09-09 RX ORDER — KETOROLAC TROMETHAMINE 30 MG/ML
15 SYRINGE (ML) INJECTION EVERY 8 HOURS
Refills: 0 | Status: DISCONTINUED | OUTPATIENT
Start: 2023-09-11 | End: 2023-09-12

## 2023-09-09 RX ORDER — HYDRALAZINE HCL 50 MG
25 TABLET ORAL
Refills: 0 | Status: DISCONTINUED | OUTPATIENT
Start: 2023-09-11 | End: 2023-09-14

## 2023-09-09 RX ORDER — SODIUM CHLORIDE 9 MG/ML
1000 INJECTION, SOLUTION INTRAVENOUS
Refills: 0 | Status: DISCONTINUED | OUTPATIENT
Start: 2023-09-11 | End: 2023-09-14

## 2023-09-09 RX ORDER — SENNA PLUS 8.6 MG/1
2 TABLET ORAL AT BEDTIME
Refills: 0 | Status: DISCONTINUED | OUTPATIENT
Start: 2023-09-11 | End: 2023-09-14

## 2023-09-09 RX ORDER — FLUDROCORTISONE ACETATE 0.1 MG/1
0.1 TABLET ORAL
Refills: 0 | Status: DISCONTINUED | OUTPATIENT
Start: 2023-09-11 | End: 2023-09-14

## 2023-09-09 RX ORDER — ACETAMINOPHEN 500 MG
650 TABLET ORAL EVERY 6 HOURS
Refills: 0 | Status: DISCONTINUED | OUTPATIENT
Start: 2023-09-11 | End: 2023-09-14

## 2023-09-09 RX ORDER — POLYETHYLENE GLYCOL 3350 17 G/17G
17 POWDER, FOR SOLUTION ORAL AT BEDTIME
Refills: 0 | Status: DISCONTINUED | OUTPATIENT
Start: 2023-09-11 | End: 2023-09-14

## 2023-09-09 RX ORDER — LANOLIN ALCOHOL/MO/W.PET/CERES
3 CREAM (GRAM) TOPICAL AT BEDTIME
Refills: 0 | Status: DISCONTINUED | OUTPATIENT
Start: 2023-09-11 | End: 2023-09-14

## 2023-09-09 RX ORDER — HYDROXYCHLOROQUINE SULFATE 200 MG
400 TABLET ORAL DAILY
Refills: 0 | Status: DISCONTINUED | OUTPATIENT
Start: 2023-09-11 | End: 2023-09-14

## 2023-09-09 RX ORDER — ATORVASTATIN CALCIUM 80 MG/1
10 TABLET, FILM COATED ORAL AT BEDTIME
Refills: 0 | Status: DISCONTINUED | OUTPATIENT
Start: 2023-09-11 | End: 2023-09-14

## 2023-09-09 RX ORDER — CARVEDILOL PHOSPHATE 80 MG/1
6.25 CAPSULE, EXTENDED RELEASE ORAL EVERY 12 HOURS
Refills: 0 | Status: DISCONTINUED | OUTPATIENT
Start: 2023-09-11 | End: 2023-09-14

## 2023-09-09 RX ORDER — AMLODIPINE BESYLATE 2.5 MG/1
10 TABLET ORAL DAILY
Refills: 0 | Status: DISCONTINUED | OUTPATIENT
Start: 2023-09-11 | End: 2023-09-14

## 2023-09-09 RX ORDER — BENZOCAINE AND MENTHOL 5; 1 G/100ML; G/100ML
1 LIQUID ORAL
Refills: 0 | Status: DISCONTINUED | OUTPATIENT
Start: 2023-09-11 | End: 2023-09-14

## 2023-09-09 RX ORDER — OXYCODONE HYDROCHLORIDE 5 MG/1
10 TABLET ORAL EVERY 4 HOURS
Refills: 0 | Status: DISCONTINUED | OUTPATIENT
Start: 2023-09-11 | End: 2023-09-14

## 2023-09-09 RX ORDER — PANTOPRAZOLE SODIUM 20 MG/1
40 TABLET, DELAYED RELEASE ORAL
Refills: 0 | Status: DISCONTINUED | OUTPATIENT
Start: 2023-09-11 | End: 2023-09-14

## 2023-09-09 RX ORDER — BUDESONIDE AND FORMOTEROL FUMARATE DIHYDRATE 160; 4.5 UG/1; UG/1
2 AEROSOL RESPIRATORY (INHALATION)
Refills: 0 | Status: DISCONTINUED | OUTPATIENT
Start: 2023-09-11 | End: 2023-09-14

## 2023-09-09 RX ORDER — ASPIRIN/CALCIUM CARB/MAGNESIUM 324 MG
325 TABLET ORAL DAILY
Refills: 0 | Status: DISCONTINUED | OUTPATIENT
Start: 2023-09-12 | End: 2023-09-14

## 2023-09-10 ENCOUNTER — TRANSCRIPTION ENCOUNTER (OUTPATIENT)
Age: 83
End: 2023-09-10

## 2023-09-11 ENCOUNTER — TRANSCRIPTION ENCOUNTER (OUTPATIENT)
Age: 83
End: 2023-09-11

## 2023-09-11 ENCOUNTER — APPOINTMENT (OUTPATIENT)
Dept: ORTHOPEDIC SURGERY | Facility: HOSPITAL | Age: 83
End: 2023-09-11
Payer: MEDICARE

## 2023-09-11 ENCOUNTER — INPATIENT (INPATIENT)
Facility: HOSPITAL | Age: 83
LOS: 2 days | Discharge: NURSING, REHAB OR LONG-TERM CARE | End: 2023-09-14
Attending: ORTHOPAEDIC SURGERY | Admitting: ORTHOPAEDIC SURGERY
Payer: MEDICARE

## 2023-09-11 VITALS
DIASTOLIC BLOOD PRESSURE: 64 MMHG | TEMPERATURE: 98 F | OXYGEN SATURATION: 96 % | RESPIRATION RATE: 17 BRPM | WEIGHT: 126.99 LBS | HEIGHT: 66 IN | SYSTOLIC BLOOD PRESSURE: 132 MMHG | HEART RATE: 66 BPM

## 2023-09-11 DIAGNOSIS — Z96.653 PRESENCE OF ARTIFICIAL KNEE JOINT, BILATERAL: Chronic | ICD-10-CM

## 2023-09-11 DIAGNOSIS — Z98.49 CATARACT EXTRACTION STATUS, UNSPECIFIED EYE: Chronic | ICD-10-CM

## 2023-09-11 DIAGNOSIS — Z95.2 PRESENCE OF PROSTHETIC HEART VALVE: Chronic | ICD-10-CM

## 2023-09-11 DIAGNOSIS — Z98.89 OTHER SPECIFIED POSTPROCEDURAL STATES: Chronic | ICD-10-CM

## 2023-09-11 DIAGNOSIS — Z96.642 PRESENCE OF LEFT ARTIFICIAL HIP JOINT: Chronic | ICD-10-CM

## 2023-09-11 DIAGNOSIS — Z98.1 ARTHRODESIS STATUS: Chronic | ICD-10-CM

## 2023-09-11 DIAGNOSIS — Z96.611 PRESENCE OF RIGHT ARTIFICIAL SHOULDER JOINT: Chronic | ICD-10-CM

## 2023-09-11 PROCEDURE — 23472 RECONSTRUCT SHOULDER JOINT: CPT | Mod: LT

## 2023-09-11 PROCEDURE — 73030 X-RAY EXAM OF SHOULDER: CPT | Mod: 26,LT

## 2023-09-11 DEVICE — HUM GUIDE PERF PIN 3X100MM: Type: IMPLANTABLE DEVICE | Site: LEFT | Status: FUNCTIONAL

## 2023-09-11 DEVICE — SCREW PERIPHERAL 5X18MM: Type: IMPLANTABLE DEVICE | Site: LEFT | Status: FUNCTIONAL

## 2023-09-11 DEVICE — BASEPLATE GLENOID FULL 15 DEG 25MM: Type: IMPLANTABLE DEVICE | Site: LEFT | Status: FUNCTIONAL

## 2023-09-11 DEVICE — GLENOSPHERE PERFORM REVERSED STD 36MM: Type: IMPLANTABLE DEVICE | Site: LEFT | Status: FUNCTIONAL

## 2023-09-11 DEVICE — PIN GUIDE AEQUALIS PERFORM PLUS 2.5X220MM: Type: IMPLANTABLE DEVICE | Site: LEFT | Status: FUNCTIONAL

## 2023-09-11 DEVICE — SCREW PERIPH 5X22MM: Type: IMPLANTABLE DEVICE | Site: LEFT | Status: FUNCTIONAL

## 2023-09-11 DEVICE — IMPLANTABLE DEVICE: Type: IMPLANTABLE DEVICE | Site: LEFT | Status: FUNCTIONAL

## 2023-09-11 DEVICE — SCREW PERFORM REVISED CENTRAL 6.5X30MM: Type: IMPLANTABLE DEVICE | Site: LEFT | Status: FUNCTIONAL

## 2023-09-11 DEVICE — SCREW PERIPH 5X26MM: Type: IMPLANTABLE DEVICE | Site: LEFT | Status: FUNCTIONAL

## 2023-09-11 RX ORDER — SODIUM CHLORIDE 9 MG/ML
3 INJECTION INTRAMUSCULAR; INTRAVENOUS; SUBCUTANEOUS EVERY 8 HOURS
Refills: 0 | Status: DISCONTINUED | OUTPATIENT
Start: 2023-09-11 | End: 2023-09-11

## 2023-09-11 RX ORDER — ACETAMINOPHEN 500 MG
1000 TABLET ORAL ONCE
Refills: 0 | Status: COMPLETED | OUTPATIENT
Start: 2023-09-11 | End: 2023-09-11

## 2023-09-11 RX ORDER — CEFAZOLIN SODIUM 1 G
2000 VIAL (EA) INJECTION EVERY 8 HOURS
Refills: 0 | Status: COMPLETED | OUTPATIENT
Start: 2023-09-11 | End: 2023-09-11

## 2023-09-11 RX ORDER — FENTANYL CITRATE 50 UG/ML
25 INJECTION INTRAVENOUS
Refills: 0 | Status: DISCONTINUED | OUTPATIENT
Start: 2023-09-11 | End: 2023-09-11

## 2023-09-11 RX ORDER — INFLUENZA VIRUS VACCINE 15; 15; 15; 15 UG/.5ML; UG/.5ML; UG/.5ML; UG/.5ML
0.7 SUSPENSION INTRAMUSCULAR ONCE
Refills: 0 | Status: DISCONTINUED | OUTPATIENT
Start: 2023-09-11 | End: 2023-09-14

## 2023-09-11 RX ORDER — ONDANSETRON 8 MG/1
4 TABLET, FILM COATED ORAL ONCE
Refills: 0 | Status: DISCONTINUED | OUTPATIENT
Start: 2023-09-11 | End: 2023-09-11

## 2023-09-11 RX ORDER — SODIUM CHLORIDE 9 MG/ML
1000 INJECTION, SOLUTION INTRAVENOUS
Refills: 0 | Status: DISCONTINUED | OUTPATIENT
Start: 2023-09-11 | End: 2023-09-11

## 2023-09-11 RX ADMIN — Medication 400 MILLIGRAM(S): at 12:15

## 2023-09-11 RX ADMIN — Medication 1000 MILLIGRAM(S): at 13:00

## 2023-09-11 RX ADMIN — OXYCODONE HYDROCHLORIDE 10 MILLIGRAM(S): 5 TABLET ORAL at 14:15

## 2023-09-11 RX ADMIN — SODIUM CHLORIDE 100 MILLILITER(S): 9 INJECTION, SOLUTION INTRAVENOUS at 12:08

## 2023-09-11 RX ADMIN — Medication 25 MILLIGRAM(S): at 18:35

## 2023-09-11 RX ADMIN — BUDESONIDE AND FORMOTEROL FUMARATE DIHYDRATE 2 PUFF(S): 160; 4.5 AEROSOL RESPIRATORY (INHALATION) at 18:34

## 2023-09-11 RX ADMIN — Medication 100 MILLIGRAM(S): at 15:42

## 2023-09-11 RX ADMIN — Medication 400 MILLIGRAM(S): at 12:00

## 2023-09-11 RX ADMIN — Medication 15 MILLIGRAM(S): at 16:00

## 2023-09-11 RX ADMIN — OXYCODONE HYDROCHLORIDE 10 MILLIGRAM(S): 5 TABLET ORAL at 18:44

## 2023-09-11 RX ADMIN — OXYCODONE HYDROCHLORIDE 5 MILLIGRAM(S): 5 TABLET ORAL at 21:59

## 2023-09-11 RX ADMIN — Medication 15 MILLIGRAM(S): at 15:41

## 2023-09-11 RX ADMIN — SENNA PLUS 2 TABLET(S): 8.6 TABLET ORAL at 22:00

## 2023-09-11 RX ADMIN — Medication 650 MILLIGRAM(S): at 18:54

## 2023-09-11 RX ADMIN — DULOXETINE HYDROCHLORIDE 30 MILLIGRAM(S): 30 CAPSULE, DELAYED RELEASE ORAL at 22:00

## 2023-09-11 RX ADMIN — OXYCODONE HYDROCHLORIDE 5 MILLIGRAM(S): 5 TABLET ORAL at 23:00

## 2023-09-11 RX ADMIN — FLUDROCORTISONE ACETATE 0.1 MILLIGRAM(S): 0.1 TABLET ORAL at 18:36

## 2023-09-11 RX ADMIN — OXYCODONE HYDROCHLORIDE 10 MILLIGRAM(S): 5 TABLET ORAL at 19:44

## 2023-09-11 RX ADMIN — OXYCODONE HYDROCHLORIDE 10 MILLIGRAM(S): 5 TABLET ORAL at 15:02

## 2023-09-11 RX ADMIN — Medication 650 MILLIGRAM(S): at 18:35

## 2023-09-11 RX ADMIN — Medication 3 MILLIGRAM(S): at 21:59

## 2023-09-11 RX ADMIN — ATORVASTATIN CALCIUM 10 MILLIGRAM(S): 80 TABLET, FILM COATED ORAL at 21:59

## 2023-09-11 RX ADMIN — SODIUM CHLORIDE 75 MILLILITER(S): 9 INJECTION, SOLUTION INTRAVENOUS at 10:15

## 2023-09-11 NOTE — DISCHARGE NOTE PROVIDER - NSDCFUADDINST_GEN_ALL_CORE_FT
Sling/Non weight bearing on the operative shoulder. No range of motion to operative shoulder. It is ok to move the elbow/wrist/fingers.     If you have a new onset of numbness or tingling in your arm or hand that was not present before surgery that lasts longer than 24 hours call your surgeon.  Keep Prineo Dressing Clean, Dry and Intact. May shower with Prineo Dressing. Please do not scrub, soak, peel or pick at the prineo dressing. No creams, lotions, or oils over dressing. May shower and let water run over incision, no baths. Pat dry once out of shower. Dressing to be removed in office at follow up visit in 2 weeks. There are no staples or stitches that need to be removed.

## 2023-09-11 NOTE — OCCUPATIONAL THERAPY INITIAL EVALUATION ADULT - ADDITIONAL COMMENTS
Pt states she lives alone in a PH, 3 MUMTAZ with L HR, followed by 6 steps to main level with b/l wide HRs, followed by 13 steps to bedroom with b/l HRs. Pt states she owns a cane, 3:1 commode and has grab bars in the shower.

## 2023-09-11 NOTE — OCCUPATIONAL THERAPY INITIAL EVALUATION ADULT - PLANNED THERAPY INTERVENTIONS, OT EVAL
ADL retraining/balance training/bed mobility training/orthotic fitting/training/ROM/strengthening/stretching/transfer training

## 2023-09-11 NOTE — DISCHARGE NOTE PROVIDER - NSDCFUSCHEDAPPT_GEN_ALL_CORE_FT
Mayur Baca  [facility] Physician [facility]  [address]  Scheduled Appointment: 09/26/2023    [facility]  INT[facility] [address]  Scheduled Appointment: 12/07/2023    Duncan Hyde  [facility]  ANT[address]  Scheduled Appointment: 12/07/2023

## 2023-09-11 NOTE — DISCHARGE NOTE PROVIDER - NSDCMRMEDTOKEN_GEN_ALL_CORE_FT
amLODIPine 10 mg oral tablet: 1 tab(s) orally once a day  atorvastatin 10 mg oral tablet: 1 tab(s) orally once a day  B Complex 50: 1 tab(s) orally once a day  Coreg 6.25 mg oral tablet: 1 tab(s) orally 2 times a day  Cymbalta 30 mg oral delayed release capsule: 1 cap(s) orally once a day  fludrocortisone 0.1 mg oral tablet: 1 tab(s) orally 2 times a week  hydrALAZINE 25 mg oral tablet: 1 tab(s) orally 2 times a day  Plaquenil 200 mg oral tablet: 2 tab(s) orally once a day  predniSONE 5 mg oral tablet: 1 orally once a day  Symbicort 160 mcg-4.5 mcg/inh inhalation aerosol: 2 inhaled once a day  traMADol 50 mg oral tablet: 1 orally 3 times a day  Vitamin D3 1000 intl units oral tablet: 2 tab(s) orally once a day   acetaminophen 325 mg oral tablet: 2 tab(s) orally every 6 hours  amLODIPine 10 mg oral tablet: 1 tab(s) orally once a day  aspirin 325 mg oral tablet: 1 tab(s) orally once a day  atorvastatin 10 mg oral tablet: 1 tab(s) orally once a day  B Complex 50: 1 tab(s) orally once a day  Coreg 6.25 mg oral tablet: 1 tab(s) orally 2 times a day  Cymbalta 30 mg oral delayed release capsule: 1 cap(s) orally once a day  fludrocortisone 0.1 mg oral tablet: 1 tab(s) orally 2 times a week  hydrALAZINE 25 mg oral tablet: 1 tab(s) orally 2 times a day  oxyCODONE 10 mg oral tablet: 1 tab(s) orally every 4 hours As needed Severe Pain (7 - 10)  oxyCODONE 5 mg oral tablet: 1 tab(s) orally every 4 hours As needed Moderate Pain (4 - 6)  pantoprazole 40 mg oral delayed release tablet: 1 tab(s) orally once a day (before a meal)  Plaquenil 200 mg oral tablet: 2 tab(s) orally once a day  polyethylene glycol 3350 oral powder for reconstitution: 17 gram(s) orally once a day (at bedtime)  predniSONE 5 mg oral tablet: 1 orally once a day  senna leaf extract oral tablet: 2 tab(s) orally once a day (at bedtime)  Symbicort 160 mcg-4.5 mcg/inh inhalation aerosol: 2 inhaled once a day  Vitamin D3 1000 intl units oral tablet: 2 tab(s) orally once a day

## 2023-09-11 NOTE — ASU PREOP CHECKLIST - AICD PRESENT
[address]   OPERATIVE REPORT    Name:  Ewa Kamara  MR#:  [id number]  :  1947  ACCOUNT #:  [de-identified]  DATE OF SERVICE:  2023    PREOPERATIVE DIAGNOSIS:  Pericardial effusion. POSTOPERATIVE DIAGNOSIS:  Pericardial effusion. PROCEDURE PERFORMED:  Pericardial window. SURGEON:  Lidia Dee. Kit Jose MD    ASSISTANT:  ZHENG Coates    ANESTHESIA:  General endotracheal anesthesia. COMPLICATIONS:  None. SPECIMENS REMOVED:  Pericardium and pericardial fluid. IMPLANTS:  None. ESTIMATED BLOOD LOSS:  5 mL. INDICATIONS:  The patient is a very pleasant *** gentleman recently diagnosed with a very large pericardial effusion, now being brought to the operating room to undergo pericardial window. PROCEDURE:  The patient was brought to the operating room and underwent general endotracheal anesthesia without complications. Chest was prepped and draped in usual sterile fashion. A subxiphoid incision made. We dissected down to the level of pericardium and removed a piece of pericardium and drained the fluid. Two Bert drains were placed at the end of the procedure. Vicryl suture was used to close the incision. I was present for the entire procedure.       Star Elder MD      [id number]  D:  2023 11:38  T:  2023 15:31  JOB #:  [id number] valve replaced/no valve replaced -loop recorder/no

## 2023-09-11 NOTE — OCCUPATIONAL THERAPY INITIAL EVALUATION ADULT - GENERAL OBSERVATIONS, REHAB EVAL
Pt was encountered supine in bed; NAD, white mesh sling donned to left UE, AXOX4, PT David present; pt c/o pain s/p left reverse TSA. Pt seen for OT eval & sling consultation. Pt fitted, provided & educated on replacement sling/immobilizer to left UE. Pt also educated on verse TSA precautions (left UE NWB, no shoulder ROM, sling management). Pt noted with good understanding of education & sling noted to be in good alignment. Pt was encountered supine in bed; NAD, white mesh sling donned to left UE, AXOX4, PT David present; pt c/o pain s/p left reverse TSA. Pt seen for OT eval & sling consultation. Pt fitted, provided & educated on replacement sling/immobilizer to left UE. Pt also educated on reverse TSA precautions (left UE NWB, no shoulder ROM, sling management). Pt noted with good understanding of education & sling noted to be in good alignment.

## 2023-09-11 NOTE — DISCHARGE NOTE PROVIDER - NSDCFUADDAPPT_GEN_ALL_CORE_FT
Follow up with your surgeon in two weeks. Call for appointment.    If you need more pain medication, call your surgeon's office. For medication refills or authorizations, please call [phone]    We recommend that you call and schedule a follow up appointment with your primary care physician for repeat blood work (CBC and BMP) for post hospital discharge follow-up care 2-4 weeks after your surgery.     Make sure to have a bowel movement by 2 days after surgery. Take stool softeners and laxatives as needed.     Call your surgeon if you have increased redness/pain/drainage or fever. Return to ER for shortness of breath/calf tenderness.

## 2023-09-11 NOTE — PHYSICAL THERAPY INITIAL EVALUATION ADULT - GENERAL OBSERVATIONS, REHAB EVAL
Pt found semi supine in bed in NAD, +hep lock, +L UE sling, +SCDs, agreeable to PT Joce and RN Michele aware.

## 2023-09-11 NOTE — DISCHARGE NOTE PROVIDER - NSDCCPCAREPLAN_GEN_ALL_CORE_FT
PRINCIPAL DISCHARGE DIAGNOSIS  Diagnosis: Osteoarthritis of left shoulder  Assessment and Plan of Treatment:

## 2023-09-11 NOTE — CONSULT NOTE ADULT - SUBJECTIVE AND OBJECTIVE BOX
JULIAN ORO is a 83y Female s/p LEFT REVERSE SHOULDER ARTHROPLASTY BICEPS TENODESIS      w/ h/o Mitral valve disease    RA (rheumatoid arthritis)    OA (osteoarthritis)    Migraine    HLD (hyperlipidemia)    Breast cancer    Chronic pain    Implantable loop recorder present    Acute asthma exacerbation    Asthma      denies any chest pain shortness of breath palpitation dizziness lightheadedness nausea vomiting fever or chills    History of lumpectomy of left breast    History of bilateral knee replacement    H/O spinal fusion    History of tonsillectomy    S/P bunionectomy    History of left hip replacement    H/O mitral valve replacement    History of cataract surgery    S/P shoulder replacement, right      No pertinent family history in first degree relatives    Family history of stomach cancer (Mother)    Family history of colon cancer in father (Father)      SH: doesnot smoke or drink at this time    No Known Allergies    acetaminophen     Tablet .. 650 milliGRAM(s) Oral every 6 hours  amLODIPine   Tablet 10 milliGRAM(s) Oral daily  atorvastatin 10 milliGRAM(s) Oral at bedtime  benzocaine/menthol Lozenge 1 Lozenge Oral every 2 hours PRN  budesonide 160 MICROgram(s)/formoterol 4.5 MICROgram(s) Inhaler 2 Puff(s) Inhalation two times a day  carvedilol 6.25 milliGRAM(s) Oral every 12 hours  ceFAZolin   IVPB 2000 milliGRAM(s) IV Intermittent every 8 hours  DULoxetine 30 milliGRAM(s) Oral daily  fludroCORTISONE 0.1 milliGRAM(s) Oral two times a day  hydrALAZINE 25 milliGRAM(s) Oral two times a day  hydroxychloroquine 400 milliGRAM(s) Oral daily  influenza  Vaccine (HIGH DOSE) 0.7 milliLiter(s) IntraMuscular once  ketorolac   Injectable 15 milliGRAM(s) IV Push every 8 hours  lactated ringers. 1000 milliLiter(s) IV Continuous <Continuous>  melatonin 3 milliGRAM(s) Oral at bedtime  ondansetron Injectable 8 milliGRAM(s) IV Push every 8 hours PRN  oxyCODONE    IR 10 milliGRAM(s) Oral every 4 hours PRN  oxyCODONE    IR 5 milliGRAM(s) Oral every 4 hours PRN  pantoprazole    Tablet 40 milliGRAM(s) Oral before breakfast  polyethylene glycol 3350 17 Gram(s) Oral at bedtime  predniSONE   Tablet 1 milliGRAM(s) Oral daily  senna 2 Tablet(s) Oral at bedtime  traMADol 50 milliGRAM(s) Oral every 4 hours PRN    T(C): 36.5 (09-11-23 @ 19:30), Max: 36.7 (09-11-23 @ 11:24)  HR: 74 (09-11-23 @ 19:30) (66 - 94)  BP: 110/69 (09-11-23 @ 19:30) (110/65 - 167/81)  RR: 17 (09-11-23 @ 19:30) (12 - 18)  SpO2: 94% (09-11-23 @ 19:30) (92% - 100%)  HEENT unremarkable  neck no JVD or bruit  heart normal S1 S2 RRR no gallops or rubs  chest clear to auscultation  abd sof nontender non distended +bs  ext no calf tenderness    A/P   DVT PX  pain control  bowel regimen   wound care as per ortho  GI PX  antiemetics prn  incentive spirometer

## 2023-09-11 NOTE — DISCHARGE NOTE PROVIDER - NSDCCPTREATMENT_GEN_ALL_CORE_FT
PRINCIPAL PROCEDURE  Procedure: Reverse total shoulder arthroplasty  Findings and Treatment: left

## 2023-09-11 NOTE — PHYSICAL THERAPY INITIAL EVALUATION ADULT - PERTINENT HX OF CURRENT PROBLEM, REHAB EVAL
Patient with complain of chronic pain in the L shoulder joint, diagnosed with OA, s/p L reverse TSA.

## 2023-09-11 NOTE — PROGRESS NOTE ADULT - SUBJECTIVE AND OBJECTIVE BOX
83yFemale s/p L rTSA POD #0  Pt seen and examined in NAD.   Pain controlled.   Pt denies any new complaints.   Pt denies CP/SOB/N/V/D/numbness/tingling/bowel or bladder dysfunction.     Vital Signs Last 24 Hrs  T(C): 36.7 (11 Sep 2023 11:24), Max: 36.7 (11 Sep 2023 11:24)  T(F): 98 (11 Sep 2023 11:24), Max: 98 (11 Sep 2023 11:24)  HR: 94 (11 Sep 2023 11:24) (66 - 94)  BP: 136/70 (11 Sep 2023 11:24) (120/68 - 167/81)  BP(mean): --  RR: 18 (11 Sep 2023 11:24) (12 - 18)  SpO2: 94% (11 Sep 2023 11:24) (94% - 100%)    Parameters below as of 11 Sep 2023 11:24  Patient On (Oxygen Delivery Method): room air        PE:   General: NAD, A&Ox3  LUE: Prineo dressing. Arm in sling. Decreased motor/sensation 2/2 nerve block.  RP2+ NVI.   RUE: Skin intact. +ROM shoulder/elbow/wrist/fingers. +ok/thumbsup/fingercross signs.  strength: 5/5.  RP2+ NVI.                      A/P: 83yFemale s/p L rTSA POD#0  Prineo Dressing  Pain controlled  PT: NWB LUE  F/U morning Xray  DVT ppx: SCDs and   Wound care, Isometric exercises, incentive spirometry   Discharge: planning   All the above discussed and understood by pt

## 2023-09-11 NOTE — PHYSICAL THERAPY INITIAL EVALUATION ADULT - IMPAIRMENTS FOUND, PT EVAL
aerobic capacity/endurance/decreased midline orientation/ergonomics and body mechanics/gait, locomotion, and balance/joint integrity and mobility/muscle strength/posture/ROM

## 2023-09-11 NOTE — DISCHARGE NOTE PROVIDER - HOSPITAL COURSE
83yFemale with history of osteoarthritis of left shoulder presenting for left RSA by Dr. Mayur Baca on 9/11/23. Risk and benefits of surgery were explained to the patient. The patient understood and agreed to proceed with surgery. Patient underwent the procedure with no intraoperative complications. Pt was brought in stable condition to the PACU. Once stable in PACU, pt was brought to the floor. During hospital stay pt was followed by Medicine,  during this admission. Pt hospital course was XX. Pt is stable for discharge to XX on POD# 83yFemale with history of osteoarthritis of left shoulder presenting for left RSA by Dr. Mayur Baca on 9/11/23. Risk and benefits of surgery were explained to the patient. The patient understood and agreed to proceed with surgery. Patient underwent the procedure with no intraoperative complications. Pt was brought in stable condition to the PACU. Once stable in PACU, pt was brought to the floor. During hospital stay pt was followed by Medicine,  during this admission. Pt hospital course was unremarkable. Pt is stable for discharge to rehab on POD# 1 83yFemale with history of osteoarthritis of left shoulder presenting for left RSA by Dr. Mayur Baca on 9/11/23. Risk and benefits of surgery were explained to the patient. The patient understood and agreed to proceed with surgery. Patient underwent the procedure with no intraoperative complications. Pt was brought in stable condition to the PACU. Once stable in PACU, pt was brought to the floor. During hospital stay pt was followed by Medicine,  during this admission. Pt hospital course was unremarkable. Pt is stable for discharge to rehab on POD# 2

## 2023-09-11 NOTE — PHYSICAL THERAPY INITIAL EVALUATION ADULT - ACTIVE RANGE OF MOTION EXAMINATION, REHAB EVAL
except L shoulder ROM not tested secondary to surgical precautions/bilateral upper extremity Active ROM was WFL (within functional limits)/bilateral  lower extremity Active ROM was WFL (within functional limits)

## 2023-09-12 LAB
ANION GAP SERPL CALC-SCNC: 4 MMOL/L — LOW (ref 5–17)
BUN SERPL-MCNC: 14 MG/DL — SIGNIFICANT CHANGE UP (ref 7–23)
CALCIUM SERPL-MCNC: 8.1 MG/DL — LOW (ref 8.5–10.1)
CHLORIDE SERPL-SCNC: 103 MMOL/L — SIGNIFICANT CHANGE UP (ref 96–108)
CO2 SERPL-SCNC: 30 MMOL/L — SIGNIFICANT CHANGE UP (ref 22–31)
CREAT SERPL-MCNC: 0.8 MG/DL — SIGNIFICANT CHANGE UP (ref 0.5–1.3)
EGFR: 73 ML/MIN/1.73M2 — SIGNIFICANT CHANGE UP
GLUCOSE SERPL-MCNC: 96 MG/DL — SIGNIFICANT CHANGE UP (ref 70–99)
HCT VFR BLD CALC: 32.8 % — LOW (ref 34.5–45)
HGB BLD-MCNC: 10.2 G/DL — LOW (ref 11.5–15.5)
MCHC RBC-ENTMCNC: 29.4 PG — SIGNIFICANT CHANGE UP (ref 27–34)
MCHC RBC-ENTMCNC: 31.1 G/DL — LOW (ref 32–36)
MCV RBC AUTO: 94.5 FL — SIGNIFICANT CHANGE UP (ref 80–100)
NRBC # BLD: 0 /100 WBCS — SIGNIFICANT CHANGE UP (ref 0–0)
PLATELET # BLD AUTO: 171 K/UL — SIGNIFICANT CHANGE UP (ref 150–400)
POTASSIUM SERPL-MCNC: 4.1 MMOL/L — SIGNIFICANT CHANGE UP (ref 3.5–5.3)
POTASSIUM SERPL-SCNC: 4.1 MMOL/L — SIGNIFICANT CHANGE UP (ref 3.5–5.3)
RBC # BLD: 3.47 M/UL — LOW (ref 3.8–5.2)
RBC # FLD: 14.4 % — SIGNIFICANT CHANGE UP (ref 10.3–14.5)
SODIUM SERPL-SCNC: 137 MMOL/L — SIGNIFICANT CHANGE UP (ref 135–145)
WBC # BLD: 7.58 K/UL — SIGNIFICANT CHANGE UP (ref 3.8–10.5)
WBC # FLD AUTO: 7.58 K/UL — SIGNIFICANT CHANGE UP (ref 3.8–10.5)

## 2023-09-12 RX ORDER — OXYCODONE HYDROCHLORIDE 5 MG/1
1 TABLET ORAL
Qty: 0 | Refills: 0 | DISCHARGE
Start: 2023-09-12

## 2023-09-12 RX ORDER — POLYETHYLENE GLYCOL 3350 17 G/17G
17 POWDER, FOR SOLUTION ORAL
Qty: 0 | Refills: 0 | DISCHARGE
Start: 2023-09-12

## 2023-09-12 RX ORDER — PANTOPRAZOLE SODIUM 20 MG/1
1 TABLET, DELAYED RELEASE ORAL
Qty: 0 | Refills: 0 | DISCHARGE
Start: 2023-09-12

## 2023-09-12 RX ORDER — SENNA PLUS 8.6 MG/1
2 TABLET ORAL
Qty: 0 | Refills: 0 | DISCHARGE
Start: 2023-09-12

## 2023-09-12 RX ORDER — ACETAMINOPHEN 500 MG
2 TABLET ORAL
Qty: 0 | Refills: 0 | DISCHARGE
Start: 2023-09-12

## 2023-09-12 RX ORDER — TRAMADOL HYDROCHLORIDE 50 MG/1
1 TABLET ORAL
Refills: 0 | DISCHARGE

## 2023-09-12 RX ORDER — ASPIRIN/CALCIUM CARB/MAGNESIUM 324 MG
1 TABLET ORAL
Qty: 0 | Refills: 0 | DISCHARGE
Start: 2023-09-12

## 2023-09-12 RX ADMIN — Medication 325 MILLIGRAM(S): at 11:49

## 2023-09-12 RX ADMIN — Medication 650 MILLIGRAM(S): at 18:17

## 2023-09-12 RX ADMIN — OXYCODONE HYDROCHLORIDE 5 MILLIGRAM(S): 5 TABLET ORAL at 15:05

## 2023-09-12 RX ADMIN — Medication 650 MILLIGRAM(S): at 07:07

## 2023-09-12 RX ADMIN — OXYCODONE HYDROCHLORIDE 5 MILLIGRAM(S): 5 TABLET ORAL at 14:15

## 2023-09-12 RX ADMIN — FLUDROCORTISONE ACETATE 0.1 MILLIGRAM(S): 0.1 TABLET ORAL at 18:16

## 2023-09-12 RX ADMIN — Medication 15 MILLIGRAM(S): at 00:45

## 2023-09-12 RX ADMIN — ATORVASTATIN CALCIUM 10 MILLIGRAM(S): 80 TABLET, FILM COATED ORAL at 20:30

## 2023-09-12 RX ADMIN — BUDESONIDE AND FORMOTEROL FUMARATE DIHYDRATE 2 PUFF(S): 160; 4.5 AEROSOL RESPIRATORY (INHALATION) at 18:17

## 2023-09-12 RX ADMIN — Medication 25 MILLIGRAM(S): at 06:04

## 2023-09-12 RX ADMIN — Medication 650 MILLIGRAM(S): at 19:10

## 2023-09-12 RX ADMIN — BUDESONIDE AND FORMOTEROL FUMARATE DIHYDRATE 2 PUFF(S): 160; 4.5 AEROSOL RESPIRATORY (INHALATION) at 06:05

## 2023-09-12 RX ADMIN — Medication 1 MILLIGRAM(S): at 06:04

## 2023-09-12 RX ADMIN — OXYCODONE HYDROCHLORIDE 5 MILLIGRAM(S): 5 TABLET ORAL at 20:30

## 2023-09-12 RX ADMIN — Medication 650 MILLIGRAM(S): at 00:06

## 2023-09-12 RX ADMIN — SODIUM CHLORIDE 100 MILLILITER(S): 9 INJECTION, SOLUTION INTRAVENOUS at 00:07

## 2023-09-12 RX ADMIN — FLUDROCORTISONE ACETATE 0.1 MILLIGRAM(S): 0.1 TABLET ORAL at 06:04

## 2023-09-12 RX ADMIN — Medication 650 MILLIGRAM(S): at 12:42

## 2023-09-12 RX ADMIN — SODIUM CHLORIDE 100 MILLILITER(S): 9 INJECTION, SOLUTION INTRAVENOUS at 07:03

## 2023-09-12 RX ADMIN — Medication 15 MILLIGRAM(S): at 08:02

## 2023-09-12 RX ADMIN — CARVEDILOL PHOSPHATE 6.25 MILLIGRAM(S): 80 CAPSULE, EXTENDED RELEASE ORAL at 06:05

## 2023-09-12 RX ADMIN — Medication 650 MILLIGRAM(S): at 01:06

## 2023-09-12 RX ADMIN — AMLODIPINE BESYLATE 10 MILLIGRAM(S): 2.5 TABLET ORAL at 06:04

## 2023-09-12 RX ADMIN — OXYCODONE HYDROCHLORIDE 5 MILLIGRAM(S): 5 TABLET ORAL at 21:15

## 2023-09-12 RX ADMIN — PANTOPRAZOLE SODIUM 40 MILLIGRAM(S): 20 TABLET, DELAYED RELEASE ORAL at 08:02

## 2023-09-12 RX ADMIN — Medication 3 MILLIGRAM(S): at 20:30

## 2023-09-12 RX ADMIN — Medication 650 MILLIGRAM(S): at 11:49

## 2023-09-12 RX ADMIN — Medication 650 MILLIGRAM(S): at 06:03

## 2023-09-12 RX ADMIN — Medication 15 MILLIGRAM(S): at 00:07

## 2023-09-12 RX ADMIN — Medication 100 MILLIGRAM(S): at 00:07

## 2023-09-12 RX ADMIN — Medication 400 MILLIGRAM(S): at 11:49

## 2023-09-12 RX ADMIN — Medication 15 MILLIGRAM(S): at 08:54

## 2023-09-12 NOTE — PROGRESS NOTE ADULT - SUBJECTIVE AND OBJECTIVE BOX
JULIAN ORO is a 83y Female s/p LEFT REVERSE SHOULDER ARTHROPLASTY BICEPS TENODESIS        denies any chest pain shortness of breath palpitation dizziness lightheadedness nausea vomiting fever or chills    T(C): 36.8 (09-12-23 @ 08:42), Max: 36.8 (09-12-23 @ 08:42)  HR: 73 (09-12-23 @ 08:42) (73 - 94)  BP: 120/58 (09-12-23 @ 08:42) (110/65 - 137/65)  RR: 16 (09-12-23 @ 08:42) (14 - 18)  SpO2: 92% (09-12-23 @ 08:42) (92% - 97%)  no jvd/bruit  s1 s2 rrr  cta  s/nt/nd  no calf tend                        10.2   7.58  )-----------( 171      ( 12 Sep 2023 06:40 )             32.8   09-12    137  |  103  |  14  ----------------------------<  96  4.1   |  30  |  0.80    Ca    8.1<L>      12 Sep 2023 06:40        cont dvt px  pain control  bowel regimen  antiemetics  incentive spirometer

## 2023-09-12 NOTE — PROGRESS NOTE ADULT - SUBJECTIVE AND OBJECTIVE BOX
83yFemale s/p left RSA POD#1. Pt seen and examined in NAD. Pain controlled. Pt denies any new complaints. Pt denies CP/SOB/N/V/D/numbness/tingling/bowel or bladder dysfunction.     PE:   Neuro: AAOX3  LUE: Sling in place. Prineo dressing C/D/I. +ROM elbow/wrist/fingers. +ok/thumbsup/fingercross signs.  strength: 5/5.  RP2+ NVI.  RUE: Skin intact. +ROM shoulder/elbow/wrist/fingers. +ok/thumbsup/fingercross signs.  strength: 5/5.  RP2+ NVI.   B/L LE: Skin intact. +ROM hip/knee/ankle/toes. Ankle Dorsi/plantarflexion: 5/5. Calf: soft, compressible and nontender. DP/PT 2+ NVI.                             10.2   7.58  )-----------( 171      ( 12 Sep 2023 06:40 )             32.8       09-12    137  |  103  |  14  ----------------------------<  96  4.1   |  30  |  0.80    Ca    8.1<L>      12 Sep 2023 06:40          A/P: 83yFemale s/p left RSA POD#1.  Post op Xrays reviewed- hardware in place -no fractures appreciated  Pain controlled  PT: WBAT - spinal precautions   DVT ppx: SCDs and asa 325mg daily  Wound care, Isometric exercises, incentive spirometry   Medical consult appreciated  Discharge: planning for rehab   All the above discussed and understood by pt   D/W Dr Baca 83yFemale s/p left RSA POD#1. Pt seen and examined in NAD. Pain controlled. Pt denies any new complaints. Pt denies CP/SOB/N/V/D/numbness/tingling/bowel or bladder dysfunction.     PE:   Neuro: AAOX3  LUE: Sling in place. Prineo dressing C/D/I. +ROM elbow/wrist/fingers. +ok/thumbsup/fingercross signs.  strength: 5/5.  RP2+ NVI.  RUE: Skin intact. +ROM shoulder/elbow/wrist/fingers. +ok/thumbsup/fingercross signs.  strength: 5/5.  RP2+ NVI.   B/L LE: Skin intact. +ROM hip/knee/ankle/toes. Ankle Dorsi/plantarflexion: 5/5. Calf: soft, compressible and nontender. DP/PT 2+ NVI.                             10.2   7.58  )-----------( 171      ( 12 Sep 2023 06:40 )             32.8       09-12    137  |  103  |  14  ----------------------------<  96  4.1   |  30  |  0.80    Ca    8.1<L>      12 Sep 2023 06:40          A/P: 83yFemale s/p left RSA POD#1.  Post op Xrays reviewed- hardware in place -no fractures appreciated  Pain controlled  DVT ppx: SCDs and asa 325mg daily  Wound care, Isometric exercises, incentive spirometry   Medical consult appreciated  Discharge: planning for rehab   All the above discussed and understood by pt   D/W Dr Baca

## 2023-09-13 LAB
ANION GAP SERPL CALC-SCNC: 4 MMOL/L — LOW (ref 5–17)
BUN SERPL-MCNC: 12 MG/DL — SIGNIFICANT CHANGE UP (ref 7–23)
CALCIUM SERPL-MCNC: 8.1 MG/DL — LOW (ref 8.5–10.1)
CHLORIDE SERPL-SCNC: 105 MMOL/L — SIGNIFICANT CHANGE UP (ref 96–108)
CO2 SERPL-SCNC: 29 MMOL/L — SIGNIFICANT CHANGE UP (ref 22–31)
CREAT SERPL-MCNC: 0.69 MG/DL — SIGNIFICANT CHANGE UP (ref 0.5–1.3)
EGFR: 86 ML/MIN/1.73M2 — SIGNIFICANT CHANGE UP
GLUCOSE SERPL-MCNC: 103 MG/DL — HIGH (ref 70–99)
HCT VFR BLD CALC: 32.1 % — LOW (ref 34.5–45)
HGB BLD-MCNC: 10.5 G/DL — LOW (ref 11.5–15.5)
MCHC RBC-ENTMCNC: 29.9 PG — SIGNIFICANT CHANGE UP (ref 27–34)
MCHC RBC-ENTMCNC: 32.7 G/DL — SIGNIFICANT CHANGE UP (ref 32–36)
MCV RBC AUTO: 91.5 FL — SIGNIFICANT CHANGE UP (ref 80–100)
NRBC # BLD: 0 /100 WBCS — SIGNIFICANT CHANGE UP (ref 0–0)
PLATELET # BLD AUTO: 165 K/UL — SIGNIFICANT CHANGE UP (ref 150–400)
POTASSIUM SERPL-MCNC: 3.6 MMOL/L — SIGNIFICANT CHANGE UP (ref 3.5–5.3)
POTASSIUM SERPL-SCNC: 3.6 MMOL/L — SIGNIFICANT CHANGE UP (ref 3.5–5.3)
RBC # BLD: 3.51 M/UL — LOW (ref 3.8–5.2)
RBC # FLD: 14.4 % — SIGNIFICANT CHANGE UP (ref 10.3–14.5)
SODIUM SERPL-SCNC: 138 MMOL/L — SIGNIFICANT CHANGE UP (ref 135–145)
WBC # BLD: 8.49 K/UL — SIGNIFICANT CHANGE UP (ref 3.8–10.5)
WBC # FLD AUTO: 8.49 K/UL — SIGNIFICANT CHANGE UP (ref 3.8–10.5)

## 2023-09-13 RX ADMIN — OXYCODONE HYDROCHLORIDE 5 MILLIGRAM(S): 5 TABLET ORAL at 21:36

## 2023-09-13 RX ADMIN — ATORVASTATIN CALCIUM 10 MILLIGRAM(S): 80 TABLET, FILM COATED ORAL at 21:37

## 2023-09-13 RX ADMIN — Medication 25 MILLIGRAM(S): at 05:56

## 2023-09-13 RX ADMIN — Medication 25 MILLIGRAM(S): at 17:26

## 2023-09-13 RX ADMIN — Medication 1 MILLIGRAM(S): at 07:43

## 2023-09-13 RX ADMIN — Medication 650 MILLIGRAM(S): at 12:40

## 2023-09-13 RX ADMIN — Medication 650 MILLIGRAM(S): at 18:20

## 2023-09-13 RX ADMIN — Medication 3 MILLIGRAM(S): at 21:37

## 2023-09-13 RX ADMIN — Medication 325 MILLIGRAM(S): at 11:42

## 2023-09-13 RX ADMIN — BUDESONIDE AND FORMOTEROL FUMARATE DIHYDRATE 2 PUFF(S): 160; 4.5 AEROSOL RESPIRATORY (INHALATION) at 17:27

## 2023-09-13 RX ADMIN — Medication 650 MILLIGRAM(S): at 00:11

## 2023-09-13 RX ADMIN — Medication 400 MILLIGRAM(S): at 11:41

## 2023-09-13 RX ADMIN — DULOXETINE HYDROCHLORIDE 30 MILLIGRAM(S): 30 CAPSULE, DELAYED RELEASE ORAL at 11:42

## 2023-09-13 RX ADMIN — AMLODIPINE BESYLATE 10 MILLIGRAM(S): 2.5 TABLET ORAL at 05:57

## 2023-09-13 RX ADMIN — CARVEDILOL PHOSPHATE 6.25 MILLIGRAM(S): 80 CAPSULE, EXTENDED RELEASE ORAL at 05:56

## 2023-09-13 RX ADMIN — Medication 650 MILLIGRAM(S): at 00:37

## 2023-09-13 RX ADMIN — Medication 650 MILLIGRAM(S): at 17:27

## 2023-09-13 RX ADMIN — FLUDROCORTISONE ACETATE 0.1 MILLIGRAM(S): 0.1 TABLET ORAL at 17:26

## 2023-09-13 RX ADMIN — BUDESONIDE AND FORMOTEROL FUMARATE DIHYDRATE 2 PUFF(S): 160; 4.5 AEROSOL RESPIRATORY (INHALATION) at 06:57

## 2023-09-13 RX ADMIN — OXYCODONE HYDROCHLORIDE 5 MILLIGRAM(S): 5 TABLET ORAL at 22:30

## 2023-09-13 RX ADMIN — Medication 650 MILLIGRAM(S): at 11:42

## 2023-09-13 RX ADMIN — Medication 650 MILLIGRAM(S): at 07:00

## 2023-09-13 RX ADMIN — Medication 650 MILLIGRAM(S): at 23:38

## 2023-09-13 RX ADMIN — OXYCODONE HYDROCHLORIDE 5 MILLIGRAM(S): 5 TABLET ORAL at 07:00

## 2023-09-13 RX ADMIN — FLUDROCORTISONE ACETATE 0.1 MILLIGRAM(S): 0.1 TABLET ORAL at 05:56

## 2023-09-13 RX ADMIN — Medication 650 MILLIGRAM(S): at 05:57

## 2023-09-13 RX ADMIN — PANTOPRAZOLE SODIUM 40 MILLIGRAM(S): 20 TABLET, DELAYED RELEASE ORAL at 05:57

## 2023-09-13 RX ADMIN — CARVEDILOL PHOSPHATE 6.25 MILLIGRAM(S): 80 CAPSULE, EXTENDED RELEASE ORAL at 17:27

## 2023-09-13 RX ADMIN — OXYCODONE HYDROCHLORIDE 5 MILLIGRAM(S): 5 TABLET ORAL at 05:57

## 2023-09-13 NOTE — PROGRESS NOTE ADULT - SUBJECTIVE AND OBJECTIVE BOX
83yFemale s/p left RSA POD#2. Pt seen and examined in NAD. Pain controlled. Pt denies any new complaints. Pt denies CP/SOB/N/V/D/numbness/tingling/bowel or bladder dysfunction. +BM today and yesterday.     PE:   Neuro: AAOX3  LUE: Sling in place. Prineo dressing C/D/I. +ROM elbow/wrist/fingers. +ok/thumbsup/fingercross signs.  strength: 5/5.  RP2+ NVI.  RUE: Skin intact. +ROM shoulder/elbow/wrist/fingers. +ok/thumbsup/fingercross signs.  strength: 5/5.  RP2+ NVI.   B/L LE: Skin intact. +ROM hip/knee/ankle/toes. Ankle Dorsi/plantarflexion: 5/5. Calf: soft, compressible and nontender. DP/PT 2+ NVI.                             10.5   8.49  )-----------( 165      ( 13 Sep 2023 05:30 )             32.1     09-13    138  |  105  |  12  ----------------------------<  103<H>  3.6   |  29  |  0.69    Ca    8.1<L>      13 Sep 2023 05:30              A/P: 83yFemale s/p left RSA POD#2  Post op Xrays reviewed- hardware in place -no fractures appreciated  Pain controlled  PT/OT NWB LUE no ROM to shoulder ok ROM to elbow/wrist/digits  DVT ppx: SCDs and asa 325mg daily  Wound care, Isometric exercises, incentive spirometry   Medical consult appreciated  Discharge: planning for rehab   All the above discussed and understood by pt   D/W Dr Baca

## 2023-09-13 NOTE — PROGRESS NOTE ADULT - SUBJECTIVE AND OBJECTIVE BOX
JULIAN ORO is a 83y Female s/p LEFT REVERSE SHOULDER ARTHROPLASTY BICEPS TENODESIS        denies any chest pain shortness of breath palpitation dizziness lightheadedness nausea vomiting fever or chills    T(C): 36.6 (09-13-23 @ 13:33), Max: 36.9 (09-12-23 @ 17:18)  HR: 90 (09-13-23 @ 13:33) (79 - 93)  BP: 135/77 (09-13-23 @ 13:33) (106/62 - 149/70)  RR: 18 (09-13-23 @ 13:33) (16 - 18)  SpO2: 94% (09-13-23 @ 13:33) (92% - 95%)  no jvd/bruit  s1 s2 rrr  cta  s/nt/nd  no calf tend  09-13    138  |  105  |  12  ----------------------------<  103<H>  3.6   |  29  |  0.69    Ca    8.1<L>      13 Sep 2023 05:30    09-13    138  |  105  |  12  ----------------------------<  103<H>  3.6   |  29  |  0.69    Ca    8.1<L>      13 Sep 2023 05:30        cont dvt px  pain control  bowel regimen  antiemetics  incentive spirometer

## 2023-09-14 ENCOUNTER — TRANSCRIPTION ENCOUNTER (OUTPATIENT)
Age: 83
End: 2023-09-14

## 2023-09-14 VITALS
HEART RATE: 82 BPM | TEMPERATURE: 98 F | DIASTOLIC BLOOD PRESSURE: 62 MMHG | SYSTOLIC BLOOD PRESSURE: 115 MMHG | RESPIRATION RATE: 16 BRPM | OXYGEN SATURATION: 96 %

## 2023-09-14 LAB
ANION GAP SERPL CALC-SCNC: 8 MMOL/L — SIGNIFICANT CHANGE UP (ref 5–17)
BUN SERPL-MCNC: 12 MG/DL — SIGNIFICANT CHANGE UP (ref 7–23)
CALCIUM SERPL-MCNC: 8.1 MG/DL — LOW (ref 8.5–10.1)
CHLORIDE SERPL-SCNC: 101 MMOL/L — SIGNIFICANT CHANGE UP (ref 96–108)
CO2 SERPL-SCNC: 28 MMOL/L — SIGNIFICANT CHANGE UP (ref 22–31)
CREAT SERPL-MCNC: 0.8 MG/DL — SIGNIFICANT CHANGE UP (ref 0.5–1.3)
EGFR: 73 ML/MIN/1.73M2 — SIGNIFICANT CHANGE UP
GLUCOSE SERPL-MCNC: 100 MG/DL — HIGH (ref 70–99)
HCT VFR BLD CALC: 31.6 % — LOW (ref 34.5–45)
HGB BLD-MCNC: 10.4 G/DL — LOW (ref 11.5–15.5)
MCHC RBC-ENTMCNC: 30 PG — SIGNIFICANT CHANGE UP (ref 27–34)
MCHC RBC-ENTMCNC: 32.9 G/DL — SIGNIFICANT CHANGE UP (ref 32–36)
MCV RBC AUTO: 91.1 FL — SIGNIFICANT CHANGE UP (ref 80–100)
NRBC # BLD: 0 /100 WBCS — SIGNIFICANT CHANGE UP (ref 0–0)
PLATELET # BLD AUTO: 179 K/UL — SIGNIFICANT CHANGE UP (ref 150–400)
POTASSIUM SERPL-MCNC: 3.8 MMOL/L — SIGNIFICANT CHANGE UP (ref 3.5–5.3)
POTASSIUM SERPL-SCNC: 3.8 MMOL/L — SIGNIFICANT CHANGE UP (ref 3.5–5.3)
RBC # BLD: 3.47 M/UL — LOW (ref 3.8–5.2)
RBC # FLD: 14.3 % — SIGNIFICANT CHANGE UP (ref 10.3–14.5)
SODIUM SERPL-SCNC: 137 MMOL/L — SIGNIFICANT CHANGE UP (ref 135–145)
WBC # BLD: 6.67 K/UL — SIGNIFICANT CHANGE UP (ref 3.8–10.5)
WBC # FLD AUTO: 6.67 K/UL — SIGNIFICANT CHANGE UP (ref 3.8–10.5)

## 2023-09-14 RX ADMIN — AMLODIPINE BESYLATE 10 MILLIGRAM(S): 2.5 TABLET ORAL at 05:58

## 2023-09-14 RX ADMIN — OXYCODONE HYDROCHLORIDE 10 MILLIGRAM(S): 5 TABLET ORAL at 13:23

## 2023-09-14 RX ADMIN — CARVEDILOL PHOSPHATE 6.25 MILLIGRAM(S): 80 CAPSULE, EXTENDED RELEASE ORAL at 05:59

## 2023-09-14 RX ADMIN — OXYCODONE HYDROCHLORIDE 10 MILLIGRAM(S): 5 TABLET ORAL at 14:23

## 2023-09-14 RX ADMIN — DULOXETINE HYDROCHLORIDE 30 MILLIGRAM(S): 30 CAPSULE, DELAYED RELEASE ORAL at 13:43

## 2023-09-14 RX ADMIN — PANTOPRAZOLE SODIUM 40 MILLIGRAM(S): 20 TABLET, DELAYED RELEASE ORAL at 05:59

## 2023-09-14 RX ADMIN — OXYCODONE HYDROCHLORIDE 10 MILLIGRAM(S): 5 TABLET ORAL at 08:40

## 2023-09-14 RX ADMIN — Medication 650 MILLIGRAM(S): at 00:35

## 2023-09-14 RX ADMIN — OXYCODONE HYDROCHLORIDE 10 MILLIGRAM(S): 5 TABLET ORAL at 09:40

## 2023-09-14 RX ADMIN — Medication 25 MILLIGRAM(S): at 05:59

## 2023-09-14 RX ADMIN — Medication 650 MILLIGRAM(S): at 13:00

## 2023-09-14 RX ADMIN — Medication 1 MILLIGRAM(S): at 05:58

## 2023-09-14 RX ADMIN — Medication 650 MILLIGRAM(S): at 07:00

## 2023-09-14 RX ADMIN — BUDESONIDE AND FORMOTEROL FUMARATE DIHYDRATE 2 PUFF(S): 160; 4.5 AEROSOL RESPIRATORY (INHALATION) at 06:01

## 2023-09-14 RX ADMIN — Medication 650 MILLIGRAM(S): at 05:59

## 2023-09-14 RX ADMIN — FLUDROCORTISONE ACETATE 0.1 MILLIGRAM(S): 0.1 TABLET ORAL at 05:58

## 2023-09-14 NOTE — DISCHARGE NOTE NURSING/CASE MANAGEMENT/SOCIAL WORK - NSDCPEFALRISK_GEN_ALL_CORE
For information on Fall & Injury Prevention, visit: [email or web address] OR  [email or web address] OR  https://www.cdc.gov/steadi/patient.html

## 2023-09-14 NOTE — PROGRESS NOTE ADULT - SUBJECTIVE AND OBJECTIVE BOX
83yFemale s/p L RSA Pt seen and examined in NAD. Pain controlled. Pt denies any new complaints. Pt denies CP/SOB/N/V/D/numbness/tingling/bowel or bladder dysfunction. (+) voids (+)tolerating PO Diet    PE:     LUE: (+)Dressing CDI. (+)ROM of wrist/fingers +ok/thumbsup/fingercross signs.  strength: 5/5.  RP2+ NVI.   RUE: Skin intact. +ROM shoulder/elbow/wrist/fingers. +ok/thumbsup/fingercross signs.  strength: 5/5.  RP2+ NVI.   LLE: calf soft & NT NVI                              10.4   6.67  )-----------( 179      ( 14 Sep 2023 06:20 )             31.6       09-14    137  |  101  |  12  ----------------------------<  100<H>  3.8   |  28  |  0.80    Ca    8.1<L>      14 Sep 2023 06:20          A/P: 83yFemale s/pL RSA POD#2    Pain controlled  PT: RSA protocol  DVT ppx: SCDs and ASA daily  Wound care, Isometric exercises, incentive spirometry   Discharge: planning Rehab pending Bed/Auth  All the above discussed and understood by pt

## 2023-09-14 NOTE — DISCHARGE NOTE NURSING/CASE MANAGEMENT/SOCIAL WORK - PATIENT PORTAL LINK FT
You can access the FollowMyHealth Patient Portal offered by [facility] by registering at the following website: [email or web address]. By joining [employer] [facility]’s FollowMyHealth portal, you will also be able to view your health information using other applications (apps) compatible with our system.

## 2023-09-14 NOTE — DISCHARGE NOTE NURSING/CASE MANAGEMENT/SOCIAL WORK - NSDCVIVACCINE_GEN_ALL_CORE_FT
Tdap; 10-Feb-2018 19:30; Naye Moreau (RN); [employer]; [id number]; IntraMuscular; Deltoid Right.; 0.5 milliLiter(s); VIS (VIS Published: 09-May-2013, VIS Presented: 10-Feb-2018);

## 2023-09-14 NOTE — PROGRESS NOTE ADULT - SUBJECTIVE AND OBJECTIVE BOX
JULIAN ORO is a 83y Female s/p LEFT REVERSE SHOULDER ARTHROPLASTY BICEPS TENODESIS        denies any chest pain shortness of breath palpitation dizziness lightheadedness nausea vomiting fever or chills    T(C): 36.5 (09-14-23 @ 05:00), Max: 36.6 (09-13-23 @ 21:32)  HR: 78 (09-14-23 @ 10:50) (72 - 83)  BP: 121/67 (09-14-23 @ 10:50) (114/66 - 124/69)  RR: 18 (09-14-23 @ 10:50) (18 - 18)  SpO2: 95% (09-14-23 @ 10:50) (94% - 95%)  no jvd/bruit  s1 s2 rrr  cta  s/nt/nd  no calf tend                        10.4   6.67  )-----------( 179      ( 14 Sep 2023 06:20 )             31.6   09-14    137  |  101  |  12  ----------------------------<  100<H>  3.8   |  28  |  0.80    Ca    8.1<L>      14 Sep 2023 06:20        cont dvt px  pain control  bowel regimen  antiemetics  incentive spirometer

## 2023-09-14 NOTE — DISCHARGE NOTE NURSING/CASE MANAGEMENT/SOCIAL WORK - NSDCFUADDAPPT_GEN_ALL_CORE_FT
Follow up with your surgeon in two weeks. Call for appointment.    If you need more pain medication, call your surgeon's office. For medication refills or authorizations, please call [phone]    We recommend that you call and schedule a follow up appointment with your primary care physician for repeat blood work (CBC and BMP) for post hospital discharge follow-up care 2-4 weeks after your surgery.     Make sure to have a bowel movement by 2 days after surgery. Take stool softeners and laxatives as needed.     Call your surgeon if you have increased redness/pain/drainage or fever. Return to ER for shortness of breath/calf tenderness.

## 2023-09-17 NOTE — PATIENT PROFILE ADULT. - FUNCTIONAL SCREEN CURRENT LEVEL: TOILETING, MLM
Alert-The patient is alert, awake and responds to voice. The patient is oriented to time, place, and person. The triage nurse is able to obtain subjective information. spontaneous (2) assistive person

## 2023-09-19 DIAGNOSIS — Z96.611 PRESENCE OF RIGHT ARTIFICIAL SHOULDER JOINT: ICD-10-CM

## 2023-09-19 DIAGNOSIS — M19.012 PRIMARY OSTEOARTHRITIS, LEFT SHOULDER: ICD-10-CM

## 2023-09-19 DIAGNOSIS — Z96.653 PRESENCE OF ARTIFICIAL KNEE JOINT, BILATERAL: ICD-10-CM

## 2023-09-19 DIAGNOSIS — M06.9 RHEUMATOID ARTHRITIS, UNSPECIFIED: ICD-10-CM

## 2023-09-19 DIAGNOSIS — E78.5 HYPERLIPIDEMIA, UNSPECIFIED: ICD-10-CM

## 2023-09-19 DIAGNOSIS — Z95.2 PRESENCE OF PROSTHETIC HEART VALVE: ICD-10-CM

## 2023-09-19 DIAGNOSIS — Z96.642 PRESENCE OF LEFT ARTIFICIAL HIP JOINT: ICD-10-CM

## 2023-09-19 DIAGNOSIS — M75.102 UNSPECIFIED ROTATOR CUFF TEAR OR RUPTURE OF LEFT SHOULDER, NOT SPECIFIED AS TRAUMATIC: ICD-10-CM

## 2023-09-19 DIAGNOSIS — Z98.1 ARTHRODESIS STATUS: ICD-10-CM

## 2023-09-26 ENCOUNTER — APPOINTMENT (OUTPATIENT)
Dept: ORTHOPEDIC SURGERY | Facility: CLINIC | Age: 83
End: 2023-09-26
Payer: MEDICARE

## 2023-09-26 VITALS — WEIGHT: 137 LBS | BODY MASS INDEX: 22.82 KG/M2 | HEIGHT: 65 IN

## 2023-09-26 DIAGNOSIS — M19.011 PRIMARY OSTEOARTHRITIS, RIGHT SHOULDER: ICD-10-CM

## 2023-09-26 PROBLEM — J45.909 UNSPECIFIED ASTHMA, UNCOMPLICATED: Chronic | Status: ACTIVE | Noted: 2023-09-11

## 2023-09-26 PROCEDURE — 73010 X-RAY EXAM OF SHOULDER BLADE: CPT | Mod: RT

## 2023-09-26 PROCEDURE — 73030 X-RAY EXAM OF SHOULDER: CPT | Mod: 50

## 2023-09-26 PROCEDURE — 99024 POSTOP FOLLOW-UP VISIT: CPT

## 2023-10-02 ENCOUNTER — EMERGENCY (EMERGENCY)
Facility: HOSPITAL | Age: 83
LOS: 0 days | Discharge: HOME | End: 2023-10-02
Attending: EMERGENCY MEDICINE
Payer: MEDICARE

## 2023-10-02 VITALS
DIASTOLIC BLOOD PRESSURE: 86 MMHG | RESPIRATION RATE: 18 BRPM | HEART RATE: 78 BPM | OXYGEN SATURATION: 93 % | TEMPERATURE: 98 F | WEIGHT: 128.09 LBS | SYSTOLIC BLOOD PRESSURE: 171 MMHG | HEIGHT: 66 IN

## 2023-10-02 VITALS
DIASTOLIC BLOOD PRESSURE: 85 MMHG | TEMPERATURE: 98 F | RESPIRATION RATE: 18 BRPM | SYSTOLIC BLOOD PRESSURE: 165 MMHG | OXYGEN SATURATION: 95 % | HEART RATE: 76 BPM

## 2023-10-02 DIAGNOSIS — G89.29 OTHER CHRONIC PAIN: ICD-10-CM

## 2023-10-02 DIAGNOSIS — M25.512 PAIN IN LEFT SHOULDER: ICD-10-CM

## 2023-10-02 DIAGNOSIS — E78.5 HYPERLIPIDEMIA, UNSPECIFIED: ICD-10-CM

## 2023-10-02 DIAGNOSIS — Z96.612 PRESENCE OF LEFT ARTIFICIAL SHOULDER JOINT: ICD-10-CM

## 2023-10-02 DIAGNOSIS — R51.9 HEADACHE, UNSPECIFIED: ICD-10-CM
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
## 2023-10-02 DIAGNOSIS — Z98.1 ARTHRODESIS STATUS: Chronic | ICD-10-CM

## 2023-10-02 DIAGNOSIS — Z98.89 OTHER SPECIFIED POSTPROCEDURAL STATES: Chronic | ICD-10-CM

## 2023-10-02 DIAGNOSIS — Z96.642 PRESENCE OF LEFT ARTIFICIAL HIP JOINT: Chronic | ICD-10-CM
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
## 2023-10-02 DIAGNOSIS — Z95.2 PRESENCE OF PROSTHETIC HEART VALVE: Chronic | ICD-10-CM

## 2023-10-02 DIAGNOSIS — S70.01XA CONTUSION OF RIGHT HIP, INITIAL ENCOUNTER: ICD-10-CM

## 2023-10-02 DIAGNOSIS — M25.552 PAIN IN LEFT HIP: ICD-10-CM

## 2023-10-02 DIAGNOSIS — Z79.82 LONG TERM (CURRENT) USE OF ASPIRIN: ICD-10-CM
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
## 2023-10-02 DIAGNOSIS — M19.90 UNSPECIFIED OSTEOARTHRITIS, UNSPECIFIED SITE: ICD-10-CM

## 2023-10-02 DIAGNOSIS — G43.909 MIGRAINE, UNSPECIFIED, NOT INTRACTABLE, WITHOUT STATUS MIGRAINOSUS: ICD-10-CM

## 2023-10-02 DIAGNOSIS — Z98.49 CATARACT EXTRACTION STATUS, UNSPECIFIED EYE: Chronic | ICD-10-CM

## 2023-10-02 DIAGNOSIS — Z96.611 PRESENCE OF RIGHT ARTIFICIAL SHOULDER JOINT: Chronic | ICD-10-CM

## 2023-10-02 DIAGNOSIS — M06.9 RHEUMATOID ARTHRITIS, UNSPECIFIED: ICD-10-CM

## 2023-10-02 DIAGNOSIS — Z96.653 PRESENCE OF ARTIFICIAL KNEE JOINT, BILATERAL: Chronic | ICD-10-CM

## 2023-10-02 DIAGNOSIS — Z85.3 PERSONAL HISTORY OF MALIGNANT NEOPLASM OF BREAST: ICD-10-CM

## 2023-10-02 DIAGNOSIS — Z96.653 PRESENCE OF ARTIFICIAL KNEE JOINT, BILATERAL: ICD-10-CM

## 2023-10-02 DIAGNOSIS — J45.909 UNSPECIFIED ASTHMA, UNCOMPLICATED: ICD-10-CM

## 2023-10-02 DIAGNOSIS — Z96.611 PRESENCE OF RIGHT ARTIFICIAL SHOULDER JOINT: ICD-10-CM

## 2023-10-02 DIAGNOSIS — Z96.642 PRESENCE OF LEFT ARTIFICIAL HIP JOINT: ICD-10-CM

## 2023-10-02 DIAGNOSIS — S00.93XA CONTUSION OF UNSPECIFIED PART OF HEAD, INITIAL ENCOUNTER: ICD-10-CM

## 2023-10-02 PROCEDURE — 70450 CT HEAD/BRAIN W/O DYE: CPT | Mod: MA

## 2023-10-02 PROCEDURE — 73502 X-RAY EXAM HIP UNI 2-3 VIEWS: CPT | Mod: 26,RT

## 2023-10-02 PROCEDURE — 73030 X-RAY EXAM OF SHOULDER: CPT | Mod: 26,LT

## 2023-10-02 PROCEDURE — 73552 X-RAY EXAM OF FEMUR 2/>: CPT | Mod: RT

## 2023-10-02 PROCEDURE — 73030 X-RAY EXAM OF SHOULDER: CPT | Mod: RT

## 2023-10-02 PROCEDURE — 99285 EMERGENCY DEPT VISIT HI MDM: CPT

## 2023-10-02 PROCEDURE — 73502 X-RAY EXAM HIP UNI 2-3 VIEWS: CPT | Mod: RT

## 2023-10-02 PROCEDURE — 99284 EMERGENCY DEPT VISIT MOD MDM: CPT | Mod: 25

## 2023-10-02 PROCEDURE — 72125 CT NECK SPINE W/O DYE: CPT | Mod: 26,MA

## 2023-10-02 PROCEDURE — 72125 CT NECK SPINE W/O DYE: CPT | Mod: MA

## 2023-10-02 PROCEDURE — 70450 CT HEAD/BRAIN W/O DYE: CPT | Mod: 26,MA

## 2023-10-02 PROCEDURE — 73552 X-RAY EXAM OF FEMUR 2/>: CPT | Mod: 26,RT

## 2023-10-02 RX ORDER — ACETAMINOPHEN 500 MG
1000 TABLET ORAL ONCE
Refills: 0 | Status: COMPLETED | OUTPATIENT
Start: 2023-10-02 | End: 2023-10-02

## 2023-10-02 RX ADMIN — Medication 500 MILLIGRAM(S): at 06:33

## 2023-10-02 NOTE — ED PROVIDER NOTE - CLINICAL SUMMARY MEDICAL DECISION MAKING FREE TEXT BOX
83-year-old female with history of asthma, chronic pain secondary to osteoarthritis and rheumatoid arthritis bilateral shoulder replacement surgery, hyperlipidemia presents for evaluation of mechanical fall from skilled nursing facility.  Patient states that she was walking with her walker and her left foot got caught in the wheel of the wheelchair that was in the hallway.  This caused her to lose balance, dropped a walker and fall onto her left side.  Patient states that she hit her left hip, left shoulder and head.  Patient denies any loss of consciousness.  Patient denies any chest pain or shortness of breath.  Neuro alert called at triage upon my assessment of patient.  CT head to r/o ICH, CT cervical spine, CT pelvis, x-ray right hip and femur, pain control prn.  Pt denies systemic symptoms and is alert.  Her mechanism is mechanical in nature.

## 2023-10-02 NOTE — ED PROVIDER NOTE - PATIENT PORTAL LINK FT
You can access the FollowMyHealth Patient Portal offered by [facility] by registering at the following website: [email or web address]. By joining [facility]’s FollowMyHealth portal, you will also be able to view your health information using other applications (apps) compatible with our system.

## 2023-10-02 NOTE — ED PROVIDER NOTE - MUSCULOSKELETAL, MLM
Decreased flexion of bilateral shoulders without swelling, deformity or tenderness to palpation.  Decreased range of motion of left hip the patient is able to perform straight leg raise.  Patient does not have any tenderness to palpation, swelling or deformity to bilateral lower extremities.

## 2023-10-02 NOTE — ED ADULT TRIAGE NOTE - CHIEF COMPLAINT QUOTE
Pt BIBEMS from [facility] c/o fall. Pt s/p mechanical trip and fall in the bathroom this morning. +headstrike, -LOC. On aspirin 325mg. C/o right shoulder pain. NKDA. NA called at 0411.

## 2023-10-02 NOTE — ED ADULT NURSE NOTE - NSFALLRISKINTERV_ED_ALL_ED

## 2023-10-02 NOTE — ED ADULT NURSE NOTE - CAS EDN DISCHARGE INTERVENTIONS
Dexcom prescription was sent to patient's former skilled nursing facility;s pharmacy, not [facility].  Patient and daughter Jessica were notified and will try to see if the facility's pharmacy can send the current prescription information to local [facility].  Patient and daughter will contact office if unable to transfer prescription.   
Patient's daughter called to schedule f/u appointment with endocrinology d/t the patient, Mariola Vega, being discharge from hospital following bladder/kidney infection and hyperglycemia.  Patient is schedule with Nathalie Roque on 9/28/21 at 1300.  Patient is on Dexcom using  and is not connected to Dexcom [employer] office account. Per daughter patient is home and being taken care of family.  Her dexcom is on and working and they report that her SG is alerting high.  No data at this time.  Per patients daughter Jessica, this is her last sensor.  Order for Dexcom was sent into [facility] by Nathalie Roque August 2021 and has refills until August 2022.   
Thanks so much, Alvina  
no iv at time of DC

## 2023-10-02 NOTE — ED ADULT NURSE REASSESSMENT NOTE - NS ED NURSE REASSESS COMMENT FT1
reiceved pt at change of shift. pt axo4 no distress. son came to pickup pt shortly after change of shift. no iv

## 2023-10-02 NOTE — ED PROVIDER NOTE - NSICDXPASTMEDICALHX_GEN_ALL_CORE_FT
PAST MEDICAL HISTORY:  Asthma     Breast cancer left 1990 treated with RT and surgery, no f/u required    Chronic pain secondary to OA and RA    HLD (hyperlipidemia)     Implantable loop recorder present pt reports she fainted and that's why it was placed    Migraine     Mitral valve disease     OA (osteoarthritis)     RA (rheumatoid arthritis)

## 2023-10-02 NOTE — ED PROVIDER NOTE - CARE PLAN
1 Principal Discharge DX:	Contusion of head, initial encounter  Secondary Diagnosis:	Contusion of hip, right

## 2023-10-02 NOTE — ED PROVIDER NOTE - OBJECTIVE STATEMENT
83-year-old female with history of asthma, chronic pain secondary to osteoarthritis and rheumatoid arthritis bilateral shoulder replacement surgery, hyperlipidemia presents for evaluation of mechanical fall from skilled nursing facility.  Patient states that she was walking with her walker and her left foot got caught in the wheel of the wheelchair that was in the hallway.  This caused her to lose balance, dropped a walker and fall onto her left side.  Patient states that she hit her left hip, left shoulder and head.  Patient denies any loss of consciousness.  Patient denies any chest pain or shortness of breath.

## 2023-10-13 ENCOUNTER — APPOINTMENT (OUTPATIENT)
Dept: ORTHOPEDIC SURGERY | Facility: CLINIC | Age: 83
End: 2023-10-13

## 2023-10-24 ENCOUNTER — APPOINTMENT (OUTPATIENT)
Dept: ORTHOPEDIC SURGERY | Facility: CLINIC | Age: 83
End: 2023-10-24
Payer: MEDICARE

## 2023-10-24 VITALS — WEIGHT: 137 LBS | HEIGHT: 65 IN | BODY MASS INDEX: 22.82 KG/M2

## 2023-10-24 PROCEDURE — 99024 POSTOP FOLLOW-UP VISIT: CPT

## 2023-11-02 ENCOUNTER — APPOINTMENT (OUTPATIENT)
Dept: OTOLARYNGOLOGY | Facility: CLINIC | Age: 83
End: 2023-11-02
Payer: MEDICARE

## 2023-11-02 VITALS — HEIGHT: 66 IN | BODY MASS INDEX: 20.09 KG/M2 | WEIGHT: 125 LBS

## 2023-11-02 DIAGNOSIS — H90.3 SENSORINEURAL HEARING LOSS, BILATERAL: ICD-10-CM

## 2023-11-02 DIAGNOSIS — H61.23 IMPACTED CERUMEN, BILATERAL: ICD-10-CM

## 2023-11-02 PROCEDURE — 99213 OFFICE O/P EST LOW 20 MIN: CPT | Mod: 25

## 2023-11-02 PROCEDURE — 69210 REMOVE IMPACTED EAR WAX UNI: CPT

## 2023-12-05 ENCOUNTER — APPOINTMENT (OUTPATIENT)
Dept: ORTHOPEDIC SURGERY | Facility: CLINIC | Age: 83
End: 2023-12-05
Payer: MEDICARE

## 2023-12-05 VITALS — HEIGHT: 66 IN | BODY MASS INDEX: 20.09 KG/M2 | WEIGHT: 125 LBS

## 2023-12-05 PROCEDURE — 99024 POSTOP FOLLOW-UP VISIT: CPT

## 2023-12-05 PROCEDURE — 73030 X-RAY EXAM OF SHOULDER: CPT | Mod: LT

## 2023-12-05 PROCEDURE — 73010 X-RAY EXAM OF SHOULDER BLADE: CPT | Mod: LT

## 2024-01-10 ENCOUNTER — APPOINTMENT (OUTPATIENT)
Dept: INTERNAL MEDICINE | Facility: CLINIC | Age: 84
End: 2024-01-10
Payer: MEDICARE

## 2024-01-10 VITALS
WEIGHT: 125 LBS | TEMPERATURE: 97.6 F | DIASTOLIC BLOOD PRESSURE: 79 MMHG | HEART RATE: 97 BPM | RESPIRATION RATE: 16 BRPM | HEIGHT: 65 IN | OXYGEN SATURATION: 95 % | SYSTOLIC BLOOD PRESSURE: 132 MMHG | BODY MASS INDEX: 20.83 KG/M2

## 2024-01-10 DIAGNOSIS — M35.3 POLYMYALGIA RHEUMATICA: ICD-10-CM

## 2024-01-10 DIAGNOSIS — J44.9 CHRONIC OBSTRUCTIVE PULMONARY DISEASE, UNSPECIFIED: ICD-10-CM

## 2024-01-10 DIAGNOSIS — Z79.52 LONG TERM (CURRENT) USE OF SYSTEMIC STEROIDS: ICD-10-CM

## 2024-01-10 DIAGNOSIS — R05.3 CHRONIC COUGH: ICD-10-CM

## 2024-01-10 PROCEDURE — 94729 DIFFUSING CAPACITY: CPT

## 2024-01-10 PROCEDURE — 94060 EVALUATION OF WHEEZING: CPT

## 2024-01-10 PROCEDURE — 99214 OFFICE O/P EST MOD 30 MIN: CPT | Mod: 25

## 2024-01-10 PROCEDURE — 94727 GAS DIL/WSHOT DETER LNG VOL: CPT

## 2024-01-10 PROCEDURE — ZZZZZ: CPT

## 2024-01-10 RX ORDER — PREDNISONE 5 MG/1
5 TABLET ORAL
Qty: 30 | Refills: 5 | Status: ACTIVE | COMMUNITY
Start: 2024-01-10

## 2024-01-10 RX ORDER — ENALAPRIL MALEATE 2.5 MG/1
2.5 TABLET ORAL
Refills: 0 | Status: DISCONTINUED | COMMUNITY
End: 2024-01-10

## 2024-01-10 RX ORDER — AMOXICILLIN 500 MG/1
500 TABLET, FILM COATED ORAL
Qty: 4 | Refills: 0 | Status: DISCONTINUED | COMMUNITY
Start: 2021-04-09 | End: 2024-01-10

## 2024-01-10 RX ORDER — MELOXICAM 15 MG/1
15 TABLET ORAL
Qty: 30 | Refills: 0 | Status: DISCONTINUED | COMMUNITY
Start: 2022-04-29 | End: 2024-01-10

## 2024-01-10 RX ORDER — PANTOPRAZOLE 40 MG/1
40 TABLET, DELAYED RELEASE ORAL
Qty: 60 | Refills: 2 | Status: DISCONTINUED | COMMUNITY
Start: 2022-06-29 | End: 2024-01-10

## 2024-01-10 RX ORDER — FLUDROCORTISONE ACETATE 0.1 MG/1
0.1 TABLET ORAL
Qty: 30 | Refills: 0 | Status: ACTIVE | COMMUNITY
Start: 2024-01-10 | End: 1900-01-01

## 2024-01-10 NOTE — REASON FOR VISIT
[Follow-Up] : a follow-up visit [Cough] : cough [COPD] : COPD [Bronchiectasis] : bronchiectasis [Shortness of Breath] : shortness of breath

## 2024-01-10 NOTE — ASSESSMENT
[FreeTextEntry1] : Ms. Roa is an 83-year-old female with COPD and bronchiectasis on CT scan which is mainly lower lobe.  She continues to have some dyspnea with exertion as well as a persistent, unproductive cough.  She will continue on current metered-dose inhaler therapy.  Patient will repeat a CT scan of the chest.  If there is persistent evidence of bronchiectasis with mucoid impaction will consider bronchoscopy with bronchoalveolar lavage to rule out the possibility of pulmonary MAC.  Follow-up as scheduled.

## 2024-01-10 NOTE — HISTORY OF PRESENT ILLNESS
[TextBox_4] : Mrs. Roa presents for a follow-up evaluation accompanied by her daughter.  She is an 83-year-old female with COPD.  She is currently on Symbicort 160/4.5 mcg 2 puffs twice daily and has an albuterol inhaler for rescue therapy.  Patient was a lifelong non-smoker.  Previous CT scan of the chest shows bilateral lower lobe bronchiectasis with some nodular densities.  These changes could be due to pulmonary MAC.  Patient previously had oral sputum samples which did not show evidence of AFB.  Patient has lost approximately 10 pounds in the past several months.  She also has an intermittent, nonproductive cough.  She has no nocturnal symptoms of cough or dyspnea.  She is on chronic prednisone therapy (5 mg a day) for treatment of polymyalgia rheumatica.

## 2024-01-10 NOTE — PROCEDURE
[FreeTextEntry1] : Complete pulmonary function test were performed. FVC 1.83 L which 73% predicted.  FEV1 1.22 L which is 65% predicted.  FEV1/FVC ratio 66%.  FEF 25/75% 0.72 L/s which is 49% predicted.  PEF 3.28 L/s which is 60% predicted. Postbronchodilator: FEV1 1.25 L which is 66% predicted.  PEF 3.06 L/s which is 56% predicted.  There is no significant bronchodilator response.  Lung volumes within normal limits.  Diffusing capacity is 55%.

## 2024-01-12 ENCOUNTER — NON-APPOINTMENT (OUTPATIENT)
Age: 84
End: 2024-01-12

## 2024-01-23 ENCOUNTER — APPOINTMENT (OUTPATIENT)
Dept: ORTHOPEDIC SURGERY | Facility: CLINIC | Age: 84
End: 2024-01-23
Payer: MEDICARE

## 2024-01-23 VITALS — BODY MASS INDEX: 20.83 KG/M2 | WEIGHT: 125 LBS | HEIGHT: 65 IN

## 2024-01-23 VITALS — WEIGHT: 125 LBS | BODY MASS INDEX: 20.83 KG/M2 | HEIGHT: 65 IN

## 2024-01-23 PROCEDURE — 99213 OFFICE O/P EST LOW 20 MIN: CPT

## 2024-01-23 NOTE — HISTORY OF PRESENT ILLNESS
[Dull/Aching] : dull/aching [de-identified] : DOS:  8/29/22  R RSA DOS  9/11/23 L RSA  12/05/2023: for f/u xrays left shoulder.  SHe is in PT with improvement.  She has some limitations.   10/24/23: Here for follow up. She is now home from rehab facility. She has home OT/PT scheduled.  9/26/23: Here for first PO visit L shoulder, she is in sling.  She is 1 yr from R RSA.  7/18/23:  Here for follow up.  She has trouble reaching up to her head, to do her hair and to get dressed.  She continues to have pain on the left.    CT L shoulder: Severe glenohumeral arthrosis as described above. Findings suspicious for chronic high-grade partial-thickness rotator cuff tearing with muscle atrophy. Subacute acromial fracture without osseous healing   2/14/23: Here for follow up. She reports some pain in her right shoulder when reaching across her body but improving. Her left is still painful.  1/3/23: Here for follow up, about 4 months. She is in PT. She states left shoulder injection helped temporarily.  She is having a little more soreness in the right shoulder and upper arm.  11/15/22: Here for follow up, nearly 3 months post op.    She is having left shoulder pain as well, flared up from PT.   10/4/22: Follow up R shoulder. She is in PT and improving.  9/13/22: Here for first post op.  In sling.   5/10/22: 82 yo RHD female with bilateral shoulder pain since 2020, worse the past year. Right is worse than left. There is pain posterior and anterior. She has pain radiating to her elbow. There is a constant pain, worse with reaching. She saw Dr. Deutsch and had CT and MRI. She takes Mobic.   CT RIGHT SHOULDER: 1. Advanced glenohumeral arthropathy with chronic remodeling of the articular surfaces. 2. Large glenohumeral joint effusion with innumerable intra-articular bodies, similar to prior MRI. 3. Amorphous mineralization along the joint capsule and the rotator cuff footprint may reflect CPPD versus sequela of chronic synovitis. 4. Moderate acromioclavicular joint arthropathy. 5. Subacromial-subdeltoid bursitis. 6. Diffusely diminutive rotator cuff tendons and muscle atrophy may reflect underlying tendon tear as described on prior MRI. 7. Lateral downsloping of the acromion with subacromial spurring can contribute to impingement like symptoms in the appropriate clinical setting. 8. Similar appearance of an irregular nodule in the posterior segment of the right upper lobe when compared to prior CT thorax   MRI RIGHT SHOULDER:  1.  Advanced glenohumeral arthropathy with chronic remodeling of the glenohumeral articular surfaces, unchanged when compared to prior CT thorax 5/11/2020. 2.  Rotator cuff tendinosis and diffuse muscle atrophy as described. Diffusely diminutive supraspinatus with high-grade partial-thickness articular surface tear. Mild infraspinatus and subscapularis articular surface fraying. Moderate-grade interstitial tear at the superior subscapularis. 3.  Longitudinal split tear of the intra-articular long head of the biceps tendon with distal reconstitution. Mild tenosynovitis. Perched extra-articular segment over the lesser tuberosity. 4.  Large glenohumeral joint effusion with synovitis and innumerable intra-articular bodies. 5.  Moderate acromioclavicular joint arthropathy. 6.  Severe subacromial-subdeltoid bursitis.advanced GH DJD, PRCT, bursitis.  PMHx: HLD, HTN, RA, valve replacement [] : no [FreeTextEntry1] : Lft shoulder [FreeTextEntry5] : reports limited range of motion

## 2024-01-23 NOTE — ASSESSMENT
[FreeTextEntry1] : B/l Gh DJD. s/p R RSA Now s/p L RSA with augmented baseplate. Xrays show signs of acromial stress fracture, implants stable. She is stiff, encouraged HEP for stretching. PT for gait/balance. PT for PROM, AROM as tolerated. OK to start strengthening. WB limit 10 lbs RTO 6 weeks continue with PT. Repeat R shoulder xrays yearly (Aug.)

## 2024-01-23 NOTE — IMAGING
[Left] : left shoulder [Components well fixed, in good position] : Components well fixed, in good position [FreeTextEntry1] : signs of acromial stress fracture

## 2024-01-23 NOTE — HISTORY OF PRESENT ILLNESS
[Dull/Aching] : dull/aching [de-identified] : DOS:  8/29/22  R RSA DOS  9/11/23 L RSA  12/05/2023: for f/u xrays left shoulder.  SHe is in PT with improvement.  She has some limitations.   10/24/23: Here for follow up. She is now home from rehab facility. She has home OT/PT scheduled.  9/26/23: Here for first PO visit L shoulder, she is in sling.  She is 1 yr from R RSA.  7/18/23:  Here for follow up.  She has trouble reaching up to her head, to do her hair and to get dressed.  She continues to have pain on the left.    CT L shoulder: Severe glenohumeral arthrosis as described above. Findings suspicious for chronic high-grade partial-thickness rotator cuff tearing with muscle atrophy. Subacute acromial fracture without osseous healing   2/14/23: Here for follow up. She reports some pain in her right shoulder when reaching across her body but improving. Her left is still painful.  1/3/23: Here for follow up, about 4 months. She is in PT. She states left shoulder injection helped temporarily.  She is having a little more soreness in the right shoulder and upper arm.  11/15/22: Here for follow up, nearly 3 months post op.    She is having left shoulder pain as well, flared up from PT.   10/4/22: Follow up R shoulder. She is in PT and improving.  9/13/22: Here for first post op.  In sling.   5/10/22: 80 yo RHD female with bilateral shoulder pain since 2020, worse the past year. Right is worse than left. There is pain posterior and anterior. She has pain radiating to her elbow. There is a constant pain, worse with reaching. She saw Dr. Deutsch and had CT and MRI. She takes Mobic.   CT RIGHT SHOULDER: 1. Advanced glenohumeral arthropathy with chronic remodeling of the articular surfaces. 2. Large glenohumeral joint effusion with innumerable intra-articular bodies, similar to prior MRI. 3. Amorphous mineralization along the joint capsule and the rotator cuff footprint may reflect CPPD versus sequela of chronic synovitis. 4. Moderate acromioclavicular joint arthropathy. 5. Subacromial-subdeltoid bursitis. 6. Diffusely diminutive rotator cuff tendons and muscle atrophy may reflect underlying tendon tear as described on prior MRI. 7. Lateral downsloping of the acromion with subacromial spurring can contribute to impingement like symptoms in the appropriate clinical setting. 8. Similar appearance of an irregular nodule in the posterior segment of the right upper lobe when compared to prior CT thorax   MRI RIGHT SHOULDER:  1.  Advanced glenohumeral arthropathy with chronic remodeling of the glenohumeral articular surfaces, unchanged when compared to prior CT thorax 5/11/2020. 2.  Rotator cuff tendinosis and diffuse muscle atrophy as described. Diffusely diminutive supraspinatus with high-grade partial-thickness articular surface tear. Mild infraspinatus and subscapularis articular surface fraying. Moderate-grade interstitial tear at the superior subscapularis. 3.  Longitudinal split tear of the intra-articular long head of the biceps tendon with distal reconstitution. Mild tenosynovitis. Perched extra-articular segment over the lesser tuberosity. 4.  Large glenohumeral joint effusion with synovitis and innumerable intra-articular bodies. 5.  Moderate acromioclavicular joint arthropathy. 6.  Severe subacromial-subdeltoid bursitis.advanced GH DJD, PRCT, bursitis.  PMHx: HLD, HTN, RA, valve replacement [] : no [FreeTextEntry1] : Lft shoulder [FreeTextEntry5] : reports limited range of motion

## 2024-01-23 NOTE — PHYSICAL EXAM
[Bilateral] : shoulder bilaterally [] : motor and sensory intact distally [5 ___] : forward flexion 5[unfilled]/5 [FreeTextEntry9] : FE R 140P   L 100P  ER R 40P IR R sacrum IR: L4

## 2024-01-24 ENCOUNTER — OUTPATIENT (OUTPATIENT)
Dept: OUTPATIENT SERVICES | Facility: HOSPITAL | Age: 84
LOS: 1 days | End: 2024-01-24
Payer: MEDICARE

## 2024-01-24 ENCOUNTER — APPOINTMENT (OUTPATIENT)
Dept: CT IMAGING | Facility: CLINIC | Age: 84
End: 2024-01-24
Payer: MEDICARE

## 2024-01-24 DIAGNOSIS — J44.9 CHRONIC OBSTRUCTIVE PULMONARY DISEASE, UNSPECIFIED: ICD-10-CM

## 2024-01-24 DIAGNOSIS — Z96.642 PRESENCE OF LEFT ARTIFICIAL HIP JOINT: Chronic | ICD-10-CM

## 2024-01-24 DIAGNOSIS — Z96.653 PRESENCE OF ARTIFICIAL KNEE JOINT, BILATERAL: Chronic | ICD-10-CM

## 2024-01-24 DIAGNOSIS — Z96.611 PRESENCE OF RIGHT ARTIFICIAL SHOULDER JOINT: Chronic | ICD-10-CM

## 2024-01-24 DIAGNOSIS — Z98.89 OTHER SPECIFIED POSTPROCEDURAL STATES: Chronic | ICD-10-CM

## 2024-01-24 DIAGNOSIS — Z98.1 ARTHRODESIS STATUS: Chronic | ICD-10-CM

## 2024-01-24 DIAGNOSIS — Z98.49 CATARACT EXTRACTION STATUS, UNSPECIFIED EYE: Chronic | ICD-10-CM

## 2024-01-24 DIAGNOSIS — R91.8 OTHER NONSPECIFIC ABNORMAL FINDING OF LUNG FIELD: ICD-10-CM

## 2024-01-24 DIAGNOSIS — Z95.2 PRESENCE OF PROSTHETIC HEART VALVE: Chronic | ICD-10-CM

## 2024-01-24 PROCEDURE — 71250 CT THORAX DX C-: CPT

## 2024-01-24 PROCEDURE — 71250 CT THORAX DX C-: CPT | Mod: 26,MH

## 2024-01-30 NOTE — CONSULT NOTE ADULT - SUBJECTIVE AND OBJECTIVE BOX
Patient is a 81y old  Female who presents with a chief complaint of ARF  Severe nausea   Weakness and Dizziness  Hypertension  Adrenal Insufficiency (24 May 2022 13:24)      HPI:  Pt is a pleasant  82 y/o  Female with PMhx of  HTN on enalapril, Coreg, triamterene, dyslipidemia, OA/RA on Plaquenil, prednisone and tramadol, breast cancer s/p lumpectomy, MVR, Loop recorder  who was sent to [facility]  by Dr. Campbell for TRACIE,  weakness and  complaint of  feeling dizzy, weak and nauseous for the last several weeks.   Pt had a creatinine of 1.9 on 5/20/22 and received a L of NS at Dr. Campbell's office.  She was recently taken off enalapril , triamterene, meloxicam by Dr. Campbell.  Pt reports she stopped Celebrex 3 weeks ago and she herself stopped Cymbalta a month ago.  She denies current NSAID use. Five weeks ago her Rheumatologist increased her prednisone from 5mg to 10 mg for a Polymyalgia Flare.  Pt reports having a sensation of her ears feeling full and dizziness for the last 4 months and had  an unremarkable work up for this by Dr. Pena.      Pt reports she had 2 months of  nausea which recently became severe and is associated with abdominal discomfort.  Pt reports she feels so nauseous now that she cannot walk.  She denies vomiting.    She denies early satiety.  She reports intermittent constipation and reports she had a bowel movement yesterday and that her last bowel movement was otherwise a week ago.  She denies recent diarrhea but admits having loose stools for some days two weeks ago.   Pt reports six months of exertional dyspnea.  She  denies fever/chills, no URI complaints, no chest pain, no HA, no rash,  no sick contacts.  She c/o urinary urgency and was treated for a UTI until 5/21 with amoxicillin.   Per Dr. Campbell an outpatient random cortisol test result was low at  2 and an outpatient CT chest/abd/pelvis shows new 0.7x 0.6 cm LLL and 0.7 x 0.5 cm RLL pulmonary nodules with an unchanged 0.8 cm RUL nodule.      Pt is vaccinated against covid.  She reports her last colonoscopy was in 2016 by Dr. Kelsey.   (23 May 2022 17:09)    She states that on 5/1/21 she fell and hit her head and sustained a concussion.  Since that time she has not felt herself.  She reports difficulty with vision, balance and attention.  She also reports feeling "woozy" and having a spinning sensation.    She saw Dr. Kidd and had a normal EEG.  Dr. Campbell ordered an MRI brain which was unremarkable.  She saw an ENT and was told that she did not have an inner ear issue.  She has been to the  and was told that her eyes were fine.  She had physical therapy but she does not feel that she was able to participate adequately due to the dizziness.    PAST MEDICAL & SURGICAL HISTORY:  Mitral valve disease      RA (rheumatoid arthritis)      OA (osteoarthritis)      Migraine      HLD (hyperlipidemia)      Breast cancer  left 1990 treated with RT and surgery      Chronic pain  secondary to OA and RA      History of lumpectomy of left breast  with sentinal node biopsy 1990      History of bilateral knee replacement  right 2009, left 2011      H/O spinal fusion  lumbar 2005      History of tonsillectomy      S/P bunionectomy  left x 2 2007      History of left hip replacement  2017      H/O mitral valve replacement      History of cataract surgery  left cataract sx          FAMILY HISTORY:  Family history of stomach cancer (Mother)  smoker    Family history of colon cancer in father (Father)  smoker        Social Hx:  Nonsmoker, no drug or alcohol use    MEDICATIONS  (STANDING):  aspirin enteric coated 81 milliGRAM(s) Oral daily  atorvastatin 10 milliGRAM(s) Oral at bedtime  carvedilol 6.25 milliGRAM(s) Oral every 12 hours  cholecalciferol 1000 Unit(s) Oral daily  enoxaparin Injectable 30 milliGRAM(s) SubCutaneous every 24 hours  pantoprazole    Tablet 40 milliGRAM(s) Oral before breakfast  predniSONE   Tablet 10 milliGRAM(s) Oral daily  sodium chloride 0.9%. 500 milliLiter(s) (75 mL/Hr) IV Continuous <Continuous>  sucralfate suspension 1 Gram(s) Oral four times a day       Allergies    No Known Allergies    Intolerances        ROS: Pertinent positives in HPI, all other ROS were reviewed and are negative.      Vital Signs Last 24 Hrs  T(C): 36.6 (24 May 2022 13:52), Max: 36.8 (23 May 2022 21:22)  T(F): 97.8 (24 May 2022 13:52), Max: 98.3 (23 May 2022 21:22)  HR: 97 (24 May 2022 13:52) (74 - 103)  BP: 105/57 (24 May 2022 13:52) (105/57 - 151/79)  BP(mean): --  RR: 16 (24 May 2022 13:52) (16 - 16)  SpO2: 97% (24 May 2022 13:52) (97% - 99%)        Constitutional: awake and alert.  HEENT: PERRLA, EOMI,   Neck: Supple.  Respiratory: Breath sounds are clear bilaterally  Cardiovascular: S1 and S2, regular / irregular rhythm  Gastrointestinal: soft, nontender  Extremities:  no edema  Vascular: Caritid Bruit - no  Musculoskeletal: no joint swelling/tenderness, no abnormal movements  Skin: No rashes    Neurological exam:  HF: A x O x 3. Appropriately interactive, normal affect. Speech fluent, No Aphasia or paraphasic errors. Naming /repetition intact   CN: MANASA, EOMI, VFF, facial sensation normal, no NLFD, tongue midline, Palate moves equally, SCM equal bilaterally  Motor: No pronator drift, Strength 5/5 in all 4 ext, normal bulk and tone, no tremor, rigidity or bradykinesia.    Sens: Intact to light touch / PP/ VS/ JS    Reflexes: Symmetric and normal . BJ 2+, BR 2+, KJ 2+, AJ 2+, downgoing toes b/l  Coord:  No FNFA, dysmetria, SAI intact   Gait/Balance: Normal/Cannot test    NIHSS:          Labs:   05-24    139  |  107  |  20  ----------------------------<  85  3.8   |  27  |  1.29    Ca    8.6      24 May 2022 11:50  Phos  3.2     05-23  Mg     1.9     05-23    TPro  6.7  /  Alb  3.3  /  TBili  0.4  /  DBili  x   /  AST  49<H>  /  ALT  41  /  AlkPhos  52  05-23                              13.6   8.99  )-----------( 154      ( 24 May 2022 11:50 )             41.9       Radiology:  - CT Head:  - MRI brain  -MRA brain/Carotids  - EEG    A/P:    No IV tpa given because…    -ASA/PLAVIX  -Atorvastatin  -DVT prophylaxis  -Dysphagia screen  -Speech and swallow eval  -PT eval/ rehab eval    Mangement d/w Pt / family /     Total Critical Care Time spent:   non-distended/non-tender Patient is a 81y old  Female who presents with a chief complaint of ARF  Severe nausea   Weakness and Dizziness  Hypertension  Adrenal Insufficiency (24 May 2022 13:24)      HPI:  Pt is a pleasant  80 y/o  Female with PMhx of  HTN on enalapril, Coreg, triamterene, dyslipidemia, OA/RA on Plaquenil, prednisone and tramadol, breast cancer s/p lumpectomy, MVR, Loop recorder  who was sent to [facility]  by Dr. Campbell for TRACIE,  weakness and  complaint of  feeling dizzy, weak and nauseous for the last several weeks.   Pt had a creatinine of 1.9 on 5/20/22 and received a L of NS at Dr. Campbell's office.  She was recently taken off enalapril , triamterene, meloxicam by Dr. Campbell.  Pt reports she stopped Celebrex 3 weeks ago and she herself stopped Cymbalta a month ago.  She denies current NSAID use. Five weeks ago her Rheumatologist increased her prednisone from 5mg to 10 mg for a Polymyalgia Flare.  Pt reports having a sensation of her ears feeling full and dizziness for the last 4 months and had  an unremarkable work up for this by Dr. Pena.      Pt reports she had 2 months of  nausea which recently became severe and is associated with abdominal discomfort.  Pt reports she feels so nauseous now that she cannot walk.  She denies vomiting.    She denies early satiety.  She reports intermittent constipation and reports she had a bowel movement yesterday and that her last bowel movement was otherwise a week ago.  She denies recent diarrhea but admits having loose stools for some days two weeks ago.   Pt reports six months of exertional dyspnea.  She  denies fever/chills, no URI complaints, no chest pain, no HA, no rash,  no sick contacts.  She c/o urinary urgency and was treated for a UTI until 5/21 with amoxicillin.   Per Dr. Campbell an outpatient random cortisol test result was low at  2 and an outpatient CT chest/abd/pelvis shows new 0.7x 0.6 cm LLL and 0.7 x 0.5 cm RLL pulmonary nodules with an unchanged 0.8 cm RUL nodule.      Pt is vaccinated against covid.  She reports her last colonoscopy was in 2016 by Dr. Kelsey.   (23 May 2022 17:09)    She states that on 5/1/21 she fell and hit her head and sustained a concussion. Records show that she had a subarachnoid hemorrhage at this time.   Since that time she has not felt herself.  She reports difficulty with vision, balance and attention.  She also reports feeling "woozy" and having a spinning sensation.    She saw Dr. Kidd and had a normal EEG.  Dr. Campbell ordered an MRI brain which was unremarkable.  She saw an ENT and was told that she did not have an inner ear issue.  She has been to the  and was told that her eyes were fine.  She had physical therapy but she does not feel that she was able to participate adequately due to the dizziness.    PAST MEDICAL & SURGICAL HISTORY:  Mitral valve disease  RA (rheumatoid arthritis)  OA (osteoarthritis)  Migraine  HLD (hyperlipidemia)  Breast cancer left 1990 treated with RT and surgery  Chronic pain secondary to OA and RA  History of lumpectomy of left breast with sentinal node biopsy 1990  History of bilateral knee replacement right 2009, left 2011  H/O spinal fusion lumbar 2005  History of tonsillectomy  S/P bunionectomy left x 2 2007  History of left hip replacement  2017  H/O mitral valve replacement  History of cataract surgery left cataract sx          FAMILY HISTORY:  Family history of stomach cancer (Mother)  smoker    Family history of colon cancer in father (Father)  smoker        Social Hx:  Nonsmoker, no drug or alcohol use    MEDICATIONS  (STANDING):  aspirin enteric coated 81 milliGRAM(s) Oral daily  atorvastatin 10 milliGRAM(s) Oral at bedtime  carvedilol 6.25 milliGRAM(s) Oral every 12 hours  cholecalciferol 1000 Unit(s) Oral daily  enoxaparin Injectable 30 milliGRAM(s) SubCutaneous every 24 hours  pantoprazole    Tablet 40 milliGRAM(s) Oral before breakfast  predniSONE   Tablet 10 milliGRAM(s) Oral daily  sodium chloride 0.9%. 500 milliLiter(s) (75 mL/Hr) IV Continuous <Continuous>  sucralfate suspension 1 Gram(s) Oral four times a day       Allergies    No Known Allergies    Intolerances        ROS: Pertinent positives in HPI, all other ROS were reviewed and are negative.      Vital Signs Last 24 Hrs  T(C): 36.6 (24 May 2022 13:52), Max: 36.8 (23 May 2022 21:22)  T(F): 97.8 (24 May 2022 13:52), Max: 98.3 (23 May 2022 21:22)  HR: 97 (24 May 2022 13:52) (74 - 103)  BP: 105/57 (24 May 2022 13:52) (105/57 - 151/79)  BP(mean): --  RR: 16 (24 May 2022 13:52) (16 - 16)  SpO2: 97% (24 May 2022 13:52) (97% - 99%)        Constitutional: awake and alert.  HEENT: PERRLA, EOMI,   Neck: Supple.  Respiratory: Breath sounds are clear bilaterally  Cardiovascular: S1 and S2, regular / irregular rhythm  Gastrointestinal: soft, nontender  Extremities:  no edema  Musculoskeletal: no joint swelling/tenderness, no abnormal movements  Skin: No rashes    Neurological exam:  HF: A x O x 3. Appropriately interactive, normal affect. Speech fluent, No Aphasia or paraphasic errors. Naming /repetition intact   CN: MANASA, Difficult with smooth pursuits, lateral gaze in both directions, + convergence insufficiency,  VFF, facial sensation normal, no NLFD, tongue midline, Palate moves equally, SCM equal bilaterally  Motor: No pronator drift, Strength 5/5 in all 4 ext, normal bulk and tone, no tremor, rigidity or bradykinesia.    Sens: Intact to light touch   Reflexes: Symmetric and normal . BJ 1, BR 1, KJ absent ,  downgoing toes b/l  Coord:  No FNFA, dysmetria, SAI intact               Labs:   05-24    139  |  107  |  20  ----------------------------<  85  3.8   |  27  |  1.29    Ca    8.6      24 May 2022 11:50  Phos  3.2     05-23  Mg     1.9     05-23    TPro  6.7  /  Alb  3.3  /  TBili  0.4  /  DBili  x   /  AST  49<H>  /  ALT  41  /  AlkPhos  52  05-23                              13.6   8.99  )-----------( 154      ( 24 May 2022 11:50 )             41.9       Radiology:    EEG 2/23/22: normal    MRI brain without contrast 3/29/22:  No significant interval change since 6/10/21  No acute intracranial pathology  Moderate pontine and minimal cerebral white matter microvascular changes

## 2024-02-22 ENCOUNTER — INPATIENT (INPATIENT)
Facility: HOSPITAL | Age: 84
LOS: 3 days | Discharge: NURSING, REHAB OR LONG-TERM CARE | DRG: 534 | End: 2024-02-26
Attending: INTERNAL MEDICINE | Admitting: INTERNAL MEDICINE
Payer: MEDICARE

## 2024-02-22 VITALS — HEIGHT: 64 IN | WEIGHT: 179.9 LBS

## 2024-02-22 DIAGNOSIS — Z98.1 ARTHRODESIS STATUS: Chronic | ICD-10-CM

## 2024-02-22 DIAGNOSIS — Z98.89 OTHER SPECIFIED POSTPROCEDURAL STATES: Chronic | ICD-10-CM

## 2024-02-22 DIAGNOSIS — Z96.642 PRESENCE OF LEFT ARTIFICIAL HIP JOINT: Chronic | ICD-10-CM

## 2024-02-22 DIAGNOSIS — Z95.2 PRESENCE OF PROSTHETIC HEART VALVE: Chronic | ICD-10-CM

## 2024-02-22 DIAGNOSIS — Z98.49 CATARACT EXTRACTION STATUS, UNSPECIFIED EYE: Chronic | ICD-10-CM

## 2024-02-22 DIAGNOSIS — Z96.653 PRESENCE OF ARTIFICIAL KNEE JOINT, BILATERAL: Chronic | ICD-10-CM

## 2024-02-22 DIAGNOSIS — Z96.611 PRESENCE OF RIGHT ARTIFICIAL SHOULDER JOINT: Chronic | ICD-10-CM

## 2024-02-22 LAB
BASOPHILS # BLD AUTO: 0.12 K/UL — SIGNIFICANT CHANGE UP (ref 0–0.2)
BASOPHILS NFR BLD AUTO: 1.4 % — SIGNIFICANT CHANGE UP (ref 0–2)
EOSINOPHIL # BLD AUTO: 0.11 K/UL — SIGNIFICANT CHANGE UP (ref 0–0.5)
EOSINOPHIL NFR BLD AUTO: 1.3 % — SIGNIFICANT CHANGE UP (ref 0–6)
HCT VFR BLD CALC: 39.9 % — SIGNIFICANT CHANGE UP (ref 34.5–45)
HGB BLD-MCNC: 12.9 G/DL — SIGNIFICANT CHANGE UP (ref 11.5–15.5)
IMM GRANULOCYTES NFR BLD AUTO: 0.7 % — SIGNIFICANT CHANGE UP (ref 0–0.9)
LYMPHOCYTES # BLD AUTO: 1.55 K/UL — SIGNIFICANT CHANGE UP (ref 1–3.3)
LYMPHOCYTES # BLD AUTO: 18.5 % — SIGNIFICANT CHANGE UP (ref 13–44)
MCHC RBC-ENTMCNC: 29.8 PG — SIGNIFICANT CHANGE UP (ref 27–34)
MCHC RBC-ENTMCNC: 32.3 GM/DL — SIGNIFICANT CHANGE UP (ref 32–36)
MCV RBC AUTO: 92.1 FL — SIGNIFICANT CHANGE UP (ref 80–100)
MONOCYTES # BLD AUTO: 0.83 K/UL — SIGNIFICANT CHANGE UP (ref 0–0.9)
MONOCYTES NFR BLD AUTO: 9.9 % — SIGNIFICANT CHANGE UP (ref 2–14)
NEUTROPHILS # BLD AUTO: 5.72 K/UL — SIGNIFICANT CHANGE UP (ref 1.8–7.4)
NEUTROPHILS NFR BLD AUTO: 68.2 % — SIGNIFICANT CHANGE UP (ref 43–77)
PLATELET # BLD AUTO: 197 K/UL — SIGNIFICANT CHANGE UP (ref 150–400)
RBC # BLD: 4.33 M/UL — SIGNIFICANT CHANGE UP (ref 3.8–5.2)
RBC # FLD: 14.1 % — SIGNIFICANT CHANGE UP (ref 10.3–14.5)
WBC # BLD: 8.39 K/UL — SIGNIFICANT CHANGE UP (ref 3.8–10.5)
WBC # FLD AUTO: 8.39 K/UL — SIGNIFICANT CHANGE UP (ref 3.8–10.5)

## 2024-02-22 PROCEDURE — 73562 X-RAY EXAM OF KNEE 3: CPT | Mod: 26,LT

## 2024-02-22 PROCEDURE — 99285 EMERGENCY DEPT VISIT HI MDM: CPT

## 2024-02-22 PROCEDURE — 93010 ELECTROCARDIOGRAM REPORT: CPT

## 2024-02-22 PROCEDURE — 73552 X-RAY EXAM OF FEMUR 2/>: CPT | Mod: 26,LT

## 2024-02-22 PROCEDURE — 73590 X-RAY EXAM OF LOWER LEG: CPT | Mod: 26,LT

## 2024-02-22 RX ORDER — MORPHINE SULFATE 50 MG/1
4 CAPSULE, EXTENDED RELEASE ORAL ONCE
Refills: 0 | Status: DISCONTINUED | OUTPATIENT
Start: 2024-02-22 | End: 2024-02-22

## 2024-02-22 RX ORDER — ONDANSETRON 8 MG/1
4 TABLET, FILM COATED ORAL ONCE
Refills: 0 | Status: COMPLETED | OUTPATIENT
Start: 2024-02-22 | End: 2024-02-22

## 2024-02-22 RX ORDER — OXYCODONE AND ACETAMINOPHEN 5; 325 MG/1; MG/1
1 TABLET ORAL ONCE
Refills: 0 | Status: DISCONTINUED | OUTPATIENT
Start: 2024-02-22 | End: 2024-02-22

## 2024-02-22 RX ORDER — ACETAMINOPHEN 500 MG
1000 TABLET ORAL ONCE
Refills: 0 | Status: COMPLETED | OUTPATIENT
Start: 2024-02-22 | End: 2024-02-22

## 2024-02-22 RX ADMIN — Medication 1000 MILLIGRAM(S): at 23:47

## 2024-02-22 RX ADMIN — MORPHINE SULFATE 4 MILLIGRAM(S): 50 CAPSULE, EXTENDED RELEASE ORAL at 23:47

## 2024-02-22 RX ADMIN — Medication 400 MILLIGRAM(S): at 23:25

## 2024-02-22 RX ADMIN — ONDANSETRON 4 MILLIGRAM(S): 8 TABLET, FILM COATED ORAL at 23:25

## 2024-02-22 RX ADMIN — MORPHINE SULFATE 4 MILLIGRAM(S): 50 CAPSULE, EXTENDED RELEASE ORAL at 23:25

## 2024-02-22 RX ADMIN — MORPHINE SULFATE 4 MILLIGRAM(S): 50 CAPSULE, EXTENDED RELEASE ORAL at 23:52

## 2024-02-22 NOTE — ED PROVIDER NOTE - MUSCULOSKELETAL, MLM
+ttp left lateral knee, + left posterior knee tenderness, 2+ dp and pt pulses, neurovascularly intact, mild pitting lower edema,

## 2024-02-22 NOTE — ED PROVIDER NOTE - SKIN, MLM
Skin normal color for race, warm, dry and intact.  chronic venous stasis changes left lower extremity

## 2024-02-22 NOTE — ED PROVIDER NOTE - OBJECTIVE STATEMENT
82 y/o female with PMHx of AVR, bilateral knee replacements done at [facility] may years ago presents to the ED after she went to stand up from the couch and her shoe was loose and she fell and twisted her left knee, states she fell onto a "poofy thing". Pt denies hip pain, head trauma, LOC, lightheadedness, dizziness, chest pain, SOB.

## 2024-02-22 NOTE — ED ADULT TRIAGE NOTE - CHIEF COMPLAINT QUOTE
Pt presents complaining of R knee pain s/p trip and fall x1 hour PTA. Pt denies head strike or syncope. On ASA. Pt unable to ambulate d/t pain 10/10. Writer had to assist patient into wheelchair from car. PSH bilateral knee replacement

## 2024-02-22 NOTE — ED ADULT NURSE NOTE - OBJECTIVE STATEMENT
Pt arrived to ED with c/o left knee pain. Pt states she has had bilateral knee replacements and a left hip replacement. Pt states she was standing up from the couch today when she thinks her left got caught and twisted and she hurt her left knee. Pt states pain is 10/10 in left knee radiating to left hip. Pt AOx4 denies fall, head strike, LOC, lightheadedness, dizziness, chest pain, or SOB.

## 2024-02-22 NOTE — ED PROVIDER NOTE - CLINICAL SUMMARY MEDICAL DECISION MAKING FREE TEXT BOX
82 y/o female with PMHx of bilateral knee replacements presents to the ED s/p mechanical trip and fall with left knee pain. plan for XR, pain control, reassess

## 2024-02-22 NOTE — ED ADULT NURSE NOTE - NSFALLRISKINTERV_ED_ALL_ED

## 2024-02-23 DIAGNOSIS — M97.9XXA PERIPROSTHETIC FRACTURE AROUND UNSPECIFIED INTERNAL PROSTHETIC JOINT, INITIAL ENCOUNTER: ICD-10-CM

## 2024-02-23 LAB
ALBUMIN SERPL ELPH-MCNC: 3.7 G/DL — SIGNIFICANT CHANGE UP (ref 3.3–5)
ALP SERPL-CCNC: 52 U/L — SIGNIFICANT CHANGE UP (ref 40–120)
ALT FLD-CCNC: 45 U/L — SIGNIFICANT CHANGE UP (ref 12–78)
ANION GAP SERPL CALC-SCNC: 2 MMOL/L — LOW (ref 5–17)
APTT BLD: 26.9 SEC — SIGNIFICANT CHANGE UP (ref 24.5–35.6)
AST SERPL-CCNC: 45 U/L — HIGH (ref 15–37)
BILIRUB SERPL-MCNC: 0.4 MG/DL — SIGNIFICANT CHANGE UP (ref 0.2–1.2)
BLD GP AB SCN SERPL QL: SIGNIFICANT CHANGE UP
BUN SERPL-MCNC: 14 MG/DL — SIGNIFICANT CHANGE UP (ref 7–23)
BUN SERPL-MCNC: 16 MG/DL — SIGNIFICANT CHANGE UP (ref 7–23)
CALCIUM SERPL-MCNC: 8.3 MG/DL — LOW (ref 8.5–10.1)
CALCIUM SERPL-MCNC: 8.9 MG/DL — SIGNIFICANT CHANGE UP (ref 8.5–10.1)
CHLORIDE SERPL-SCNC: 103 MMOL/L — SIGNIFICANT CHANGE UP (ref 96–108)
CHLORIDE SERPL-SCNC: 104 MMOL/L — SIGNIFICANT CHANGE UP (ref 96–108)
CO2 SERPL-SCNC: 31 MMOL/L — SIGNIFICANT CHANGE UP (ref 22–31)
CO2 SERPL-SCNC: 32 MMOL/L — HIGH (ref 22–31)
CREAT SERPL-MCNC: 0.78 MG/DL — SIGNIFICANT CHANGE UP (ref 0.5–1.3)
CREAT SERPL-MCNC: 0.92 MG/DL — SIGNIFICANT CHANGE UP (ref 0.5–1.3)
EGFR: 61 ML/MIN/1.73M2 — SIGNIFICANT CHANGE UP
EGFR: 75 ML/MIN/1.73M2 — SIGNIFICANT CHANGE UP
GLUCOSE SERPL-MCNC: 107 MG/DL — HIGH (ref 70–99)
GLUCOSE SERPL-MCNC: 97 MG/DL — SIGNIFICANT CHANGE UP (ref 70–99)
HCT VFR BLD CALC: 38.4 % — SIGNIFICANT CHANGE UP (ref 34.5–45)
HGB BLD-MCNC: 12.5 G/DL — SIGNIFICANT CHANGE UP (ref 11.5–15.5)
INR BLD: 0.88 RATIO — SIGNIFICANT CHANGE UP (ref 0.85–1.18)
MCHC RBC-ENTMCNC: 30 PG — SIGNIFICANT CHANGE UP (ref 27–34)
MCHC RBC-ENTMCNC: 32.6 GM/DL — SIGNIFICANT CHANGE UP (ref 32–36)
MCV RBC AUTO: 92.3 FL — SIGNIFICANT CHANGE UP (ref 80–100)
PLATELET # BLD AUTO: 173 K/UL — SIGNIFICANT CHANGE UP (ref 150–400)
POTASSIUM SERPL-MCNC: 3.8 MMOL/L — SIGNIFICANT CHANGE UP (ref 3.5–5.3)
POTASSIUM SERPL-MCNC: 4.2 MMOL/L — SIGNIFICANT CHANGE UP (ref 3.5–5.3)
POTASSIUM SERPL-SCNC: 3.8 MMOL/L — SIGNIFICANT CHANGE UP (ref 3.5–5.3)
POTASSIUM SERPL-SCNC: 4.2 MMOL/L — SIGNIFICANT CHANGE UP (ref 3.5–5.3)
PROT SERPL-MCNC: 7.7 GM/DL — SIGNIFICANT CHANGE UP (ref 6–8.3)
PROTHROM AB SERPL-ACNC: 10 SEC — SIGNIFICANT CHANGE UP (ref 9.5–13)
RBC # BLD: 4.16 M/UL — SIGNIFICANT CHANGE UP (ref 3.8–5.2)
RBC # FLD: 14.2 % — SIGNIFICANT CHANGE UP (ref 10.3–14.5)
SODIUM SERPL-SCNC: 136 MMOL/L — SIGNIFICANT CHANGE UP (ref 135–145)
SODIUM SERPL-SCNC: 136 MMOL/L — SIGNIFICANT CHANGE UP (ref 135–145)
WBC # BLD: 9.74 K/UL — SIGNIFICANT CHANGE UP (ref 3.8–10.5)
WBC # FLD AUTO: 9.74 K/UL — SIGNIFICANT CHANGE UP (ref 3.8–10.5)

## 2024-02-23 PROCEDURE — 97162 PT EVAL MOD COMPLEX 30 MIN: CPT | Mod: GP

## 2024-02-23 PROCEDURE — 85027 COMPLETE CBC AUTOMATED: CPT

## 2024-02-23 PROCEDURE — 94640 AIRWAY INHALATION TREATMENT: CPT

## 2024-02-23 PROCEDURE — 73700 CT LOWER EXTREMITY W/O DYE: CPT | Mod: 26,LT,MC

## 2024-02-23 PROCEDURE — 99222 1ST HOSP IP/OBS MODERATE 55: CPT

## 2024-02-23 PROCEDURE — 80048 BASIC METABOLIC PNL TOTAL CA: CPT

## 2024-02-23 PROCEDURE — 97530 THERAPEUTIC ACTIVITIES: CPT | Mod: GP

## 2024-02-23 PROCEDURE — 76376 3D RENDER W/INTRP POSTPROCES: CPT | Mod: 26

## 2024-02-23 PROCEDURE — 71045 X-RAY EXAM CHEST 1 VIEW: CPT | Mod: 26

## 2024-02-23 PROCEDURE — 36415 COLL VENOUS BLD VENIPUNCTURE: CPT

## 2024-02-23 PROCEDURE — 97116 GAIT TRAINING THERAPY: CPT | Mod: GP

## 2024-02-23 PROCEDURE — 99284 EMERGENCY DEPT VISIT MOD MDM: CPT | Mod: GC

## 2024-02-23 PROCEDURE — 87635 SARS-COV-2 COVID-19 AMP PRB: CPT

## 2024-02-23 RX ORDER — ENOXAPARIN SODIUM 100 MG/ML
40 INJECTION SUBCUTANEOUS EVERY 24 HOURS
Refills: 0 | Status: DISCONTINUED | OUTPATIENT
Start: 2024-02-23 | End: 2024-02-26

## 2024-02-23 RX ORDER — CHOLECALCIFEROL (VITAMIN D3) 125 MCG
2 CAPSULE ORAL
Qty: 0 | Refills: 0 | DISCHARGE

## 2024-02-23 RX ORDER — OXYCODONE HYDROCHLORIDE 5 MG/1
2.5 TABLET ORAL EVERY 4 HOURS
Refills: 0 | Status: DISCONTINUED | OUTPATIENT
Start: 2024-02-23 | End: 2024-02-25

## 2024-02-23 RX ORDER — ACETAMINOPHEN 500 MG
1000 TABLET ORAL ONCE
Refills: 0 | Status: COMPLETED | OUTPATIENT
Start: 2024-02-23 | End: 2024-02-23

## 2024-02-23 RX ORDER — DULOXETINE HYDROCHLORIDE 30 MG/1
1 CAPSULE, DELAYED RELEASE ORAL
Refills: 0 | DISCHARGE

## 2024-02-23 RX ORDER — HYDRALAZINE HCL 50 MG
25 TABLET ORAL
Refills: 0 | Status: DISCONTINUED | OUTPATIENT
Start: 2024-02-23 | End: 2024-02-26

## 2024-02-23 RX ORDER — ALBUTEROL 90 UG/1
2 AEROSOL, METERED ORAL EVERY 6 HOURS
Refills: 0 | Status: DISCONTINUED | OUTPATIENT
Start: 2024-02-23 | End: 2024-02-26

## 2024-02-23 RX ORDER — CARVEDILOL PHOSPHATE 80 MG/1
1 CAPSULE, EXTENDED RELEASE ORAL
Qty: 0 | Refills: 0 | DISCHARGE

## 2024-02-23 RX ORDER — OXYCODONE HYDROCHLORIDE 5 MG/1
5 TABLET ORAL EVERY 4 HOURS
Refills: 0 | Status: DISCONTINUED | OUTPATIENT
Start: 2024-02-23 | End: 2024-02-26

## 2024-02-23 RX ORDER — KETOROLAC TROMETHAMINE 30 MG/ML
30 SYRINGE (ML) INJECTION ONCE
Refills: 0 | Status: DISCONTINUED | OUTPATIENT
Start: 2024-02-23 | End: 2024-02-23

## 2024-02-23 RX ORDER — ASPIRIN/CALCIUM CARB/MAGNESIUM 324 MG
81 TABLET ORAL DAILY
Refills: 0 | Status: DISCONTINUED | OUTPATIENT
Start: 2024-02-23 | End: 2024-02-26

## 2024-02-23 RX ORDER — INFLUENZA VIRUS VACCINE 15; 15; 15; 15 UG/.5ML; UG/.5ML; UG/.5ML; UG/.5ML
0.7 SUSPENSION INTRAMUSCULAR ONCE
Refills: 0 | Status: COMPLETED | OUTPATIENT
Start: 2024-02-23 | End: 2024-02-23

## 2024-02-23 RX ORDER — AMLODIPINE BESYLATE 2.5 MG/1
10 TABLET ORAL DAILY
Refills: 0 | Status: DISCONTINUED | OUTPATIENT
Start: 2024-02-23 | End: 2024-02-26

## 2024-02-23 RX ORDER — BUDESONIDE AND FORMOTEROL FUMARATE DIHYDRATE 160; 4.5 UG/1; UG/1
2 AEROSOL RESPIRATORY (INHALATION)
Refills: 0 | DISCHARGE

## 2024-02-23 RX ORDER — FLUDROCORTISONE ACETATE 0.1 MG/1
1 TABLET ORAL
Refills: 0 | DISCHARGE

## 2024-02-23 RX ORDER — ATORVASTATIN CALCIUM 80 MG/1
1 TABLET, FILM COATED ORAL
Qty: 0 | Refills: 0 | DISCHARGE

## 2024-02-23 RX ORDER — ATORVASTATIN CALCIUM 80 MG/1
10 TABLET, FILM COATED ORAL AT BEDTIME
Refills: 0 | Status: DISCONTINUED | OUTPATIENT
Start: 2024-02-23 | End: 2024-02-26

## 2024-02-23 RX ORDER — ALBUTEROL 90 UG/1
2 AEROSOL, METERED ORAL
Refills: 0 | DISCHARGE

## 2024-02-23 RX ORDER — TRAMADOL HYDROCHLORIDE 50 MG/1
2 TABLET ORAL
Refills: 0 | DISCHARGE

## 2024-02-23 RX ORDER — HYDROXYCHLOROQUINE SULFATE 200 MG
1 TABLET ORAL
Refills: 0 | DISCHARGE

## 2024-02-23 RX ORDER — FLUDROCORTISONE ACETATE 0.1 MG/1
0.1 TABLET ORAL
Refills: 0 | Status: DISCONTINUED | OUTPATIENT
Start: 2024-02-23 | End: 2024-02-26

## 2024-02-23 RX ORDER — ASPIRIN/CALCIUM CARB/MAGNESIUM 324 MG
1 TABLET ORAL
Refills: 0 | DISCHARGE

## 2024-02-23 RX ORDER — BUDESONIDE AND FORMOTEROL FUMARATE DIHYDRATE 160; 4.5 UG/1; UG/1
2 AEROSOL RESPIRATORY (INHALATION)
Refills: 0 | Status: DISCONTINUED | OUTPATIENT
Start: 2024-02-23 | End: 2024-02-26

## 2024-02-23 RX ORDER — ASCORBIC ACID 60 MG
1 TABLET,CHEWABLE ORAL
Refills: 0 | DISCHARGE

## 2024-02-23 RX ORDER — HYDROXYCHLOROQUINE SULFATE 200 MG
200 TABLET ORAL
Refills: 0 | Status: DISCONTINUED | OUTPATIENT
Start: 2024-02-23 | End: 2024-02-26

## 2024-02-23 RX ORDER — DULOXETINE HYDROCHLORIDE 30 MG/1
30 CAPSULE, DELAYED RELEASE ORAL AT BEDTIME
Refills: 0 | Status: DISCONTINUED | OUTPATIENT
Start: 2024-02-23 | End: 2024-02-26

## 2024-02-23 RX ORDER — ASCORBIC ACID 60 MG
500 TABLET,CHEWABLE ORAL DAILY
Refills: 0 | Status: DISCONTINUED | OUTPATIENT
Start: 2024-02-23 | End: 2024-02-26

## 2024-02-23 RX ORDER — SODIUM CHLORIDE 9 MG/ML
1000 INJECTION INTRAMUSCULAR; INTRAVENOUS; SUBCUTANEOUS
Refills: 0 | Status: DISCONTINUED | OUTPATIENT
Start: 2024-02-23 | End: 2024-02-26

## 2024-02-23 RX ORDER — ONDANSETRON 8 MG/1
4 TABLET, FILM COATED ORAL EVERY 6 HOURS
Refills: 0 | Status: DISCONTINUED | OUTPATIENT
Start: 2024-02-23 | End: 2024-02-26

## 2024-02-23 RX ORDER — CHOLECALCIFEROL (VITAMIN D3) 125 MCG
2000 CAPSULE ORAL DAILY
Refills: 0 | Status: DISCONTINUED | OUTPATIENT
Start: 2024-02-23 | End: 2024-02-26

## 2024-02-23 RX ORDER — MORPHINE SULFATE 50 MG/1
4 CAPSULE, EXTENDED RELEASE ORAL EVERY 4 HOURS
Refills: 0 | Status: DISCONTINUED | OUTPATIENT
Start: 2024-02-23 | End: 2024-02-26

## 2024-02-23 RX ADMIN — Medication 1 TABLET(S): at 12:52

## 2024-02-23 RX ADMIN — Medication 200 MILLIGRAM(S): at 21:41

## 2024-02-23 RX ADMIN — ATORVASTATIN CALCIUM 10 MILLIGRAM(S): 80 TABLET, FILM COATED ORAL at 21:41

## 2024-02-23 RX ADMIN — Medication 30 MILLIGRAM(S): at 06:46

## 2024-02-23 RX ADMIN — Medication 500 MILLIGRAM(S): at 12:51

## 2024-02-23 RX ADMIN — MORPHINE SULFATE 4 MILLIGRAM(S): 50 CAPSULE, EXTENDED RELEASE ORAL at 11:31

## 2024-02-23 RX ADMIN — DULOXETINE HYDROCHLORIDE 30 MILLIGRAM(S): 30 CAPSULE, DELAYED RELEASE ORAL at 21:42

## 2024-02-23 RX ADMIN — Medication 25 MILLIGRAM(S): at 21:41

## 2024-02-23 RX ADMIN — FLUDROCORTISONE ACETATE 0.1 MILLIGRAM(S): 0.1 TABLET ORAL at 13:05

## 2024-02-23 RX ADMIN — ENOXAPARIN SODIUM 40 MILLIGRAM(S): 100 INJECTION SUBCUTANEOUS at 17:01

## 2024-02-23 RX ADMIN — Medication 1000 MILLIGRAM(S): at 17:10

## 2024-02-23 RX ADMIN — MORPHINE SULFATE 4 MILLIGRAM(S): 50 CAPSULE, EXTENDED RELEASE ORAL at 04:07

## 2024-02-23 RX ADMIN — BUDESONIDE AND FORMOTEROL FUMARATE DIHYDRATE 2 PUFF(S): 160; 4.5 AEROSOL RESPIRATORY (INHALATION) at 20:55

## 2024-02-23 RX ADMIN — Medication 400 MILLIGRAM(S): at 16:57

## 2024-02-23 RX ADMIN — AMLODIPINE BESYLATE 10 MILLIGRAM(S): 2.5 TABLET ORAL at 12:52

## 2024-02-23 RX ADMIN — MORPHINE SULFATE 4 MILLIGRAM(S): 50 CAPSULE, EXTENDED RELEASE ORAL at 11:45

## 2024-02-23 RX ADMIN — MORPHINE SULFATE 4 MILLIGRAM(S): 50 CAPSULE, EXTENDED RELEASE ORAL at 00:15

## 2024-02-23 RX ADMIN — SODIUM CHLORIDE 75 MILLILITER(S): 9 INJECTION INTRAMUSCULAR; INTRAVENOUS; SUBCUTANEOUS at 02:48

## 2024-02-23 RX ADMIN — Medication 2000 UNIT(S): at 12:52

## 2024-02-23 RX ADMIN — Medication 5 MILLIGRAM(S): at 13:05

## 2024-02-23 RX ADMIN — Medication 81 MILLIGRAM(S): at 12:52

## 2024-02-23 NOTE — H&P ADULT - NSHPPHYSICALEXAM_GEN_ALL_CORE
Vital Signs Last 24 Hrs  T(C): 36.5 (23 Feb 2024 07:00), Max: 36.5 (23 Feb 2024 03:49)  T(F): 97.7 (23 Feb 2024 07:00), Max: 97.7 (23 Feb 2024 03:49)  HR: 71 (23 Feb 2024 07:00) (71 - 83)  BP: 133/69 (23 Feb 2024 07:00) (133/69 - 156/80)  BP(mean): 96 (22 Feb 2024 23:51) (96 - 109)  RR: 16 (23 Feb 2024 07:00) (16 - 18)  SpO2: 100% (23 Feb 2024 07:00) (88% - 100%)    Parameters below as of 23 Feb 2024 07:00  Patient On (Oxygen Delivery Method): nasal cannula  O2 Flow (L/min): 2

## 2024-02-23 NOTE — PHYSICAL THERAPY INITIAL EVALUATION ADULT - PERTINENT HX OF CURRENT PROBLEM, REHAB EVAL
82 yo female with PMH of RA, osteoarthritis, HTN, HLD, COPD, breast CA presents to ED with left knee pain s/p mechanical fall at home. Pt states her shoe was loose and was trying to put it on properly when she fell. she thinks she hit her left knee to the wall. No head trauma or LOC. Pt has a history of b/l knee surgery, with left knee replacement about 15 yrs ago.   In the ED patient was found to have a periprosthetic distal femur fracture.

## 2024-02-23 NOTE — H&P ADULT - HISTORY OF PRESENT ILLNESS
82 yo female with PMH of RA, osteoarthritis, HTN, HLD, COPD, breast CA presents to ED with left knee pain s/p mechanical fall at home. Pt states her shoe was loose and was trying to put it on properly when she fell. she thinks she hit her left knee to the wall. No head trauma or LOC. Pt has a history of b/l knee surgery, with left knee replacement about 15 yrs ago.   In the ED patient was found to have a periprosthetic distal femur fracture.   Pt denies any fevers, chills, headaches, weakness, CP, SOB, dizziness, abd pain, N/V/D, dysuria.

## 2024-02-23 NOTE — H&P ADULT - ASSESSMENT
84 yo female with PMH of RA, osteoarthritis, HTN, HLD, COPD, breast CA admitted with:    #s/p mechanical fall with periprosthetic distal femur fracture  - admit to med-surg  - orthopedic consult appreciated - no surgical intervention at this time, conservative management  - PT eval  - pain control  -  consulted for discharge planning    #COPD  - stable  - continue symbicort  - albuterol PRN    #HTN  - continue amlodipine, hydralazine    #RA, osteoarthritis  - continue home meds    #HLD  - continue statin    #DVT prophylaxis  - lovenox    #Advance Directives  - FULL CODE, d/w patient

## 2024-02-23 NOTE — CONSULT NOTE ADULT - ATTENDING COMMENTS
Agree with above.  Patient presents with left knee pain status post ground-level fall.  Normally ambulates with a cane.  Had left knee replaced at [facility] years ago.  Denies numbness tingling or other injuries.  Left lower extremity in bulky [place] knee immobilizer  Sensation tact light touch DP/SP/T/S/S  Positive EHL, FHL, dorsiflexion, plantarflexion    Imaging reviewed which shows a lateral epicondyle fracture of the left femur, implants appear stable    Discussed nonoperative versus operative intervention with patient.  Due to the nature of the fracture and the appearance of the stability of the implant I believe we could do a trial of conservative treatment.  Patient will be allowed to be protected weight- bearing in knee immobilizer.    Discussed if the implant does move we would then need to do surgery to fix this.  Patient is in agreement with plan. Physical therapy.  Pain control.  DVT prophylaxis.  Patient to follow-up in office in 2 weeks.

## 2024-02-23 NOTE — H&P ADULT - MOTOR
strength b/l upper extremities and RLE 5/5  pt hesitant to move LLE secondary to pain and she is in knee immobilizer

## 2024-02-23 NOTE — PATIENT PROFILE ADULT - FALL HARM RISK - RISK INTERVENTIONS

## 2024-02-23 NOTE — PHARMACOTHERAPY INTERVENTION NOTE - COMMENTS
Medication reconciliation completed.  Reviewed Medication list and confirmed med allergies with patient; confirmed with Dr. First Medmarika.

## 2024-02-23 NOTE — CONSULT NOTE ADULT - SUBJECTIVE AND OBJECTIVE BOX
83y Female presents with L knee pain s/p mechanical fall. Denies numbness/tingling in the affected extremity. Denies head strike/LOC/other orthopedic injuries at this time. Patient ambulates with assistance at baseline. Patient endorses L TKA at [facility] 15+ years ago and does not recall name of surgeon and has not followed up in many years. Takes A81 daily. Denies any other orthopedic concerns at this time. Complaining of no pain elsewhere     PAST MEDICAL & SURGICAL HISTORY:  Mitral valve disease      RA (rheumatoid arthritis)      OA (osteoarthritis)      Migraine      HLD (hyperlipidemia)      Breast cancer  left 1990 treated with RT and surgery, no f/u required      Chronic pain  secondary to OA and RA      Implantable loop recorder present  pt reports she fainted and that's why it was placed      Asthma      History of lumpectomy of left breast  with sentinal node biopsy 1990      History of bilateral knee replacement  right 2009, left 2011      H/O spinal fusion  lumbar 2005      History of tonsillectomy      S/P bunionectomy  left x 2 2007      History of left hip replacement  2017      H/O mitral valve replacement  2015      History of cataract surgery  left cataract sx      S/P shoulder replacement, right        Home Medications:  acetaminophen 325 mg oral tablet: 2 tab(s) orally every 6 hours (12 Sep 2023 10:29)  amLODIPine 10 mg oral tablet: 1 tab(s) orally once a day (11 Sep 2023 07:13)  aspirin 325 mg oral tablet: 1 tab(s) orally once a day (12 Sep 2023 10:29)  atorvastatin 10 mg oral tablet: 1 tab(s) orally once a day (11 Sep 2023 07:13)  B Complex 50: 1 tab(s) orally once a day (11 Sep 2023 07:13)  Coreg 6.25 mg oral tablet: 1 tab(s) orally 2 times a day (11 Sep 2023 07:13)  Cymbalta 30 mg oral delayed release capsule: 1 cap(s) orally once a day (11 Sep 2023 07:13)  fludrocortisone 0.1 mg oral tablet: 1 tab(s) orally 2 times a week (11 Sep 2023 07:13)  hydrALAZINE 25 mg oral tablet: 1 tab(s) orally 2 times a day (11 Sep 2023 07:13)  oxyCODONE 10 mg oral tablet: 1 tab(s) orally every 4 hours As needed Severe Pain (7 - 10) (12 Sep 2023 10:29)  oxyCODONE 5 mg oral tablet: 1 tab(s) orally every 4 hours As needed Moderate Pain (4 - 6) (12 Sep 2023 10:29)  pantoprazole 40 mg oral delayed release tablet: 1 tab(s) orally once a day (before a meal) (12 Sep 2023 10:29)  Plaquenil 200 mg oral tablet: 2 tab(s) orally once a day (11 Sep 2023 07:13)  polyethylene glycol 3350 oral powder for reconstitution: 17 gram(s) orally once a day (at bedtime) (12 Sep 2023 10:29)  predniSONE 5 mg oral tablet: 1 orally once a day (11 Sep 2023 07:13)  senna leaf extract oral tablet: 2 tab(s) orally once a day (at bedtime) (12 Sep 2023 10:29)  Symbicort 160 mcg-4.5 mcg/inh inhalation aerosol: 2 inhaled once a day (11 Sep 2023 07:13)  Vitamin D3 1000 intl units oral tablet: 2 tab(s) orally once a day (11 Sep 2023 07:13)    Allergies    No Known Allergies    Intolerances                              12.9   8.39  )-----------( 197      ( 22 Feb 2024 23:17 )             39.9     02-22    136  |  103  |  16  ----------------------------<  107<H>  4.2   |  31  |  0.92    Ca    8.9      22 Feb 2024 23:17    TPro  7.7  /  Alb  3.7  /  TBili  0.4  /  DBili  x   /  AST  45<H>  /  ALT  45  /  AlkPhos  52  02-22    PT/INR - ( 23 Feb 2024 00:51 )   PT: 10.0 sec;   INR: 0.88 ratio         PTT - ( 23 Feb 2024 00:51 )  PTT:26.9 sec  Urinalysis Basic - ( 22 Feb 2024 23:17 )    Color: x / Appearance: x / SG: x / pH: x  Gluc: 107 mg/dL / Ketone: x  / Bili: x / Urobili: x   Blood: x / Protein: x / Nitrite: x   Leuk Esterase: x / RBC: x / WBC x   Sq Epi: x / Non Sq Epi: x / Bacteria: x          Vital Signs Last 24 Hrs  T(C): 36.4 (22 Feb 2024 21:47), Max: 36.4 (22 Feb 2024 21:47)  T(F): 97.6 (22 Feb 2024 21:47), Max: 97.6 (22 Feb 2024 21:47)  HR: 83 (22 Feb 2024 21:47) (83 - 83)  BP: 150/72 (22 Feb 2024 23:51) (145/86 - 150/72)  BP(mean): 96 (22 Feb 2024 23:51) (96 - 109)  RR: 18 (22 Feb 2024 21:47) (18 - 18)  SpO2: 94% (22 Feb 2024 21:47) (94% - 94%)    Parameters below as of 22 Feb 2024 21:47  Patient On (Oxygen Delivery Method): room air        PHYSICAL EXAM  General: NAD, Awake and Alert    LEFT Lower Extremity:   Skin with chronic vasular changes to anterior tibia, ecchymosis over knee   + Ecchymosis of the thigh/lnee/proximal tibia  + Swelling of the thigh/lnee  No Gross deformity of the distal thigh  TTP over the distal thigh // knee  NTTP over the bony prominences of the hip/ankle/foot/toes  L2-S1 SILT  +EHL/FHL/TA/GSC  +DP pulses  Calf nontender  Compartments soft and compressible      Secondary Assessment:  NC/AT, NTTP of clavicles, NTTP of C-spine,T-spine, or L-spine in the midline and paraspinal areas; NTTP of pelvis  LUE: NTTP of Shoulder, Elbow, Wrist, Hand; NT with AROM/PROM of Shoulder, Elbow, Wrist, Hand; AIN/PIN/Med/Uln/Msc/Rad/Ax intact  RUE: NTTP of Shoulder, Elbow, Wrist, Hand; NT with AROM/PROM of Shoulder, Elbow, Wrist, Hand; AIN/PIN/Med/Uln/Msc/Rad/Ax intact   RLE: Able to SLR, NT with Log Roll, NT with Heel Strike, NTTP of Hip, Knee, Ankle, Foot; NT with AROM/PROM of Hip, Knee, Ankle, Foot; Q/H/GSC/TA/EHL/FHL intact, chronic vascular changes         IMAGING:  XR LEFT Knee: L TKA perisprosthetic distal femur / lateral epicondyle fracture   CT L knee: L TKA perisprosthetic distal femur / lateral epicondyle fracture            Assessment/Plan:  83y Female with L TKA perisprosthetic distal femur / lateral epicondyle fracture     -Pain control as needed  -NWB LEFT lower extremity in bulky amezquita knee immobilizer  -Possible plan for ORIF v Revision v Nonoperative/conservative therapy. Final plan pending discussion with on call orthopedic attending  -NPO after midnight in event of operative manangement   -FU preop labs  -DVT ppx: Please hold all chemical dvt ppx for possible OR   -Please document medical optimization for possible OR  -Will discuss with attending, and advise if plan changes  -Final plan pending discussion with on call orthopedic attending  - Will d/w Dr. Miller and update plan as needed    83y Female presents with L knee pain s/p mechanical fall. Denies numbness/tingling in the affected extremity. Denies head strike/LOC/other orthopedic injuries at this time. Patient ambulates with assistance at baseline. Patient endorses L TKA at [facility] 15+ years ago and does not recall name of surgeon and has not followed up in many years. Takes A81 daily. Denies any other orthopedic concerns at this time. Complaining of no pain elsewhere     PAST MEDICAL & SURGICAL HISTORY:  Mitral valve disease      RA (rheumatoid arthritis)      OA (osteoarthritis)      Migraine      HLD (hyperlipidemia)      Breast cancer  left 1990 treated with RT and surgery, no f/u required      Chronic pain  secondary to OA and RA      Implantable loop recorder present  pt reports she fainted and that's why it was placed      Asthma      History of lumpectomy of left breast  with sentinal node biopsy 1990      History of bilateral knee replacement  right 2009, left 2011      H/O spinal fusion  lumbar 2005      History of tonsillectomy      S/P bunionectomy  left x 2 2007      History of left hip replacement  2017      H/O mitral valve replacement  2015      History of cataract surgery  left cataract sx      S/P shoulder replacement, right        Home Medications:  acetaminophen 325 mg oral tablet: 2 tab(s) orally every 6 hours (12 Sep 2023 10:29)  amLODIPine 10 mg oral tablet: 1 tab(s) orally once a day (11 Sep 2023 07:13)  aspirin 325 mg oral tablet: 1 tab(s) orally once a day (12 Sep 2023 10:29)  atorvastatin 10 mg oral tablet: 1 tab(s) orally once a day (11 Sep 2023 07:13)  B Complex 50: 1 tab(s) orally once a day (11 Sep 2023 07:13)  Coreg 6.25 mg oral tablet: 1 tab(s) orally 2 times a day (11 Sep 2023 07:13)  Cymbalta 30 mg oral delayed release capsule: 1 cap(s) orally once a day (11 Sep 2023 07:13)  fludrocortisone 0.1 mg oral tablet: 1 tab(s) orally 2 times a week (11 Sep 2023 07:13)  hydrALAZINE 25 mg oral tablet: 1 tab(s) orally 2 times a day (11 Sep 2023 07:13)  oxyCODONE 10 mg oral tablet: 1 tab(s) orally every 4 hours As needed Severe Pain (7 - 10) (12 Sep 2023 10:29)  oxyCODONE 5 mg oral tablet: 1 tab(s) orally every 4 hours As needed Moderate Pain (4 - 6) (12 Sep 2023 10:29)  pantoprazole 40 mg oral delayed release tablet: 1 tab(s) orally once a day (before a meal) (12 Sep 2023 10:29)  Plaquenil 200 mg oral tablet: 2 tab(s) orally once a day (11 Sep 2023 07:13)  polyethylene glycol 3350 oral powder for reconstitution: 17 gram(s) orally once a day (at bedtime) (12 Sep 2023 10:29)  predniSONE 5 mg oral tablet: 1 orally once a day (11 Sep 2023 07:13)  senna leaf extract oral tablet: 2 tab(s) orally once a day (at bedtime) (12 Sep 2023 10:29)  Symbicort 160 mcg-4.5 mcg/inh inhalation aerosol: 2 inhaled once a day (11 Sep 2023 07:13)  Vitamin D3 1000 intl units oral tablet: 2 tab(s) orally once a day (11 Sep 2023 07:13)    Allergies    No Known Allergies    Intolerances                              12.9   8.39  )-----------( 197      ( 22 Feb 2024 23:17 )             39.9     02-22    136  |  103  |  16  ----------------------------<  107<H>  4.2   |  31  |  0.92    Ca    8.9      22 Feb 2024 23:17    TPro  7.7  /  Alb  3.7  /  TBili  0.4  /  DBili  x   /  AST  45<H>  /  ALT  45  /  AlkPhos  52  02-22    PT/INR - ( 23 Feb 2024 00:51 )   PT: 10.0 sec;   INR: 0.88 ratio         PTT - ( 23 Feb 2024 00:51 )  PTT:26.9 sec  Urinalysis Basic - ( 22 Feb 2024 23:17 )    Color: x / Appearance: x / SG: x / pH: x  Gluc: 107 mg/dL / Ketone: x  / Bili: x / Urobili: x   Blood: x / Protein: x / Nitrite: x   Leuk Esterase: x / RBC: x / WBC x   Sq Epi: x / Non Sq Epi: x / Bacteria: x          Vital Signs Last 24 Hrs  T(C): 36.4 (22 Feb 2024 21:47), Max: 36.4 (22 Feb 2024 21:47)  T(F): 97.6 (22 Feb 2024 21:47), Max: 97.6 (22 Feb 2024 21:47)  HR: 83 (22 Feb 2024 21:47) (83 - 83)  BP: 150/72 (22 Feb 2024 23:51) (145/86 - 150/72)  BP(mean): 96 (22 Feb 2024 23:51) (96 - 109)  RR: 18 (22 Feb 2024 21:47) (18 - 18)  SpO2: 94% (22 Feb 2024 21:47) (94% - 94%)    Parameters below as of 22 Feb 2024 21:47  Patient On (Oxygen Delivery Method): room air        PHYSICAL EXAM  General: NAD, Awake and Alert    LEFT Lower Extremity:   Skin with chronic vasular changes to anterior tibia, ecchymosis over knee   + Ecchymosis of the thigh/lnee/proximal tibia  + Swelling of the thigh/lnee  No Gross deformity of the distal thigh  TTP over the distal thigh // knee  NTTP over the bony prominences of the hip/ankle/foot/toes  L2-S1 SILT  +EHL/FHL/TA/GSC  +DP pulses  Calf nontender  Compartments soft and compressible      Secondary Assessment:  NC/AT, NTTP of clavicles, NTTP of C-spine,T-spine, or L-spine in the midline and paraspinal areas; NTTP of pelvis  LUE: NTTP of Shoulder, Elbow, Wrist, Hand; NT with AROM/PROM of Shoulder, Elbow, Wrist, Hand; AIN/PIN/Med/Uln/Msc/Rad/Ax intact  RUE: NTTP of Shoulder, Elbow, Wrist, Hand; NT with AROM/PROM of Shoulder, Elbow, Wrist, Hand; AIN/PIN/Med/Uln/Msc/Rad/Ax intact   RLE: Able to SLR, NT with Log Roll, NT with Heel Strike, NTTP of Hip, Knee, Ankle, Foot; NT with AROM/PROM of Hip, Knee, Ankle, Foot; Q/H/GSC/TA/EHL/FHL intact, chronic vascular changes         IMAGING:  XR LEFT Knee: L TKA perisprosthetic distal femur / lateral epicondyle fracture   CT L knee: L TKA perisprosthetic distal femur / lateral epicondyle fracture            Assessment/Plan:  83y Female with L TKA perisprosthetic distal femur / lateral epicondyle fracture     -Pain control as needed  -Partial weight bearing on LEFT lower extremity in bulky amezquita knee immobilizer  -No acute orthopedic intervention at this time  -Please feed patient, cancel NPO, no longer needed  -FU preop labs  -FU outpatient with Dr. Miller in 1-2 weeks  -D/w Dr. Miller who agrees with above

## 2024-02-24 PROCEDURE — 99232 SBSQ HOSP IP/OBS MODERATE 35: CPT

## 2024-02-24 RX ORDER — ACETAMINOPHEN 500 MG
1000 TABLET ORAL ONCE
Refills: 0 | Status: COMPLETED | OUTPATIENT
Start: 2024-02-24 | End: 2024-02-24

## 2024-02-24 RX ADMIN — DULOXETINE HYDROCHLORIDE 30 MILLIGRAM(S): 30 CAPSULE, DELAYED RELEASE ORAL at 22:30

## 2024-02-24 RX ADMIN — AMLODIPINE BESYLATE 10 MILLIGRAM(S): 2.5 TABLET ORAL at 09:37

## 2024-02-24 RX ADMIN — Medication 400 MILLIGRAM(S): at 00:16

## 2024-02-24 RX ADMIN — Medication 400 MILLIGRAM(S): at 18:06

## 2024-02-24 RX ADMIN — Medication 2000 UNIT(S): at 09:37

## 2024-02-24 RX ADMIN — Medication 81 MILLIGRAM(S): at 09:36

## 2024-02-24 RX ADMIN — BUDESONIDE AND FORMOTEROL FUMARATE DIHYDRATE 2 PUFF(S): 160; 4.5 AEROSOL RESPIRATORY (INHALATION) at 20:12

## 2024-02-24 RX ADMIN — Medication 25 MILLIGRAM(S): at 09:38

## 2024-02-24 RX ADMIN — BUDESONIDE AND FORMOTEROL FUMARATE DIHYDRATE 2 PUFF(S): 160; 4.5 AEROSOL RESPIRATORY (INHALATION) at 09:59

## 2024-02-24 RX ADMIN — Medication 25 MILLIGRAM(S): at 22:30

## 2024-02-24 RX ADMIN — Medication 1000 MILLIGRAM(S): at 10:06

## 2024-02-24 RX ADMIN — Medication 200 MILLIGRAM(S): at 09:38

## 2024-02-24 RX ADMIN — ENOXAPARIN SODIUM 40 MILLIGRAM(S): 100 INJECTION SUBCUTANEOUS at 18:05

## 2024-02-24 RX ADMIN — Medication 200 MILLIGRAM(S): at 22:30

## 2024-02-24 RX ADMIN — Medication 500 MILLIGRAM(S): at 09:37

## 2024-02-24 RX ADMIN — Medication 5 MILLIGRAM(S): at 09:38

## 2024-02-24 RX ADMIN — Medication 400 MILLIGRAM(S): at 09:36

## 2024-02-24 RX ADMIN — Medication 1000 MILLIGRAM(S): at 00:00

## 2024-02-24 RX ADMIN — Medication 1 TABLET(S): at 09:37

## 2024-02-24 RX ADMIN — Medication 1000 MILLIGRAM(S): at 18:06

## 2024-02-24 RX ADMIN — ATORVASTATIN CALCIUM 10 MILLIGRAM(S): 80 TABLET, FILM COATED ORAL at 22:30

## 2024-02-24 NOTE — PHYSICAL THERAPY INITIAL EVALUATION ADULT - MODALITIES TREATMENT COMMENTS
Pt OOB in chair after session L LE elevated on stool +alarm, CBIR, +IV, Pain increased 9/10 Rn aware of pain level for this pt, IV Tylenol given, able to take a few steps w/ PT assist from bed to commode and commode to chair, Pt resting and eating breakfast at end of session.

## 2024-02-24 NOTE — PHYSICAL THERAPY INITIAL EVALUATION ADULT - LIVES WITH, PROFILE
3 steps to enter house w/1 side rails, MUMTAZ 6 steps up , Then 13 steps to bedroomse/alone
alone; 3 steps to enter house w/1 side rails, MUMTAZ 6 steps up , Then 13 steps to bedroom/alone

## 2024-02-24 NOTE — PHYSICAL THERAPY INITIAL EVALUATION ADULT - GENERAL OBSERVATIONS, REHAB EVAL
Ashutosh Villa Pt seen on 2N, reports 8/10 pain L LE, L LE in KI+, PWB maintained for session. Pt was anxious while transferring /ambulating PT reassured and consoled.

## 2024-02-24 NOTE — PHYSICAL THERAPY INITIAL EVALUATION ADULT - ACTIVE RANGE OF MOTION EXAMINATION, REHAB EVAL
L knee and hip NT, L ankle WFL, B elevation shld to approx 90 deg/bilateral upper extremity Active ROM was WFL (within functional limits)/Right LE Active ROM was WFL (within functional limits)

## 2024-02-24 NOTE — PHYSICAL THERAPY INITIAL EVALUATION ADULT - DIAGNOSIS, PT EVAL
S/p Fall, periprosthetic distal femur fracture. PWB LLE w/KI on
S/p Fall, periprosthetic distal femur fracture. PWB LLE w/KI on

## 2024-02-24 NOTE — PHYSICAL THERAPY INITIAL EVALUATION ADULT - PERTINENT HX OF CURRENT PROBLEM, REHAB EVAL
82 yo female with PMH of RA, osteoarthritis, HTN, HLD, COPD, breast CA presents to ED with left knee pain s/p mechanical fall at home. Pt states her shoe was loose and was trying to put it on properly when she fell. she thinks she hit her left knee to the wall. No head trauma or LOC. Pt has a history of b/l knee surgery, with left knee replacement about 15 yrs ago.   In the ED patient was found to have a periprosthetic distal femur fracture. 82 yo female with PMH of RA, osteoarthritis, HTN, HLD, COPD, breast CA presents to ED with left knee pain s/p mechanical fall at home. Pt states her shoe was loose and was trying to put it on properly when she fell. she thinks she hit her left knee to the wall. No head trauma or LOC. Pt has a history of b/l knee surgery, with left knee replacement about 15 yrs ago. Pt reports B TSA- R approx 6 months ago.  In the ED patient was found to have a periprosthetic distal femur fracture.

## 2024-02-24 NOTE — PHYSICAL THERAPY INITIAL EVALUATION ADULT - MANUAL MUSCLE TESTING RESULTS, REHAB EVAL
pt anxious for session, B UE 3-/5, R LE at least 3+/5, L ankle at least 3/5 held MMT due to pain at this time./grossly assessed due to

## 2024-02-25 PROCEDURE — 99232 SBSQ HOSP IP/OBS MODERATE 35: CPT

## 2024-02-25 RX ORDER — ACETAMINOPHEN 500 MG
650 TABLET ORAL EVERY 6 HOURS
Refills: 0 | Status: DISCONTINUED | OUTPATIENT
Start: 2024-02-25 | End: 2024-02-26

## 2024-02-25 RX ORDER — ACETAMINOPHEN 500 MG
1000 TABLET ORAL ONCE
Refills: 0 | Status: COMPLETED | OUTPATIENT
Start: 2024-02-25 | End: 2024-02-25

## 2024-02-25 RX ORDER — LANOLIN ALCOHOL/MO/W.PET/CERES
5 CREAM (GRAM) TOPICAL ONCE
Refills: 0 | Status: COMPLETED | OUTPATIENT
Start: 2024-02-25 | End: 2024-02-25

## 2024-02-25 RX ORDER — CARVEDILOL PHOSPHATE 80 MG/1
6.25 CAPSULE, EXTENDED RELEASE ORAL EVERY 12 HOURS
Refills: 0 | Status: DISCONTINUED | OUTPATIENT
Start: 2024-02-25 | End: 2024-02-26

## 2024-02-25 RX ORDER — CARVEDILOL PHOSPHATE 80 MG/1
1 CAPSULE, EXTENDED RELEASE ORAL
Refills: 0 | DISCHARGE

## 2024-02-25 RX ORDER — ACETAMINOPHEN 500 MG
1000 TABLET ORAL ONCE
Refills: 0 | Status: DISCONTINUED | OUTPATIENT
Start: 2024-02-25 | End: 2024-02-26

## 2024-02-25 RX ADMIN — Medication 200 MILLIGRAM(S): at 21:50

## 2024-02-25 RX ADMIN — Medication 400 MILLIGRAM(S): at 14:36

## 2024-02-25 RX ADMIN — Medication 650 MILLIGRAM(S): at 06:52

## 2024-02-25 RX ADMIN — CARVEDILOL PHOSPHATE 6.25 MILLIGRAM(S): 80 CAPSULE, EXTENDED RELEASE ORAL at 21:50

## 2024-02-25 RX ADMIN — Medication 25 MILLIGRAM(S): at 21:49

## 2024-02-25 RX ADMIN — Medication 1 TABLET(S): at 11:05

## 2024-02-25 RX ADMIN — AMLODIPINE BESYLATE 10 MILLIGRAM(S): 2.5 TABLET ORAL at 11:05

## 2024-02-25 RX ADMIN — BUDESONIDE AND FORMOTEROL FUMARATE DIHYDRATE 2 PUFF(S): 160; 4.5 AEROSOL RESPIRATORY (INHALATION) at 20:50

## 2024-02-25 RX ADMIN — Medication 81 MILLIGRAM(S): at 11:05

## 2024-02-25 RX ADMIN — Medication 500 MILLIGRAM(S): at 11:05

## 2024-02-25 RX ADMIN — DULOXETINE HYDROCHLORIDE 30 MILLIGRAM(S): 30 CAPSULE, DELAYED RELEASE ORAL at 21:50

## 2024-02-25 RX ADMIN — BUDESONIDE AND FORMOTEROL FUMARATE DIHYDRATE 2 PUFF(S): 160; 4.5 AEROSOL RESPIRATORY (INHALATION) at 08:27

## 2024-02-25 RX ADMIN — Medication 5 MILLIGRAM(S): at 02:17

## 2024-02-25 RX ADMIN — Medication 1000 MILLIGRAM(S): at 14:51

## 2024-02-25 RX ADMIN — ATORVASTATIN CALCIUM 10 MILLIGRAM(S): 80 TABLET, FILM COATED ORAL at 21:49

## 2024-02-25 RX ADMIN — ENOXAPARIN SODIUM 40 MILLIGRAM(S): 100 INJECTION SUBCUTANEOUS at 17:43

## 2024-02-25 RX ADMIN — Medication 5 MILLIGRAM(S): at 11:04

## 2024-02-25 RX ADMIN — Medication 200 MILLIGRAM(S): at 11:04

## 2024-02-25 RX ADMIN — Medication 2000 UNIT(S): at 11:05

## 2024-02-25 RX ADMIN — Medication 25 MILLIGRAM(S): at 11:04

## 2024-02-25 NOTE — PROGRESS NOTE ADULT - SUBJECTIVE AND OBJECTIVE BOX
History of Present Illness:   82 yo female with PMH of RA, osteoarthritis, HTN, HLD, COPD, breast CA presents to ED with left knee pain s/p mechanical fall at home. Pt states her shoe was loose and was trying to put it on properly when she fell. she thinks she hit her left knee to the wall. No head trauma or LOC. Pt has a history of b/l knee surgery, with left knee replacement about 15 yrs ago.   In the ED patient was found to have a periprosthetic distal femur fracture.   Pt denies any fevers, chills, headaches, weakness, CP, SOB, dizziness, abd pain, N/V/D, dysuria.     2.24: c/o some knee pain            REVIEW OF SYSTEMS:    CONSTITUTIONAL: No weakness, No fevers or chills  ENT: No ear ache, No sorethroat  NECK: No pain, No stiffness  RESPIRATORY: No cough, No wheezing, No hemoptysis; No dyspnea  CARDIOVASCULAR: No chest pain, No palpitations  GASTROINTESTINAL: No abd pain, No nausea, No vomiting, No hematemesis, No diarrhea or constipation. No melena, No hematochezia.  GENITOURINARY: No dysuria, No  hematuria  NEUROLOGICAL: No diplopia, No paresthesia, No motor dysfunction  MUSCULOSKELETAL: +rt knee arthralgia, No myalgia  SKIN: No rashes, or lesions   PSYCH: no anxiety, no suicidal ideation    All other review of systems is negative unless indicated above    Vital Signs Last 24 Hrs  T(C): 36.4 (24 Feb 2024 07:40), Max: 36.6 (24 Feb 2024 00:01)  T(F): 97.5 (24 Feb 2024 07:40), Max: 97.9 (24 Feb 2024 00:01)  HR: 89 (24 Feb 2024 07:40) (84 - 89)  BP: 153/67 (24 Feb 2024 07:40) (129/65 - 153/67)  BP(mean): --  RR: 18 (24 Feb 2024 07:40) (16 - 18)  SpO2: 93% (24 Feb 2024 07:40) (93% - 94%)    Parameters below as of 24 Feb 2024 07:40  Patient On (Oxygen Delivery Method): room air        PHYSICAL EXAM:    GENERAL: NAD  HEENT:  NC/AT, EOMI, PERRLA, No scleral icterus, Moist mucous membranes  NECK: Supple, No JVD  CNS:  Alert & Oriented X3, Motor Strength 5/5 B/L upper and lower extremities; DTRs 2+ intact   LUNG: Normal Breath sounds, Clear to auscultation bilaterally, No rales, No rhonchi, No wheezing  HEART: RRR; No murmurs, No rubs  ABDOMEN: +BS, ST/ND/NT  GENITOURINARY: Voiding, Bladder not distended  EXTREMITIES: Rt knee in immobilizer   MUSCULOSKELTAL: Joints normal ROM, No TTP, No effusion  VAGINAL: deferred  SKIN: no rashes  RECTAL: deferred, not indicated  BREAST: deferred                          12.5   9.74  )-----------( 173      ( 23 Feb 2024 04:24 )             38.4     02-23    136  |  104  |  14  ----------------------------<  97  3.8   |  32<H>  |  0.78    Ca    8.3<L>      23 Feb 2024 04:24    TPro  7.7  /  Alb  3.7  /  TBili  0.4  /  DBili  x   /  AST  45<H>  /  ALT  45  /  AlkPhos  52  02-22    Vancomycin levels:   Cultures:     MEDICATIONS  (STANDING):  acetaminophen   IVPB .. 1000 milliGRAM(s) IV Intermittent once  amLODIPine   Tablet 10 milliGRAM(s) Oral daily  ascorbic acid 500 milliGRAM(s) Oral daily  aspirin enteric coated 81 milliGRAM(s) Oral daily  atorvastatin 10 milliGRAM(s) Oral at bedtime  budesonide 160 MICROgram(s)/formoterol 4.5 MICROgram(s) Inhaler 2 Puff(s) Inhalation two times a day  cholecalciferol 2000 Unit(s) Oral daily  DULoxetine 30 milliGRAM(s) Oral at bedtime  enoxaparin Injectable 40 milliGRAM(s) SubCutaneous every 24 hours  fludroCORTISONE 0.1 milliGRAM(s) Oral <User Schedule>  hydrALAZINE 25 milliGRAM(s) Oral two times a day  hydroxychloroquine 200 milliGRAM(s) Oral two times a day  influenza  Vaccine (HIGH DOSE) 0.7 milliLiter(s) IntraMuscular once  multivitamin 1 Tablet(s) Oral daily  predniSONE   Tablet 5 milliGRAM(s) Oral daily  sodium chloride 0.9%. 1000 milliLiter(s) (75 mL/Hr) IV Continuous <Continuous>    MEDICATIONS  (PRN):  albuterol    90 MICROgram(s) HFA Inhaler 2 Puff(s) Inhalation every 6 hours PRN for shortness of breath and/or wheezing  morphine  - Injectable 4 milliGRAM(s) IV Push every 4 hours PRN Severe Pain (7 - 10)  ondansetron Injectable 4 milliGRAM(s) IV Push every 6 hours PRN Nausea and/or Vomiting  oxyCODONE    IR 2.5 milliGRAM(s) Oral every 4 hours PRN Mild Pain (1 - 3)  oxyCODONE    IR 5 milliGRAM(s) Oral every 4 hours PRN Moderate Pain (4 - 6)      all labs reviewed  all imaging reviewed          · Assessment	  82 yo female with PMH of RA, osteoarthritis, HTN, HLD, COPD, breast CA admitted with:    #s/p mechanical fall with periprosthetic distal femur fracture  - admit to med-surg  - orthopedic consult appreciated - no surgical intervention at this time, conservative management  - PT eval  - analgesia prn  Partial Wt bearing to LLE  c/o knee brace     #COPD  - stable  - continue symbicort  - albuterol PRN    #HTN  - continue amlodipine, hydralazine    #RA, osteoarthritis  - continue home meds    #HLD  - continue statin    #DVT prophylaxis  - lovenox    #Advance Directives  - FULL CODE, d/w patient
History of Present Illness:   82 yo female with PMH of RA, osteoarthritis, HTN, HLD, COPD, breast CA presents to ED with left knee pain s/p mechanical fall at home. Pt states her shoe was loose and was trying to put it on properly when she fell. she thinks she hit her left knee to the wall. No head trauma or LOC. Pt has a history of b/l knee surgery, with left knee replacement about 15 yrs ago.   In the ED patient was found to have a periprosthetic distal femur fracture.   Pt denies any fevers, chills, headaches, weakness, CP, SOB, dizziness, abd pain, N/V/D, dysuria.     2.24: c/o some knee pain  2.25: c/o knee pain with transfers and ambulation       REVIEW OF SYSTEMS:    CONSTITUTIONAL: No weakness, No fevers or chills  ENT: No ear ache, No sorethroat  NECK: No pain, No stiffness  RESPIRATORY: No cough, No wheezing, No hemoptysis; No dyspnea  CARDIOVASCULAR: No chest pain, No palpitations  GASTROINTESTINAL: No abd pain, No nausea, No vomiting, No hematemesis, No diarrhea or constipation. No melena, No hematochezia.  GENITOURINARY: No dysuria, No  hematuria  NEUROLOGICAL: No diplopia, No paresthesia, No motor dysfunction  MUSCULOSKELETAL: +rt knee arthralgia, No myalgia  SKIN: No rashes, or lesions   PSYCH: no anxiety, no suicidal ideation    All other review of systems is negative unless indicated above    Vital Signs Last 24 Hrs  T(C): 36.8 (25 Feb 2024 16:00), Max: 36.8 (25 Feb 2024 16:00)  T(F): 98.2 (25 Feb 2024 16:00), Max: 98.2 (25 Feb 2024 16:00)  HR: 89 (25 Feb 2024 16:00) (89 - 107)  BP: 108/61 (25 Feb 2024 16:00) (108/61 - 146/79)  RR: 18 (25 Feb 2024 16:00) (18 - 18)  SpO2: 95% (25 Feb 2024 16:00) (92% - 95%)    Parameters below as of 25 Feb 2024 16:00  Patient On (Oxygen Delivery Method): room air            PHYSICAL EXAM:    GENERAL: NAD  HEENT:  NC/AT, EOMI, PERRLA, No scleral icterus, Moist mucous membranes  NECK: Supple, No JVD  CNS:  Alert & Oriented X3, Motor Strength 5/5 B/L upper and lower extremities; DTRs 2+ intact   LUNG: Normal Breath sounds, Clear to auscultation bilaterally, No rales, No rhonchi, No wheezing  HEART: RRR; No murmurs, No rubs  ABDOMEN: +BS, ST/ND/NT  GENITOURINARY: Voiding, Bladder not distended  EXTREMITIES: Rt knee in immobilizer   MUSCULOSKELTAL: Joints normal ROM, No TTP, No effusion  VAGINAL: deferred  SKIN: no rashes  RECTAL: deferred, not indicated  BREAST: deferred                          12.5   9.74  )-----------( 173      ( 23 Feb 2024 04:24 )             38.4     02-23    136  |  104  |  14  ----------------------------<  97  3.8   |  32<H>  |  0.78    Ca    8.3<L>      23 Feb 2024 04:24    TPro  7.7  /  Alb  3.7  /  TBili  0.4  /  DBili  x   /  AST  45<H>  /  ALT  45  /  AlkPhos  52  02-22    Vancomycin levels:   Cultures:     MEDICATIONS  (STANDING):  amLODIPine   Tablet 10 milliGRAM(s) Oral daily  ascorbic acid 500 milliGRAM(s) Oral daily  aspirin enteric coated 81 milliGRAM(s) Oral daily  atorvastatin 10 milliGRAM(s) Oral at bedtime  budesonide 160 MICROgram(s)/formoterol 4.5 MICROgram(s) Inhaler 2 Puff(s) Inhalation two times a day  carvedilol 6.25 milliGRAM(s) Oral every 12 hours  cholecalciferol 2000 Unit(s) Oral daily  DULoxetine 30 milliGRAM(s) Oral at bedtime  enoxaparin Injectable 40 milliGRAM(s) SubCutaneous every 24 hours  fludroCORTISONE 0.1 milliGRAM(s) Oral <User Schedule>  hydrALAZINE 25 milliGRAM(s) Oral two times a day  hydroxychloroquine 200 milliGRAM(s) Oral two times a day  multivitamin 1 Tablet(s) Oral daily  predniSONE   Tablet 5 milliGRAM(s) Oral daily  sodium chloride 0.9%. 1000 milliLiter(s) (75 mL/Hr) IV Continuous <Continuous>      all labs reviewed  all imaging reviewed          · Assessment	  82 yo female with PMH of RA, osteoarthritis, HTN, HLD, COPD, breast CA admitted with:    1. Periprosthetic distal femur fracture due to [facility] fall   Fracture non displaced   - orthopedic consult appreciated - no surgical intervention at this time, conservative management with bracing   - PT eval  - analgesia prn  Partial Wt bearing to LLE    2. COPD  - stable  - continue symbicort  - albuterol PRN    3. HTN  - continue amlodipine, hydralazine  she states she takes Coreg 6.25mg Bid; will restart w holding parameters     Also patient taking Florinef as outpatient along with all the above antiHtn meds; suspect  orthostasis present with concomitant supine Htn     4. RA  - continue home meds: Prednisone and Plaquenil     #DVT prophylaxis  - lovenox    #Advance Directives  - FULL CODE    Dispo: Rehab tomorrow

## 2024-02-26 ENCOUNTER — TRANSCRIPTION ENCOUNTER (OUTPATIENT)
Age: 84
End: 2024-02-26

## 2024-02-26 VITALS
HEART RATE: 90 BPM | DIASTOLIC BLOOD PRESSURE: 63 MMHG | OXYGEN SATURATION: 95 % | TEMPERATURE: 98 F | SYSTOLIC BLOOD PRESSURE: 128 MMHG | RESPIRATION RATE: 16 BRPM

## 2024-02-26 LAB — SARS-COV-2 RNA SPEC QL NAA+PROBE: SIGNIFICANT CHANGE UP

## 2024-02-26 PROCEDURE — 99239 HOSP IP/OBS DSCHRG MGMT >30: CPT

## 2024-02-26 RX ORDER — ACETAMINOPHEN 500 MG
1000 TABLET ORAL ONCE
Refills: 0 | Status: COMPLETED | OUTPATIENT
Start: 2024-02-26 | End: 2024-02-26

## 2024-02-26 RX ORDER — ACETAMINOPHEN 500 MG
2 TABLET ORAL
Qty: 0 | Refills: 0 | DISCHARGE
Start: 2024-02-26

## 2024-02-26 RX ORDER — ENOXAPARIN SODIUM 100 MG/ML
40 INJECTION SUBCUTANEOUS
Qty: 0 | Refills: 0 | DISCHARGE

## 2024-02-26 RX ORDER — OXYCODONE HYDROCHLORIDE 5 MG/1
1 TABLET ORAL
Qty: 0 | Refills: 0 | DISCHARGE
Start: 2024-02-26

## 2024-02-26 RX ADMIN — Medication 200 MILLIGRAM(S): at 09:03

## 2024-02-26 RX ADMIN — Medication 1000 MILLIGRAM(S): at 09:12

## 2024-02-26 RX ADMIN — BUDESONIDE AND FORMOTEROL FUMARATE DIHYDRATE 2 PUFF(S): 160; 4.5 AEROSOL RESPIRATORY (INHALATION) at 08:01

## 2024-02-26 RX ADMIN — Medication 500 MILLIGRAM(S): at 09:03

## 2024-02-26 RX ADMIN — Medication 5 MILLIGRAM(S): at 09:02

## 2024-02-26 RX ADMIN — Medication 81 MILLIGRAM(S): at 09:01

## 2024-02-26 RX ADMIN — FLUDROCORTISONE ACETATE 0.1 MILLIGRAM(S): 0.1 TABLET ORAL at 10:57

## 2024-02-26 RX ADMIN — Medication 400 MILLIGRAM(S): at 08:54

## 2024-02-26 RX ADMIN — CARVEDILOL PHOSPHATE 6.25 MILLIGRAM(S): 80 CAPSULE, EXTENDED RELEASE ORAL at 10:56

## 2024-02-26 RX ADMIN — Medication 1 TABLET(S): at 09:01

## 2024-02-26 RX ADMIN — Medication 2000 UNIT(S): at 09:02

## 2024-02-26 NOTE — DISCHARGE NOTE NURSING/CASE MANAGEMENT/SOCIAL WORK - NSDCVIVACCINE_GEN_ALL_CORE_FT
Tdap; 10-Feb-2018 19:30; Naye Moreau (RN); [employer]; [id number]; IntraMuscular; Deltoid Right.; 0.5 milliLiter(s); VIS (VIS Published: 09-May-2013, VIS Presented: 10-Feb-2018);

## 2024-02-26 NOTE — DISCHARGE NOTE PROVIDER - NSDCFUSCHEDAPPT_GEN_ALL_CORE_FT
Mayur Bcaa  [facility] Physician [facility]  ONCORTHO [address]  Scheduled Appointment: 03/05/2024    Ramon Pena  [facility]  OTOLARYNG [address]  Scheduled Appointment: 03/14/2024    Duncan Hyde  [facility]  INTMED [address]  Scheduled Appointment: 04/10/2024

## 2024-02-26 NOTE — DISCHARGE NOTE NURSING/CASE MANAGEMENT/SOCIAL WORK - PATIENT PORTAL LINK FT
You can access the FollowMyHealth Patient Portal offered by [facility] by registering at the following website: [email or web address]. By joining [employer]’s FollowMyHealth portal, you will also be able to view your health information using other applications (apps) compatible with our system.

## 2024-02-26 NOTE — DISCHARGE NOTE PROVIDER - CARE PROVIDER_API CALL
Justo Miller  Orthopaedic Surgery  [address] [place] [id number]  Phone: [phone]  Fax: [phone]  Follow Up Time: 2 weeks

## 2024-02-26 NOTE — DISCHARGE NOTE PROVIDER - NSDCMRMEDTOKEN_GEN_ALL_CORE_FT
amLODIPine 10 mg oral tablet: 1 tab(s) orally once a day  aspirin 81 mg oral delayed release tablet: 1 tab(s) orally once a day  atorvastatin 10 mg oral tablet: 1 tab(s) orally once a day  B Complex 50: 1 tab(s) orally once a day  Coreg 6.25 mg oral tablet: 1 tab(s) orally 2 times a day  Cymbalta 30 mg oral delayed release capsule: 1 cap(s) orally once a day (at bedtime) for back pain  fludrocortisone 0.1 mg oral tablet: 1 tab(s) orally Monday, Wednesday, and Friday  hydrALAZINE 25 mg oral tablet: 1 tab(s) orally 2 times a day  Plaquenil 200 mg oral tablet: 1 tab(s) orally 2 times a day  predniSONE 1 mg oral tablet: 5 tab(s) orally once a day  Symbicort 160 mcg-4.5 mcg/inh inhalation aerosol: 2 puff(s) inhaled 2 times a day  traMADol 50 mg oral tablet: 2 tab(s) orally every 4 to 6 hours as needed for pain (max 6 tabs daily)  Ventolin HFA 90 mcg/inh inhalation aerosol: 2 puff(s) inhaled every 4 to 6 hours as needed for  shortness of breath and/or wheezing  Vitamin C 500 mg oral tablet: 1 tab(s) orally once a day  Vitamin D3 1000 intl units oral tablet: 2 tab(s) orally once a day   acetaminophen 325 mg oral tablet: 2 tab(s) orally every 6 hours As needed Mild Pain (1 - 3)  amLODIPine 10 mg oral tablet: 1 tab(s) orally once a day hold for SBP&lt; 100  aspirin 81 mg oral delayed release tablet: 1 tab(s) orally once a day  atorvastatin 10 mg oral tablet: 1 tab(s) orally once a day  B Complex 50: 1 tab(s) orally once a day  Coreg 6.25 mg oral tablet: 1 tab(s) orally 2 times a day  Cymbalta 30 mg oral delayed release capsule: 1 cap(s) orally once a day (at bedtime) for back pain  fludrocortisone 0.1 mg oral tablet: 1 tab(s) orally Monday, Wednesday, and Friday  hydrALAZINE 25 mg oral tablet: 1 tab(s) orally 2 times a day hold for SBP &lt; 100  Lovenox 40 mg/0.4 mL injectable solution: 40 milligram(s) subcutaneously once a day VTE prophylaxis  oxyCODONE 5 mg oral tablet: 1 tab(s) orally every 4 hours As needed Moderate Pain (4 - 6)  Plaquenil 200 mg oral tablet: 1 tab(s) orally 2 times a day  predniSONE 5 mg oral tablet: 1 tab(s) orally once a day  Symbicort 160 mcg-4.5 mcg/inh inhalation aerosol: 2 puff(s) inhaled 2 times a day  traMADol 50 mg oral tablet: 2 tab(s) orally every 4 to 6 hours as needed for pain (max 6 tabs daily)  Ventolin HFA 90 mcg/inh inhalation aerosol: 2 puff(s) inhaled every 4 to 6 hours as needed for  shortness of breath and/or wheezing  Vitamin C 500 mg oral tablet: 1 tab(s) orally once a day  Vitamin D3 1000 intl units oral tablet: 2 tab(s) orally once a day

## 2024-02-26 NOTE — DISCHARGE NOTE PROVIDER - NSDCHC_MEDRECSTATUS_GEN_ALL_CORE
Please refer to attached ultrasound report for doctor's evaluation of the clinical information obtained by vital signs, ultrasound, and/or non-stress test along with management recommendation. Admission Reconciliation is Not Complete  Discharge Reconciliation is Not Complete Admission Reconciliation is Not Complete  Discharge Reconciliation is Completed

## 2024-02-26 NOTE — DISCHARGE NOTE PROVIDER - HOSPITAL COURSE
82 yo female with PMH of syncope, has loop recorder,  RA, Asthma, osteoarthritis, orthostasis on florinef M, W, F, along with all the above antiHtn meds; suspect  orthostasis present with concomitant supine Htn  HLD, COPD, breast CA in 1990, S/P RT and surgery presents to ED with left knee pain s/p mechanical fall at home. Pt states her shoe was loose and was trying to put it on properly when she fell. she thinks she hit her left knee to the wall. No head trauma or LOC. Pt has a history of b/l knee surgery, with left knee replacement about 15 yrs ago. In the ED patient was found to have a periprosthetic distal femur fracture.  Seen By Ortho service, who Discussed nonoperative versus operative intervention with patient.  Due to the nature of the fracture and the appearance of the stability of the implant I believe we could do a trial of conservative treatment.  Patient will be allowed to be protected weight- bearing in knee immobilizer.    Discussed if the implant does move we would then need to do surgery to fix this.  Patient is in agreement with plan. Physical therapy.  Pain control.  DVT prophylaxis.  Patient to follow-up in office in 2 weeks.  seen by Physical therapy, plan for rehab today, allowed to PWB on LLE    PHYSICAL EXAM:    GENERAL: Comfortable, no acute distress   HEAD:  Normocephalic, atraumatic  EYES: EOMI, PERRLA  HEENT: Moist mucous membranes  NECK: Supple, No JVD  NERVOUS SYSTEM:  Alert & Oriented X3, Motor Strength 5/5 B/L upper and lower extremities  CHEST/LUNG: Clear to auscultation bilaterally  HEART: Regular rate and rhythm  ABDOMEN: Soft, non tender, Nondistended, Bowel sounds present  GENITOURINARY: Voiding, no palpable bladder  EXTREMITIES:   No clubbing, cyanosis, or edema  MUSCULOSKELETAL- left bulky amezquita knee immobilizer in place  SKIN-no rash    < from: Xray Chest 1 View- PORTABLE-Urgent (Xray Chest 1 View- PORTABLE-Urgent .) (02.23.24 @ 02:58) >    INTERPRETATION:  Left femur, left knee, left tib-fib, and chest. Patient   had a fall.    Left femur. 4 views. 5 images.    There is a left knee replacement with good alignment.    Lumbar hardware on the left is seen inserting into the sacrum.    Upper bones are intact.    Left knee. There is a knee replacement with base with good alignment.    There is a periprosthetic fracture off the medial femoral epicondyles may   extend all the way to the inferior extent. A CAT scan has also been   ordered. There is a lipohemarthrosis.    Left tib-fib. 2 views. 3 images. Tib-fib is intact.    AP chest on February 23, 2024 2:35 AM.    Heart size normal. Sternotomy, prosthetic heart valve, and left loop   recorder again noted. Bilateral reverse shoulder replacement's are seen.    Lungs are clear and findings are similar to CAT scan January 24 this year.    IMPRESSION: Periprosthetic fracture of the medial   < from: CT Knee No Cont, Left (02.23.24 @ 00:36) >  Impression:    Periprosthetic fracture of the distal femur with moderate   lipohemarthrosis.    Final diagnoses:   #s/p mechanical fall with periprosthetic distal femur fracture  -#COPD  #HTN  #RA, osteoarthritis  #HLD  #DVT prophylaxis    time for discharge 45Mins  discharge d/w pt  discharge summary to be faxed to PCP

## 2024-02-26 NOTE — DISCHARGE NOTE PROVIDER - NSDCCPCAREPLAN_GEN_ALL_CORE_FT
PRINCIPAL DISCHARGE DIAGNOSIS  Diagnosis: Krystyna-prosthetic fracture around prosthetic joint  Assessment and Plan of Treatment: rehab for physical therapy  follow instructions by ortho pedic surgery  follow up with ortho pedic doctor in 2 weeks   partial weight bearing on LLE   tramadol or oxycodone for pain'lovenox for DVT prevention

## 2024-02-26 NOTE — ED ADULT NURSE NOTE - OBJECTIVE STATEMENT
pt complaining of left hip pain. pt had replacement in november. states it was displaced in february and was here. pt was bent over on toilet this am but not fall when pain occurred. pedal pulses present. positive tingling. screaming in pain. daughter at bedside with pt c/w lipitor 40mg qhs

## 2024-03-05 ENCOUNTER — APPOINTMENT (OUTPATIENT)
Dept: ORTHOPEDIC SURGERY | Facility: CLINIC | Age: 84
End: 2024-03-05

## 2024-03-05 ENCOUNTER — APPOINTMENT (OUTPATIENT)
Age: 84
End: 2024-03-05
Payer: MEDICARE

## 2024-03-05 VITALS
WEIGHT: 125 LBS | HEART RATE: 94 BPM | HEIGHT: 65 IN | BODY MASS INDEX: 20.83 KG/M2 | SYSTOLIC BLOOD PRESSURE: 104 MMHG | DIASTOLIC BLOOD PRESSURE: 65 MMHG

## 2024-03-05 VITALS — HEIGHT: 65 IN | WEIGHT: 125 LBS | BODY MASS INDEX: 20.83 KG/M2

## 2024-03-05 PROCEDURE — 73560 X-RAY EXAM OF KNEE 1 OR 2: CPT | Mod: LT

## 2024-03-05 PROCEDURE — 99204 OFFICE O/P NEW MOD 45 MIN: CPT

## 2024-03-05 NOTE — DISCUSSION/SUMMARY
[de-identified] : Left knee femoral condyle periprosthetic fracture  Extensive discussion of the natural history of this issue was had with the patient.  We discussed the treatment options focusing on conservative therapy which includes anti-inflammatories, physical therapy/home exercise, & activity modification.  Will continue to trial conservative management.  Recommend tramadol 100 mg twice daily for pain as this is the patient's baseline dose.  Continue Tylenol.  Lovenox for DVT prophylaxis Continue physical therapy.  Patient may be protected weightbearing in [place] locked in extension.  She was fitted for this today.  Okay to take off for hygiene purposes and daily skin checks. Patient will return in 1 month.  Discussed at this time we will begin knee range of motion.  The patient's current medication management of their orthopedic diagnosis was reviewed today: (1) We discussed a comprehensive treatment plan that included possible pharmaceutical management involving the use of prescription strength medications including but not limited to options such as oral Ibuprofen 400mg QID, once daily Meloxicam 15 mg, or 500-650 mg Tylenol versus over the counter oral medications and topical prescription NSAID Pennsaid vs over the counter Voltaren gel. (2) There is a moderate risk of morbidity with further treatment, especially from use of prescription strength medications and possible side effects of these medications which include upset stomach with oral medications, skin reactions to topical medications and cardiac/renal issues with long term use. (3) I recommended that the patient follow-up with their medical physician to discuss any significant specific potential issues with long term medication use such as interactions with current medications or with exacerbation of underlying medical comorbidities. (4) The benefits and risks associated with use of injectable, oral or topical, prescription and over the counter anti-inflammatory medications were discussed with the patient. The patient voiced understanding of the risks including but not limited to bleeding, stroke, kidney dysfunction, heart disease, and were referred to the black box warning label for further information.

## 2024-03-05 NOTE — HISTORY OF PRESENT ILLNESS
[de-identified] : 3/5/24: Patient presents with left knee pain status post ground-level fall.  Normally ambulates with a cane.  Had left knee replaced at [facility] years ago.  She has been using the brace.  Does have some pain when weightbearing although she has been mostly doing only partial weightbearing.  Taking Tylenol and tramadol for pain.

## 2024-03-05 NOTE — PHYSICAL EXAM
[de-identified] : 3/5/24: 2 views right knee-lateral femoral condyle fracture, unchanged alignment from imaging at hospital, total knee arthroplasty appears in stable position [de-identified] : Left knee Range of motion 0-90, pain with deeper flexion Moderate laxity and pain with varus and valgus stress

## 2024-03-08 DIAGNOSIS — G47.00 INSOMNIA, UNSPECIFIED: ICD-10-CM

## 2024-03-08 DIAGNOSIS — M19.90 UNSPECIFIED OSTEOARTHRITIS, UNSPECIFIED SITE: ICD-10-CM

## 2024-03-08 DIAGNOSIS — I10 ESSENTIAL (PRIMARY) HYPERTENSION: ICD-10-CM

## 2024-03-08 DIAGNOSIS — Z79.52 LONG TERM (CURRENT) USE OF SYSTEMIC STEROIDS: ICD-10-CM

## 2024-03-08 DIAGNOSIS — J44.9 CHRONIC OBSTRUCTIVE PULMONARY DISEASE, UNSPECIFIED: ICD-10-CM

## 2024-03-08 DIAGNOSIS — Z98.1 ARTHRODESIS STATUS: ICD-10-CM

## 2024-03-08 DIAGNOSIS — Z96.611 PRESENCE OF RIGHT ARTIFICIAL SHOULDER JOINT: ICD-10-CM

## 2024-03-08 DIAGNOSIS — Z95.818 PRESENCE OF OTHER CARDIAC IMPLANTS AND GRAFTS: ICD-10-CM

## 2024-03-08 DIAGNOSIS — E78.5 HYPERLIPIDEMIA, UNSPECIFIED: ICD-10-CM

## 2024-03-08 DIAGNOSIS — Z92.3 PERSONAL HISTORY OF IRRADIATION: ICD-10-CM

## 2024-03-08 DIAGNOSIS — M97.12XA PERIPROSTHETIC FRACTURE AROUND INTERNAL PROSTHETIC LEFT KNEE JOINT, INITIAL ENCOUNTER: ICD-10-CM
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
## 2024-03-08 DIAGNOSIS — M06.9 RHEUMATOID ARTHRITIS, UNSPECIFIED: ICD-10-CM
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
## 2024-03-08 DIAGNOSIS — Z85.3 PERSONAL HISTORY OF MALIGNANT NEOPLASM OF BREAST: ICD-10-CM

## 2024-03-08 DIAGNOSIS — Z79.51 LONG TERM (CURRENT) USE OF INHALED STEROIDS: ICD-10-CM

## 2024-03-08 DIAGNOSIS — Z95.2 PRESENCE OF PROSTHETIC HEART VALVE: ICD-10-CM

## 2024-03-08 DIAGNOSIS — Z96.653 PRESENCE OF ARTIFICIAL KNEE JOINT, BILATERAL: ICD-10-CM

## 2024-03-08 DIAGNOSIS — Z79.82 LONG TERM (CURRENT) USE OF ASPIRIN: ICD-10-CM

## 2024-03-08 DIAGNOSIS — S72.425A: ICD-10-CM

## 2024-03-14 ENCOUNTER — APPOINTMENT (OUTPATIENT)
Dept: OTOLARYNGOLOGY | Facility: CLINIC | Age: 84
End: 2024-03-14

## 2024-04-02 ENCOUNTER — APPOINTMENT (OUTPATIENT)
Dept: ORTHOPEDIC SURGERY | Facility: CLINIC | Age: 84
End: 2024-04-02
Payer: MEDICARE

## 2024-04-02 VITALS
HEIGHT: 65 IN | HEART RATE: 73 BPM | DIASTOLIC BLOOD PRESSURE: 62 MMHG | WEIGHT: 125 LBS | BODY MASS INDEX: 20.83 KG/M2 | SYSTOLIC BLOOD PRESSURE: 111 MMHG

## 2024-04-02 PROCEDURE — G2211 COMPLEX E/M VISIT ADD ON: CPT

## 2024-04-02 PROCEDURE — 73562 X-RAY EXAM OF KNEE 3: CPT | Mod: 26,LT

## 2024-04-02 PROCEDURE — 99214 OFFICE O/P EST MOD 30 MIN: CPT

## 2024-04-02 NOTE — DISCUSSION/SUMMARY
[de-identified] : Left knee femoral condyle periprosthetic fracture  Extensive discussion of the natural history of this issue was had with the patient.  We discussed the treatment options focusing on conservative therapy which includes anti-inflammatories, physical therapy/home exercise, & activity modification.  Will continue to trial conservative management.  Recommend tramadol 100 mg twice daily for pain as this is the patient's baseline dose.  Continue Tylenol.  Lovenox for DVT prophylaxis Continue physical therapy.  Patient may be protected weightbearing 25% in [place] locked in extension when ambulating.  Okay to take off for hygiene purposes and daily skin checks.  Okay to take off brace when resting.  Range of motion 0-90 with therapist. Patient will return in 2 weeks.  At this time we will likely allow her to begin ambulating with the [place] unlocked 0-90.  Did discuss the implant may have shifted but we will see if it is a stable position.  Patient may require revision surgery if she is unhappy with the knee.  Continue trial of conservative treatment.

## 2024-04-02 NOTE — PHYSICAL EXAM
[de-identified] : Left knee Range of motion 0-90, pain with deeper flexion Improved pain and laxity with varus and valgus stress  [de-identified] : 4/1/24: 2 views left knee-unchanged alignment, hardware appears in valgus position 3/5/24: 2 views left knee-lateral femoral condyle fracture, unchanged alignment from imaging at hospital, total knee arthroplasty appears in stable position

## 2024-04-02 NOTE — HISTORY OF PRESENT ILLNESS
[de-identified] : 4/2/24: Patient here for follow-up left knee periprosthetic fracture.  Has been essentially toe-touch weightbearing.  Does have some occasional pains in the knee.  Has been using the brace.  Taking Tylenol and occasional tramadol for pain.  3/5/24: Patient presents with left knee pain status post ground-level fall.  Normally ambulates with a cane.  Had left knee replaced at [facility] years ago.  She has been using the brace.  Does have some pain when weightbearing although she has been mostly doing only partial weightbearing.  Taking Tylenol and tramadol for pain.

## 2024-04-09 NOTE — ASU PATIENT PROFILE, ADULT - FALL HARM RISK - FALL HARM RISK
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 Dementia is moderate. The patient does have signs of behavioral disturbance. Home dementia medications are Held or Continued: continued.. Continue non-pharmacologic interventions to prevent delirium (No VS between 11PM-5AM, activity during day, opening blinds, providing glasses/hearing aids, and up in chair during daytime). Will avoid narcotics and benzos unless absolutely necessary. PRN anti-psychotics are prescribed to avoid self harm behaviors.    Patient's agitation has resolved.  Depakote started.  Patient has baseline dementia.    U/A negative     Discharge today    No indicators present

## 2024-04-10 ENCOUNTER — APPOINTMENT (OUTPATIENT)
Dept: INTERNAL MEDICINE | Facility: CLINIC | Age: 84
End: 2024-04-10

## 2024-04-16 ENCOUNTER — APPOINTMENT (OUTPATIENT)
Dept: ORTHOPEDIC SURGERY | Facility: CLINIC | Age: 84
End: 2024-04-16
Payer: MEDICARE

## 2024-04-16 VITALS
HEART RATE: 81 BPM | BODY MASS INDEX: 20.83 KG/M2 | DIASTOLIC BLOOD PRESSURE: 63 MMHG | WEIGHT: 125 LBS | SYSTOLIC BLOOD PRESSURE: 105 MMHG | HEIGHT: 65 IN

## 2024-04-16 DIAGNOSIS — Z96.611 PRESENCE OF RIGHT ARTIFICIAL SHOULDER JOINT: ICD-10-CM

## 2024-04-16 PROCEDURE — 99214 OFFICE O/P EST MOD 30 MIN: CPT

## 2024-04-16 PROCEDURE — 73560 X-RAY EXAM OF KNEE 1 OR 2: CPT | Mod: LT

## 2024-04-16 NOTE — HISTORY OF PRESENT ILLNESS
[de-identified] : 4/16/24: Patient here for follow-up left knee periprosthetic fracture.  Has been ambulating with partial weightbearing with minimal pain.  Taking baseline tramadol for pain in general.  Has been using the brace.  States she is going home from rehab in about a week.  4/2/24: Patient here for follow-up left knee periprosthetic fracture.  Has been essentially toe-touch weightbearing.  Does have some occasional pains in the knee.  Has been using the brace.  Taking Tylenol and occasional tramadol for pain.  3/5/24: Patient presents with left knee pain status post ground-level fall.  Normally ambulates with a cane.  Had left knee replaced at [facility] years ago.  She has been using the brace.  Does have some pain when weightbearing although she has been mostly doing only partial weightbearing.  Taking Tylenol and tramadol for pain.

## 2024-04-16 NOTE — PHYSICAL EXAM
[de-identified] : Left knee Range of motion 0-90, no pain with range of motion No pain with varus and valgus stress, minimal laxity  [de-identified] : 4/16/24: 2 views left knee-unchanged fracture alignment, hardware appears in valgus position 4/1/24: 2 views left knee-unchanged alignment, hardware appears in valgus position 3/5/24: 2 views left knee-lateral femoral condyle fracture, unchanged alignment from imaging at hospital, total knee arthroplasty appears in stable position

## 2024-04-16 NOTE — DISCUSSION/SUMMARY
[de-identified] : Left knee femoral condyle periprosthetic fracture  Extensive discussion of the natural history of this issue was had with the patient.  We discussed the treatment options focusing on conservative therapy which includes anti-inflammatories, physical therapy/home exercise, & activity modification.  Will continue to trial conservative management.  Recommend tramadol 100 mg twice daily for pain as this is the patient's baseline dose.  Continue Tylenol.  DVT prophylaxis Continue physical therapy, prescription for outpatient therapy given to patient..  Patient may be weightbearing as tolerated with the brace unlocked. Patient will return in 2 weeks.  At this time we will repeat x-rays to ensure nothing is changed.  Continue trial of conservative treatment.

## 2024-04-30 ENCOUNTER — APPOINTMENT (OUTPATIENT)
Dept: ORTHOPEDIC SURGERY | Facility: CLINIC | Age: 84
End: 2024-04-30
Payer: MEDICARE

## 2024-04-30 VITALS
HEIGHT: 65 IN | DIASTOLIC BLOOD PRESSURE: 47 MMHG | BODY MASS INDEX: 20.83 KG/M2 | HEART RATE: 89 BPM | WEIGHT: 125 LBS | SYSTOLIC BLOOD PRESSURE: 82 MMHG

## 2024-04-30 DIAGNOSIS — Z96.612 PRESENCE OF LEFT ARTIFICIAL SHOULDER JOINT: ICD-10-CM

## 2024-04-30 DIAGNOSIS — M19.012 PRIMARY OSTEOARTHRITIS, LEFT SHOULDER: ICD-10-CM

## 2024-04-30 DIAGNOSIS — S72.412A DISPLACED UNSPECIFIED CONDYLE FRACTURE OF LOWER END OF LEFT FEMUR, INITIAL ENCOUNTER FOR CLOSED FRACTURE: ICD-10-CM

## 2024-04-30 PROCEDURE — 73030 X-RAY EXAM OF SHOULDER: CPT | Mod: LT

## 2024-04-30 PROCEDURE — G2211 COMPLEX E/M VISIT ADD ON: CPT

## 2024-04-30 PROCEDURE — 99213 OFFICE O/P EST LOW 20 MIN: CPT | Mod: 25

## 2024-04-30 PROCEDURE — 73010 X-RAY EXAM OF SHOULDER BLADE: CPT | Mod: LT

## 2024-04-30 PROCEDURE — 73560 X-RAY EXAM OF KNEE 1 OR 2: CPT | Mod: LT

## 2024-04-30 PROCEDURE — 99213 OFFICE O/P EST LOW 20 MIN: CPT

## 2024-04-30 NOTE — PHYSICAL EXAM
[de-identified] : Left knee Range of motion 0-110, no pain with range of motion No pain with varus and valgus stress, minimal laxity  [de-identified] : 4/30/24:2 views left knee-unchanged fracture alignment, hardware appears in valgus position 4/16/24: 2 views left knee-unchanged fracture alignment, hardware appears in valgus position 4/1/24: 2 views left knee-unchanged alignment, hardware appears in valgus position 3/5/24: 2 views left knee-lateral femoral condyle fracture, unchanged alignment from imaging at hospital, total knee arthroplasty appears in stable position

## 2024-04-30 NOTE — ASSESSMENT
[FreeTextEntry1] : B/l Gh DJD. s/p R RSA Now s/p L RSA with augmented baseplate. Xrays show signs of acromial stress fracture, implants stable. PT for PROM, AROM as tolerated, strengthening. WB limit 20 lbs Repeat bilateral shoulder xrays in Sept.

## 2024-04-30 NOTE — HISTORY OF PRESENT ILLNESS
[de-identified] : 4/30/24: Patient here for follow-up left knee periprosthetic fracture.  She has been ambulating with the [place] unlocked and bearing weight.  She is using cane for ambulation.  Does not have pain with ambulation.  States her knee has given out a couple times however.  She does feel like the brace helps although she feels it is a large brace that is cumbersome and hard to use.  She is back home now.  4/16/24: Patient here for follow-up left knee periprosthetic fracture.  Has been ambulating with partial weightbearing with minimal pain.  Taking baseline tramadol for pain in general.  Has been using the brace.  States she is going home from rehab in about a week.  4/2/24: Patient here for follow-up left knee periprosthetic fracture.  Has been essentially toe-touch weightbearing.  Does have some occasional pains in the knee.  Has been using the brace.  Taking Tylenol and occasional tramadol for pain.  3/5/24: Patient presents with left knee pain status post ground-level fall.  Normally ambulates with a cane.  [facility] left knee replaced [facility] ago.  She has been using the brace.  Does have some pain when weightbearing although she has been mostly doing only partial weightbearing.  Taking Tylenol and tramadol for pain.

## 2024-04-30 NOTE — PHYSICAL EXAM
[Bilateral] : shoulder bilaterally [5 ___] : forward flexion 5[unfilled]/5 [] : motor and sensory intact distally [FreeTextEntry9] : FE R 140P   L 110P  ER R 40P L 30P IR R sacrum IR: L4

## 2024-04-30 NOTE — HISTORY OF PRESENT ILLNESS
[de-identified] : DOS:  8/29/22  R RSA DOS  9/11/23 L RSA  4/30/24: Here for follow up, over 6 months from L RSA. She states she fx her tibia 2 months ago and has been in rehab facility. They did some PT on her shoulder but not extensive. She reports increased stiffness.  1/23/24:  Here for follow up.  She is in PT with improvement.  She has some pain with lifting.  The right one is still difficult with lifting.    12/05/2023: for f/u xrays left shoulder.  She is in PT with improvement.  She has some limitations.   10/24/23: Here for follow up. She is now home from rehab facility. She has home OT/PT scheduled.  9/26/23: Here for first PO visit L shoulder, she is in sling.  She is 1 yr from R RSA.  7/18/23:  Here for follow up.  She has trouble reaching up to her head, to do her hair and to get dressed.  She continues to have pain on the left.    CT L shoulder: Severe glenohumeral arthrosis as described above. Findings suspicious for chronic high-grade partial-thickness rotator cuff tearing with muscle atrophy. Subacute acromial fracture without osseous healing   2/14/23: Here for follow up. She reports some pain in her right shoulder when reaching across her body but improving. Her left is still painful.  1/3/23: Here for follow up, about 4 months. She is in PT. She states left shoulder injection helped temporarily.  She is having a little more soreness in the right shoulder and upper arm.  11/15/22: Here for follow up, nearly 3 months post op.    She is having left shoulder pain as well, flared up from PT.   10/4/22: Follow up R shoulder. She is in PT and improving.  9/13/22: Here for first post op.  In sling.   5/10/22: 82 yo [facility] female with bilateral shoulder pain since 2020, worse the past year. Right is worse than left. There is pain posterior and anterior. She has pain radiating to her elbow. There is a constant pain, worse with reaching. She saw Dr. Deutsch and had CT and MRI. She takes Mobic.   CT RIGHT SHOULDER: 1. Advanced glenohumeral arthropathy with chronic remodeling of the articular surfaces. 2. Large glenohumeral joint effusion with innumerable intra-articular bodies, similar to prior MRI. 3. Amorphous mineralization along the joint capsule and the rotator cuff footprint may reflect CPPD versus sequela of chronic synovitis. 4. Moderate acromioclavicular joint arthropathy. 5. Subacromial-subdeltoid bursitis. 6. Diffusely diminutive rotator cuff tendons and muscle atrophy may reflect underlying tendon tear as described on prior MRI. 7. Lateral downsloping of the acromion with subacromial spurring can contribute to impingement like symptoms in the appropriate clinical setting. 8. Similar appearance of an irregular nodule in the posterior segment of the right upper lobe when compared to prior CT thorax   MRI RIGHT SHOULDER:  1.  Advanced glenohumeral arthropathy with chronic remodeling of the glenohumeral articular surfaces, unchanged when compared to prior CT thorax 5/11/2020. 2.  Rotator cuff tendinosis and diffuse muscle atrophy as described. Diffusely diminutive supraspinatus with high-grade partial-thickness articular surface tear. Mild infraspinatus and subscapularis articular surface fraying. Moderate-grade interstitial tear at the superior subscapularis. 3.  Longitudinal split tear of the intra-articular long head of the biceps tendon with distal reconstitution. Mild tenosynovitis. Perched extra-articular segment over the lesser tuberosity. 4.  Large glenohumeral joint effusion with synovitis and innumerable intra-articular bodies. 5.  Moderate acromioclavicular joint arthropathy. 6.  Severe subacromial-subdeltoid bursitis.advanced GH DJD, PRCT, bursitis.  PMHx: HLD, HTN, RA, valve replacement

## 2024-05-01 ENCOUNTER — APPOINTMENT (OUTPATIENT)
Dept: INTERNAL MEDICINE | Facility: CLINIC | Age: 84
End: 2024-05-01
Payer: MEDICARE

## 2024-05-01 VITALS
SYSTOLIC BLOOD PRESSURE: 90 MMHG | OXYGEN SATURATION: 93 % | WEIGHT: 126 LBS | RESPIRATION RATE: 16 BRPM | BODY MASS INDEX: 20.99 KG/M2 | HEIGHT: 65 IN | TEMPERATURE: 97.7 F | DIASTOLIC BLOOD PRESSURE: 58 MMHG | HEART RATE: 77 BPM

## 2024-05-01 DIAGNOSIS — R06.09 OTHER FORMS OF DYSPNEA: ICD-10-CM

## 2024-05-01 DIAGNOSIS — R91.8 OTHER NONSPECIFIC ABNORMAL FINDING OF LUNG FIELD: ICD-10-CM

## 2024-05-01 DIAGNOSIS — J45.30 MILD PERSISTENT ASTHMA, UNCOMPLICATED: ICD-10-CM

## 2024-05-01 DIAGNOSIS — Z95.2 PRESENCE OF PROSTHETIC HEART VALVE: ICD-10-CM

## 2024-05-01 PROCEDURE — 94060 EVALUATION OF WHEEZING: CPT

## 2024-05-01 PROCEDURE — 99214 OFFICE O/P EST MOD 30 MIN: CPT | Mod: 25

## 2024-05-01 NOTE — HISTORY OF PRESENT ILLNESS
[TextBox_4] : Mrs. Roa presents for a follow-up evaluation accompanied by her daughter.  She has a history of moderate asthma.  She is currently on Symbicort 160/4.5 mcg 2 puffs twice daily and albuterol as needed.  Patient recently was discharged from rehab 1 week ago.  She had fallen and fractured her proximal tibia.  She is now in a leg brace.  While in rehab she was being treated with albuterol/ipratropium nebulizer every 6 hours.

## 2024-05-01 NOTE — DISCHARGE NOTE PROVIDER - CARE PROVIDER_API CALL
Brief Operative Note    Patient: Hansel Vaughn 45 year old male    MRN: [id number]    Surgeon(s): Gera Ramires MD  Phone Number: [phone]                       Surgeons and Role:     * Gera Ramires MD - Primary    Assistant(s): Lamont Elizalde PA-C    Pre-Op Diagnosis: DISPLACED INTRA-ARTICULAR FRACTURE RADIAL HEAD LEFT ELBOW     Post-Op Diagnosis: Same as pre-op     Procedure: Procedure(s):  OPEN REDUCTION INTERNAL FIXATION, RADIAL HEAD FRACTURE OF LEFT ELBOW    Anesthesia Type: General                                   Complications: None    Description: See operative note    Findings: See operative note    Specimens Removed: No specimens collected     Estimated Blood Loss: 1 mL    Assistant Tasks: Opening and closing and Implanting devices     Implants:   Implant Name Type Inv. Item Serial No.  Lot No. LRB No. Used Action   COUNTERSINK DRILL CANNULATED 2 MM SCR STRL - [id number] Screw COUNTERSINK DRILL CANNULATED 2 MM SCR STRL 03.333.100S Depuy [employer]ynthes Trauma  Left 1 Implanted   SCREW BN 3MM 20MM 5MM CMPR CANNULATED TI NS - [id number] Screw SCREW BN 3MM 20MM 5MM CMPR CANNULATED TI NS 04.333.220 Dep[employer]ynthes Trauma  Left 1 Implanted   SCREW BN 3MM 22MM CMPR CANNULATED TI NS - [id number] Screw SCREW BN 3MM 22MM CMPR CANNULATED TI NS 04.333.222 Dep[employer]ynthes Trauma  Left 1 Implanted         I was present for the key portions of the procedure and was immediately available for the non-key portions        
Mayur Baca  Orthopaedic Surgery  [address] [facility], [address]  [place] [address]  Phone: [phone]  Fax: [phone]  Follow Up Time:

## 2024-05-01 NOTE — REASON FOR VISIT
[Follow-Up - From Hospitalization] : a follow-up visit after a recent hospitalization [Asthma] : asthma [Shortness of Breath] : shortness of breath

## 2024-05-01 NOTE — PROCEDURE
[FreeTextEntry1] : Spirometry pre and postbronchodilator therapy was performed. FEV1 1.15 L which is 61% predicted.  FVC 1.84 L which is 74% predicted.  FEV1/FVC ratio 63%.  PEF 3.48 L/s which is 64% predicted. Postbronchodilator: FEV1 1.19 L which is 63% predicted.  PEF 2.96 L/s which is 54% predicted.  There is no significant bronchodilator response.

## 2024-05-01 NOTE — DISCUSSION/SUMMARY
[FreeTextEntry1] : Ms. Roa is an 83-year-old female with history of asthma.  1.  She will continue on current metered-dose inhaler therapy with Symbicort 160/4.5 mcg 2 puffs twice daily and albuterol on an as-needed basis. 2.  Patient will check with her drug plan for more affordable substitution for Symbicort. 3.  Follow-up in 6 months with complete pulmonary function test. 4.  Will repeat CT scan of the chest with next visit.  Patient has a history of bronchiectasis and pulmonary nodules.

## 2024-05-01 NOTE — PHYSICAL EXAM
[No Acute Distress] : no acute distress [Normal Oropharynx] : normal oropharynx [Normal Appearance] : normal appearance [No Neck Mass] : no neck mass [Normal Rate/Rhythm] : normal rate/rhythm [Normal S1, S2] : normal s1, s2 [No Murmurs] : no murmurs [No Resp Distress] : no resp distress [No Abnormalities] : no abnormalities [Benign] : benign [Normal Gait] : normal gait [No Clubbing] : no clubbing [No Cyanosis] : no cyanosis [No Edema] : no edema [FROM] : FROM [Normal Color/ Pigmentation] : normal color/ pigmentation [No Focal Deficits] : no focal deficits [Oriented x3] : oriented x3 [Normal Affect] : normal affect [TextBox_68] : Fine basilar crackles right greater than left

## 2024-05-02 ENCOUNTER — APPOINTMENT (OUTPATIENT)
Dept: OTOLARYNGOLOGY | Facility: CLINIC | Age: 84
End: 2024-05-02
Payer: MEDICARE

## 2024-05-02 VITALS — WEIGHT: 127 LBS | BODY MASS INDEX: 21.16 KG/M2 | HEIGHT: 65 IN

## 2024-05-02 DIAGNOSIS — H61.21 IMPACTED CERUMEN, RIGHT EAR: ICD-10-CM

## 2024-05-02 DIAGNOSIS — I95.1 ORTHOSTATIC HYPOTENSION: ICD-10-CM

## 2024-05-02 DIAGNOSIS — H93.8X3 OTHER SPECIFIED DISORDERS OF EAR, BILATERAL: ICD-10-CM

## 2024-05-02 DIAGNOSIS — H90.3 SENSORINEURAL HEARING LOSS, BILATERAL: ICD-10-CM

## 2024-05-02 PROCEDURE — 99213 OFFICE O/P EST LOW 20 MIN: CPT | Mod: 25

## 2024-05-02 PROCEDURE — 69210 REMOVE IMPACTED EAR WAX UNI: CPT

## 2024-05-02 RX ORDER — AMLODIPINE BESYLATE 10 MG/1
10 TABLET ORAL
Refills: 0 | Status: ACTIVE | COMMUNITY

## 2024-05-02 RX ORDER — HYDRALAZINE HYDROCHLORIDE 25 MG/1
25 TABLET ORAL
Refills: 0 | Status: ACTIVE | COMMUNITY

## 2024-05-02 RX ORDER — VITAMIN K2 90 MCG
1000 CAPSULE ORAL
Refills: 0 | Status: ACTIVE | COMMUNITY

## 2024-05-02 RX ORDER — SUMATRIPTAN 100 MG/1
100 TABLET, FILM COATED ORAL
Refills: 0 | Status: ACTIVE | COMMUNITY

## 2024-05-02 RX ORDER — SUMATRIPTAN 50 MG/1
50 TABLET, FILM COATED ORAL
Refills: 0 | Status: ACTIVE | COMMUNITY

## 2024-05-02 NOTE — PHYSICAL EXAM
[de-identified] : right xs cerumen ; left impacted  [de-identified] : after cerumen removal  [Midline] : trachea located in midline position [Normal] : no rashes

## 2024-05-02 NOTE — ASSESSMENT
[FreeTextEntry1] : Patient has hearing aides - c/o occ clogged ears - worse left - bialt cerumen removed and ears clear after - continue with hearing aides - also advised balance issues are from orthostatic hypotension -.  followup 6 mos

## 2024-05-02 NOTE — REASON FOR VISIT
[Subsequent Evaluation] : a subsequent evaluation for [Family Member] : family member [Other: _____] : [unfilled] [FreeTextEntry2] : ears

## 2024-05-02 NOTE — HISTORY OF PRESENT ILLNESS
[de-identified] : Patient with hearing aides - c/o clogged ears.   Occ problems with balance but has hx of orthostatic hypotension

## 2024-05-21 RX ORDER — ALBUTEROL SULFATE 90 UG/1
108 (90 BASE) INHALANT RESPIRATORY (INHALATION)
Qty: 1 | Refills: 5 | Status: ACTIVE | COMMUNITY
Start: 2024-05-01

## 2024-08-12 NOTE — DISCHARGE NOTE PROVIDER - EXTENDED VTE YES NO FOR MLM ASPIRIN
From: Alexander Kent  To: Nancy Jeffers PA-C  Sent: 8/12/2024 1:17 PM CDT  Subject: No change with steroid     Aly Cruz,  The steroids are not providing any relief and I was feeling a little better yesterday but today I am the same as before. I can’t lift up my arm or walk much as I have no energy and feel very weak. I have 2 days left of the steroids but don’t feel any better. Please advise.     Thank you,  Alexander   ,

## 2024-09-05 ENCOUNTER — APPOINTMENT (OUTPATIENT)
Dept: ORTHOPEDIC SURGERY | Facility: CLINIC | Age: 84
End: 2024-09-05
Payer: MEDICARE

## 2024-09-05 VITALS — HEIGHT: 65 IN | WEIGHT: 126 LBS | BODY MASS INDEX: 20.99 KG/M2

## 2024-09-05 DIAGNOSIS — Z96.611 PRESENCE OF RIGHT ARTIFICIAL SHOULDER JOINT: ICD-10-CM

## 2024-09-05 DIAGNOSIS — M19.011 PRIMARY OSTEOARTHRITIS, RIGHT SHOULDER: ICD-10-CM

## 2024-09-05 DIAGNOSIS — M19.012 PRIMARY OSTEOARTHRITIS, LEFT SHOULDER: ICD-10-CM

## 2024-09-05 DIAGNOSIS — Z96.612 PRESENCE OF LEFT ARTIFICIAL SHOULDER JOINT: ICD-10-CM

## 2024-09-05 PROCEDURE — 73010 X-RAY EXAM OF SHOULDER BLADE: CPT | Mod: RT

## 2024-09-05 PROCEDURE — 99214 OFFICE O/P EST MOD 30 MIN: CPT

## 2024-09-05 PROCEDURE — 73030 X-RAY EXAM OF SHOULDER: CPT | Mod: RT

## 2024-09-05 NOTE — ASSESSMENT
[FreeTextEntry1] : B/l Gh RUTHD. s/p R RSA  s/p L RSA with augmented baseplate. Overall doing well with pain, but some difficultiues with above shoulder activities.   PT for PROM, AROM as tolerated, strengthening. Modalities.   Repeat bilateral shoulder xrays in Sept.

## 2024-09-05 NOTE — HISTORY OF PRESENT ILLNESS
[de-identified] : DOS:  8/29/22  R RSA DOS  9/11/23 L RSA  9/5/24: Here for follow up. She is experiencing slight pain with certain movements, but otherwise reports that she is doing well.  She has some toruble with reaching up to the head and behind the head.    4/30/24: Here for follow up, over 6 months from L RSA. She states she fx her tibia 2 months ago and has been in rehab facility. They did some PT on her shoulder but not extensive. She reports increased stiffness.  1/23/24:  Here for follow up.  She is in PT with improvement.  She has some pain with lifting.  The right one is still difficult with lifting.    12/05/2023: for f/u xrays left shoulder.  She is in PT with improvement.  She has some limitations.   10/24/23: Here for follow up. She is now home from rehab facility. She has home OT/PT scheduled.  9/26/23: Here for first PO visit L shoulder, she is in sling.  She is 1 yr from R RSA.  7/18/23:  Here for follow up.  She has trouble reaching up to her head, to do her hair and to get dressed.  She continues to have pain on the left.    CT L shoulder: Severe glenohumeral arthrosis as described above. Findings suspicious for chronic high-grade partial-thickness rotator cuff tearing with muscle atrophy. Subacute acromial fracture without osseous healing   2/14/23: Here for follow up. She reports some pain in her right shoulder when reaching across her body but improving. Her left is still painful.  1/3/23: Here for follow up, about 4 months. She is in PT. She states left shoulder injection helped temporarily.  She is having a little more soreness in the right shoulder and upper arm.  11/15/22: Here for follow up, nearly 3 months post op.    She is having left shoulder pain as well, flared up from PT.   10/4/22: Follow up R shoulder. She is in PT and improving.  9/13/22: Here for first post op.  In sling.   5/10/22: 80 yo RHD female with bilateral shoulder pain since 2020, worse the past year. Right is worse than left. There is pain posterior and anterior. She has pain radiating to her elbow. There is a constant pain, worse with reaching. She saw Dr. Deutsch and had CT and MRI. She takes Mobic.   CT RIGHT SHOULDER: 1. Advanced glenohumeral arthropathy with chronic remodeling of the articular surfaces. 2. Large glenohumeral joint effusion with innumerable intra-articular bodies, similar to prior MRI. 3. Amorphous mineralization along the joint capsule and the rotator cuff footprint may reflect CPPD versus sequela of chronic synovitis. 4. Moderate acromioclavicular joint arthropathy. 5. Subacromial-subdeltoid bursitis. 6. Diffusely diminutive rotator cuff tendons and muscle atrophy may reflect underlying tendon tear as described on prior MRI. 7. Lateral downsloping of the acromion with subacromial spurring can contribute to impingement like symptoms in the appropriate clinical setting. 8. Similar appearance of an irregular nodule in the posterior segment of the right upper lobe when compared to prior CT thorax   MRI RIGHT SHOULDER:  1.  Advanced glenohumeral arthropathy with chronic remodeling of the glenohumeral articular surfaces, unchanged when compared to prior CT thorax 5/11/2020. 2.  Rotator cuff tendinosis and diffuse muscle atrophy as described. Diffusely diminutive supraspinatus with high-grade partial-thickness articular surface tear. Mild infraspinatus and subscapularis articular surface fraying. Moderate-grade interstitial tear at the superior subscapularis. 3.  Longitudinal split tear of the intra-articular long head of the biceps tendon with distal reconstitution. Mild tenosynovitis. Perched extra-articular segment over the lesser tuberosity. 4.  Large glenohumeral joint effusion with synovitis and innumerable intra-articular bodies. 5.  Moderate acromioclavicular joint arthropathy. 6.  Severe subacromial-subdeltoid bursitis.advanced GH DJD, PRCT, bursitis.  PMHx: HLD, HTN, RA, valve replacement

## 2024-09-05 NOTE — PHYSICAL EXAM
[Bilateral] : shoulder bilaterally [5 ___] : forward flexion 5[unfilled]/5 [] : motor and sensory intact distally [FreeTextEntry9] : FE: 120 b/l ER: 30P b/l IR: L4/5 b/l

## 2024-09-10 ENCOUNTER — APPOINTMENT (OUTPATIENT)
Dept: ORTHOPEDIC SURGERY | Facility: CLINIC | Age: 84
End: 2024-09-10

## 2024-09-19 NOTE — PROCEDURE NOTE - NSICDXPROCEDURE_GEN_ALL_CORE_FT
PROCEDURES:  Insertion, implantable loop recorder 04-May-2021 13:01:56  Isabel Polk  
[phone]

## 2024-10-05 ENCOUNTER — INPATIENT (INPATIENT)
Facility: HOSPITAL | Age: 84
LOS: 3 days | Discharge: NURSING, REHAB OR LONG-TERM CARE | DRG: 536 | End: 2024-10-09
Attending: HOSPITALIST | Admitting: SURGERY
Payer: MEDICARE

## 2024-10-05 VITALS
RESPIRATION RATE: 18 BRPM | OXYGEN SATURATION: 93 % | HEART RATE: 76 BPM | SYSTOLIC BLOOD PRESSURE: 162 MMHG | TEMPERATURE: 98 F | DIASTOLIC BLOOD PRESSURE: 83 MMHG

## 2024-10-05 DIAGNOSIS — S32.609A UNSPECIFIED FRACTURE OF UNSPECIFIED ISCHIUM, INITIAL ENCOUNTER FOR CLOSED FRACTURE: ICD-10-CM

## 2024-10-05 DIAGNOSIS — Z96.642 PRESENCE OF LEFT ARTIFICIAL HIP JOINT: Chronic | ICD-10-CM

## 2024-10-05 DIAGNOSIS — Z98.1 ARTHRODESIS STATUS: Chronic | ICD-10-CM

## 2024-10-05 DIAGNOSIS — Z98.49 CATARACT EXTRACTION STATUS, UNSPECIFIED EYE: Chronic | ICD-10-CM

## 2024-10-05 DIAGNOSIS — Z98.89 OTHER SPECIFIED POSTPROCEDURAL STATES: Chronic | ICD-10-CM

## 2024-10-05 DIAGNOSIS — Z96.653 PRESENCE OF ARTIFICIAL KNEE JOINT, BILATERAL: Chronic | ICD-10-CM

## 2024-10-05 DIAGNOSIS — Z95.2 PRESENCE OF PROSTHETIC HEART VALVE: Chronic | ICD-10-CM

## 2024-10-05 DIAGNOSIS — Z96.611 PRESENCE OF RIGHT ARTIFICIAL SHOULDER JOINT: Chronic | ICD-10-CM

## 2024-10-05 LAB
ALBUMIN SERPL ELPH-MCNC: 3.9 G/DL — SIGNIFICANT CHANGE UP (ref 3.3–5)
ALP SERPL-CCNC: 55 U/L — SIGNIFICANT CHANGE UP (ref 40–120)
ALT FLD-CCNC: 52 U/L — SIGNIFICANT CHANGE UP (ref 12–78)
ANION GAP SERPL CALC-SCNC: 6 MMOL/L — SIGNIFICANT CHANGE UP (ref 5–17)
APTT BLD: 29 SEC — SIGNIFICANT CHANGE UP (ref 24.5–35.6)
AST SERPL-CCNC: 55 U/L — HIGH (ref 15–37)
BASOPHILS # BLD AUTO: 0 K/UL — SIGNIFICANT CHANGE UP (ref 0–0.2)
BASOPHILS NFR BLD AUTO: 0 % — SIGNIFICANT CHANGE UP (ref 0–2)
BILIRUB SERPL-MCNC: 0.6 MG/DL — SIGNIFICANT CHANGE UP (ref 0.2–1.2)
BLD GP AB SCN SERPL QL: SIGNIFICANT CHANGE UP
BUN SERPL-MCNC: 13 MG/DL — SIGNIFICANT CHANGE UP (ref 7–23)
CALCIUM SERPL-MCNC: 9.2 MG/DL — SIGNIFICANT CHANGE UP (ref 8.5–10.1)
CHLORIDE SERPL-SCNC: 100 MMOL/L — SIGNIFICANT CHANGE UP (ref 96–108)
CO2 SERPL-SCNC: 29 MMOL/L — SIGNIFICANT CHANGE UP (ref 22–31)
CREAT SERPL-MCNC: 0.89 MG/DL — SIGNIFICANT CHANGE UP (ref 0.5–1.3)
EGFR: 63 ML/MIN/1.73M2 — SIGNIFICANT CHANGE UP
EOSINOPHIL # BLD AUTO: 0 K/UL — SIGNIFICANT CHANGE UP (ref 0–0.5)
EOSINOPHIL NFR BLD AUTO: 0 % — SIGNIFICANT CHANGE UP (ref 0–6)
GLUCOSE SERPL-MCNC: 106 MG/DL — HIGH (ref 70–99)
HCT VFR BLD CALC: 43 % — SIGNIFICANT CHANGE UP (ref 34.5–45)
HGB BLD-MCNC: 14 G/DL — SIGNIFICANT CHANGE UP (ref 11.5–15.5)
INR BLD: 0.87 RATIO — SIGNIFICANT CHANGE UP (ref 0.85–1.16)
LYMPHOCYTES # BLD AUTO: 0.79 K/UL — LOW (ref 1–3.3)
LYMPHOCYTES # BLD AUTO: 13 % — SIGNIFICANT CHANGE UP (ref 13–44)
MANUAL SMEAR VERIFICATION: SIGNIFICANT CHANGE UP
MCHC RBC-ENTMCNC: 31 PG — SIGNIFICANT CHANGE UP (ref 27–34)
MCHC RBC-ENTMCNC: 32.6 GM/DL — SIGNIFICANT CHANGE UP (ref 32–36)
MCV RBC AUTO: 95.3 FL — SIGNIFICANT CHANGE UP (ref 80–100)
MONOCYTES # BLD AUTO: 0.37 K/UL — SIGNIFICANT CHANGE UP (ref 0–0.9)
MONOCYTES NFR BLD AUTO: 6 % — SIGNIFICANT CHANGE UP (ref 2–14)
NEUTROPHILS # BLD AUTO: 4.95 K/UL — SIGNIFICANT CHANGE UP (ref 1.8–7.4)
NEUTROPHILS NFR BLD AUTO: 81 % — HIGH (ref 43–77)
NRBC # BLD: 0 /100 WBCS — SIGNIFICANT CHANGE UP (ref 0–0)
NRBC # BLD: SIGNIFICANT CHANGE UP /100 WBCS (ref 0–0)
PLAT MORPH BLD: NORMAL — SIGNIFICANT CHANGE UP
PLATELET # BLD AUTO: 167 K/UL — SIGNIFICANT CHANGE UP (ref 150–400)
POTASSIUM SERPL-MCNC: 4 MMOL/L — SIGNIFICANT CHANGE UP (ref 3.5–5.3)
POTASSIUM SERPL-SCNC: 4 MMOL/L — SIGNIFICANT CHANGE UP (ref 3.5–5.3)
PROT SERPL-MCNC: 7.6 GM/DL — SIGNIFICANT CHANGE UP (ref 6–8.3)
PROTHROM AB SERPL-ACNC: 10.3 SEC — SIGNIFICANT CHANGE UP (ref 9.9–13.4)
RBC # BLD: 4.51 M/UL — SIGNIFICANT CHANGE UP (ref 3.8–5.2)
RBC # FLD: 13.2 % — SIGNIFICANT CHANGE UP (ref 10.3–14.5)
RBC BLD AUTO: NORMAL — SIGNIFICANT CHANGE UP
SODIUM SERPL-SCNC: 135 MMOL/L — SIGNIFICANT CHANGE UP (ref 135–145)
WBC # BLD: 6.11 K/UL — SIGNIFICANT CHANGE UP (ref 3.8–10.5)
WBC # FLD AUTO: 6.11 K/UL — SIGNIFICANT CHANGE UP (ref 3.8–10.5)

## 2024-10-05 PROCEDURE — 97116 GAIT TRAINING THERAPY: CPT | Mod: GP

## 2024-10-05 PROCEDURE — 84100 ASSAY OF PHOSPHORUS: CPT

## 2024-10-05 PROCEDURE — 72070 X-RAY EXAM THORAC SPINE 2VWS: CPT

## 2024-10-05 PROCEDURE — 85025 COMPLETE CBC W/AUTO DIFF WBC: CPT

## 2024-10-05 PROCEDURE — 71250 CT THORAX DX C-: CPT | Mod: 26,MC

## 2024-10-05 PROCEDURE — 99285 EMERGENCY DEPT VISIT HI MDM: CPT | Mod: FS,25

## 2024-10-05 PROCEDURE — 36415 COLL VENOUS BLD VENIPUNCTURE: CPT

## 2024-10-05 PROCEDURE — 99222 1ST HOSP IP/OBS MODERATE 55: CPT

## 2024-10-05 PROCEDURE — 73502 X-RAY EXAM HIP UNI 2-3 VIEWS: CPT | Mod: 26,LT

## 2024-10-05 PROCEDURE — 74176 CT ABD & PELVIS W/O CONTRAST: CPT | Mod: 26,MC

## 2024-10-05 PROCEDURE — 85027 COMPLETE CBC AUTOMATED: CPT

## 2024-10-05 PROCEDURE — 97163 PT EVAL HIGH COMPLEX 45 MIN: CPT | Mod: GP

## 2024-10-05 PROCEDURE — 72170 X-RAY EXAM OF PELVIS: CPT | Mod: 26

## 2024-10-05 PROCEDURE — 72100 X-RAY EXAM L-S SPINE 2/3 VWS: CPT

## 2024-10-05 PROCEDURE — 83735 ASSAY OF MAGNESIUM: CPT

## 2024-10-05 PROCEDURE — 97530 THERAPEUTIC ACTIVITIES: CPT | Mod: GP

## 2024-10-05 PROCEDURE — 90662 IIV NO PRSV INCREASED AG IM: CPT

## 2024-10-05 PROCEDURE — 72125 CT NECK SPINE W/O DYE: CPT | Mod: 26,MC

## 2024-10-05 PROCEDURE — 72170 X-RAY EXAM OF PELVIS: CPT

## 2024-10-05 PROCEDURE — 93010 ELECTROCARDIOGRAM REPORT: CPT

## 2024-10-05 PROCEDURE — 70450 CT HEAD/BRAIN W/O DYE: CPT | Mod: 26,MC

## 2024-10-05 PROCEDURE — 80048 BASIC METABOLIC PNL TOTAL CA: CPT

## 2024-10-05 PROCEDURE — 12001 RPR S/N/AX/GEN/TRNK 2.5CM/<: CPT

## 2024-10-05 PROCEDURE — 90471 IMMUNIZATION ADMIN: CPT

## 2024-10-05 RX ORDER — DULOXETINE HCL 20 MG
30 CAPSULE,DELAYED RELEASE (ENTERIC COATED) ORAL AT BEDTIME
Refills: 0 | Status: DISCONTINUED | OUTPATIENT
Start: 2024-10-05 | End: 2024-10-09

## 2024-10-05 RX ORDER — CRANBERRY FRUIT EXTRACT 650 MG
1000 CAPSULE ORAL DAILY
Refills: 0 | Status: DISCONTINUED | OUTPATIENT
Start: 2024-10-05 | End: 2024-10-09

## 2024-10-05 RX ORDER — INFLUENZA VIRUS VACCINE 15; 15; 15; 15 UG/.5ML; UG/.5ML; UG/.5ML; UG/.5ML
0.5 SUSPENSION INTRAMUSCULAR ONCE
Refills: 0 | Status: COMPLETED | OUTPATIENT
Start: 2024-10-05 | End: 2024-10-09

## 2024-10-05 RX ORDER — ALBUTEROL 90 MCG
2 AEROSOL (GRAM) INHALATION EVERY 6 HOURS
Refills: 0 | Status: DISCONTINUED | OUTPATIENT
Start: 2024-10-05 | End: 2024-10-09

## 2024-10-05 RX ORDER — CARVEDILOL 3.125 MG
6.25 TABLET ORAL EVERY 12 HOURS
Refills: 0 | Status: DISCONTINUED | OUTPATIENT
Start: 2024-10-05 | End: 2024-10-08

## 2024-10-05 RX ORDER — ATORVASTATIN CALCIUM 10 MG/1
10 TABLET, FILM COATED ORAL AT BEDTIME
Refills: 0 | Status: DISCONTINUED | OUTPATIENT
Start: 2024-10-05 | End: 2024-10-09

## 2024-10-05 RX ORDER — TRAMADOL HYDROCHLORIDE 50 MG/1
50 TABLET, COATED ORAL EVERY 6 HOURS
Refills: 0 | Status: DISCONTINUED | OUTPATIENT
Start: 2024-10-05 | End: 2024-10-09

## 2024-10-05 RX ORDER — ENOXAPARIN SODIUM 150 MG/ML
40 INJECTION SUBCUTANEOUS EVERY 24 HOURS
Refills: 0 | Status: DISCONTINUED | OUTPATIENT
Start: 2024-10-05 | End: 2024-10-09

## 2024-10-05 RX ORDER — KETOROLAC TROMETHAMINE 10 MG/1
15 TABLET, FILM COATED ORAL EVERY 6 HOURS
Refills: 0 | Status: DISCONTINUED | OUTPATIENT
Start: 2024-10-05 | End: 2024-10-09

## 2024-10-05 RX ORDER — ONDANSETRON HCL/PF 4 MG/2 ML
4 VIAL (ML) INJECTION EVERY 6 HOURS
Refills: 0 | Status: DISCONTINUED | OUTPATIENT
Start: 2024-10-05 | End: 2024-10-09

## 2024-10-05 RX ORDER — FLUDROCORTISONE ACETATE 0.1 MG/1
0.1 TABLET ORAL
Refills: 0 | Status: DISCONTINUED | OUTPATIENT
Start: 2024-10-05 | End: 2024-10-09

## 2024-10-05 RX ORDER — ACETAMINOPHEN 325 MG
650 TABLET ORAL EVERY 6 HOURS
Refills: 0 | Status: DISCONTINUED | OUTPATIENT
Start: 2024-10-05 | End: 2024-10-09

## 2024-10-05 RX ORDER — HYDRALAZINE HYDROCHLORIDE 100 MG/1
25 TABLET ORAL
Refills: 0 | Status: DISCONTINUED | OUTPATIENT
Start: 2024-10-05 | End: 2024-10-08

## 2024-10-05 RX ORDER — MULTI VITAMIN/MINERAL SUPPLEMENT WITH ASCORBIC ACID, NIACIN, PYRIDOXINE, PANTOTHENIC ACID, FOLIC ACID, RIBOFLAVIN, THIAMIN, BIOTIN, COBALAMIN AND ZINC. 60; 20; 12.5; 10; 10; 1.7; 1.5; 1; .3; .006 MG/1; MG/1; MG/1; MG/1; MG/1; MG/1; MG/1; MG/1; MG/1; MG/1
1 TABLET, COATED ORAL DAILY
Refills: 0 | Status: DISCONTINUED | OUTPATIENT
Start: 2024-10-05 | End: 2024-10-09

## 2024-10-05 RX ORDER — PREDNISONE 5 MG/1
5 TABLET ORAL DAILY
Refills: 0 | Status: DISCONTINUED | OUTPATIENT
Start: 2024-10-05 | End: 2024-10-09

## 2024-10-05 RX ORDER — HYDROXYCHLOROQUINE SULFATE 200 MG/1
200 TABLET, FILM COATED ORAL
Refills: 0 | Status: DISCONTINUED | OUTPATIENT
Start: 2024-10-05 | End: 2024-10-09

## 2024-10-05 RX ORDER — AMLODIPINE BESYLATE 5 MG
10 TABLET ORAL DAILY
Refills: 0 | Status: DISCONTINUED | OUTPATIENT
Start: 2024-10-05 | End: 2024-10-07

## 2024-10-05 RX ORDER — ACETAMINOPHEN 325 MG
1000 TABLET ORAL ONCE
Refills: 0 | Status: DISCONTINUED | OUTPATIENT
Start: 2024-10-05 | End: 2024-10-09

## 2024-10-05 RX ORDER — HYDRALAZINE HYDROCHLORIDE 100 MG/1
25 TABLET ORAL DAILY
Refills: 0 | Status: DISCONTINUED | OUTPATIENT
Start: 2024-10-05 | End: 2024-10-05

## 2024-10-05 RX ORDER — OXYCODONE HYDROCHLORIDE 30 MG/1
5 TABLET, FILM COATED, EXTENDED RELEASE ORAL EVERY 6 HOURS
Refills: 0 | Status: DISCONTINUED | OUTPATIENT
Start: 2024-10-05 | End: 2024-10-09

## 2024-10-05 RX ADMIN — HYDRALAZINE HYDROCHLORIDE 25 MILLIGRAM(S): 100 TABLET ORAL at 22:14

## 2024-10-05 RX ADMIN — ENOXAPARIN SODIUM 40 MILLIGRAM(S): 150 INJECTION SUBCUTANEOUS at 18:50

## 2024-10-05 RX ADMIN — KETOROLAC TROMETHAMINE 15 MILLIGRAM(S): 10 TABLET, FILM COATED ORAL at 18:51

## 2024-10-05 RX ADMIN — ATORVASTATIN CALCIUM 10 MILLIGRAM(S): 10 TABLET, FILM COATED ORAL at 17:52

## 2024-10-05 RX ADMIN — Medication 6.25 MILLIGRAM(S): at 17:52

## 2024-10-05 RX ADMIN — Medication 500 MILLIGRAM(S): at 17:31

## 2024-10-05 RX ADMIN — KETOROLAC TROMETHAMINE 15 MILLIGRAM(S): 10 TABLET, FILM COATED ORAL at 19:06

## 2024-10-05 RX ADMIN — HYDROXYCHLOROQUINE SULFATE 200 MILLIGRAM(S): 200 TABLET, FILM COATED ORAL at 22:15

## 2024-10-05 RX ADMIN — Medication 1000 UNIT(S): at 17:32

## 2024-10-05 RX ADMIN — TRAMADOL HYDROCHLORIDE 50 MILLIGRAM(S): 50 TABLET, COATED ORAL at 17:30

## 2024-10-05 NOTE — ED ADULT NURSE REASSESSMENT NOTE - NS ED NURSE REASSESS COMMENT FT1
pt with SpO2 88% reports "difficult to take a deep breath because of the pain". pt placed on 3L NC O2 with improvement to 90%.

## 2024-10-05 NOTE — ED ADULT NURSE REASSESSMENT NOTE - NS ED NURSE REASSESS COMMENT FT1
received pt from ELIA Monzon. pt resting comfortably in stretcher with c/o pain to left hip and head. c-collar remains in place. pt requesting to use rest room. NA assisted pt with bed pan s/t pain in left hip. updated on plan of care. awaiting CT/X-Ray results at this time.

## 2024-10-05 NOTE — PATIENT PROFILE ADULT - FALL HARM RISK - RISK INTERVENTIONS

## 2024-10-05 NOTE — H&P ADULT - NSHPLABSRESULTS_GEN_ALL_CORE
Vital Signs Last 24 Hrs  T(C): 36.8 (05 Oct 2024 16:55), Max: 36.8 (05 Oct 2024 16:55)  T(F): 98.2 (05 Oct 2024 16:55), Max: 98.2 (05 Oct 2024 16:55)  HR: 96 (05 Oct 2024 16:55) (76 - 96)  BP: 154/84 (05 Oct 2024 16:55) (154/84 - 162/83)  BP(mean): --  RR: 17 (05 Oct 2024 16:55) (17 - 18)  SpO2: 90% (05 Oct 2024 16:55) (90% - 93%)    Parameters below as of 05 Oct 2024 16:55  Patient On (Oxygen Delivery Method): nasal cannula  O2 Flow (L/min): 3      Labs:             14.0   6.11  )-----------( 167      ( 05 Oct 2024 15:25 )             43.0     CBC Full  -  ( 05 Oct 2024 15:25 )  WBC Count : 6.11 K/uL  RBC Count : 4.51 M/uL  Hemoglobin : 14.0 g/dL  Hematocrit : 43.0 %  Platelet Count - Automated : 167 K/uL  Mean Cell Volume : 95.3 fl  Mean Cell Hemoglobin : 31.0 pg  Mean Cell Hemoglobin Concentration : 32.6 gm/dL  Auto Neutrophil # : 4.95 K/uL  Auto Lymphocyte # : 0.79 K/uL  Auto Monocyte # : 0.37 K/uL  Auto Eosinophil # : 0.00 K/uL  Auto Basophil # : 0.00 K/uL  Auto Neutrophil % : 81.0 %  Auto Lymphocyte % : 13.0 %  Auto Monocyte % : 6.0 %  Auto Eosinophil % : 0.0 %  Auto Basophil % : 0.0 %    10-05    135  |  100  |  13  ----------------------------<  106[H]  4.0   |  29  |  0.89    Ca    9.2      05 Oct 2024 15:25    TPro  7.6  /  Alb  3.9  /  TBili  0.6  /  DBili  x   /  AST  55[H]  /  ALT  52  /  AlkPhos  55  10-05    LIVER FUNCTIONS - ( 05 Oct 2024 15:25 )  Alb: 3.9 g/dL / Pro: 7.6 gm/dL / ALK PHOS: 55 U/L / ALT: 52 U/L / AST: 55 U/L / GGT: x           PT/INR - ( 05 Oct 2024 15:25 )   PT: 10.3 sec;   INR: 0.87 ratio         PTT - ( 05 Oct 2024 15:25 )  PTT:29.0 sec    Rad  < from: CT Abdomen and Pelvis No Cont (10.05.24 @ 14:18) >    Comminuted,mildly displaced fracture left ischium extending to junction   of posterior column of acetabulum and ischial tuberosity.  There is a noncemented left total hip arthroplasty with component   alignment within normal limits.    There is posterior fusionL3-S1 with bilateral spinal instrumentation.  Degenerative changes and diffuse osteopenia present.    There are mild compression deformities at the superior endplates of T11   and T12 vertebral bodies with probable Schmorl's nodes.    IMPRESSION:    No acute pulmonary process.    No acute intra-abdominal pathology, given limitations of noncontrast exam.    Comminuted, mildly displaced fracture left ischium as discussed.    < end of copied text >      < from: Xray Hip w/ Pelvis 2 or 3 Views, Left (10.05.24 @ 14:19) >    IMPRESSION:  Fractures in the obturator ring with mild displacement of   fracture fragments.    < end of copied text >

## 2024-10-05 NOTE — ED ADULT NURSE NOTE - OBJECTIVE STATEMENT
pt presents to the ED for fall with head strike and obvious injury to the head. pt reports she hit and shattered her oven. denies nausea, vomiting, or LOC. endorses mild visual issues but states "It might be because Im not wearing my glasses." denies any other complaints or discomforts at this time

## 2024-10-05 NOTE — ED PROVIDER NOTE - NS ED ROS FT
GEN: Denies fever, chills, sick contacts, recent travel  HEENT: +HA Denies dizziness, blurry vision  Cardiac: Denies CP, SOB, palpitations  Lungs: Denies cough, wheezing  PV: Denies LE swelling  GI: Denies nausea, vomiting, diarrhea, constipation, flank pain  : Denies hematuria, dysuria, frequency, urgency  Skin: +scalp laceration Denies rash, redness  MSK: Denies pain, decreased ROM  Neuro: Denies dizziness, difficulty speaking, difficulty swallowing, numbness/tingling in extremities, loss of bowel/bladder control, weakness in extremities GEN: Denies fever, chills, sick contacts, recent travel  HEENT: +HA Denies dizziness, blurry vision  Cardiac: Denies CP, SOB, palpitations  Lungs: Denies cough, wheezing  PV: Denies LE swelling  GI: Denies nausea, vomiting, diarrhea, constipation, flank pain  : Denies hematuria, dysuria, frequency, urgency  Skin: +scalp laceration Denies rash, redness  MSK: +back pain, decreased ROM  Neuro: Denies dizziness, difficulty speaking, difficulty swallowing, numbness/tingling in extremities, loss of bowel/bladder control, weakness in extremities

## 2024-10-05 NOTE — H&P ADULT - NSHPPHYSICALEXAM_GEN_ALL_CORE
Physical exam:  GCS of 15  Airway is patent  Breathing is symmetric and unlabored  Neuro: CNII-XII grossly intact  Psych: normal affect  HEENT: Normocephalic, posterior scalp laceration 3 cm, repaired with staples, GRADY, EOM wnl, no otorrhea or hemotympanum b/l, no epistaxis or d/c b/l nares, no craniofacial bony pathology or tenderness b/l  Neck: No crepitus, no ecchymosis, no hematoma, to exam, no JVD, no tracheal deviation  Cervicothoracolumbosacral spine: no gross bony pathology or tenderness to exam  Cardiovascular: S1S2 Present  Chest: no gross rib pathology or tenderness to exam. No sternal pathology or tenderness to exam. No crepitus, no ecchymosis, no hematoma. No penetrating thoracoabdominal trauma. Tenderness to lower spine lumbar region.  Respiratory: Respiratory Effort normal; no wheezes, rales or rhonchi to exam  ABD: soft, nontender, non distended, no rebound, no guarding, no rigidity, no skin changes to exam. No pelvic instability to exam, no skin changes  Musculoskeletal: Pt has palpable b/l radial, femoral, dorsalis pedis pulses. All digits are warm and well perfused. No gross long bone pathology or tenderness to exam. Pt demonstrates grossly intact sensorimotor function. Pt has good capillary refill to digits, no calf edema or tenderness to exam. Minimal tenderness to left hip  Skin: dark discoloration to right forearm and bilateral legs (chronic as per patient)

## 2024-10-05 NOTE — ED ADULT TRIAGE NOTE - CHIEF COMPLAINT QUOTE
Pt A&OX3, BIBEMS s/p fall. Pt reports sitting on a high stool after coming home from the store when she slid off landing on her left side. Hit her head on the oven door shattering the glass. Unknown LOC. Aspirin Daily. EMS placed pt in cervical collar.   PA evaluated patient in triage, neuro alert called at [id number]. PA evaluated wound on the back of patient's head.   Patient placed in view of nursing station.

## 2024-10-05 NOTE — ED ADULT NURSE NOTE - NSFALLHARMRISKINTERV_ED_ALL_ED

## 2024-10-05 NOTE — ED PROVIDER NOTE - CLINICAL SUMMARY MEDICAL DECISION MAKING FREE TEXT BOX
Patient with scalp laceration that was.  Department, patient with ischial fracture extending to acetabulum, orthopedic consulted, trauma consulted.  Patient was admitted to trauma service for further evaluation and management.

## 2024-10-05 NOTE — H&P ADULT - ASSESSMENT
83 y/o F with PMH of HLD, migraines, RA, asthma presents s/p mechanical fall with head injury.    Found to have a left ischium mildly displaced fracture as well as anterior pubic rami fracture.  Head lac repaired in the ED    P:  - Admit to Dr Hines  - Pain control  - Ortho for hip fracture  - Nausea control PRN  - Restart home meds  - PT evaluation  - SW for dispo planning      Plan discussed with Dr Hines

## 2024-10-05 NOTE — ED PROVIDER NOTE - PROGRESS NOTE DETAILS
Julieta Hernandez for attending Dr. Morales  Discussed with orthopedics, will consult in the ED Julieta Hernandez for attending Dr. Morales  Spoke with trauma,  will consult in the ED As per surgery, admit to trauma with Dr. Hines as accepting. - Remy Dowell PA-C

## 2024-10-05 NOTE — ED ADULT TRIAGE NOTE - NSSEPSISSUSPECTED_ED_A_ED
Action 2: Continue Detail Level: Zone Continue Regimen: Enstilar foam bid prn Other Instructions: Discussed recent labs \\nHumira vs Enbrel in reserve \\nHistory of remicade No

## 2024-10-05 NOTE — ED PROVIDER NOTE - OBJECTIVE STATEMENT
83 y/o F with PMH of HLD, migraines, RA, asthma presents s/p mechanical fall with head injury. Pt states she was unloading her groceries while sitting on a stool. Fell sideways off the stool and hit the back of her head on her oven door. Pt takes daily ASA. Reports pain in her head and back. Denies LOC, visual changes, nausea, vomiting, CP, SOB, numbness/tingling in extremities.

## 2024-10-05 NOTE — ED ADULT NURSE REASSESSMENT NOTE - NS ED NURSE REASSESS COMMENT FT1
Pt aox4, resp even unlabored with 2-3L O2 via NC in use. Sutures to back of head intact with minimal active bleeding. Pt arms and legs ecchymotic, abd softly distended, NT. Pt voiding light yellow urine via purwick. Pt medicated recently for pain with tramadol.  Pt GARCIA x4, NVI. Falls precautions maintained. Report given to 2N. Pt in no distress at this time.

## 2024-10-05 NOTE — H&P ADULT - HISTORY OF PRESENT ILLNESS
CC:Patient is a 84y old  Female who presents with a chief complaint of fall    Subjective:  83 y/o F with PMH of HLD, migraines, RA, asthma presents s/p mechanical fall with head injury. Pt states she was unloading her groceries while sitting on a stool. Fell sideways off the stool and hit the back of her head on her oven door. No LOC. Pt takes daily ASA. Reports pain in her head and back. Could not ambulate after even, Denies LOC, visual changes, nausea, vomiting, CP, SOB, numbness/tingling in extremities.  Pt seen and examined at bedside. Pt is AAOx3, pt in no acute distress. Pt denied c/o fever, chills, chest pain, SOB, abd pain, N/V/D, hemoptysis, hematemesis, hematuria, hematochezia headache, diplopia, vertigo, dizziness Pt tolerating diet, (+) void, (+) ambulation, (+) bowel function    ROS: as above    PMH: HTN, HLD, Arthritis  PSH: b/l knee, left hip, b/l shoulder, spine  No Known Allergies    SH: Denies  FH: NA    Meds:  acetaminophen     Tablet .. 650 milliGRAM(s) Oral every 6 hours  acetaminophen   IVPB .. 1000 milliGRAM(s) IV Intermittent once  albuterol    90 MICROgram(s) HFA Inhaler 2 Puff(s) Inhalation every 6 hours PRN  amLODIPine   Tablet 10 milliGRAM(s) Oral daily  ascorbic acid 500 milliGRAM(s) Oral daily  atorvastatin 10 milliGRAM(s) Oral at bedtime  carvedilol 6.25 milliGRAM(s) Oral every 12 hours  cholecalciferol 1000 Unit(s) Oral daily  DULoxetine 30 milliGRAM(s) Oral daily  fludroCORTISONE 0.1 milliGRAM(s) Oral <User Schedule>  hydrALAZINE 25 milliGRAM(s) Oral daily  hydroxychloroquine 200 milliGRAM(s) Oral two times a day  ketorolac   Injectable 15 milliGRAM(s) IV Push every 6 hours PRN  multivitamin 1 Tablet(s) Oral daily  ondansetron Injectable 4 milliGRAM(s) IV Push every 6 hours PRN  oxyCODONE    IR 5 milliGRAM(s) Oral every 6 hours PRN  predniSONE   Tablet 5 milliGRAM(s) Oral daily  traMADol 50 milliGRAM(s) Oral every 6 hours PRN

## 2024-10-05 NOTE — CONSULT NOTE ADULT - ATTENDING COMMENTS
Agree with above.  Patient status post ground-level fall presents with left pelvic pain. patient known to me for prior left knee periprosthetic fracture treated nonoperatively.  Patient ambulates with cane at baseline.  Left lower extremity no pain with axial load, logroll causes some pain in her knee but not in hip or pelvis.  Imaging reviewed which shows a left ischium fracture.  Hardware in appear stable and in good alignment.  Weightbearing as tolerated, amenable to conservative treatment.  Pain control DVT prophylaxis.

## 2024-10-05 NOTE — ED ADULT NURSE REASSESSMENT NOTE - NS ED NURSE REASSESS COMMENT FT1
pt undressed, changed into gown. linens changed. pt reports pain to left hip and head. awaiting pain medication order to be verified.

## 2024-10-05 NOTE — ED PROVIDER NOTE - PHYSICAL EXAMINATION
Gen: NAD. clothes covered in shards of glass. A&O x3.   HEENT: +laceration over occipital scalp. Dentition in good repair. No oropharyngeal erythema, tonsilar enlargement or exudates. Uvula midline. No submandibular or anterior cervical lymphadenopathy. TM well visualized bilaterally. Nares patent. Pt wearing hard c-collar upon arrival.  Cardiac: s1s2, RRR.  Lungs: CTAB, no chest wall tenderness.  Abdomen: NBS x4, soft, nontender. No CVAT.  MSK: No obvious deformity to extremities. +midline lumbar spinal tenderness and tenderness over left hip. Otherwise no tenderness over bony landmarks on extremities. 5/5 upper and lower extremity strength. Full ROM in all extremities  Neuro: CN II-XII intact. Sensation intact to light touch in all extremities.   PV: No LE edema or calf tenderness. Distal pulses 2+ in all extremities.

## 2024-10-06 LAB
ANION GAP SERPL CALC-SCNC: 5 MMOL/L — SIGNIFICANT CHANGE UP (ref 5–17)
BASOPHILS # BLD AUTO: 0.09 K/UL — SIGNIFICANT CHANGE UP (ref 0–0.2)
BASOPHILS NFR BLD AUTO: 1.2 % — SIGNIFICANT CHANGE UP (ref 0–2)
BUN SERPL-MCNC: 18 MG/DL — SIGNIFICANT CHANGE UP (ref 7–23)
CALCIUM SERPL-MCNC: 8.3 MG/DL — LOW (ref 8.5–10.1)
CHLORIDE SERPL-SCNC: 102 MMOL/L — SIGNIFICANT CHANGE UP (ref 96–108)
CO2 SERPL-SCNC: 30 MMOL/L — SIGNIFICANT CHANGE UP (ref 22–31)
CREAT SERPL-MCNC: 0.97 MG/DL — SIGNIFICANT CHANGE UP (ref 0.5–1.3)
EGFR: 57 ML/MIN/1.73M2 — LOW
EOSINOPHIL # BLD AUTO: 0.25 K/UL — SIGNIFICANT CHANGE UP (ref 0–0.5)
EOSINOPHIL NFR BLD AUTO: 3.4 % — SIGNIFICANT CHANGE UP (ref 0–6)
GLUCOSE SERPL-MCNC: 95 MG/DL — SIGNIFICANT CHANGE UP (ref 70–99)
HCT VFR BLD CALC: 38.6 % — SIGNIFICANT CHANGE UP (ref 34.5–45)
HGB BLD-MCNC: 12.6 G/DL — SIGNIFICANT CHANGE UP (ref 11.5–15.5)
IMM GRANULOCYTES NFR BLD AUTO: 0.4 % — SIGNIFICANT CHANGE UP (ref 0–0.9)
LYMPHOCYTES # BLD AUTO: 0.72 K/UL — LOW (ref 1–3.3)
LYMPHOCYTES # BLD AUTO: 9.7 % — LOW (ref 13–44)
MAGNESIUM SERPL-MCNC: 2.1 MG/DL — SIGNIFICANT CHANGE UP (ref 1.6–2.6)
MCHC RBC-ENTMCNC: 31.1 PG — SIGNIFICANT CHANGE UP (ref 27–34)
MCHC RBC-ENTMCNC: 32.6 GM/DL — SIGNIFICANT CHANGE UP (ref 32–36)
MCV RBC AUTO: 95.3 FL — SIGNIFICANT CHANGE UP (ref 80–100)
MONOCYTES # BLD AUTO: 0.33 K/UL — SIGNIFICANT CHANGE UP (ref 0–0.9)
MONOCYTES NFR BLD AUTO: 4.5 % — SIGNIFICANT CHANGE UP (ref 2–14)
NEUTROPHILS # BLD AUTO: 5.97 K/UL — SIGNIFICANT CHANGE UP (ref 1.8–7.4)
NEUTROPHILS NFR BLD AUTO: 80.8 % — HIGH (ref 43–77)
PHOSPHATE SERPL-MCNC: 4.2 MG/DL — SIGNIFICANT CHANGE UP (ref 2.5–4.5)
PLATELET # BLD AUTO: 141 K/UL — LOW (ref 150–400)
POTASSIUM SERPL-MCNC: 3.8 MMOL/L — SIGNIFICANT CHANGE UP (ref 3.5–5.3)
POTASSIUM SERPL-SCNC: 3.8 MMOL/L — SIGNIFICANT CHANGE UP (ref 3.5–5.3)
RBC # BLD: 4.05 M/UL — SIGNIFICANT CHANGE UP (ref 3.8–5.2)
RBC # FLD: 13.3 % — SIGNIFICANT CHANGE UP (ref 10.3–14.5)
SODIUM SERPL-SCNC: 137 MMOL/L — SIGNIFICANT CHANGE UP (ref 135–145)
WBC # BLD: 7.39 K/UL — SIGNIFICANT CHANGE UP (ref 3.8–10.5)
WBC # FLD AUTO: 7.39 K/UL — SIGNIFICANT CHANGE UP (ref 3.8–10.5)

## 2024-10-06 RX ADMIN — MULTI VITAMIN/MINERAL SUPPLEMENT WITH ASCORBIC ACID, NIACIN, PYRIDOXINE, PANTOTHENIC ACID, FOLIC ACID, RIBOFLAVIN, THIAMIN, BIOTIN, COBALAMIN AND ZINC. 1 TABLET(S): 60; 20; 12.5; 10; 10; 1.7; 1.5; 1; .3; .006 TABLET, COATED ORAL at 10:21

## 2024-10-06 RX ADMIN — Medication 650 MILLIGRAM(S): at 11:49

## 2024-10-06 RX ADMIN — Medication 650 MILLIGRAM(S): at 17:00

## 2024-10-06 RX ADMIN — TRAMADOL HYDROCHLORIDE 50 MILLIGRAM(S): 50 TABLET, COATED ORAL at 21:34

## 2024-10-06 RX ADMIN — Medication 10 MILLIGRAM(S): at 10:15

## 2024-10-06 RX ADMIN — HYDROXYCHLOROQUINE SULFATE 200 MILLIGRAM(S): 200 TABLET, FILM COATED ORAL at 10:15

## 2024-10-06 RX ADMIN — TRAMADOL HYDROCHLORIDE 50 MILLIGRAM(S): 50 TABLET, COATED ORAL at 00:46

## 2024-10-06 RX ADMIN — TRAMADOL HYDROCHLORIDE 50 MILLIGRAM(S): 50 TABLET, COATED ORAL at 21:04

## 2024-10-06 RX ADMIN — Medication 20 MILLIEQUIVALENT(S): at 13:43

## 2024-10-06 RX ADMIN — KETOROLAC TROMETHAMINE 15 MILLIGRAM(S): 10 TABLET, FILM COATED ORAL at 02:55

## 2024-10-06 RX ADMIN — TRAMADOL HYDROCHLORIDE 50 MILLIGRAM(S): 50 TABLET, COATED ORAL at 13:46

## 2024-10-06 RX ADMIN — KETOROLAC TROMETHAMINE 15 MILLIGRAM(S): 10 TABLET, FILM COATED ORAL at 02:25

## 2024-10-06 RX ADMIN — ENOXAPARIN SODIUM 40 MILLIGRAM(S): 150 INJECTION SUBCUTANEOUS at 17:00

## 2024-10-06 RX ADMIN — Medication 650 MILLIGRAM(S): at 11:47

## 2024-10-06 RX ADMIN — Medication 1000 UNIT(S): at 10:15

## 2024-10-06 RX ADMIN — KETOROLAC TROMETHAMINE 15 MILLIGRAM(S): 10 TABLET, FILM COATED ORAL at 17:17

## 2024-10-06 RX ADMIN — Medication 500 MILLIGRAM(S): at 10:15

## 2024-10-06 RX ADMIN — Medication 650 MILLIGRAM(S): at 17:15

## 2024-10-06 RX ADMIN — Medication 6.25 MILLIGRAM(S): at 21:03

## 2024-10-06 RX ADMIN — HYDRALAZINE HYDROCHLORIDE 25 MILLIGRAM(S): 100 TABLET ORAL at 21:04

## 2024-10-06 RX ADMIN — KETOROLAC TROMETHAMINE 15 MILLIGRAM(S): 10 TABLET, FILM COATED ORAL at 10:15

## 2024-10-06 RX ADMIN — HYDROXYCHLOROQUINE SULFATE 200 MILLIGRAM(S): 200 TABLET, FILM COATED ORAL at 21:03

## 2024-10-06 RX ADMIN — Medication 6.25 MILLIGRAM(S): at 10:19

## 2024-10-06 RX ADMIN — PREDNISONE 5 MILLIGRAM(S): 5 TABLET ORAL at 10:16

## 2024-10-06 RX ADMIN — TRAMADOL HYDROCHLORIDE 50 MILLIGRAM(S): 50 TABLET, COATED ORAL at 01:16

## 2024-10-06 RX ADMIN — TRAMADOL HYDROCHLORIDE 50 MILLIGRAM(S): 50 TABLET, COATED ORAL at 14:30

## 2024-10-06 RX ADMIN — HYDRALAZINE HYDROCHLORIDE 25 MILLIGRAM(S): 100 TABLET ORAL at 10:15

## 2024-10-06 RX ADMIN — TRAMADOL HYDROCHLORIDE 50 MILLIGRAM(S): 50 TABLET, COATED ORAL at 07:54

## 2024-10-06 RX ADMIN — ATORVASTATIN CALCIUM 10 MILLIGRAM(S): 10 TABLET, FILM COATED ORAL at 21:04

## 2024-10-06 RX ADMIN — TRAMADOL HYDROCHLORIDE 50 MILLIGRAM(S): 50 TABLET, COATED ORAL at 08:45

## 2024-10-06 RX ADMIN — KETOROLAC TROMETHAMINE 15 MILLIGRAM(S): 10 TABLET, FILM COATED ORAL at 17:03

## 2024-10-06 NOTE — PHYSICAL THERAPY INITIAL EVALUATION ADULT - TRANSFER TRAINING, PT EVAL
GOAL: Pt will perform sit to/from stand transfers independently with RW within 4weeks.
07-Sep-2018 18:06

## 2024-10-06 NOTE — PHYSICAL THERAPY INITIAL EVALUATION ADULT - GENERAL OBSERVATIONS, REHAB EVAL
pt received in bed on 2N, +SCDs, on 3L O2 NC. pt with c/o 4/10 pain, RN informed but reported pt is not due for pain meds at this time.  pt agreeable to PT.

## 2024-10-06 NOTE — PHYSICAL THERAPY INITIAL EVALUATION ADULT - PRECAUTIONS/LIMITATIONS, REHAB EVAL
pt desat to 88% on RA, pt with c/o dizziness in standing, reports she get orthostatic at times and needs to mobilize slowly/fall precautions/oxygen therapy device and L/min

## 2024-10-06 NOTE — PHYSICAL THERAPY INITIAL EVALUATION ADULT - RANGE OF MOTION EXAMINATION, REHAB EVAL
B shldr flexion ~100*, reports h/o B shoulder replacements; L hip ROM limited by pain/deficits as listed below

## 2024-10-06 NOTE — PHYSICAL THERAPY INITIAL EVALUATION ADULT - PLANNED THERAPY INTERVENTIONS, PT EVAL
GOAL: Pt will perform 10 stairs independently within 4 weeks./balance training/bed mobility training/gait training/strengthening/transfer training

## 2024-10-06 NOTE — PHYSICAL THERAPY INITIAL EVALUATION ADULT - ADDITIONAL COMMENTS
6 MUMTAZ with HR, inside has 3 steps to kitchen with HR and 1 flight to second floor.  + shower chair

## 2024-10-06 NOTE — PHYSICAL THERAPY INITIAL EVALUATION ADULT - PERTINENT HX OF CURRENT PROBLEM, REHAB EVAL
66F presents s/p mechanical fall with head injury. Pt states she was unloading her groceries while sitting on a stool. Fell sideways off the stool and hit the back of her head on her oven door. No LOC. Pt takes daily ASA. Reports pain in her head and back. Could not ambulate after event. Found to have a left ischium mildly displaced fracture as well as anterior pubic rami fracture.  Head lac repaired in the ED

## 2024-10-07 LAB
ANION GAP SERPL CALC-SCNC: 5 MMOL/L — SIGNIFICANT CHANGE UP (ref 5–17)
BASOPHILS # BLD AUTO: 0.09 K/UL — SIGNIFICANT CHANGE UP (ref 0–0.2)
BASOPHILS NFR BLD AUTO: 1 % — SIGNIFICANT CHANGE UP (ref 0–2)
BUN SERPL-MCNC: 20 MG/DL — SIGNIFICANT CHANGE UP (ref 7–23)
CALCIUM SERPL-MCNC: 8.6 MG/DL — SIGNIFICANT CHANGE UP (ref 8.5–10.1)
CHLORIDE SERPL-SCNC: 102 MMOL/L — SIGNIFICANT CHANGE UP (ref 96–108)
CO2 SERPL-SCNC: 27 MMOL/L — SIGNIFICANT CHANGE UP (ref 22–31)
CREAT SERPL-MCNC: 0.88 MG/DL — SIGNIFICANT CHANGE UP (ref 0.5–1.3)
EGFR: 64 ML/MIN/1.73M2 — SIGNIFICANT CHANGE UP
EOSINOPHIL # BLD AUTO: 0.3 K/UL — SIGNIFICANT CHANGE UP (ref 0–0.5)
EOSINOPHIL NFR BLD AUTO: 3.5 % — SIGNIFICANT CHANGE UP (ref 0–6)
GLUCOSE SERPL-MCNC: 93 MG/DL — SIGNIFICANT CHANGE UP (ref 70–99)
HCT VFR BLD CALC: 39.5 % — SIGNIFICANT CHANGE UP (ref 34.5–45)
HGB BLD-MCNC: 13 G/DL — SIGNIFICANT CHANGE UP (ref 11.5–15.5)
IMM GRANULOCYTES NFR BLD AUTO: 0.9 % — SIGNIFICANT CHANGE UP (ref 0–0.9)
LYMPHOCYTES # BLD AUTO: 0.82 K/UL — LOW (ref 1–3.3)
LYMPHOCYTES # BLD AUTO: 9.5 % — LOW (ref 13–44)
MAGNESIUM SERPL-MCNC: 2 MG/DL — SIGNIFICANT CHANGE UP (ref 1.6–2.6)
MCHC RBC-ENTMCNC: 31.4 PG — SIGNIFICANT CHANGE UP (ref 27–34)
MCHC RBC-ENTMCNC: 32.9 GM/DL — SIGNIFICANT CHANGE UP (ref 32–36)
MCV RBC AUTO: 95.4 FL — SIGNIFICANT CHANGE UP (ref 80–100)
MONOCYTES # BLD AUTO: 0.58 K/UL — SIGNIFICANT CHANGE UP (ref 0–0.9)
MONOCYTES NFR BLD AUTO: 6.7 % — SIGNIFICANT CHANGE UP (ref 2–14)
NEUTROPHILS # BLD AUTO: 6.73 K/UL — SIGNIFICANT CHANGE UP (ref 1.8–7.4)
NEUTROPHILS NFR BLD AUTO: 78.4 % — HIGH (ref 43–77)
PHOSPHATE SERPL-MCNC: 3.2 MG/DL — SIGNIFICANT CHANGE UP (ref 2.5–4.5)
PLATELET # BLD AUTO: 129 K/UL — LOW (ref 150–400)
POTASSIUM SERPL-MCNC: 4 MMOL/L — SIGNIFICANT CHANGE UP (ref 3.5–5.3)
POTASSIUM SERPL-SCNC: 4 MMOL/L — SIGNIFICANT CHANGE UP (ref 3.5–5.3)
RBC # BLD: 4.14 M/UL — SIGNIFICANT CHANGE UP (ref 3.8–5.2)
RBC # FLD: 13.5 % — SIGNIFICANT CHANGE UP (ref 10.3–14.5)
SODIUM SERPL-SCNC: 134 MMOL/L — LOW (ref 135–145)
WBC # BLD: 8.6 K/UL — SIGNIFICANT CHANGE UP (ref 3.8–10.5)
WBC # FLD AUTO: 8.6 K/UL — SIGNIFICANT CHANGE UP (ref 3.8–10.5)

## 2024-10-07 PROCEDURE — 99221 1ST HOSP IP/OBS SF/LOW 40: CPT | Mod: AI

## 2024-10-07 RX ORDER — AMLODIPINE BESYLATE 5 MG
5 TABLET ORAL DAILY
Refills: 0 | Status: DISCONTINUED | OUTPATIENT
Start: 2024-10-07 | End: 2024-10-09

## 2024-10-07 RX ORDER — ASPIRIN 325 MG
81 TABLET ORAL DAILY
Refills: 0 | Status: DISCONTINUED | OUTPATIENT
Start: 2024-10-07 | End: 2024-10-09

## 2024-10-07 RX ADMIN — MULTI VITAMIN/MINERAL SUPPLEMENT WITH ASCORBIC ACID, NIACIN, PYRIDOXINE, PANTOTHENIC ACID, FOLIC ACID, RIBOFLAVIN, THIAMIN, BIOTIN, COBALAMIN AND ZINC. 1 TABLET(S): 60; 20; 12.5; 10; 10; 1.7; 1.5; 1; .3; .006 TABLET, COATED ORAL at 11:05

## 2024-10-07 RX ADMIN — Medication 6.25 MILLIGRAM(S): at 21:37

## 2024-10-07 RX ADMIN — KETOROLAC TROMETHAMINE 15 MILLIGRAM(S): 10 TABLET, FILM COATED ORAL at 11:03

## 2024-10-07 RX ADMIN — ENOXAPARIN SODIUM 40 MILLIGRAM(S): 150 INJECTION SUBCUTANEOUS at 18:43

## 2024-10-07 RX ADMIN — Medication 500 MILLIGRAM(S): at 11:04

## 2024-10-07 RX ADMIN — Medication 81 MILLIGRAM(S): at 11:04

## 2024-10-07 RX ADMIN — Medication 5 MILLIGRAM(S): at 11:58

## 2024-10-07 RX ADMIN — TRAMADOL HYDROCHLORIDE 50 MILLIGRAM(S): 50 TABLET, COATED ORAL at 06:22

## 2024-10-07 RX ADMIN — Medication 650 MILLIGRAM(S): at 06:06

## 2024-10-07 RX ADMIN — Medication 6.25 MILLIGRAM(S): at 11:04

## 2024-10-07 RX ADMIN — TRAMADOL HYDROCHLORIDE 50 MILLIGRAM(S): 50 TABLET, COATED ORAL at 05:52

## 2024-10-07 RX ADMIN — HYDROXYCHLOROQUINE SULFATE 200 MILLIGRAM(S): 200 TABLET, FILM COATED ORAL at 21:34

## 2024-10-07 RX ADMIN — HYDRALAZINE HYDROCHLORIDE 25 MILLIGRAM(S): 100 TABLET ORAL at 11:04

## 2024-10-07 RX ADMIN — Medication 650 MILLIGRAM(S): at 11:58

## 2024-10-07 RX ADMIN — Medication 650 MILLIGRAM(S): at 05:52

## 2024-10-07 RX ADMIN — HYDROXYCHLOROQUINE SULFATE 200 MILLIGRAM(S): 200 TABLET, FILM COATED ORAL at 11:04

## 2024-10-07 RX ADMIN — Medication 1000 UNIT(S): at 11:04

## 2024-10-07 RX ADMIN — TRAMADOL HYDROCHLORIDE 50 MILLIGRAM(S): 50 TABLET, COATED ORAL at 18:44

## 2024-10-07 RX ADMIN — TRAMADOL HYDROCHLORIDE 50 MILLIGRAM(S): 50 TABLET, COATED ORAL at 19:21

## 2024-10-07 RX ADMIN — ATORVASTATIN CALCIUM 10 MILLIGRAM(S): 10 TABLET, FILM COATED ORAL at 21:35

## 2024-10-07 RX ADMIN — Medication 650 MILLIGRAM(S): at 18:44

## 2024-10-07 RX ADMIN — Medication 650 MILLIGRAM(S): at 19:21

## 2024-10-07 RX ADMIN — PREDNISONE 5 MILLIGRAM(S): 5 TABLET ORAL at 11:04

## 2024-10-07 RX ADMIN — FLUDROCORTISONE ACETATE 0.1 MILLIGRAM(S): 0.1 TABLET ORAL at 09:04

## 2024-10-07 RX ADMIN — KETOROLAC TROMETHAMINE 15 MILLIGRAM(S): 10 TABLET, FILM COATED ORAL at 11:18

## 2024-10-07 RX ADMIN — HYDRALAZINE HYDROCHLORIDE 25 MILLIGRAM(S): 100 TABLET ORAL at 21:34

## 2024-10-07 NOTE — PROGRESS NOTE ADULT - TIME BILLING
Pt examination, review of relevant labs and radiologic studies, assured stabilization of pt, discussion with relevant services/providers for coordination of pt care and services, time is exclusive of resident and/or physician assistant teaching or supervision time

## 2024-10-07 NOTE — CONSULT NOTE ADULT - NS ATTEND AMEND GEN_ALL_CORE FT
Patient seen and examined with DIAMOND Smith.  I was physically present for the key portions of the evaluation and management (E/M) service provided.  I agree with the above history, physical, and plan which I have reviewed and edited where appropriate.  84 year old woman admitted s/p mechanical fall     * acute left pubic ramus fracture s/p mechanical fall  - admitted under trauma   - ortho consulted; no acute orthopedic surgical intervention  - pain meds PRN  - PT/OT WBAT  - Head CT- negative    *Hx of RA  - on chronic prednisone  - c/w plaquenil    *history of orthostatic hypotension  - patient with implantable loop recorder- consult EP for interrogation   - c/w Renetta  - check orthostatic    *HTN  - c/w amlodipine/carvedilol/hydralazine     *VTE prophylaxis- Lovenox     Case d/w Dr Howe

## 2024-10-07 NOTE — CONSULT NOTE ADULT - ASSESSMENT
84 year old woamn  84 year old woman admitted s/p mechanical fall     * acute left pubic ramus fracture s/p mechanical fall  - admitted under trauma   - ortho consulted; no acute orthopedic surgical intervention  - pain meds PRN  - PT/OT WBAT  - Head CT- negative    *Hx of RA  - on chronic prednisone  - c/w plaquenil    *history of orthostatic hypotension  - patient with implantable loop recorder- consult EP for interrogation   - c/w Renetta  - check orthostatic    *HTN  - c/w amlodipine/carvedilol/hydralazine     *VTE prophylaxis- Lovenox     Case d/w Dr Howe

## 2024-10-08 LAB
ANION GAP SERPL CALC-SCNC: 7 MMOL/L — SIGNIFICANT CHANGE UP (ref 5–17)
BUN SERPL-MCNC: 20 MG/DL — SIGNIFICANT CHANGE UP (ref 7–23)
CALCIUM SERPL-MCNC: 8.7 MG/DL — SIGNIFICANT CHANGE UP (ref 8.5–10.1)
CHLORIDE SERPL-SCNC: 102 MMOL/L — SIGNIFICANT CHANGE UP (ref 96–108)
CO2 SERPL-SCNC: 26 MMOL/L — SIGNIFICANT CHANGE UP (ref 22–31)
CREAT SERPL-MCNC: 0.82 MG/DL — SIGNIFICANT CHANGE UP (ref 0.5–1.3)
EGFR: 70 ML/MIN/1.73M2 — SIGNIFICANT CHANGE UP
GLUCOSE SERPL-MCNC: 94 MG/DL — SIGNIFICANT CHANGE UP (ref 70–99)
HCT VFR BLD CALC: 36.1 % — SIGNIFICANT CHANGE UP (ref 34.5–45)
HGB BLD-MCNC: 12 G/DL — SIGNIFICANT CHANGE UP (ref 11.5–15.5)
MAGNESIUM SERPL-MCNC: 2 MG/DL — SIGNIFICANT CHANGE UP (ref 1.6–2.6)
MCHC RBC-ENTMCNC: 31.3 PG — SIGNIFICANT CHANGE UP (ref 27–34)
MCHC RBC-ENTMCNC: 33.2 GM/DL — SIGNIFICANT CHANGE UP (ref 32–36)
MCV RBC AUTO: 94 FL — SIGNIFICANT CHANGE UP (ref 80–100)
PHOSPHATE SERPL-MCNC: 3.2 MG/DL — SIGNIFICANT CHANGE UP (ref 2.5–4.5)
PLATELET # BLD AUTO: 129 K/UL — LOW (ref 150–400)
POTASSIUM SERPL-MCNC: 3.8 MMOL/L — SIGNIFICANT CHANGE UP (ref 3.5–5.3)
POTASSIUM SERPL-SCNC: 3.8 MMOL/L — SIGNIFICANT CHANGE UP (ref 3.5–5.3)
RBC # BLD: 3.84 M/UL — SIGNIFICANT CHANGE UP (ref 3.8–5.2)
RBC # FLD: 13.4 % — SIGNIFICANT CHANGE UP (ref 10.3–14.5)
SODIUM SERPL-SCNC: 135 MMOL/L — SIGNIFICANT CHANGE UP (ref 135–145)
WBC # BLD: 7.97 K/UL — SIGNIFICANT CHANGE UP (ref 3.8–10.5)
WBC # FLD AUTO: 7.97 K/UL — SIGNIFICANT CHANGE UP (ref 3.8–10.5)

## 2024-10-08 PROCEDURE — 99232 SBSQ HOSP IP/OBS MODERATE 35: CPT

## 2024-10-08 RX ADMIN — HYDROXYCHLOROQUINE SULFATE 200 MILLIGRAM(S): 200 TABLET, FILM COATED ORAL at 22:11

## 2024-10-08 RX ADMIN — Medication 650 MILLIGRAM(S): at 17:20

## 2024-10-08 RX ADMIN — Medication 81 MILLIGRAM(S): at 09:36

## 2024-10-08 RX ADMIN — TRAMADOL HYDROCHLORIDE 50 MILLIGRAM(S): 50 TABLET, COATED ORAL at 09:37

## 2024-10-08 RX ADMIN — Medication 650 MILLIGRAM(S): at 17:15

## 2024-10-08 RX ADMIN — Medication 650 MILLIGRAM(S): at 11:34

## 2024-10-08 RX ADMIN — PREDNISONE 5 MILLIGRAM(S): 5 TABLET ORAL at 09:36

## 2024-10-08 RX ADMIN — HYDROXYCHLOROQUINE SULFATE 200 MILLIGRAM(S): 200 TABLET, FILM COATED ORAL at 09:36

## 2024-10-08 RX ADMIN — HYDRALAZINE HYDROCHLORIDE 25 MILLIGRAM(S): 100 TABLET ORAL at 09:37

## 2024-10-08 RX ADMIN — Medication 650 MILLIGRAM(S): at 05:18

## 2024-10-08 RX ADMIN — TRAMADOL HYDROCHLORIDE 50 MILLIGRAM(S): 50 TABLET, COATED ORAL at 17:15

## 2024-10-08 RX ADMIN — TRAMADOL HYDROCHLORIDE 50 MILLIGRAM(S): 50 TABLET, COATED ORAL at 10:10

## 2024-10-08 RX ADMIN — Medication 500 MILLIGRAM(S): at 09:36

## 2024-10-08 RX ADMIN — ATORVASTATIN CALCIUM 10 MILLIGRAM(S): 10 TABLET, FILM COATED ORAL at 22:11

## 2024-10-08 RX ADMIN — Medication 650 MILLIGRAM(S): at 05:22

## 2024-10-08 RX ADMIN — Medication 1000 UNIT(S): at 09:36

## 2024-10-08 RX ADMIN — Medication 6.25 MILLIGRAM(S): at 09:43

## 2024-10-08 RX ADMIN — ENOXAPARIN SODIUM 40 MILLIGRAM(S): 150 INJECTION SUBCUTANEOUS at 17:15

## 2024-10-08 RX ADMIN — Medication 5 MILLIGRAM(S): at 09:36

## 2024-10-08 RX ADMIN — Medication 650 MILLIGRAM(S): at 11:38

## 2024-10-08 NOTE — PROGRESS NOTE ADULT - ASSESSMENT
83 y/o F with PMH of HLD, migraines, RA, asthma presents s/p mechanical fall with head injury. Found to have a left ischium mildly displaced fracture as well as anterior pubic rami fracture. Head lac repaired in the ED    #Traumatic Fall   #Pelvic Fx (Lt pubic rami, Lt Ischium)  #T11 & T12 VCF  #Head Laceration   #Orthostatic Hypotension     Plan:  - Ortho for hip fracture &  T11-T12 VCF  No acute orthopaedic surgical intervention indicated at this time. This patient is orthopaedically stable for discharge.   Patient to follow up with Dr. Miller as an outpatient for further evaluation and management.  WBAT/PT/OT    Hospitalist co-mgt:  *Hx of RA-  on chronic prednisone, c/w plaquenil   *Hx of orthostatic hypotension  - patient with implantable loop recorder- consult EP for interrogation. AS per EP- ILR is not working- will need ODILON patch prior to dc and will need outpatient f/u with EP to have ILR explanted and replaced.  - c/w Florinef  - orthostatic +     *HTN  - c/w amlodipine  - Holding carvedilol/hydralazine 2/2 orthostatic hypotension      - VTE prophylaxis- Lovenox   - Pain control  - Nausea control PRN  - Resume appropriate home meds    Pt currently requiring further medical optimization prior to dispo. Case and plan discussed with Hospitalist team and surgical attending this AM.  -Plan to transfer to medicine service today,   
84 year old woman admitted s/p mechanical fall     * acute left pubic ramus fracture s/p mechanical fall  - admitted under trauma   - ortho consulted; no acute orthopedic surgical intervention  - pain meds PRN  - PT/OT WBAT  - Head CT- negative    *Hx of RA  - on chronic prednisone  - c/w plaquenil    *history of orthostatic hypotension  - patient with implantable loop recorder- consult EP for interrogation - AS per EP- ILR is not working- will need ODILON patch prior to dc and will need outpatient f/u with EP to have ILR explanted and replaced   - c/w Florinef  - check orthostatic    *HTN- now with period of hypotension   - c/w amlodipine/carvedilol/hydralazine   - d/w Dr Campbell- dc HYdralazine and coreg for now- c/w Amlodpine  - Dr Campbell will see patient in AM     *VTE prophylaxis- Lovenox     Case d/w Dr Howe     
85 y/o F with PMH of HLD, migraines, RA, asthma presents s/p mechanical fall with head injury.    Found to have a left ischium mildly displaced fracture as well as anterior pubic rami fracture.  Head lac repaired in the ED    Plan:    - Ortho for hip fracture;   WBAT/PT/OT  No acute orthopaedic surgical intervention indicated at this time. This patient is orthopaedically stable for discharge.   Patient to follow up with Dr. Miller as an outpatient for further evaluation and management.      Hospitalist co-mgt:  *Hx of RA-  on chronic prednisone, c/w plaquenil   *Hx of orthostatic hypotension  - patient with implantable loop recorder- consult EP for interrogation   - c/w Florinef  - check orthostatic    *HTN  - c/w amlodipine/carvedilol/hydralazine     *VTE prophylaxis- Lovenox       - Pain control  - Nausea control PRN  - Restart home meds    Dispo planing: Home with PT vs [facility] - pending placement and final decision     D/W Attending     
83 y/o F with PMH of HLD, migraines, RA, asthma presents s/p mechanical fall with head injury.    Found to have a left ischium mildly displaced fracture as well as anterior pubic rami fracture.  Head lac repaired in the ED    P:  - Admit to Dr Hines  - Pain control  - Ortho for hip fracture; reccs appreciated  - Nausea control PRN  - Restart home meds  - PT evaluation  - SW for dispo planning      Plan discussed with Dr Hines

## 2024-10-08 NOTE — PROGRESS NOTE ADULT - SUBJECTIVE AND OBJECTIVE BOX
SURGERY DAILY PROGRESS NOTE:     Subjective:  Patient seen and examined at bedside during am rounds. Pain well controlled. AVSS. Denies any fevers, chills, n/v/d, chest pain or shortness of breath    Objective:    MEDICATIONS  (STANDING):  acetaminophen     Tablet .. 650 milliGRAM(s) Oral every 6 hours  acetaminophen   IVPB .. 1000 milliGRAM(s) IV Intermittent once  amLODIPine   Tablet 10 milliGRAM(s) Oral daily  ascorbic acid 500 milliGRAM(s) Oral daily  atorvastatin 10 milliGRAM(s) Oral at bedtime  carvedilol 6.25 milliGRAM(s) Oral every 12 hours  cholecalciferol 1000 Unit(s) Oral daily  DULoxetine 30 milliGRAM(s) Oral at bedtime  enoxaparin Injectable 40 milliGRAM(s) SubCutaneous every 24 hours  fludroCORTISONE 0.1 milliGRAM(s) Oral <User Schedule>  hydrALAZINE 25 milliGRAM(s) Oral two times a day  hydroxychloroquine 200 milliGRAM(s) Oral two times a day  influenza  Vaccine (HIGH DOSE) 0.5 milliLiter(s) IntraMuscular once  multivitamin 1 Tablet(s) Oral daily  predniSONE   Tablet 5 milliGRAM(s) Oral daily    MEDICATIONS  (PRN):  albuterol    90 MICROgram(s) HFA Inhaler 2 Puff(s) Inhalation every 6 hours PRN for shortness of breath and/or wheezing  ketorolac   Injectable 15 milliGRAM(s) IV Push every 6 hours PRN Moderate Pain (4 - 6)  ondansetron Injectable 4 milliGRAM(s) IV Push every 6 hours PRN Nausea  oxyCODONE    IR 5 milliGRAM(s) Oral every 6 hours PRN Severe Pain (7 - 10)  traMADol 50 milliGRAM(s) Oral every 6 hours PRN Mild Pain (1 - 3)      Vital Signs Last 24 Hrs  T(C): 36.6 (06 Oct 2024 00:19), Max: 36.8 (05 Oct 2024 16:55)  T(F): 97.9 (06 Oct 2024 00:19), Max: 98.2 (05 Oct 2024 16:55)  HR: 71 (06 Oct 2024 00:19) (71 - 96)  BP: 115/66 (06 Oct 2024 00:19) (115/66 - 168/96)  BP(mean): 118 (05 Oct 2024 17:38) (118 - 118)  RR: 18 (06 Oct 2024 00:19) (17 - 18)  SpO2: 98% (06 Oct 2024 00:19) (90% - 98%)    Parameters below as of 06 Oct 2024 00:19  Patient On (Oxygen Delivery Method): nasal cannula  O2 Flow (L/min): 3      TERTIRARY Physical exam:  GCS of 15  Airway is patent  Breathing is symmetric and unlabored  Neuro: CNII-XII grossly intact  Psych: normal affect  HEENT: Normocephalic, posterior scalp laceration 3 cm, repaired with staples, GRADY, EOM wnl, no otorrhea or hemotympanum b/l, no epistaxis or d/c b/l nares, no craniofacial bony pathology or tenderness b/l  Neck: No crepitus, no ecchymosis, no hematoma, to exam, no JVD, no tracheal deviation  Cervicothoracolumbosacral spine: no gross bony pathology or tenderness to exam  Cardiovascular: S1S2 Present  Chest: no gross rib pathology or tenderness to exam. No sternal pathology or tenderness to exam. No crepitus, no ecchymosis, no hematoma. No penetrating thoracoabdominal trauma. Tenderness to lower spine lumbar region.  Respiratory: Respiratory Effort normal; no wheezes, rales or rhonchi to exam  ABD: soft, nontender, non distended, no rebound, no guarding, no rigidity, no skin changes to exam. No pelvic instability to exam, no skin changes  Musculoskeletal: Pt has palpable b/l radial, femoral, dorsalis pedis pulses. All digits are warm and well perfused. No gross long bone pathology or tenderness to exam. Pt demonstrates grossly intact sensorimotor function. Pt has good capillary refill to digits, no calf edema or tenderness to exam. Minimal tenderness to left hip  Skin: dark discoloration to right forearm and bilateral legs (chronic as per patient)    I&O's Detail    05 Oct 2024 07:01  -  06 Oct 2024 06:46  --------------------------------------------------------  IN:  Total IN: 0 mL    OUT:    Voided (mL): 400 mL  Total OUT: 400 mL    Total NET: -400 mL          Daily     Daily     LABS:                        14.0   6.11  )-----------( 167      ( 05 Oct 2024 15:25 )             43.0     10-05    135  |  100  |  13  ----------------------------<  106[H]  4.0   |  29  |  0.89    Ca    9.2      05 Oct 2024 15:25    TPro  7.6  /  Alb  3.9  /  TBili  0.6  /  DBili  x   /  AST  55[H]  /  ALT  52  /  AlkPhos  55  10-05    PT/INR - ( 05 Oct 2024 15:25 )   PT: 10.3 sec;   INR: 0.87 ratio         PTT - ( 05 Oct 2024 15:25 )  PTT:29.0 sec  Urinalysis Basic - ( 05 Oct 2024 15:25 )    Color: x / Appearance: x / SG: x / pH: x  Gluc: 106 mg/dL / Ketone: x  / Bili: x / Urobili: x   Blood: x / Protein: x / Nitrite: x   Leuk Esterase: x / RBC: x / WBC x   Sq Epi: x / Non Sq Epi: x / Bacteria: x  
CC:Patient is a 84y old  Female who presents with a chief complaint of Fall with pelvic fracture (07 Oct 2024 11:39)    Trauma Surgery Progress note     Subjective:  Pt seen and examined at bedside with chaperone. Pt is AAOx3, pt in no acute distress. Pt denied c/o fever, chills, chest pain, SOB, abd pain, N/V/D, extremity pain or dysfunction, hemoptysis, hematemesis, hematuria, hematochexia, headache, diplopia, vertigo, dizzyness. Pt tolerating diet, (+) void, (+) ambulation, (+) bowel function    No acute events overnight     ROS:  otherwise as abovementioned ROS    Vital Signs Last 24 Hrs  T(C): 36.8 (07 Oct 2024 08:01), Max: 36.8 (07 Oct 2024 08:01)  T(F): 98.2 (07 Oct 2024 08:01), Max: 98.2 (07 Oct 2024 08:01)  HR: 76 (07 Oct 2024 08:01) (66 - 76)  BP: 138/70 (07 Oct 2024 08:01) (117/66 - 138/70)  BP(mean): 77 (06 Oct 2024 16:00) (77 - 77)  RR: 18 (07 Oct 2024 08:01) (18 - 18)  SpO2: 93% (07 Oct 2024 08:01) (93% - 96%)    Parameters below as of 07 Oct 2024 08:01  Patient On (Oxygen Delivery Method): room air        Labs:                                13.0   8.60  )-----------( 129      ( 07 Oct 2024 06:50 )             39.5     CBC Full  -  ( 07 Oct 2024 06:50 )  WBC Count : 8.60 K/uL  RBC Count : 4.14 M/uL  Hemoglobin : 13.0 g/dL  Hematocrit : 39.5 %  Platelet Count - Automated : 129 K/uL  Mean Cell Volume : 95.4 fl  Mean Cell Hemoglobin : 31.4 pg  Mean Cell Hemoglobin Concentration : 32.9 gm/dL  Auto Neutrophil # : 6.73 K/uL  Auto Lymphocyte # : 0.82 K/uL  Auto Monocyte # : 0.58 K/uL  Auto Eosinophil # : 0.30 K/uL  Auto Basophil # : 0.09 K/uL  Auto Neutrophil % : 78.4 %  Auto Lymphocyte % : 9.5 %  Auto Monocyte % : 6.7 %  Auto Eosinophil % : 3.5 %  Auto Basophil % : 1.0 %    10-07    134[L]  |  102  |  20  ----------------------------<  93  4.0   |  27  |  0.88    Ca    8.6      07 Oct 2024 06:50  Phos  3.2     10-07  Mg     2.0     10-07    TPro  7.6  /  Alb  3.9  /  TBili  0.6  /  DBili  x   /  AST  55[H]  /  ALT  52  /  AlkPhos  55  10-05    LIVER FUNCTIONS - ( 05 Oct 2024 15:25 )  Alb: 3.9 g/dL / Pro: 7.6 gm/dL / ALK PHOS: 55 U/L / ALT: 52 U/L / AST: 55 U/L / GGT: x           PT/INR - ( 05 Oct 2024 15:25 )   PT: 10.3 sec;   INR: 0.87 ratio         PTT - ( 05 Oct 2024 15:25 )  PTT:29.0 sec      Meds:  acetaminophen     Tablet .. 650 milliGRAM(s) Oral every 6 hours  acetaminophen   IVPB .. 1000 milliGRAM(s) IV Intermittent once  albuterol    90 MICROgram(s) HFA Inhaler 2 Puff(s) Inhalation every 6 hours PRN  amLODIPine   Tablet 5 milliGRAM(s) Oral daily  ascorbic acid 500 milliGRAM(s) Oral daily  aspirin  chewable 81 milliGRAM(s) Oral daily  atorvastatin 10 milliGRAM(s) Oral at bedtime  carvedilol 6.25 milliGRAM(s) Oral every 12 hours  cholecalciferol 1000 Unit(s) Oral daily  DULoxetine 30 milliGRAM(s) Oral at bedtime  enoxaparin Injectable 40 milliGRAM(s) SubCutaneous every 24 hours  fludroCORTISONE 0.1 milliGRAM(s) Oral <User Schedule>  hydrALAZINE 25 milliGRAM(s) Oral two times a day  hydroxychloroquine 200 milliGRAM(s) Oral two times a day  influenza  Vaccine (HIGH DOSE) 0.5 milliLiter(s) IntraMuscular once  ketorolac   Injectable 15 milliGRAM(s) IV Push every 6 hours PRN  multivitamin 1 Tablet(s) Oral daily  ondansetron Injectable 4 milliGRAM(s) IV Push every 6 hours PRN  oxyCODONE    IR 5 milliGRAM(s) Oral every 6 hours PRN  predniSONE   Tablet 5 milliGRAM(s) Oral daily  traMADol 50 milliGRAM(s) Oral every 6 hours PRN      Radiology:      Physical exam:  GCS of 15  Airway is patent  Breathing is symmetric and unlabored  Neuro: CNII-XII grossly intact  Psych: normal affect  HEENT: Normocephalic, posterior scalp laceration 3 cm, repaired with staples, GRADY, EOM wnl, no otorrhea or hemotympanum b/l, no epistaxis or d/c b/l nares, no craniofacial bony pathology or tenderness b/l  Neck: No crepitus, no ecchymosis, no hematoma, to exam, no JVD, no tracheal deviation  Cervicothoracolumbosacral spine: no gross bony pathology or tenderness to exam  Cardiovascular: S1S2 Present  Chest: no gross rib pathology or tenderness to exam. No sternal pathology or tenderness to exam. No crepitus, no ecchymosis, no hematoma. No penetrating thoracoabdominal trauma. Tenderness to lower spine lumbar region.  Respiratory: Respiratory Effort normal; no wheezes, rales or rhonchi to exam  ABD: soft, nontender, non distended, no rebound, no guarding, no rigidity, no skin changes to exam. No pelvic instability to exam, no skin changes  Musculoskeletal: Pt has palpable b/l radial, femoral, dorsalis pedis pulses. All digits are warm and well perfused. No gross long bone pathology or tenderness to exam. Pt demonstrates grossly intact sensorimotor function. Pt has good capillary refill to digits, no calf edema or tenderness to exam. Minimal tenderness to left hip  Skin: dark discoloration to right forearm and bilateral legs (chronic as per patient)        
CC:Patient is a 84y old  Female who presents with a chief complaint of Fall with pelvic fracture (08 Oct 2024 12:37)    Overnight: No acute events     Subjective:  Pt seen and examined at bedside. Pt is AAOx3, in no acute distress. Endorsing dizziness while working with PT. Pt denied c/o fever, chills, chest pain, SOB, abd pain, N/V/D, extremity pain or dysfunction, hemoptysis, hematemesis, hematuria, hematochezia, headache, diplopia, vertigo, dizziness.    ROS:  otherwise as abovementioned ROS    Vital Signs Last 24 Hrs  T(C): 36.6 (08 Oct 2024 08:02), Max: 36.7 (07 Oct 2024 16:00)  T(F): 97.9 (08 Oct 2024 08:02), Max: 98.1 (07 Oct 2024 16:00)  HR: 72 (08 Oct 2024 08:02) (71 - 73)  BP: 143/63 (08 Oct 2024 08:02) (126/63 - 143/63)  BP(mean): --  RR: 18 (08 Oct 2024 08:02) (18 - 18)  SpO2: 93% (08 Oct 2024 08:02) (92% - 95%)    Parameters below as of 08 Oct 2024 08:02  Patient On (Oxygen Delivery Method): room air        Labs:                        12.0   7.97  )-----------( 129      ( 08 Oct 2024 07:24 )             36.1     CBC Full  -  ( 08 Oct 2024 07:24 )  WBC Count : 7.97 K/uL  RBC Count : 3.84 M/uL  Hemoglobin : 12.0 g/dL  Hematocrit : 36.1 %  Platelet Count - Automated : 129 K/uL  Mean Cell Volume : 94.0 fl  Mean Cell Hemoglobin : 31.3 pg  Mean Cell Hemoglobin Concentration : 33.2 gm/dL  Auto Neutrophil # : x  Auto Lymphocyte # : x  Auto Monocyte # : x  Auto Eosinophil # : x  Auto Basophil # : x  Auto Neutrophil % : x  Auto Lymphocyte % : x  Auto Monocyte % : x  Auto Eosinophil % : x  Auto Basophil % : x    10-08    135  |  102  |  20  ----------------------------<  94  3.8   |  26  |  0.82    Ca    8.7      08 Oct 2024 07:24  Phos  3.2     10-08  Mg     2.0     10-08              Meds:  acetaminophen     Tablet .. 650 milliGRAM(s) Oral every 6 hours  acetaminophen   IVPB .. 1000 milliGRAM(s) IV Intermittent once  albuterol    90 MICROgram(s) HFA Inhaler 2 Puff(s) Inhalation every 6 hours PRN  amLODIPine   Tablet 5 milliGRAM(s) Oral daily  ascorbic acid 500 milliGRAM(s) Oral daily  aspirin  chewable 81 milliGRAM(s) Oral daily  atorvastatin 10 milliGRAM(s) Oral at bedtime  cholecalciferol 1000 Unit(s) Oral daily  DULoxetine 30 milliGRAM(s) Oral at bedtime  enoxaparin Injectable 40 milliGRAM(s) SubCutaneous every 24 hours  fludroCORTISONE 0.1 milliGRAM(s) Oral <User Schedule>  hydroxychloroquine 200 milliGRAM(s) Oral two times a day  influenza  Vaccine (HIGH DOSE) 0.5 milliLiter(s) IntraMuscular once  ketorolac   Injectable 15 milliGRAM(s) IV Push every 6 hours PRN  multivitamin 1 Tablet(s) Oral daily  ondansetron Injectable 4 milliGRAM(s) IV Push every 6 hours PRN  oxyCODONE    IR 5 milliGRAM(s) Oral every 6 hours PRN  predniSONE   Tablet 5 milliGRAM(s) Oral daily  traMADol 50 milliGRAM(s) Oral every 6 hours PRN        Physical exam:  GCS of 15  Airway is patent  Breathing is symmetric and unlabored  Neuro: CNII-XII grossly intact  Psych: normal affect  HEENT: Normocephalic, posterior scalp laceration 3 cm, repaired with staples, GRADY, EOM wnl, no otorrhea or hemotympanum b/l, no epistaxis or d/c b/l nares, no craniofacial bony pathology or tenderness b/l  Neck: No crepitus, no ecchymosis, no hematoma, to exam, no JVD, no tracheal deviation  Cervicothoracolumbosacral spine: no gross bony pathology or tenderness to exam  Cardiovascular: S1S2 Present  Chest: no gross rib pathology or tenderness to exam. No sternal pathology or tenderness to exam. No crepitus, no ecchymosis, no hematoma. No penetrating thoracoabdominal trauma.  Respiratory: Respiratory Effort normal; no wheezes, rales or rhonchi to exam  ABD: soft, nontender, non distended, no rebound, no guarding, no rigidity, no skin changes to exam. No pelvic instability to exam, no skin changes  Musculoskeletal: Pt has palpable b/l radial, femoral, dorsalis pedis pulses. All digits are warm and well perfused. No gross long bone pathology or tenderness to exam. Pt demonstrates grossly intact sensorimotor function. Pt has good capillary refill to digits, no calf edema or tenderness to exam. Minimal tenderness to left hip  Skin: dark discoloration to right forearm and bilateral legs (chronic as per patient)      
PCP- Dr Campbell     CHIEF COMPLAINT:  mechanical fall with pelvic facture     HISTORY OF THE PRESENT ILLNESS:  83 y/o woman with PMH of HLD, migraines, RA, asthma presents s/p mechanical fall with head injury. Pt states she was unloading her groceries while sitting on a stool. Fell sideways off the stool and hit the back of her head on her oven door.   Found to have a left ischium mildly displaced fracture as well as anterior pubic rami fracture- ortho consulted. No acute orthopaedic surgical intervention indicated at this time.  Head lac repaired in the ED    10/8- Patient was seen and examined. No acute overnight events. Denies fever, pain or SOB. Tolerating diet. This morning patient worked with PT but felt dizzy. VSS    ROS:   All 10 systems reviewed and found to be negative with the exception of what has been described above.     Vital Signs Last 24 Hrs  T(C): 36.6 (08 Oct 2024 08:02), Max: 36.7 (07 Oct 2024 16:00)  T(F): 97.9 (08 Oct 2024 08:02), Max: 98.1 (07 Oct 2024 16:00)  HR: 72 (08 Oct 2024 08:02) (71 - 73)  BP: 143/63 (08 Oct 2024 08:02) (126/63 - 143/63)  BP(mean): --  RR: 18 (08 Oct 2024 08:02) (18 - 18)  SpO2: 93% (08 Oct 2024 08:02) (92% - 95%)    Parameters below as of 08 Oct 2024 08:02  Patient On (Oxygen Delivery Method): room air      PE:  Constitutional: NAD, OOB to chair-posterior scalp laceration- staples  HEENT: NC/AT  Back: no tenderness  Respiratory: respirations even and non labored, LCTA- diminished ar the base   Cardiovascular: S1S2 regular, no murmurs  Abdomen: soft, not tender, not distended, positive BS  Genitourinary: voiding  Musculoskeletal: no muscle tenderness, no joint swelling or tenderness  Extremities: no pedal edema - bruising on the right arm - discoloration on the right forearm   Neurological: no focal deficits     MEDICATIONS  (STANDING):  acetaminophen     Tablet .. 650 milliGRAM(s) Oral every 6 hours  acetaminophen   IVPB .. 1000 milliGRAM(s) IV Intermittent once  amLODIPine   Tablet 5 milliGRAM(s) Oral daily  ascorbic acid 500 milliGRAM(s) Oral daily  aspirin  chewable 81 milliGRAM(s) Oral daily  atorvastatin 10 milliGRAM(s) Oral at bedtime  cholecalciferol 1000 Unit(s) Oral daily  DULoxetine 30 milliGRAM(s) Oral at bedtime  enoxaparin Injectable 40 milliGRAM(s) SubCutaneous every 24 hours  fludroCORTISONE 0.1 milliGRAM(s) Oral <User Schedule>  hydroxychloroquine 200 milliGRAM(s) Oral two times a day  influenza  Vaccine (HIGH DOSE) 0.5 milliLiter(s) IntraMuscular once  multivitamin 1 Tablet(s) Oral daily  predniSONE   Tablet 5 milliGRAM(s) Oral daily    MEDICATIONS  (PRN):  albuterol    90 MICROgram(s) HFA Inhaler 2 Puff(s) Inhalation every 6 hours PRN for shortness of breath and/or wheezing  ketorolac   Injectable 15 milliGRAM(s) IV Push every 6 hours PRN Moderate Pain (4 - 6)  ondansetron Injectable 4 milliGRAM(s) IV Push every 6 hours PRN Nausea  oxyCODONE    IR 5 milliGRAM(s) Oral every 6 hours PRN Severe Pain (7 - 10)  traMADol 50 milliGRAM(s) Oral every 6 hours PRN Mild Pain (1 - 3)      LABS:                          13.0   8.60  )-----------( 129      ( 07 Oct 2024 06:50 )             39.5     10-07    134[L]  |  102  |  20  ----------------------------<  93  4.0   |  27  |  0.88    Ca    8.6      07 Oct 2024 06:50  Phos  3.2     10-07  Mg     2.0     10-07    TPro  7.6  /  Alb  3.9  /  TBili  0.6  /  DBili  x   /  AST  55[H]  /  ALT  52  /  AlkPhos  55  10-05        LIVER FUNCTIONS - ( 05 Oct 2024 15:25 )  Alb: 3.9 g/dL / Pro: 7.6 gm/dL / ALK PHOS: 55 U/L / ALT: 52 U/L / AST: 55 U/L / GGT: x           PT/INR - ( 05 Oct 2024 15:25 )   PT: 10.3 sec;   INR: 0.87 ratio         PTT - ( 05 Oct 2024 15:25 )  PTT:29.0 sec    Urinalysis Basic - ( 07 Oct 2024 06:50 )    Color: x / Appearance: x / SG: x / pH: x  Gluc: 93 mg/dL / Ketone: x  / Bili: x / Urobili: x   Blood: x / Protein: x / Nitrite: x   Leuk Esterase: x / RBC: x / WBC x   Sq Epi: x / Non Sq Epi: x / Bacteria: x          ACC: [id number] EXAM:  XR PELVIS AP ONLY 1-2 VIEWS   PROCEDURE DATE:  10/05/2024    INTERPRETATION:  Inlet and outlet views of the pelvis.  Clinical indications: Pelvic pain.    IMPRESSION: Lower lumbar fusion. Left total hip arthroplasty. Moderate   right hip arthrosis. Minimally displaced fracture of the left ischial   tuberosity.        ACC: [id number] EXAM:  XR HIP WITH PELV 2-3V LT  PROCEDURE DATE:  10/05/2024    INTERPRETATION:  XR HIP WITH PELVIS 2 OR 3 VIEWS LEFT  INDICATION:  fall.  COMPARISON: October 2, 2023  FINDINGS:  There are fractures in the inferior pubic ramus on the left   and a second fracture in the obturator ring laterally adjacent to the   acetabulum. There is a total hip prosthesis on the left which appears   intact. There is a sacral lumbar spinal fusion    IMPRESSION:  Fractures in the obturator ring with mild displacement of   fracture fragments.

## 2024-10-08 NOTE — CHART NOTE - NSCHARTNOTEFT_GEN_A_CORE
Patient seen and examined by Dr. Miller at bedside. Patient states that she is feeling "woozy" and dizzy at times. Dr. Miller wanted to make primary team aware and follow up work up if needed. No orthopedic intervention needed at this time. ortho stable for discharge. FU with Dr. Miller in office in 2-4 weeks for repeat xrays. Dr. Miller aware and agrees with plan above.
TERTIARY TRAUMA SURVEY  ------------------------------------------------------------------------------------    Date of TTS: 10/6  Admit Date: 10/5    HPI:  CC:Patient is a 84y old  Female who presents with a chief complaint of fall    Subjective:  83 y/o F with PMH of HLD, migraines, RA, asthma presents s/p mechanical fall with head injury. Pt states she was unloading her groceries while sitting on a stool. Fell sideways off the stool and hit the back of her head on her oven door. No LOC. Pt takes daily ASA. Reports pain in her head and back. Could not ambulate after even, Denies LOC, visual changes, nausea, vomiting, CP, SOB, numbness/tingling in extremities.  Pt seen and examined at bedside. Pt is AAOx3, pt in no acute distress. Pt denied c/o fever, chills, chest pain, SOB, abd pain, N/V/D, hemoptysis, hematemesis, hematuria, hematochezia headache, diplopia, vertigo, dizziness Pt tolerating diet, (+) void, (+) ambulation, (+) bowel function    ROS: as above    PMH: HTN, HLD, Arthritis  PSH: b/l knee, left hip, b/l shoulder, spine  No Known Allergies    SH: Denies  FH: NA    Meds:  acetaminophen     Tablet .. 650 milliGRAM(s) Oral every 6 hours  acetaminophen   IVPB .. 1000 milliGRAM(s) IV Intermittent once  albuterol    90 MICROgram(s) HFA Inhaler 2 Puff(s) Inhalation every 6 hours PRN  amLODIPine   Tablet 10 milliGRAM(s) Oral daily  ascorbic acid 500 milliGRAM(s) Oral daily  atorvastatin 10 milliGRAM(s) Oral at bedtime  carvedilol 6.25 milliGRAM(s) Oral every 12 hours  cholecalciferol 1000 Unit(s) Oral daily  DULoxetine 30 milliGRAM(s) Oral daily  fludroCORTISONE 0.1 milliGRAM(s) Oral <User Schedule>  hydrALAZINE 25 milliGRAM(s) Oral daily  hydroxychloroquine 200 milliGRAM(s) Oral two times a day  ketorolac   Injectable 15 milliGRAM(s) IV Push every 6 hours PRN  multivitamin 1 Tablet(s) Oral daily  ondansetron Injectable 4 milliGRAM(s) IV Push every 6 hours PRN  oxyCODONE    IR 5 milliGRAM(s) Oral every 6 hours PRN  predniSONE   Tablet 5 milliGRAM(s) Oral daily  traMADol 50 milliGRAM(s) Oral every 6 hours PRN     (05 Oct 2024 17:04)      INTERVAL EVENTS: ***    PAST MEDICAL & SURGICAL HISTORY:  Mitral valve disease      RA (rheumatoid arthritis)      OA (osteoarthritis)      Migraine      HLD (hyperlipidemia)      Breast cancer  left 1990 treated with RT and surgery, no f/u required      Chronic pain  secondary to OA and RA      Implantable loop recorder present  pt reports she fainted and that's why it was placed      Asthma      History of lumpectomy of left breast  with sentinal node biopsy 1990      History of bilateral knee replacement  right 2009, left 2011      H/O spinal fusion  lumbar 2005      History of tonsillectomy      S/P bunionectomy  left x 2 2007      History of left hip replacement  2017      H/O mitral valve replacement  2015      History of cataract surgery  left cataract sx      S/P shoulder replacement, right          FAMILY HISTORY:  Family history of stomach cancer (Mother)  smoker    Family history of colon cancer in father (Father)  smoker        ALLERGIES: No Known Allergies      CURRENT MEDICATIONS  acetaminophen     Tablet .. 650 milliGRAM(s) Oral every 6 hours  acetaminophen   IVPB .. 1000 milliGRAM(s) IV Intermittent once  albuterol    90 MICROgram(s) HFA Inhaler 2 Puff(s) Inhalation every 6 hours PRN  amLODIPine   Tablet 10 milliGRAM(s) Oral daily  ascorbic acid 500 milliGRAM(s) Oral daily  aspirin  chewable 81 milliGRAM(s) Oral daily  atorvastatin 10 milliGRAM(s) Oral at bedtime  carvedilol 6.25 milliGRAM(s) Oral every 12 hours  cholecalciferol 1000 Unit(s) Oral daily  DULoxetine 30 milliGRAM(s) Oral at bedtime  enoxaparin Injectable 40 milliGRAM(s) SubCutaneous every 24 hours  fludroCORTISONE 0.1 milliGRAM(s) Oral <User Schedule>  hydrALAZINE 25 milliGRAM(s) Oral two times a day  hydroxychloroquine 200 milliGRAM(s) Oral two times a day  influenza  Vaccine (HIGH DOSE) 0.5 milliLiter(s) IntraMuscular once  ketorolac   Injectable 15 milliGRAM(s) IV Push every 6 hours PRN  multivitamin 1 Tablet(s) Oral daily  ondansetron Injectable 4 milliGRAM(s) IV Push every 6 hours PRN  oxyCODONE    IR 5 milliGRAM(s) Oral every 6 hours PRN  predniSONE   Tablet 5 milliGRAM(s) Oral daily  traMADol 50 milliGRAM(s) Oral every 6 hours PRN    -----------------------------------------------------------------------------------    VITAL SIGNS:  T(C): 36.4 (10-06-24 @ 16:00), Max: 36.4 (10-06-24 @ 07:52)  HR: 71 (10-06-24 @ 16:00) (71 - 73)  BP: 117/66 (10-06-24 @ 16:00)  RR: 18 (10-06-24 @ 16:00) (18 - 18)  SpO2: 96% (10-06-24 @ 16:00) (96% - 99%)    10-05-24 @ 07:01  -  10-06-24 @ 07:00  --------------------------------------------------------  IN: 0 mL / OUT: 400 mL / NET: -400 mL          PHYSICAL EXAM:    General: NAD  HEENT: NC/AT; Normal inspection of eyes and nose; Moist mucous membranes, no oral lesions  Neck: Soft, supple, full ROM. No cervical or paraspinal tenderness.   Cardio: RRR.   Chest: Good effort, CTAB. No chest wall tenderness.  GI/Abd: Soft, NT/ND. Pain at the pelvic region  Vascular: Extremities warm; B/L UE and LE pulses 2+  Skin: No rashes; Normal color  Musculoskeletal: All 4 extremities moving spontaneously, no limitations. Full ROM of shoulders, elbows, wrists, fingers, knees, ankles bilaterally. No tenderness to palpation of joints or extremities.  Neuro: Strength 5/5 in B/L UE/LE. Sensation to light touch intact in B/L UE/LE.   CRANIAL NERVES: I - olfactory intact. II - normal visual acuity testing with Snellen. III/IV/VI - EOM's intact, painless. V - Normal sensation throughout 3 branches. VII - Normal and symmetric eyebrow raise; cheek puff symmetric; normal and symmetric smile; Normal strength with eye closing b/l. VIII - Hearing intact to whisper. IX/X - Normal palate rise, + gag reflex. XI - normal shoulder shrug, neck flexion & lateral rotation. XII - Normal and symmetric tongue protrusion.      LABS:      MICROBIOLOGY:      ------------------------------------------------------------------------------------------  RADIOLOGICAL FINDINGS REVIEW:  ***  Pelvic frx    List Injuries Identified to Date:        List Operative and Interventional Radiological Procedures: ***    Consults (Date):  [] Neurosurgery   [] Orthopedic Surgery  [] Spine Surgery  [] Plastic Surgery  [] ENT  [] Urology  [] PM&R  [X] Social Work    INTERPRETATION/ASSESSMENT:   JULIAN ORO is a 84y Female who required a tertiary survey due to fall.    PLAN:   - Activity: Ambulate with assistance  - Diet: Reg  - c/w care
case and plan [facility] medicine team, pt c/o feeling lightheaded, with h/o orthostasis, orthostatic + today.  loop recorder RRT as per EP.  pt cleared from trauma perspective, injuries stable and no longer require inpatient management.    pt has expected discharge > 24 hours / acute medical process requiring inpatient management  case and plan el[facility] medicine team, DIAMOND Del Rio and Dr. Howe, will transfer care to medicine service  trauma team to sign off, elizabeth Ferris
Ep called to interrogate ILR after sustaining a fall.  Her ILR was found to be at RRT.  No orthopedic interventions were performed and she is being discharged today.  Will arrange for Zio AT upon discharge and will make arrangements with Dr. Briseno and pt to have an explant and re-implant of  ILR .  Her initial indiction for ILR was [facility].  Pt is aware and in agreement

## 2024-10-08 NOTE — PROGRESS NOTE ADULT - REASON FOR ADMISSION
Fall with pelvic fracture

## 2024-10-09 ENCOUNTER — TRANSCRIPTION ENCOUNTER (OUTPATIENT)
Age: 84
End: 2024-10-09

## 2024-10-09 VITALS — WEIGHT: 136.91 LBS

## 2024-10-09 LAB
ANION GAP SERPL CALC-SCNC: 3 MMOL/L — LOW (ref 5–17)
BUN SERPL-MCNC: 16 MG/DL — SIGNIFICANT CHANGE UP (ref 7–23)
CALCIUM SERPL-MCNC: 8.8 MG/DL — SIGNIFICANT CHANGE UP (ref 8.5–10.1)
CHLORIDE SERPL-SCNC: 104 MMOL/L — SIGNIFICANT CHANGE UP (ref 96–108)
CO2 SERPL-SCNC: 28 MMOL/L — SIGNIFICANT CHANGE UP (ref 22–31)
CREAT SERPL-MCNC: 0.72 MG/DL — SIGNIFICANT CHANGE UP (ref 0.5–1.3)
EGFR: 82 ML/MIN/1.73M2 — SIGNIFICANT CHANGE UP
GLUCOSE SERPL-MCNC: 87 MG/DL — SIGNIFICANT CHANGE UP (ref 70–99)
HCT VFR BLD CALC: 35.3 % — SIGNIFICANT CHANGE UP (ref 34.5–45)
HGB BLD-MCNC: 11.6 G/DL — SIGNIFICANT CHANGE UP (ref 11.5–15.5)
MAGNESIUM SERPL-MCNC: 2 MG/DL — SIGNIFICANT CHANGE UP (ref 1.6–2.6)
MCHC RBC-ENTMCNC: 31.1 PG — SIGNIFICANT CHANGE UP (ref 27–34)
MCHC RBC-ENTMCNC: 32.9 GM/DL — SIGNIFICANT CHANGE UP (ref 32–36)
MCV RBC AUTO: 94.6 FL — SIGNIFICANT CHANGE UP (ref 80–100)
PHOSPHATE SERPL-MCNC: 3.2 MG/DL — SIGNIFICANT CHANGE UP (ref 2.5–4.5)
PLATELET # BLD AUTO: 146 K/UL — LOW (ref 150–400)
POTASSIUM SERPL-MCNC: 4.1 MMOL/L — SIGNIFICANT CHANGE UP (ref 3.5–5.3)
POTASSIUM SERPL-SCNC: 4.1 MMOL/L — SIGNIFICANT CHANGE UP (ref 3.5–5.3)
RBC # BLD: 3.73 M/UL — LOW (ref 3.8–5.2)
RBC # FLD: 13.6 % — SIGNIFICANT CHANGE UP (ref 10.3–14.5)
SODIUM SERPL-SCNC: 135 MMOL/L — SIGNIFICANT CHANGE UP (ref 135–145)
WBC # BLD: 7.63 K/UL — SIGNIFICANT CHANGE UP (ref 3.8–10.5)
WBC # FLD AUTO: 7.63 K/UL — SIGNIFICANT CHANGE UP (ref 3.8–10.5)

## 2024-10-09 PROCEDURE — 72070 X-RAY EXAM THORAC SPINE 2VWS: CPT | Mod: 26

## 2024-10-09 PROCEDURE — 99239 HOSP IP/OBS DSCHRG MGMT >30: CPT

## 2024-10-09 PROCEDURE — 72100 X-RAY EXAM L-S SPINE 2/3 VWS: CPT | Mod: 26

## 2024-10-09 RX ADMIN — TRAMADOL HYDROCHLORIDE 50 MILLIGRAM(S): 50 TABLET, COATED ORAL at 12:55

## 2024-10-09 RX ADMIN — KETOROLAC TROMETHAMINE 15 MILLIGRAM(S): 10 TABLET, FILM COATED ORAL at 08:54

## 2024-10-09 RX ADMIN — Medication 1000 UNIT(S): at 10:56

## 2024-10-09 RX ADMIN — Medication 650 MILLIGRAM(S): at 05:49

## 2024-10-09 RX ADMIN — TRAMADOL HYDROCHLORIDE 50 MILLIGRAM(S): 50 TABLET, COATED ORAL at 06:20

## 2024-10-09 RX ADMIN — PREDNISONE 5 MILLIGRAM(S): 5 TABLET ORAL at 10:56

## 2024-10-09 RX ADMIN — Medication 81 MILLIGRAM(S): at 10:57

## 2024-10-09 RX ADMIN — TRAMADOL HYDROCHLORIDE 50 MILLIGRAM(S): 50 TABLET, COATED ORAL at 11:59

## 2024-10-09 RX ADMIN — FLUDROCORTISONE ACETATE 0.1 MILLIGRAM(S): 0.1 TABLET ORAL at 08:54

## 2024-10-09 RX ADMIN — INFLUENZA VIRUS VACCINE 0.5 MILLILITER(S): 15; 15; 15; 15 SUSPENSION INTRAMUSCULAR at 15:18

## 2024-10-09 RX ADMIN — Medication 5 MILLIGRAM(S): at 10:55

## 2024-10-09 RX ADMIN — Medication 650 MILLIGRAM(S): at 12:01

## 2024-10-09 RX ADMIN — Medication 650 MILLIGRAM(S): at 12:00

## 2024-10-09 RX ADMIN — Medication 500 MILLIGRAM(S): at 10:56

## 2024-10-09 RX ADMIN — HYDROXYCHLOROQUINE SULFATE 200 MILLIGRAM(S): 200 TABLET, FILM COATED ORAL at 10:55

## 2024-10-09 RX ADMIN — TRAMADOL HYDROCHLORIDE 50 MILLIGRAM(S): 50 TABLET, COATED ORAL at 05:50

## 2024-10-09 RX ADMIN — KETOROLAC TROMETHAMINE 15 MILLIGRAM(S): 10 TABLET, FILM COATED ORAL at 09:10

## 2024-10-09 RX ADMIN — MULTI VITAMIN/MINERAL SUPPLEMENT WITH ASCORBIC ACID, NIACIN, PYRIDOXINE, PANTOTHENIC ACID, FOLIC ACID, RIBOFLAVIN, THIAMIN, BIOTIN, COBALAMIN AND ZINC. 1 TABLET(S): 60; 20; 12.5; 10; 10; 1.7; 1.5; 1; .3; .006 TABLET, COATED ORAL at 10:56

## 2024-10-09 NOTE — DISCHARGE NOTE PROVIDER - NSDCMRMEDTOKEN_GEN_ALL_CORE_FT
acetaminophen 325 mg oral tablet: 2 tab(s) orally every 6 hours As needed Mild Pain (1 - 3)  amLODIPine 10 mg oral tablet: 1 tab(s) orally once a day hold for SBP&lt; 100  aspirin 81 mg oral delayed release tablet: 1 tab(s) orally once a day  atorvastatin 10 mg oral tablet: 1 tab(s) orally once a day  B Complex 50: 1 tab(s) orally once a day  fludrocortisone 0.1 mg oral tablet: 1 tab(s) orally Monday, Wednesday, and Friday  oxyCODONE 5 mg oral tablet: 1 tab(s) orally every 4 hours As needed Moderate Pain (4 - 6)  Plaquenil 200 mg oral tablet: 1 tab(s) orally 2 times a day  predniSONE 5 mg oral tablet: 1 tab(s) orally once a day  Symbicort 160 mcg-4.5 mcg/inh inhalation aerosol: 2 puff(s) inhaled 2 times a day  traMADol 50 mg oral tablet: 2 tab(s) orally 2 times a day as per patient this is how she takes it at home for arthritis  Ventolin HFA 90 mcg/inh inhalation aerosol: 2 puff(s) inhaled every 4 to 6 hours as needed for  shortness of breath and/or wheezing  Vitamin C 500 mg oral tablet: 1 tab(s) orally once a day  Vitamin D3 1000 intl units oral tablet: 2 tab(s) orally once a day

## 2024-10-09 NOTE — DISCHARGE NOTE NURSING/CASE MANAGEMENT/SOCIAL WORK - NSDCVIVACCINE_GEN_ALL_CORE_FT
Tdap; 10-Feb-2018 19:30; Naye Moreau (RN); [employer]; [id number]; IntraMuscular; Deltoid Right.; 0.5 milliLiter(s); VIS (VIS Published: 09-May-2013, VIS Presented: 10-Feb-2018);

## 2024-10-09 NOTE — DIETITIAN INITIAL EVALUATION ADULT - OTHER INFO
83 y/o woman with PMH of HLD, migraines, RA, asthma presents s/p mechanical fall with head injury. Pt states she was unloading her groceries while sitting on a stool. Fell sideways off the stool and hit the back of her head on her oven door. Found to have a left ischium mildly displaced fracture as well as anterior pubic rami fracture- ortho consulted. No acute orthopaedic surgical intervention indicated at this time. Head lac repaired in the ED. Pt feeling dizzy during PT session. Hx of orthostatic hypotension - with implantable loop recorder - consult EP for interrogation. as per EP - ILR not working will need ODILON patch prior to d/c and outpt f/u to have ILR explanted and replaced. Admitting diagnosis: ischium fracture, scalp laceration.     Pt seen eating breakfast at time of RD visit, had pancakes. Pt reports no change in appetite upon admission. Reports UBW ~125#, endorses no known wt changes at this time. Could not obtain bedscale wt at time of RD visit, pt in chair. Per EMR, admit wt of 137# on 10/5/24. Per HIE wt of 125# on 9/5/24. ? admit wt - no edema doc'd. Pt appears thin, frail, bony. NFPE reveals mod-sev muscle/fat wasting. Consider liberalizing diet to regular to maximize caloric and nutrient intake.  Pt denied all ONS at this time; agreeable to trial high-protein mousse BID (provides 394 kcal, 17g protein/ container). Consider adding appetite stimulant such as Remeron or Marinol 2/2 chronically poor appetite/ PO intake. See additional recommendations below.  85 y/o woman with PMH of HLD, migraines, RA, asthma presents s/p mechanical fall with head injury. Pt states she was unloading her groceries while sitting on a stool. Fell sideways off the stool and hit the back of her head on her oven door. Found to have a left ischium mildly displaced fracture as well as anterior pubic rami fracture- ortho consulted. No acute orthopaedic surgical intervention indicated at this time. Head lac repaired in the ED. Pt feeling dizzy during PT session. Hx of orthostatic hypotension - with implantable loop recorder - consult EP for interrogation. as per EP - ILR not working will need ODILON patch prior to d/c and outpt f/u to have ILR explanted and replaced. Admitting diagnosis: ischium fracture, scalp laceration.     Pt seen eating breakfast at time of RD visit, had pancakes. Pt reports no change in appetite upon admission. Reports UBW ~125#, endorses no known wt changes at this time. Could not obtain bedscale wt at time of RD visit, pt in chair. Per EMR, admit wt of 137# on 10/5/24. Per HIE wt of 125# on 9/5/24. ? accuracy of admit wt - no edema doc'd. Pt appears thin, frail, bony. NFPE reveals mod-sev muscle/fat wasting. Consider liberalizing diet to regular to maximize caloric and nutrient intake.  Pt denied all ONS at this time; agreeable to trial high-protein mousse BID (provides 394 kcal, 17g protein/ container). Consider adding appetite stimulant such as Remeron or Marinol 2/2 chronically poor appetite/ PO intake. See additional recommendations below.

## 2024-10-09 NOTE — DISCHARGE NOTE PROVIDER - NSDCCPCAREPLAN_GEN_ALL_CORE_FT
PRINCIPAL DISCHARGE DIAGNOSIS  Diagnosis: Ischium fracture  Assessment and Plan of Treatment: orthopedic service consulted and no plan for acute orthopedic intervention  Pain meds as per your dc medication paperwork   f/u with Ortho as an outpatient      SECONDARY DISCHARGE DIAGNOSES  Diagnosis: Scalp laceration  Assessment and Plan of Treatment: staples will need to be removed on 10/15    Diagnosis: Rheumatoid arthritis  Assessment and Plan of Treatment: continue plaquenil and prednisone      Diagnosis: Chronic orthostatic hypotension  Assessment and Plan of Treatment: continue florinef    Diagnosis: Hypertension  Assessment and Plan of Treatment: continue amlodipine  f/u with Dr Campbell as an outpatient   - you are discharged with a Zio patch- you will need to f/u with Cardiologist to have your Implantable loop recorder replaced. call to make an appointment

## 2024-10-09 NOTE — DISCHARGE NOTE PROVIDER - ATTENDING DISCHARGE PHYSICAL EXAMINATION:
Patient seen and examined with DIAMOND Smith.  I was physically present for the key portions of the evaluation and management (E/M) service provided.  I agree with the above history, physical, and plan which I have reviewed and edited where appropriate.   resp cta b/l  cvs + s1 s2 RR      * acute left pubic ramus fracture s/p mechanical fall  - ortho consulted; no acute orthopedic surgical intervention  - pain meds PRN  - PT/OT WBAT  - Head CT- negative  - f/u with Dr Miller as an outpatient   - patient with staples to head lac- recommend to d/c staples on 10/15    *Hx of RA  - on chronic prednisone  - c/w plaquenil    *history of orthostatic hypotension  - patient with implantable loop recorder- consult EP for interrogation - AS per EP- ILR is not working- will need ODILON patch prior to dc and will need outpatient f/u with EP to have ILR explanted and replaced   - c/w Florinef  - check orthostatic    *HTN- now with period of hypotension   - d/w Dr Campbell- dc HYdralazine and coreg for now- c/w Amlodipine  - c/w florinef  - if patient is hypertensive SBP>160- recommend starting low dose hydralazine po     *VTE prophylaxis- Lovenox

## 2024-10-09 NOTE — DIETITIAN INITIAL EVALUATION ADULT - PERTINENT MEDS FT
MEDICATIONS  (STANDING):  acetaminophen     Tablet .. 650 milliGRAM(s) Oral every 6 hours  acetaminophen   IVPB .. 1000 milliGRAM(s) IV Intermittent once  amLODIPine   Tablet 5 milliGRAM(s) Oral daily  ascorbic acid 500 milliGRAM(s) Oral daily  aspirin  chewable 81 milliGRAM(s) Oral daily  atorvastatin 10 milliGRAM(s) Oral at bedtime  cholecalciferol 1000 Unit(s) Oral daily  enoxaparin Injectable 40 milliGRAM(s) SubCutaneous every 24 hours  fludroCORTISONE 0.1 milliGRAM(s) Oral <User Schedule>  hydroxychloroquine 200 milliGRAM(s) Oral two times a day  influenza  Vaccine (HIGH DOSE) 0.5 milliLiter(s) IntraMuscular once  multivitamin 1 Tablet(s) Oral daily  predniSONE   Tablet 5 milliGRAM(s) Oral daily    MEDICATIONS  (PRN):  albuterol    90 MICROgram(s) HFA Inhaler 2 Puff(s) Inhalation every 6 hours PRN for shortness of breath and/or wheezing  ketorolac   Injectable 15 milliGRAM(s) IV Push every 6 hours PRN Moderate Pain (4 - 6)  ondansetron Injectable 4 milliGRAM(s) IV Push every 6 hours PRN Nausea  oxyCODONE    IR 5 milliGRAM(s) Oral every 6 hours PRN Severe Pain (7 - 10)  traMADol 50 milliGRAM(s) Oral every 6 hours PRN Mild Pain (1 - 3)

## 2024-10-09 NOTE — DIETITIAN INITIAL EVALUATION ADULT - PERTINENT LABORATORY DATA
10-09    135  |  104  |  16  ----------------------------<  87  4.1   |  28  |  0.72    Ca    8.8      09 Oct 2024 07:57  Phos  3.2     10-09  Mg     2.0     10-09

## 2024-10-09 NOTE — DISCHARGE NOTE PROVIDER - CARE PROVIDER_API CALL
Valentin Campbell  Cardiovascular Disease  [address], [address]  [place] [address]  Phone: [phone]  Fax: [phone]  Follow Up Time:     Justo Miller  Orthopaedic Surgery  [address] [place] [address]  Phone: [phone]  Fax: [phone]  Follow Up Time:

## 2024-10-09 NOTE — DISCHARGE NOTE PROVIDER - NSDCQMAMI_CARD_ALL_CORE
I have been having trouble breathing for a week. My doctor thinks it is my CHF. My legs are swollen too. No

## 2024-10-09 NOTE — CONSULT NOTE ADULT - CONSULT REASON
medical management
Time consulted ~0900  Time seen ~0929
consult called ~1630  consult seen ~1640
Fall with fracture

## 2024-10-09 NOTE — DIETITIAN INITIAL EVALUATION ADULT - ORAL INTAKE PTA/DIET HISTORY
Pt reports she lives by herself, daughter helps with the food shopping and cooking. Reports "fair" appetite/PO intake; states "don't feel like eating much" - likely meeting <75% ENN. Reports constipation but does not take any medication for it.

## 2024-10-09 NOTE — CONSULT NOTE ADULT - SUBJECTIVE AND OBJECTIVE BOX
CHIEF COMPLAINT: Patient is a 84y old  Female who presents with a chief complaint of Fall with pelvic fracture (08 Oct 2024 14:40)      HPI:  CC:Patient is a 84y old  Female who presents with a chief complaint of fall    Subjective:  83 y/o F with PMH of HLD, migraines, RA, asthma presents s/p mechanical fall with head injury. Pt states she was unloading her groceries while sitting on a stool. Fell sideways off the stool and hit the back of her head on her oven door. No LOC. Pt takes daily ASA. Reports pain in her head and back. Could not ambulate after even, Denies LOC, visual changes, nausea, vomiting, CP, SOB, numbness/tingling in extremities.  Pt seen and examined at bedside. Pt is AAOx3, pt in no acute distress. Pt denied c/o fever, chills, chest pain, SOB, abd pain, N/V/D, hemoptysis, hematemesis, hematuria, hematochezia headache, diplopia, vertigo, dizziness Pt tolerating diet, (+) void, (+) ambulation, (+) bowel function    ROS: as above      10/9/24 no acute issues overall    PMH: HTN, HLD, Arthritis  PSH: b/l knee, left hip, b/l shoulder, spine  No Known Allergies    SH: Denies  FH: NA    Meds:  acetaminophen     Tablet .. 650 milliGRAM(s) Oral every 6 hours  acetaminophen   IVPB .. 1000 milliGRAM(s) IV Intermittent once  albuterol    90 MICROgram(s) HFA Inhaler 2 Puff(s) Inhalation every 6 hours PRN  amLODIPine   Tablet 10 milliGRAM(s) Oral daily  ascorbic acid 500 milliGRAM(s) Oral daily  atorvastatin 10 milliGRAM(s) Oral at bedtime  carvedilol 6.25 milliGRAM(s) Oral every 12 hours  cholecalciferol 1000 Unit(s) Oral daily  DULoxetine 30 milliGRAM(s) Oral daily  fludroCORTISONE 0.1 milliGRAM(s) Oral <User Schedule>  hydrALAZINE 25 milliGRAM(s) Oral daily  hydroxychloroquine 200 milliGRAM(s) Oral two times a day  ketorolac   Injectable 15 milliGRAM(s) IV Push every 6 hours PRN  multivitamin 1 Tablet(s) Oral daily  ondansetron Injectable 4 milliGRAM(s) IV Push every 6 hours PRN  oxyCODONE    IR 5 milliGRAM(s) Oral every 6 hours PRN  predniSONE   Tablet 5 milliGRAM(s) Oral daily  traMADol 50 milliGRAM(s) Oral every 6 hours PRN     (05 Oct 2024 17:04)      PMHx: PAST MEDICAL & SURGICAL HISTORY:  Mitral valve disease      RA (rheumatoid arthritis)      OA (osteoarthritis)      Migraine      HLD (hyperlipidemia)      Breast cancer  left 1990 treated with RT and surgery, no f/u required      Chronic pain  secondary to OA and RA      Implantable loop recorder present  pt reports she fainted and that's why it was placed      Asthma      History of lumpectomy of left breast  with sentinal node biopsy 1990      History of bilateral knee replacement  right 2009, left 2011      H/O spinal fusion  lumbar 2005      History of tonsillectomy      S/P bunionectomy  left x 2 2007      History of left hip replacement  2017      H/O mitral valve replacement  2015      History of cataract surgery  left cataract sx      S/P shoulder replacement, right            Soc Hx:  lives with family, no smoking         Vital Signs Last 24 Hrs  T(C): 36.6 (09 Oct 2024 04:20), Max: 36.6 (08 Oct 2024 08:02)  T(F): 97.9 (09 Oct 2024 04:20), Max: 97.9 (08 Oct 2024 08:02)  HR: 79 (09 Oct 2024 04:20) (72 - 81)  BP: 151/79 (09 Oct 2024 04:20) (131/71 - 155/74)  BP(mean): 85 (08 Oct 2024 16:00) (85 - 85)  RR: 18 (09 Oct 2024 04:20) (18 - 18)  SpO2: 94% (09 Oct 2024 04:20) (93% - 95%)    Parameters below as of 09 Oct 2024 04:20  Patient On (Oxygen Delivery Method): room air        I&O's Summary          PHYSICAL EXAM:   Constitutional: NAD, awake and alert, well-developed  HEENT: PERR, EOMI, Normal Hearing, MMM  Neck: Soft and supple, No LAD, No JVD  Respiratory: Breath sounds are clear bilaterally, No wheezing, rales or rhonchi  Cardiovascular: S1 and S2, regular rate and rhythm, no Murmurs, gallops or rubs  Gastrointestinal: Bowel Sounds present, soft, nontender, nondistended, no guarding, no rebound  Extremities: No peripheral edema  Vascular: 2+ peripheral pulses  Neurological: A/O x 3, no focal deficits  Musculoskeletal: 5/5 strength b/l upper and lower extremities  Skin: No rashes    MEDICATIONS:  MEDICATIONS  (STANDING):  acetaminophen     Tablet .. 650 milliGRAM(s) Oral every 6 hours  acetaminophen   IVPB .. 1000 milliGRAM(s) IV Intermittent once  amLODIPine   Tablet 5 milliGRAM(s) Oral daily  ascorbic acid 500 milliGRAM(s) Oral daily  aspirin  chewable 81 milliGRAM(s) Oral daily  atorvastatin 10 milliGRAM(s) Oral at bedtime  cholecalciferol 1000 Unit(s) Oral daily  DULoxetine 30 milliGRAM(s) Oral at bedtime  enoxaparin Injectable 40 milliGRAM(s) SubCutaneous every 24 hours  fludroCORTISONE 0.1 milliGRAM(s) Oral <User Schedule>  hydroxychloroquine 200 milliGRAM(s) Oral two times a day  influenza  Vaccine (HIGH DOSE) 0.5 milliLiter(s) IntraMuscular once  multivitamin 1 Tablet(s) Oral daily  predniSONE   Tablet 5 milliGRAM(s) Oral daily      LABS: All Labs Reviewed:                        12.0   7.97  )-----------( 129      ( 08 Oct 2024 07:24 )             36.1     10-08    135  |  102  |  20  ----------------------------<  94  3.8   |  26  |  0.82    Ca    8.7      08 Oct 2024 07:24  Phos  3.2     10-08  Mg     2.0     10-08            EKG:< from: 12 Lead ECG (10.05.24 @ 15:43) >    Diagnosis Line Normal sinus rhythm  Possible Left atrial enlargement  T wave abnormality, consider inferior ischemia  Abnormal ECG  When compared with ECG of 22-FEB-2024 23:50,  No significant change was found  Confirmed by Palla MD, Gera ([id number]) on 10/6/2024 5:34:22 PM    < end of copied text >        
84y Female community ambulator with assistive device (cane) presents to the ED with left pelvic pain after mechanical fall today. Patient denies headstrike or LOC. Patient noticed progressively worsening pain and subsequent inability to ambulate secondary to pain from the affected side. Patient subsequently came to the ED for further evaluation and management. In the ED, endorses pain over the hip/groin area. Patient denies any fevers, chills, numbness, tingling, weakness, or any other orthopaedic complaint. Rik hip arthroplasty '17 by Dr. Mcleod, posterior spine fusion and BL TKA performed at outside hospital by unknown surgeon at unknown dates.    LABS:                        14.0   6.11  )-----------( 167      ( 05 Oct 2024 15:25 )             43.0     10-05    135  |  100  |  13  ----------------------------<  106[H]  4.0   |  29  |  0.89    Ca    9.2      05 Oct 2024 15:25    TPro  7.6  /  Alb  3.9  /  TBili  0.6  /  DBili  x   /  AST  55[H]  /  ALT  52  /  AlkPhos  55  10-05    PT/INR - ( 05 Oct 2024 15:25 )   PT: 10.3 sec;   INR: 0.87 ratio         PTT - ( 05 Oct 2024 15:25 )  PTT:29.0 sec  Urinalysis Basic - ( 05 Oct 2024 15:25 )    Color: x / Appearance: x / SG: x / pH: x  Gluc: 106 mg/dL / Ketone: x  / Bili: x / Urobili: x   Blood: x / Protein: x / Nitrite: x   Leuk Esterase: x / RBC: x / WBC x   Sq Epi: x / Non Sq Epi: x / Bacteria: x        VITAL SIGNS:  T(C): 36.8 (10-05-24 @ 16:55), Max: 36.8 (10-05-24 @ 16:55)  HR: 96 (10-05-24 @ 16:55) (76 - 96)  BP: 154/84 (10-05-24 @ 16:55) (154/84 - 162/83)  RR: 17 (10-05-24 @ 16:55) (17 - 18)  SpO2: 90% (10-05-24 @ 16:55) (90% - 93%)    Gen: Resting in bed, NAD  L LE:   Skin intact. No edema, erythema, or lesions of the skin. No visualized deformity.  TTP over the bony prominences of the hip. NTTP throughout the rest of the extremity. Negative log roll or axial load.   SILT L2-S1  GSC/TA/EHL/FHL intact; Q/H unable to assess 2/2 pain.   DP pulse palpable.   No calf tenderness bilaterally.   Compartments soft and compressible.     Secondary Assessment:  NC/AT, NTTP of clavicles, NTTP of C-spine,T-spine, or L-spine in the midline and paraspinal areas; NTTP of pelvis  LUE: NTTP of Shoulder, Elbow, Wrist, Hand; NT with AROM/PROM of Shoulder, Elbow, Wrist, Hand; AIN/PIN/Med/Uln/Msc/Rad/Ax intact  RUE: NTTP of Shoulder, Elbow, Wrist, Hand; NT with AROM/PROM of Shoulder, Elbow, Wrist, Hand; AIN/PIN/Med/Uln/Msc/Rad/Ax intact   RLE: Able to SLR, NT with Log Roll, NT with Heel Strike, NTTP of Hip, Knee, Ankle, Foot; NT with AROM/PROM of Hip, Knee, Ankle, Foot; Q/H/GSC/TA/EHL/FHL intact                   Imaging: XR reviewed demonstrating pubic rami fx (personal read)    Assessment and Plan  84y Female with L pubic ramus fracture    Imaging findings reviewed and discussed with the patient.   WBAT/PT/OT  Pain management PRN  No acute orthopaedic surgical intervention indicated at this time. This patient is orthopaedically stable for discharge.   Patient to follow up with Dr. Miller as an outpatient for further evaluation and management.   All of the patient's questions and concerns were answered and addressed.   Presentation and imaging discussed with Dr. Miller who agrees with plan. Will advise if plan changes.     
84y Female presents with lower back pain after a MF. Denies any numbness/tingling in upper or lower extremities. Had head strike with laceration treated by trauma surgery team in the ED, denies LOC/other orthopedic injuries at this time. Patient ambulates with cane at baseline. Has been able to ambulate while in the hospital without issues.    PAST MEDICAL & SURGICAL HISTORY:  Mitral valve disease      RA (rheumatoid arthritis)      OA (osteoarthritis)      Migraine      HLD (hyperlipidemia)      Breast cancer  left 1990 treated with RT and surgery, no f/u required      Chronic pain  secondary to OA and RA      Implantable loop recorder present  pt reports she fainted and that's why it was placed      Asthma      History of lumpectomy of left breast  with sentinal node biopsy 1990      History of bilateral knee replacement  right 2009, left 2011      H/O spinal fusion  lumbar 2005      History of tonsillectomy      S/P bunionectomy  left x 2 2007      History of left hip replacement  2017      H/O mitral valve replacement  2015      History of cataract surgery  left cataract sx      S/P shoulder replacement, right        Home Medications:  acetaminophen 325 mg oral tablet: 2 tab(s) orally every 6 hours As needed Mild Pain (1 - 3) (26 Feb 2024 12:14)  amLODIPine 10 mg oral tablet: 1 tab(s) orally once a day hold for SBP&lt; 100 (26 Feb 2024 12:14)  aspirin 81 mg oral delayed release tablet: 1 tab(s) orally once a day (23 Feb 2024 10:39)  atorvastatin 10 mg oral tablet: 1 tab(s) orally once a day (23 Feb 2024 10:38)  B Complex 50: 1 tab(s) orally once a day (23 Feb 2024 10:38)  Coreg 6.25 mg oral tablet: 1 tab(s) orally 2 times a day (25 Feb 2024 11:11)  Cymbalta 30 mg oral delayed release capsule: 1 cap(s) orally once a day (at bedtime) for back pain (23 Feb 2024 10:36)  fludrocortisone 0.1 mg oral tablet: 1 tab(s) orally Monday, Wednesday, and Friday (23 Feb 2024 10:36)  hydrALAZINE 25 mg oral tablet: 1 tab(s) orally 2 times a day hold for SBP &lt; 100 (26 Feb 2024 12:14)  Lovenox 40 mg/0.4 mL injectable solution: 40 milligram(s) subcutaneously once a day VTE prophylaxis (26 Feb 2024 12:14)  oxyCODONE 5 mg oral tablet: 1 tab(s) orally every 4 hours As needed Moderate Pain (4 - 6) (26 Feb 2024 12:14)  Plaquenil 200 mg oral tablet: 1 tab(s) orally 2 times a day (23 Feb 2024 10:36)  predniSONE 5 mg oral tablet: 1 tab(s) orally once a day (26 Feb 2024 12:14)  Symbicort 160 mcg-4.5 mcg/inh inhalation aerosol: 2 puff(s) inhaled 2 times a day (23 Feb 2024 10:37)  traMADol 50 mg oral tablet: 2 tab(s) orally every 4 to 6 hours as needed for pain (max 6 tabs daily) (23 Feb 2024 10:39)  Ventolin HFA 90 mcg/inh inhalation aerosol: 2 puff(s) inhaled every 4 to 6 hours as needed for  shortness of breath and/or wheezing (23 Feb 2024 10:39)  Vitamin C 500 mg oral tablet: 1 tab(s) orally once a day (23 Feb 2024 10:40)  Vitamin D3 1000 intl units oral tablet: 2 tab(s) orally once a day (23 Feb 2024 10:38)    Allergies    No Known Allergies    Intolerances                              12.0   7.97  )-----------( 129      ( 08 Oct 2024 07:24 )             36.1     10-08    135  |  102  |  20  ----------------------------<  94  3.8   |  26  |  0.82    Ca    8.7      08 Oct 2024 07:24  Phos  3.2     10-08  Mg     2.0     10-08        Urinalysis Basic - ( 08 Oct 2024 07:24 )    Color: x / Appearance: x / SG: x / pH: x  Gluc: 94 mg/dL / Ketone: x  / Bili: x / Urobili: x   Blood: x / Protein: x / Nitrite: x   Leuk Esterase: x / RBC: x / WBC x   Sq Epi: x / Non Sq Epi: x / Bacteria: x          Vital Signs Last 24 Hrs  T(C): 36.6 (08 Oct 2024 08:02), Max: 36.7 (07 Oct 2024 16:00)  T(F): 97.9 (08 Oct 2024 08:02), Max: 98.1 (07 Oct 2024 16:00)  HR: 72 (08 Oct 2024 08:02) (71 - 73)  BP: 143/63 (08 Oct 2024 08:02) (126/63 - 143/63)  BP(mean): --  RR: 18 (08 Oct 2024 08:02) (18 - 18)  SpO2: 93% (08 Oct 2024 08:02) (92% - 95%)    Parameters below as of 08 Oct 2024 08:02  Patient On (Oxygen Delivery Method): room air        Physical Exam  GEN: NAD, well appearing in bed    Spine:  Skin intact, no abrasions, erythema or ecchymosis  No palpable step off  NTTP along C/T/L spine  Grossly moving all extremities  + RP  + DP    Motor:                            C5             C6               C7             C8                  T1  R                      5/5             5/5             5/5             5/5                 5/5    L                      5/5             5/5             5/5             5/5                 5/5                              L2                 L3             L4                L5                   S1  R                     5/5                5/5           5/5               5/5                5/5  L                     5/5                5/5            5/5               5/5               5/5    Sensory:                    C5      C6      C7      C8       T1          R                 2         2        2         2         2          (0=absent, 1=impaired, 2=normal, NT=not testable)  L                 2         2        2         2         2          (0=absent, 1=impaired, 2=normal, NT=not testable)                      L2        L3       L4      L5       S1          R                 2          2         2        2        2           (0=absent, 1=impaired, 2=normal, NT=not testable)  L                 2          2         2        2        2           (0=absent, 1=impaired, 2=normal, NT=not testable)    Negative Hoffmans bilaterally  Negative Babinski bilaterally   Negative Clonus bilaterally     Secondary Assessment:  NC/AT, NTTP of clavicles, NTTP of C-,T-,L-Spine, NTTP of Pelvis  UEs: NTTP of Shoulders, Elbows, Wrists, Hands; NT with AROM/PROM of Shoulders, Elbows, Wrists, Hands; AIN/PIN/Med/Uln/Msc/Rad/Ax intact  LEs: Able to SLR, NT with Log Roll, NT with Heel Strike, NTTP of Hips, Knees, Ankles, Feet; NT with AROM/PROM of Hips, Knees, Ankles, Feet; Q/H/Gsc/TA/EHL/FHL intact       IMAGING:  < from: CT Abdomen and Pelvis No Cont (10.05.24 @ 14:18) >  Comminuted,mildly displaced fracture left ischium extending to junction   of posterior column of acetabulum and ischial tuberosity.  There is a noncemented left total hip arthroplasty with component   alignment within normal limits.    There is posterior fusionL3-S1 with bilateral spinal instrumentation.  Degenerative changes and diffuse osteopenia present.    There are mild compression deformities at the superior endplates of T11   and T12 vertebral bodies with probable Schmorl's nodes.    < end of copied text >      Assessment/Plan:  84y Female with T11 T12 vertebral compression fractures.    -No acute orthopaedic surgery or intervention indicated at this time  -Pain control as needed, ice as needed  -DVT ppx per primary team  -WBAT     -Will discuss with attending, and advise if plan changes  
PCP- Dr Campbell     CHIEF COMPLAINT:  mechanical fall with pelvic facture     HISTORY OF THE PRESENT ILLNESS:  85 y/o woman with PMH of HLD, migraines, RA, asthma presents s/p mechanical fall with head injury. Pt states she was unloading her groceries while sitting on a stool. Fell sideways off the stool and hit the back of her head on her oven door.   Found to have a left ischium mildly displaced fracture as well as anterior pubic rami fracture- ortho consulted. No acute orthopaedic surgical intervention indicated at this time.  Head lac repaired in the ED    10/7- patient was seen and examined. VSS- complaining of left hip pain 3/10. Denies SOB, CP. Worked with PT and did well.     PAST MEDICAL & SURGICAL HISTORY:  Mitral valve disease  COPD   Orthostatic Hypotension- on florinef   HTN  RA (rheumatoid arthritis)- on prednisone and plaquenil   OA (osteoarthritis)  Migraine  HLD (hyperlipidemia)  Breast cancer  left 1990 treated with RT and surgery, no f/u required  Chronic pain secondary to OA and RA  Implantable loop recorder  Asthma  History of lumpectomy of left breast  with sentinal node biopsy 1990  History of bilateral knee replacement  right 2009, left 2011  H/O spinal fusion  lumbar 2005  History of tonsillectomy  S/P bunionectomy  left x 2 2007  History of left hip replacement  2017  H/O mitral valve replacement  2015  History of cataract surgery  left cataract sx  S/P shoulder replacement, right    FAMILY HISTORY:  Family history of stomach cancer (Mother)  smoker  Family history of colon cancer in father (Father)  smoker    SOCIAL HISTORY: denies smoking  drinks on occasions  denies substance or drug use     REVIEW OF SYSTEMS:   All 10 systems reviewed in detailed and found to be negative with the exception of what has already been described above    MEDICATIONS  (STANDING):  acetaminophen     Tablet .. 650 milliGRAM(s) Oral every 6 hours  acetaminophen   IVPB .. 1000 milliGRAM(s) IV Intermittent once  amLODIPine   Tablet 5 milliGRAM(s) Oral daily  ascorbic acid 500 milliGRAM(s) Oral daily  aspirin  chewable 81 milliGRAM(s) Oral daily  atorvastatin 10 milliGRAM(s) Oral at bedtime  carvedilol 6.25 milliGRAM(s) Oral every 12 hours  cholecalciferol 1000 Unit(s) Oral daily  DULoxetine 30 milliGRAM(s) Oral at bedtime  enoxaparin Injectable 40 milliGRAM(s) SubCutaneous every 24 hours  fludroCORTISONE 0.1 milliGRAM(s) Oral <User Schedule>  hydrALAZINE 25 milliGRAM(s) Oral two times a day  hydroxychloroquine 200 milliGRAM(s) Oral two times a day  influenza  Vaccine (HIGH DOSE) 0.5 milliLiter(s) IntraMuscular once  multivitamin 1 Tablet(s) Oral daily  predniSONE   Tablet 5 milliGRAM(s) Oral daily    MEDICATIONS  (PRN):  albuterol    90 MICROgram(s) HFA Inhaler 2 Puff(s) Inhalation every 6 hours PRN for shortness of breath and/or wheezing  ketorolac   Injectable 15 milliGRAM(s) IV Push every 6 hours PRN Moderate Pain (4 - 6)  ondansetron Injectable 4 milliGRAM(s) IV Push every 6 hours PRN Nausea  oxyCODONE    IR 5 milliGRAM(s) Oral every 6 hours PRN Severe Pain (7 - 10)  traMADol 50 milliGRAM(s) Oral every 6 hours PRN Mild Pain (1 - 3)      PE:  Constitutional: NAD, OOB to chair-posterior scalp laceration- staples  HEENT: NC/AT  Back: no tenderness  Respiratory: respirations even and non labored, LCTA- diminished ar the base   Cardiovascular: S1S2 regular, no murmurs  Abdomen: soft, not tender, not distended, positive BS  Genitourinary: voiding  Musculoskeletal: no muscle tenderness, no joint swelling or tenderness  Extremities: no pedal edema - bruising on the right arm - discoloration on the right forearm   Neurological: no focal deficits     LABS:                          13.0   8.60  )-----------( 129      ( 07 Oct 2024 06:50 )             39.5     10-07    134[L]  |  102  |  20  ----------------------------<  93  4.0   |  27  |  0.88    Ca    8.6      07 Oct 2024 06:50  Phos  3.2     10-07  Mg     2.0     10-07    TPro  7.6  /  Alb  3.9  /  TBili  0.6  /  DBili  x   /  AST  55[H]  /  ALT  52  /  AlkPhos  55  10-05        LIVER FUNCTIONS - ( 05 Oct 2024 15:25 )  Alb: 3.9 g/dL / Pro: 7.6 gm/dL / ALK PHOS: 55 U/L / ALT: 52 U/L / AST: 55 U/L / GGT: x           PT/INR - ( 05 Oct 2024 15:25 )   PT: 10.3 sec;   INR: 0.87 ratio         PTT - ( 05 Oct 2024 15:25 )  PTT:29.0 sec    Urinalysis Basic - ( 07 Oct 2024 06:50 )    Color: x / Appearance: x / SG: x / pH: x  Gluc: 93 mg/dL / Ketone: x  / Bili: x / Urobili: x   Blood: x / Protein: x / Nitrite: x   Leuk Esterase: x / RBC: x / WBC x   Sq Epi: x / Non Sq Epi: x / Bacteria: x          ACC: [id number] EXAM:  XR PELVIS AP ONLY 1-2 VIEWS   PROCEDURE DATE:  10/05/2024    INTERPRETATION:  Inlet and outlet views of the pelvis.  Clinical indications: Pelvic pain.    IMPRESSION: Lower lumbar fusion. Left total hip arthroplasty. Moderate   right hip arthrosis. Minimally displaced fracture of the left ischial   tuberosity.        ACC: [id number] EXAM:  XR HIP WITH PELV 2-3V LT  PROCEDURE DATE:  10/05/2024    INTERPRETATION:  XR HIP WITH PELVIS 2 OR 3 VIEWS LEFT  INDICATION:  fall.  COMPARISON: October 2, 2023  FINDINGS:  There are fractures in the inferior pubic ramus on the left   and a second fracture in the obturator ring laterally adjacent to the   acetabulum. There is a total hip prosthesis on the left which appears   intact. There is a sacral lumbar spinal fusion    IMPRESSION:  Fractures in the obturator ring with mild displacement of   fracture fragments.

## 2024-10-09 NOTE — DIETITIAN NUTRITION RISK NOTIFICATION - ADDITIONAL COMMENTS/DIETITIAN RECOMMENDATIONS
1. Consider liberalizing diet to regular to maximize caloric and nutrient intake.   2. Add high-protein mousse BID (provides 394 kcal, 17g protein/ container)   3. C/w MVI w/ minerals daily to ensure 100% RDA met   4. Consider adding thiamine 100 mg daily 2/2 poor PO intake/ malnutrition   5. Monitor bowel movements, if no BM for >3 days, consider implementing bowel regimen.   6. Monitor daily lytes/min and replete prn  7. Encourage protein-rich foods, maximize food preferences   8. Monitor wts weekly track/trend changes  9. Consider adding appetite stimulant such as Remeron or Marinol 2/2 chronically poor appetite/ PO intake   10. Confirm goals of care regarding nutrition support. Nutrition support is not recommended due to overall declining medical status which evidenced based studies indicate EN is not effective in prolonging survival and improving quality of life. It can also increase risk of aspiration pneumonia as well as other related issues (infection, GI complications, and worsening/ non-healing PI's). However, will provide nutrition/ hydration within GOC.   RD will continue to monitor PO intake, labs, hydration, and wt prn.

## 2024-10-09 NOTE — CONSULT NOTE ADULT - ASSESSMENT
84 F with HTN, orthostatic, with fall and head trauma    HTN-- orthostatic yesterday--  coreg and hydralazine discontinued- would recheck  orthostatics today-  on norvasc 5 now--   if SBP > 160 can increase norvasc to BID dosing or re-introduce low dose hydralazine    encourage PO hydration    patient not taking duloxetine at home and wishes to discontinue here    recommend PT/rehab     will be discharged with event monitor and consider replacement of ILR as an outpaiten

## 2024-10-09 NOTE — DISCHARGE NOTE PROVIDER - NSDCCAREPROVSEEN_GEN_ALL_CORE_FT
Keanu, Imtiaz Miller, Justo Hines, Nathan Polk, Isabel Dorantes, Ashutosh Campbell, Valentin Howe, Sachin Phillips, Genesis Kruger, Jeramie Russell, Reji Thorpe, Cathy Buckley, Todd HUERTA

## 2024-10-09 NOTE — DISCHARGE NOTE NURSING/CASE MANAGEMENT/SOCIAL WORK - PATIENT PORTAL LINK FT
You can access the FollowMyHealth Patient Portal offered by [facility] by registering at the following website: [email or web address]. By joining [facility]’s FollowMyHealth portal, you will also be able to view your health information using other applications (apps) compatible with our system.

## 2024-10-09 NOTE — DISCHARGE NOTE PROVIDER - HOSPITAL COURSE
83 y/o woman with PMH of HLD, migraines, RA, asthma presents s/p mechanical fall with head injury. Pt states she was unloading her groceries while sitting on a stool. Fell sideways off the stool and hit the back of her head on her oven door.   Found to have a left ischium mildly displaced fracture as well as anterior pubic rami fracture- ortho consulted. No acute orthopaedic surgical intervention indicated at this time.  Head lac repaired in the ED.     10/9- Patient was seen and examined. VSS. No acute overnight events. Denies fever, pain or SOB. Tolerating diet. Worked with PT and did well.     Vital Signs Last 24 Hrs  T(C): 36.6 (09 Oct 2024 08:11), Max: 36.6 (09 Oct 2024 00:05)  T(F): 97.9 (09 Oct 2024 08:11), Max: 97.9 (09 Oct 2024 00:05)  HR: 80 (09 Oct 2024 08:11) (75 - 81)  BP: 146/70 (09 Oct 2024 08:11) (131/71 - 155/74)  BP(mean): 85 (08 Oct 2024 16:00) (85 - 85)  RR: 18 (09 Oct 2024 08:11) (18 - 18)  SpO2: 93% (09 Oct 2024 08:11) (93% - 95%)    Parameters below as of 09 Oct 2024 08:11  Patient On (Oxygen Delivery Method): room air    ROS:   All 10 systems reviewed and found to be negative with the exception of what has been described above.      PE:  Constitutional: NAD, OOB to chair-posterior scalp laceration- staples  HEENT: NC/AT  Back: no tenderness  Respiratory: respirations even and non labored, LCTA- diminished ar the base   Cardiovascular: S1S2 regular, no murmurs  Abdomen: soft, not tender, not distended, positive BS  Genitourinary: voiding  Musculoskeletal: no muscle tenderness, no joint swelling or tenderness  Extremities: no pedal edema - bruising on the right arm - discoloration on the right forearm   Neurological: no focal deficits     Meds/Labs- reviewed    PLAN  * acute left pubic ramus fracture s/p mechanical fall  - ortho consulted; no acute orthopedic surgical intervention  - pain meds PRN  - PT/OT WBAT  - Head CT- negative  - f/u with Dr Miller as an outpatient   - patient with staples to head lac- recommend to d/c staples on 10/15    *Hx of RA  - on chronic prednisone  - c/w plaquenil    *history of orthostatic hypotension  - patient with implantable loop recorder- consult EP for interrogation - AS per EP- ILR is not working- will need ODILON patch prior to dc and will need outpatient f/u with EP to have ILR explanted and replaced   - c/w Florinef  - check orthostatic    *HTN- now with period of hypotension   - d/w Dr Campbell- dc HYdralazine and coreg for now- c/w Amlodipine  - c/w florinef  - if patient is hypertensive SBP>160- recommend starting low dose hydralazine po     *VTE prophylaxis- Lovenox     Case d/w Dr Howe.  Plan d/w patient.

## 2024-10-09 NOTE — DISCHARGE NOTE PROVIDER - NSDCFUSCHEDAPPT_GEN_ALL_CORE_FT
Tayler Briseno  [facility] Physician [facility]  ELECTROPH [address] [place] [address] Av  Scheduled Appointment: 10/18/2024    [facility]  INTMED [address]  Scheduled Appointment: 11/01/2024    Duncan Hyde  [facility]  INTMED [address]  Scheduled Appointment: 11/01/2024    hCeikh Segura  [facility]  OTOLARYNG [address]  Scheduled Appointment: 11/05/2024

## 2024-10-16 DIAGNOSIS — I95.1 ORTHOSTATIC HYPOTENSION: ICD-10-CM
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
## 2024-10-16 DIAGNOSIS — S32.602A UNSPECIFIED FRACTURE OF LEFT ISCHIUM, INITIAL ENCOUNTER FOR CLOSED FRACTURE: ICD-10-CM

## 2024-10-16 DIAGNOSIS — Z96.653 PRESENCE OF ARTIFICIAL KNEE JOINT, BILATERAL: ICD-10-CM

## 2024-10-16 DIAGNOSIS — Z79.899 OTHER LONG TERM (CURRENT) DRUG THERAPY: ICD-10-CM

## 2024-10-16 DIAGNOSIS — J45.909 UNSPECIFIED ASTHMA, UNCOMPLICATED: ICD-10-CM

## 2024-10-16 DIAGNOSIS — Z92.3 PERSONAL HISTORY OF IRRADIATION: ICD-10-CM

## 2024-10-16 DIAGNOSIS — Z79.52 LONG TERM (CURRENT) USE OF SYSTEMIC STEROIDS: ICD-10-CM

## 2024-10-16 DIAGNOSIS — Z85.3 PERSONAL HISTORY OF MALIGNANT NEOPLASM OF BREAST: ICD-10-CM

## 2024-10-16 DIAGNOSIS — S32.502A UNSPECIFIED FRACTURE OF LEFT PUBIS, INITIAL ENCOUNTER FOR CLOSED FRACTURE: ICD-10-CM
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
## 2024-10-16 DIAGNOSIS — Z95.818 PRESENCE OF OTHER CARDIAC IMPLANTS AND GRAFTS: ICD-10-CM

## 2024-10-16 DIAGNOSIS — S01.01XA LACERATION WITHOUT FOREIGN BODY OF SCALP, INITIAL ENCOUNTER: ICD-10-CM

## 2024-10-16 DIAGNOSIS — Z79.51 LONG TERM (CURRENT) USE OF INHALED STEROIDS: ICD-10-CM

## 2024-10-16 DIAGNOSIS — Z90.12 ACQUIRED ABSENCE OF LEFT BREAST AND NIPPLE: ICD-10-CM

## 2024-10-16 DIAGNOSIS — E78.5 HYPERLIPIDEMIA, UNSPECIFIED: ICD-10-CM

## 2024-10-16 DIAGNOSIS — I10 ESSENTIAL (PRIMARY) HYPERTENSION: ICD-10-CM

## 2024-10-16 DIAGNOSIS — M19.90 UNSPECIFIED OSTEOARTHRITIS, UNSPECIFIED SITE: ICD-10-CM

## 2024-10-16 DIAGNOSIS — Z23 ENCOUNTER FOR IMMUNIZATION: ICD-10-CM

## 2024-10-16 DIAGNOSIS — Z96.611 PRESENCE OF RIGHT ARTIFICIAL SHOULDER JOINT: ICD-10-CM

## 2024-10-16 DIAGNOSIS — Z96.642 PRESENCE OF LEFT ARTIFICIAL HIP JOINT: ICD-10-CM

## 2024-10-16 DIAGNOSIS — M06.9 RHEUMATOID ARTHRITIS, UNSPECIFIED: ICD-10-CM

## 2024-10-16 DIAGNOSIS — Z95.2 PRESENCE OF PROSTHETIC HEART VALVE: ICD-10-CM

## 2024-10-16 DIAGNOSIS — Z79.82 LONG TERM (CURRENT) USE OF ASPIRIN: ICD-10-CM

## 2024-10-16 DIAGNOSIS — Z98.42 CATARACT EXTRACTION STATUS, LEFT EYE: ICD-10-CM

## 2024-10-18 ENCOUNTER — APPOINTMENT (OUTPATIENT)
Dept: ELECTROPHYSIOLOGY | Facility: CLINIC | Age: 84
End: 2024-10-18
Payer: MEDICARE

## 2024-10-18 VITALS
HEIGHT: 65 IN | BODY MASS INDEX: 20.99 KG/M2 | HEART RATE: 103 BPM | OXYGEN SATURATION: 94 % | DIASTOLIC BLOOD PRESSURE: 84 MMHG | WEIGHT: 126 LBS | SYSTOLIC BLOOD PRESSURE: 137 MMHG

## 2024-10-18 DIAGNOSIS — J44.9 CHRONIC OBSTRUCTIVE PULMONARY DISEASE, UNSPECIFIED: ICD-10-CM

## 2024-10-18 DIAGNOSIS — M35.3 POLYMYALGIA RHEUMATICA: ICD-10-CM

## 2024-10-18 PROCEDURE — 93285 PRGRMG DEV EVAL SCRMS IP: CPT

## 2024-10-18 PROCEDURE — 99204 OFFICE O/P NEW MOD 45 MIN: CPT

## 2024-10-21 DIAGNOSIS — R55 SYNCOPE AND COLLAPSE: ICD-10-CM

## 2024-11-01 ENCOUNTER — APPOINTMENT (OUTPATIENT)
Dept: ORTHOPEDIC SURGERY | Facility: CLINIC | Age: 84
End: 2024-11-01
Payer: MEDICARE

## 2024-11-01 DIAGNOSIS — T84.028A DISLOCATION OF OTHER INTERNAL JOINT PROSTHESIS, INITIAL ENCOUNTER: ICD-10-CM

## 2024-11-01 DIAGNOSIS — Z96.659 DISLOCATION OF OTHER INTERNAL JOINT PROSTHESIS, INITIAL ENCOUNTER: ICD-10-CM

## 2024-11-01 DIAGNOSIS — S32.9XXA FRACTURE OF UNSPECIFIED PARTS OF LUMBOSACRAL SPINE AND PELVIS, INITIAL ENCOUNTER FOR CLOSED FRACTURE: ICD-10-CM

## 2024-11-01 PROCEDURE — G2211 COMPLEX E/M VISIT ADD ON: CPT

## 2024-11-01 PROCEDURE — 99213 OFFICE O/P EST LOW 20 MIN: CPT

## 2024-11-05 ENCOUNTER — APPOINTMENT (OUTPATIENT)
Dept: OTOLARYNGOLOGY | Facility: CLINIC | Age: 84
End: 2024-11-05
Payer: MEDICARE

## 2024-11-05 ENCOUNTER — NON-APPOINTMENT (OUTPATIENT)
Age: 84
End: 2024-11-05

## 2024-11-05 VITALS — BODY MASS INDEX: 21.16 KG/M2 | WEIGHT: 127 LBS | HEIGHT: 65 IN

## 2024-11-05 DIAGNOSIS — H61.23 IMPACTED CERUMEN, BILATERAL: ICD-10-CM

## 2024-11-05 DIAGNOSIS — H93.8X3 OTHER SPECIFIED DISORDERS OF EAR, BILATERAL: ICD-10-CM

## 2024-11-05 DIAGNOSIS — H90.5 UNSPECIFIED SENSORINEURAL HEARING LOSS: ICD-10-CM

## 2024-11-05 PROCEDURE — 99213 OFFICE O/P EST LOW 20 MIN: CPT | Mod: 25

## 2024-11-05 PROCEDURE — 69210 REMOVE IMPACTED EAR WAX UNI: CPT

## 2024-11-13 NOTE — PROCEDURAL SAFETY CHECKLIST WITH OR WITHOUT SEDATION - NSPTSPECIFCONCERNSD_GEN_ALL_CORE
For information on Fall & Injury Prevention, visit: [email or web address] OR  [email or web address] OR  https://www.cdc.gov/steadi/patient.html no

## 2024-11-22 ENCOUNTER — APPOINTMENT (OUTPATIENT)
Dept: INTERNAL MEDICINE | Facility: CLINIC | Age: 84
End: 2024-11-22
Payer: MEDICARE

## 2024-11-22 VITALS
WEIGHT: 126 LBS | OXYGEN SATURATION: 93 % | BODY MASS INDEX: 21.51 KG/M2 | TEMPERATURE: 97.7 F | DIASTOLIC BLOOD PRESSURE: 80 MMHG | RESPIRATION RATE: 18 BRPM | HEIGHT: 64 IN | SYSTOLIC BLOOD PRESSURE: 145 MMHG | HEART RATE: 99 BPM

## 2024-11-22 DIAGNOSIS — J44.9 CHRONIC OBSTRUCTIVE PULMONARY DISEASE, UNSPECIFIED: ICD-10-CM

## 2024-11-22 DIAGNOSIS — R91.8 OTHER NONSPECIFIC ABNORMAL FINDING OF LUNG FIELD: ICD-10-CM

## 2024-11-22 DIAGNOSIS — R06.09 OTHER FORMS OF DYSPNEA: ICD-10-CM

## 2024-11-22 DIAGNOSIS — J45.40 MODERATE PERSISTENT ASTHMA, UNCOMPLICATED: ICD-10-CM

## 2024-11-22 PROCEDURE — 94729 DIFFUSING CAPACITY: CPT

## 2024-11-22 PROCEDURE — G2211 COMPLEX E/M VISIT ADD ON: CPT

## 2024-11-22 PROCEDURE — 99214 OFFICE O/P EST MOD 30 MIN: CPT | Mod: 25

## 2024-11-22 PROCEDURE — 94060 EVALUATION OF WHEEZING: CPT

## 2024-11-22 PROCEDURE — ZZZZZ: CPT

## 2024-11-22 PROCEDURE — 94727 GAS DIL/WSHOT DETER LNG VOL: CPT

## 2024-11-22 RX ORDER — IPRATROPIUM BROMIDE AND ALBUTEROL SULFATE 2.5; .5 MG/3ML; MG/3ML
0.5-2.5 (3) SOLUTION RESPIRATORY (INHALATION)
Refills: 0 | Status: ACTIVE | COMMUNITY

## 2024-11-22 RX ORDER — MULTIVIT-MIN/FOLIC/VIT K/LYCOP 400-300MCG
500 TABLET ORAL
Refills: 0 | Status: ACTIVE | COMMUNITY

## 2024-11-22 RX ORDER — B-COMPLEX WITH VITAMIN C
TABLET ORAL
Refills: 0 | Status: ACTIVE | COMMUNITY

## 2024-11-22 RX ORDER — FLUTICASONE FUROATE, UMECLIDINIUM BROMIDE AND VILANTEROL TRIFENATATE 200; 62.5; 25 UG/1; UG/1; UG/1
200-62.5-25 POWDER RESPIRATORY (INHALATION)
Qty: 1 | Refills: 5 | Status: ACTIVE | COMMUNITY
Start: 2024-11-22 | End: 1900-01-01

## 2024-12-21 NOTE — DISCHARGE NOTE PROVIDER - NSDCCONDITION_GEN_ALL_CORE
Patient's most recent potassium results are listed below.   Recent Labs     12/19/24  0338 12/20/24  0340 12/21/24  0331   K 3.3* 3.3* 3.9       Plan  - Replete potassium per protocol  - Monitor potassium Daily  - Patient's hypokalemia is stable, continue below and monitor  12/20 increase pot bicarb to bid     Stable

## 2025-01-03 ENCOUNTER — OUTPATIENT (OUTPATIENT)
Dept: OUTPATIENT SERVICES | Facility: HOSPITAL | Age: 85
LOS: 1 days | End: 2025-01-03
Payer: MEDICARE

## 2025-01-03 ENCOUNTER — APPOINTMENT (OUTPATIENT)
Dept: CT IMAGING | Facility: CLINIC | Age: 85
End: 2025-01-03
Payer: MEDICARE

## 2025-01-03 DIAGNOSIS — Z98.89 OTHER SPECIFIED POSTPROCEDURAL STATES: Chronic | ICD-10-CM

## 2025-01-03 DIAGNOSIS — Z96.611 PRESENCE OF RIGHT ARTIFICIAL SHOULDER JOINT: Chronic | ICD-10-CM

## 2025-01-03 DIAGNOSIS — Z96.653 PRESENCE OF ARTIFICIAL KNEE JOINT, BILATERAL: Chronic | ICD-10-CM

## 2025-01-03 DIAGNOSIS — Z98.49 CATARACT EXTRACTION STATUS, UNSPECIFIED EYE: Chronic | ICD-10-CM

## 2025-01-03 DIAGNOSIS — Z96.642 PRESENCE OF LEFT ARTIFICIAL HIP JOINT: Chronic | ICD-10-CM

## 2025-01-03 DIAGNOSIS — Z95.2 PRESENCE OF PROSTHETIC HEART VALVE: Chronic | ICD-10-CM

## 2025-01-03 DIAGNOSIS — Z98.1 ARTHRODESIS STATUS: Chronic | ICD-10-CM

## 2025-01-03 PROCEDURE — 71250 CT THORAX DX C-: CPT | Mod: 26,MH

## 2025-01-03 PROCEDURE — 71250 CT THORAX DX C-: CPT

## 2025-01-16 ENCOUNTER — OUTPATIENT (OUTPATIENT)
Dept: OUTPATIENT SERVICES | Facility: HOSPITAL | Age: 85
LOS: 1 days | Discharge: HOME | End: 2025-01-16
Payer: MEDICARE

## 2025-01-16 VITALS
SYSTOLIC BLOOD PRESSURE: 151 MMHG | HEART RATE: 89 BPM | WEIGHT: 126.1 LBS | OXYGEN SATURATION: 94 % | RESPIRATION RATE: 18 BRPM | DIASTOLIC BLOOD PRESSURE: 77 MMHG | HEIGHT: 65 IN

## 2025-01-16 VITALS
DIASTOLIC BLOOD PRESSURE: 65 MMHG | OXYGEN SATURATION: 94 % | HEART RATE: 86 BPM | SYSTOLIC BLOOD PRESSURE: 137 MMHG | RESPIRATION RATE: 18 BRPM

## 2025-01-16 DIAGNOSIS — Z96.611 PRESENCE OF RIGHT ARTIFICIAL SHOULDER JOINT: Chronic | ICD-10-CM

## 2025-01-16 DIAGNOSIS — Z98.89 OTHER SPECIFIED POSTPROCEDURAL STATES: Chronic | ICD-10-CM

## 2025-01-16 DIAGNOSIS — Z95.2 PRESENCE OF PROSTHETIC HEART VALVE: Chronic | ICD-10-CM

## 2025-01-16 DIAGNOSIS — Z98.49 CATARACT EXTRACTION STATUS, UNSPECIFIED EYE: Chronic | ICD-10-CM

## 2025-01-16 DIAGNOSIS — Z96.642 PRESENCE OF LEFT ARTIFICIAL HIP JOINT: Chronic | ICD-10-CM

## 2025-01-16 DIAGNOSIS — R55 SYNCOPE AND COLLAPSE: ICD-10-CM

## 2025-01-16 DIAGNOSIS — Z98.1 ARTHRODESIS STATUS: Chronic | ICD-10-CM

## 2025-01-16 DIAGNOSIS — Z96.653 PRESENCE OF ARTIFICIAL KNEE JOINT, BILATERAL: Chronic | ICD-10-CM

## 2025-01-16 PROCEDURE — 33286 RMVL SUBQ CAR RHYTHM MNTR: CPT

## 2025-01-16 PROCEDURE — 33286 RMVL SUBQ CAR RHYTHM MNTR: CPT | Mod: 59

## 2025-01-16 PROCEDURE — 33285 INSJ SUBQ CAR RHYTHM MNTR: CPT

## 2025-01-16 PROCEDURE — C1764: CPT

## 2025-01-16 NOTE — ASU PATIENT PROFILE, ADULT - FALL HARM RISK - HARM RISK INTERVENTIONS

## 2025-01-16 NOTE — PACU DISCHARGE NOTE - COMMENTS
pt aaox4, pt denies any symptoms. clean dry dressing intact over left ACW, no s/sx of hematoma, swelling, or bleeding noted. dc instructions given, verbalized understanding. pt ambulatory upon dc, assisted pt on wc to main lobby
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
## 2025-01-21 DIAGNOSIS — T82.111A BREAKDOWN (MECHANICAL) OF CARDIAC PULSE GENERATOR (BATTERY), INITIAL ENCOUNTER: ICD-10-CM

## 2025-01-23 DIAGNOSIS — R55 SYNCOPE AND COLLAPSE: ICD-10-CM

## 2025-01-23 DIAGNOSIS — Z45.09 ENCOUNTER FOR ADJUSTMENT AND MANAGEMENT OF OTHER CARDIAC DEVICE: ICD-10-CM

## 2025-01-29 ENCOUNTER — NON-APPOINTMENT (OUTPATIENT)
Age: 85
End: 2025-01-29

## 2025-01-29 ENCOUNTER — APPOINTMENT (OUTPATIENT)
Dept: ELECTROPHYSIOLOGY | Facility: CLINIC | Age: 85
End: 2025-01-29
Payer: MEDICARE

## 2025-01-29 VITALS
DIASTOLIC BLOOD PRESSURE: 77 MMHG | HEIGHT: 64 IN | HEART RATE: 109 BPM | SYSTOLIC BLOOD PRESSURE: 125 MMHG | OXYGEN SATURATION: 96 % | BODY MASS INDEX: 21.34 KG/M2 | WEIGHT: 125 LBS

## 2025-01-29 DIAGNOSIS — R55 SYNCOPE AND COLLAPSE: ICD-10-CM

## 2025-01-29 DIAGNOSIS — Z95.818 PRESENCE OF OTHER CARDIAC IMPLANTS AND GRAFTS: ICD-10-CM

## 2025-01-29 PROCEDURE — 93285 PRGRMG DEV EVAL SCRMS IP: CPT

## 2025-01-29 PROCEDURE — 99212 OFFICE O/P EST SF 10 MIN: CPT

## 2025-01-29 RX ORDER — HYDROXYCHLOROQUINE SULFATE 200 MG/1
200 TABLET ORAL
Refills: 0 | Status: ACTIVE | COMMUNITY

## 2025-01-29 RX ORDER — TRAMADOL HYDROCHLORIDE 50 MG/1
50 TABLET, COATED ORAL
Refills: 0 | Status: ACTIVE | COMMUNITY

## 2025-01-31 NOTE — DISCHARGE NOTE PROVIDER - ATTENDING ATTESTATION STATEMENT
Herminia/Faith,  Please send copy of pathology results to patient's primary care physician: Dr. Danny Fraser - [facility].    I called the patient [phone] and informed her of the results of the pathology. She is aware of the need for repeat surveillance colonoscopy in 7 years' time (if her health at that time warrants given advanced age at that time).  I have personally seen and examined the patient. I have collaborated with and supervised the

## 2025-02-21 NOTE — PRE-OP CHECKLIST - AS BP NONINV SITE
".\"A [employer] message has been sent to the patient for [place]IENT [place] [facility]  "
left upper arm

## 2025-05-07 ENCOUNTER — APPOINTMENT (OUTPATIENT)
Dept: OTOLARYNGOLOGY | Facility: CLINIC | Age: 85
End: 2025-05-07
Payer: MEDICARE

## 2025-05-07 ENCOUNTER — NON-APPOINTMENT (OUTPATIENT)
Age: 85
End: 2025-05-07

## 2025-05-07 VITALS — BODY MASS INDEX: 20.99 KG/M2 | HEIGHT: 65 IN | WEIGHT: 126 LBS

## 2025-05-07 DIAGNOSIS — H90.3 SENSORINEURAL HEARING LOSS, BILATERAL: ICD-10-CM

## 2025-05-07 DIAGNOSIS — H93.8X3 OTHER SPECIFIED DISORDERS OF EAR, BILATERAL: ICD-10-CM

## 2025-05-07 DIAGNOSIS — H61.23 IMPACTED CERUMEN, BILATERAL: ICD-10-CM

## 2025-05-07 PROCEDURE — G0268 REMOVAL OF IMPACTED WAX MD: CPT

## 2025-05-07 PROCEDURE — 92557 COMPREHENSIVE HEARING TEST: CPT

## 2025-05-07 PROCEDURE — 92567 TYMPANOMETRY: CPT

## 2025-05-07 PROCEDURE — 99214 OFFICE O/P EST MOD 30 MIN: CPT | Mod: 25

## 2025-05-21 NOTE — ASSESSMENT
Medication passed protocol.     Medication:    Semaglutide, 2 MG/DOSE, (Ozempic, 2 MG/DOSE,) 8 MG/3ML Solution Pen-injector    passed protocol.   Last office visit date: 11/1/24  Next appointment scheduled?: Yes 7/15/25  Number of refills given: 3     [FreeTextEntry1] : 82 year old woman recently admitted to the hospital with severe abdominal pain, found to have antral ulcer, here for follow up. \par \par Patient doing well. \par I will write her for more pantoprazole BID. \par She can stop taking the carafate. \par We are booking an EGD in one month for her two month follow up to ensure healing.

## 2025-06-19 ENCOUNTER — APPOINTMENT (OUTPATIENT)
Dept: CT IMAGING | Facility: CLINIC | Age: 85
End: 2025-06-19
Payer: MEDICARE

## 2025-06-19 ENCOUNTER — RESULT REVIEW (OUTPATIENT)
Age: 85
End: 2025-06-19

## 2025-06-19 ENCOUNTER — OUTPATIENT (OUTPATIENT)
Dept: OUTPATIENT SERVICES | Facility: HOSPITAL | Age: 85
LOS: 1 days | End: 2025-06-19
Payer: MEDICARE

## 2025-06-19 DIAGNOSIS — Z98.49 CATARACT EXTRACTION STATUS, UNSPECIFIED EYE: Chronic | ICD-10-CM

## 2025-06-19 DIAGNOSIS — Z98.89 OTHER SPECIFIED POSTPROCEDURAL STATES: Chronic | ICD-10-CM

## 2025-06-19 DIAGNOSIS — Z98.1 ARTHRODESIS STATUS: Chronic | ICD-10-CM

## 2025-06-19 DIAGNOSIS — Z00.8 ENCOUNTER FOR OTHER GENERAL EXAMINATION: ICD-10-CM

## 2025-06-19 DIAGNOSIS — Z96.642 PRESENCE OF LEFT ARTIFICIAL HIP JOINT: Chronic | ICD-10-CM

## 2025-06-19 DIAGNOSIS — Z96.653 PRESENCE OF ARTIFICIAL KNEE JOINT, BILATERAL: Chronic | ICD-10-CM

## 2025-06-19 DIAGNOSIS — Z96.611 PRESENCE OF RIGHT ARTIFICIAL SHOULDER JOINT: Chronic | ICD-10-CM

## 2025-06-19 DIAGNOSIS — Z95.2 PRESENCE OF PROSTHETIC HEART VALVE: Chronic | ICD-10-CM

## 2025-06-19 PROCEDURE — 74177 CT ABD & PELVIS W/CONTRAST: CPT | Mod: MH

## 2025-06-19 PROCEDURE — 74177 CT ABD & PELVIS W/CONTRAST: CPT | Mod: 26

## 2025-06-19 PROCEDURE — 71260 CT THORAX DX C+: CPT | Mod: MH

## 2025-06-19 PROCEDURE — 71260 CT THORAX DX C+: CPT | Mod: 26

## 2025-07-22 NOTE — PHYSICAL THERAPY INITIAL EVALUATION ADULT - OCCUPATION
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

## 2025-08-06 ENCOUNTER — RX RENEWAL (OUTPATIENT)
Age: 85
End: 2025-08-06

## 2025-09-09 ENCOUNTER — APPOINTMENT (OUTPATIENT)
Dept: ORTHOPEDIC SURGERY | Facility: CLINIC | Age: 85
End: 2025-09-09

## (undated) DEVICE — SUT ORTHOCORD 2 36"

## (undated) DEVICE — BLADE SURGICAL #15 CARBON

## (undated) DEVICE — HOOD T5 PEELAWAY

## (undated) DEVICE — MIXER BONE CEMENT EVAC III

## (undated) DEVICE — FRA-ESU BOVIE FORCE FX F3B25826A: Type: DURABLE MEDICAL EQUIPMENT

## (undated) DEVICE — SUT ORTHOCORD COMPOSITE

## (undated) DEVICE — SUT STRATAFIX SPIRAL MONOCRYL PLUS 4-0 45CM PS-2 UNDYED

## (undated) DEVICE — SUT ETHIBOND 2 30" V37

## (undated) DEVICE — VENODYNE/SCD SLEEVE CALF MEDIUM

## (undated) DEVICE — FRAZIER SUCTION TIP 12FR

## (undated) DEVICE — BLADE SURGICAL #10 CARBON

## (undated) DEVICE — DRAPE MAGNETIC INSTRUMENT MEDIUM

## (undated) DEVICE — DRAPE IOBAN 33" X 23"

## (undated) DEVICE — DRAPE SURGICAL #1010

## (undated) DEVICE — DRAPE INSTRUMENT POUCH 6.75" X 11"

## (undated) DEVICE — SUT NDL MAYO CATGUT 1/2 CIRCLE TAPER POINT 0.056" X 1.950"

## (undated) DEVICE — DRSG MCCONNELL ARM WRAP LG

## (undated) DEVICE — PACK ORTHO

## (undated) DEVICE — GOWN SMARTGOWN RAGLAN XLG

## (undated) DEVICE — FRA-ESU BOVIE FORCE TRIAD T7J19745DX: Type: DURABLE MEDICAL EQUIPMENT

## (undated) DEVICE — SUT VICRYL 0 27" CT-1 UNDYED

## (undated) DEVICE — SUT VICRYL 2-0 27" CT-2 UNDYED

## (undated) DEVICE — DRSG COBAN 6"

## (undated) DEVICE — DRAPE TOWEL BLUE 17" X 24"

## (undated) DEVICE — DRSG DERMABOND PRINEO 22CM

## (undated) DEVICE — ZIMMER PULSAVAC PLUS FAN KIT

## (undated) DEVICE — DRAPE U 47X51" LF STERILE

## (undated) DEVICE — DRAPE 3/4 SHEET W REINFORCEMENT 56X77"

## (undated) DEVICE — GLV 8 PROTEXIS (BLUE)

## (undated) DEVICE — WARMING BLANKET LOWER ADULT

## (undated) DEVICE — LAP PAD 18 X 18"

## (undated) DEVICE — SOL IRR BAG NS 0.9% 3000ML

## (undated) DEVICE — GLV 8 PROTEXIS (WHITE)

## (undated) DEVICE — PACK BASIC

## (undated) DEVICE — SAW BLADE STRYKER SAGITTAL 24.7X0.89X73.7MM

## (undated) DEVICE — VISITEC 4X4

## (undated) DEVICE — DRAPE SPLIT SHEET 77" X 120"

## (undated) DEVICE — DRILL BIT TORNIER 3.2MM